# Patient Record
Sex: FEMALE | Race: WHITE | HISPANIC OR LATINO | Employment: OTHER | URBAN - METROPOLITAN AREA
[De-identification: names, ages, dates, MRNs, and addresses within clinical notes are randomized per-mention and may not be internally consistent; named-entity substitution may affect disease eponyms.]

---

## 2017-01-24 ENCOUNTER — OFFICE VISIT (OUTPATIENT)
Dept: BEHAVIORAL HEALTH | Facility: PHYSICIAN GROUP | Age: 52
End: 2017-01-24
Payer: MEDICARE

## 2017-01-24 VITALS
HEIGHT: 62 IN | BODY MASS INDEX: 32.2 KG/M2 | WEIGHT: 175 LBS | DIASTOLIC BLOOD PRESSURE: 87 MMHG | SYSTOLIC BLOOD PRESSURE: 118 MMHG | HEART RATE: 84 BPM

## 2017-01-24 DIAGNOSIS — F43.10 PTSD (POST-TRAUMATIC STRESS DISORDER): ICD-10-CM

## 2017-01-24 DIAGNOSIS — F31.32 BIPOLAR AFFECTIVE DISORDER, CURRENTLY DEPRESSED, MODERATE (HCC): ICD-10-CM

## 2017-01-24 PROBLEM — F31.9 BIPOLAR I DISORDER WITH MOOD-CONGRUENT PSYCHOTIC FEATURES (HCC): Status: ACTIVE | Noted: 2017-01-24

## 2017-01-24 PROBLEM — F31.81 BIPOLAR 2 DISORDER (HCC): Status: RESOLVED | Noted: 2017-01-24 | Resolved: 2017-01-24

## 2017-01-24 PROBLEM — F31.81 BIPOLAR 2 DISORDER (HCC): Status: ACTIVE | Noted: 2017-01-24

## 2017-01-24 PROBLEM — F31.9 BIPOLAR I DISORDER WITH MOOD-CONGRUENT PSYCHOTIC FEATURES (HCC): Status: RESOLVED | Noted: 2017-01-24 | Resolved: 2017-01-24

## 2017-01-24 PROCEDURE — G8484 FLU IMMUNIZE NO ADMIN: HCPCS | Performed by: PSYCHIATRY & NEUROLOGY

## 2017-01-24 PROCEDURE — 1036F TOBACCO NON-USER: CPT | Performed by: PSYCHIATRY & NEUROLOGY

## 2017-01-24 PROCEDURE — 99204 OFFICE O/P NEW MOD 45 MIN: CPT | Performed by: PSYCHIATRY & NEUROLOGY

## 2017-01-24 PROCEDURE — 3017F COLORECTAL CA SCREEN DOC REV: CPT | Mod: 8P | Performed by: PSYCHIATRY & NEUROLOGY

## 2017-01-24 PROCEDURE — 3014F SCREEN MAMMO DOC REV: CPT | Mod: 8P | Performed by: PSYCHIATRY & NEUROLOGY

## 2017-01-24 PROCEDURE — G8432 DEP SCR NOT DOC, RNG: HCPCS | Performed by: PSYCHIATRY & NEUROLOGY

## 2017-01-24 PROCEDURE — G8419 CALC BMI OUT NRM PARAM NOF/U: HCPCS | Performed by: PSYCHIATRY & NEUROLOGY

## 2017-01-24 RX ORDER — DIVALPROEX SODIUM 500 MG/1
500 TABLET, EXTENDED RELEASE ORAL DAILY
COMMUNITY
End: 2017-01-24

## 2017-01-24 RX ORDER — LORAZEPAM 1 MG/1
1 TABLET ORAL 2 TIMES DAILY
COMMUNITY
End: 2017-10-05

## 2017-01-24 RX ORDER — LURASIDONE HYDROCHLORIDE 20 MG/1
20 TABLET, FILM COATED ORAL
Qty: 60 TAB | Refills: 1 | Status: SHIPPED | OUTPATIENT
Start: 2017-01-24 | End: 2017-10-05
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
## 2017-03-22 ENCOUNTER — OFFICE VISIT (OUTPATIENT)
Dept: URGENT CARE | Facility: CLINIC | Age: 52
End: 2017-03-22
Payer: MEDICARE

## 2017-03-22 VITALS
TEMPERATURE: 97 F | HEIGHT: 62 IN | BODY MASS INDEX: 30.36 KG/M2 | RESPIRATION RATE: 14 BRPM | WEIGHT: 165 LBS | HEART RATE: 62 BPM | DIASTOLIC BLOOD PRESSURE: 72 MMHG | OXYGEN SATURATION: 98 % | SYSTOLIC BLOOD PRESSURE: 118 MMHG

## 2017-03-22 DIAGNOSIS — S70.02XA CONTUSION OF LEFT HIP, INITIAL ENCOUNTER: ICD-10-CM

## 2017-03-22 DIAGNOSIS — S80.12XA CONTUSION OF LEFT LOWER LEG, INITIAL ENCOUNTER: ICD-10-CM

## 2017-03-22 PROCEDURE — G8419 CALC BMI OUT NRM PARAM NOF/U: HCPCS | Performed by: NURSE PRACTITIONER

## 2017-03-22 PROCEDURE — 3017F COLORECTAL CA SCREEN DOC REV: CPT | Mod: 8P | Performed by: NURSE PRACTITIONER

## 2017-03-22 PROCEDURE — 1036F TOBACCO NON-USER: CPT | Performed by: NURSE PRACTITIONER

## 2017-03-22 PROCEDURE — G8432 DEP SCR NOT DOC, RNG: HCPCS | Performed by: NURSE PRACTITIONER

## 2017-03-22 PROCEDURE — 3014F SCREEN MAMMO DOC REV: CPT | Mod: 8P | Performed by: NURSE PRACTITIONER

## 2017-03-22 PROCEDURE — G8484 FLU IMMUNIZE NO ADMIN: HCPCS | Performed by: NURSE PRACTITIONER

## 2017-03-22 PROCEDURE — 99213 OFFICE O/P EST LOW 20 MIN: CPT | Performed by: NURSE PRACTITIONER

## 2017-03-22 NOTE — MR AVS SNAPSHOT
"Patti Tello   3/22/2017 8:30 AM   Office Visit   MRN: [id number]    Department:  [facility]   Dept Phone:  [phone]    Description:  Female : 1965   Provider:  VIPIN Crouch           Reason for Visit     Fall pt states she fell in the shower and landed on (L) side aprox an hour ago      Allergies as of 3/22/2017     No Known Allergies      You were diagnosed with     Contusion of left lower leg, initial encounter   [535206]       Contusion of left hip, initial encounter   [743977]         Vital Signs     Blood Pressure Pulse Temperature Respirations Height Weight    118/72 mmHg 62 36.1 °C (97 °F) 14 1.575 m (5' 2\") 74.844 kg (165 lb)    Body Mass Index Oxygen Saturation Smoking Status             30.17 kg/m2 98% Never Smoker          Basic Information     Date Of Birth Sex Race Ethnicity Preferred Language    1965 Female  or  Non- English      Problem List              ICD-10-CM Priority Class Noted - Resolved    Herniated nucleus pulposus, L4-5 M51.26   3/18/2010 - Present    Spondylisthesis M43.10   3/18/2010 - Present    Insomnia G47.00   11/10/2010 - Present    Left lumbar radiculopathy M54.16   11/10/2010 - Present    Arthritis M19.90   Unknown - Present    Cancer (CMS-HCC) C80.1   Unknown - Present    Back injury S39.92XA   Unknown - Present    Chronic neck pain M54.2, G89.29   Unknown - Present    Chronic low back pain M54.5, G89.29   Unknown - Present    Vitamin D insufficiency E55.9   2010 - Present    Panic attacks F41.0   Unknown - Present    PTSD (post-traumatic stress disorder) F43.10   Unknown - Present    Encounter for long-term (current) use of other medications Z79.899   2011 - Present    Cervical radiculopathy, chronic M54.12   2011 - Present    Itching due to drug T50.901A, L29.8   2011 - Present    Cold intolerance R68.89   2012 - Present    Exertional dyspnea R06.09   2012 - Present   " Degenerative arthritis of cervical spine M47.812   2/8/2012 - Present    Radiculopathy, lumbar region M54.16   10/1/2012 - Present    Spondylolisthesis of lumbar region M43.16   10/1/2012 - Present    Low back pain M54.5   10/16/2012 - Present    Lumbar radiculopathy, acute M54.16   10/16/2012 - Present    Cervicalgia M54.2   10/22/2012 - Present    Thoracic or lumbosacral neuritis or radiculitis, unspecified [id number]   10/22/2012 - Present    Lumbar stenosis M48.06 High  10/22/2012 - Present    S/P laminectomy Z98.890   10/22/2012 - Present    Anxiety F41.9   12/6/2012 - Present    Chronic UTI N39.0   9/16/2013 - Present    Chronic pain syndrome G89.4   9/16/2013 - Present    Complicated UTI (urinary tract infection) N39.0   10/11/2013 - Present    Nephrolithiasis N20.0   10/11/2013 - Present    H/O gastric bypass Z98.890   10/11/2013 - Present    HX: breast cancer Z85.3   10/11/2013 - Present    Shortness of breath R06.02   9/6/2016 - Present    Bipolar affective disorder, currently depressed, moderate (CMS-HCC) F31.32   1/24/2017 - Present      Health Maintenance        Date Due Completion Dates    IMM DTaP/Tdap/Td Vaccine (1 - Tdap) 9/25/1983 ---    PAP SMEAR 9/25/1985 ---    MAMMOGRAM 7/26/2013 7/26/2012, 9/9/2011, 5/20/2009, 5/20/2009, 12/12/2005, 10/14/2004    COLONOSCOPY 9/25/2014 ---    IMM INFLUENZA (1) 9/1/2016 10/2/2012            Current Immunizations     Influenza TIV (IM) 10/2/2012  9:34 AM      Below and/or attached are the medications your provider expects you to take. Review all of your home medications and newly ordered medications with your provider and/or pharmacist. Follow medication instructions as directed by your provider and/or pharmacist. Please keep your medication list with you and share with your provider. Update the information when medications are discontinued, doses are changed, or new medications (including over-the-counter products) are added; and carry medication information at all  times in the event of emergency situations     Allergies:  No Known Allergies          Medications  Valid as of: March 22, 2017 -  9:00 AM    Generic Name Brand Name Tablet Size Instructions for use    Albuterol Sulfate (Aero Soln) albuterol 108 (90 BASE) MCG/ACT Inhale 2 Puffs by mouth every 6 hours as needed for Shortness of Breath.        Budesonide-Formoterol Fumarate (Aerosol) SYMBICORT 160-4.5 MCG/ACT Inhale 1 Puff by mouth 2 Times a Day.        Fluticasone Propionate (Suspension) FLONASE 50 MCG/ACT Spray 1 Spray in nose 2 times a day. Each Nostril        LORazepam (Tab) ATIVAN 1 MG Take 1 mg by mouth 2 Times a Day.        Lurasidone HCl (Tab) LATUDA 20 MG Take 1 Tab by mouth with dinner.        Meclizine HCl (Tab) ANTIVERT 12.5 MG Take 1 Tab by mouth 3 times a day as needed.        Methadone HCl (Tab) DOLOPHINE 10 MG Take 10 mg by mouth 3 times a day as needed. Indications: Moderate to Severe Pain        Nitrofurantoin Monohyd Macro (Cap) MACROBID 100 MG Take 100 mg by mouth 2 times a day.        Ondansetron HCl (Tab) ZOFRAN 4 MG Take 1 Tab by mouth every 6 hours as needed for Nausea/Vomiting.        Ospemifene (Tab) Ospemifene 60 MG Take  by mouth.        OxyCODONE HCl (Tab) ROXICODONE 10 MG Take 20 mg by mouth every 6 hours as needed for Moderate Pain.        Sertraline HCl (Tab) ZOLOFT 50 MG Take 1 Tab by mouth every day.        Tiotropium Bromide Monohydrate (Cap) SPIRIVA 18 MCG Inhale 1 Cap by mouth every day.        .                 Medicines prescribed today were sent to:     [facility] [id number] [place] [address], [place] - [address] [facility] [address] [facility]    [address] [facility] [address]    Phone: [phone] Fax: [phone]    Open 24 Hours?: Yes      Medication refill instructions:       If your prescription bottle indicates you have medication refills left, it is not necessary to call your provider’s office. Please contact your pharmacy and they will refill your medication.    If your  prescription bottle indicates you do not have any refills left, you may request refills at any time through one of the following ways: The online Patsnap system (except Urgent Care), by calling your provider’s office, or by asking your pharmacy to contact your provider’s office with a refill request. Medication refills are processed only during regular business hours and may not be available until the next business day. Your provider may request additional information or to have a follow-up visit with you prior to refilling your medication.   *Please Note: Medication refills are assigned a new Rx number when refilled electronically. Your pharmacy may indicate that no refills were authorized even though a new prescription for the same medication is available at the pharmacy. Please request the medicine by name with the pharmacy before contacting your provider for a refill.        Other Notes About Your Plan     Violation of narcotic contract.  Neg uds twice while on norco.  Will not prescribe narcotics.  See tel encounter dated 2/4/11.           Patsnap Access Code: [email or web address]  Expires: 4/21/2017  8:25 AM    Patsnap  A secure, online tool to manage your health information     [employer]’s Patsnap® is a secure, online tool that connects you to your personalized health information from the privacy of your home -- day or night - making it very easy for you to manage your healthcare. Once the activation process is completed, you can even access your medical information using the Patsnap sena, which is available for free in the Apple Sena store or Google Play store.     Patsnap provides the following levels of access (as shown below):   My Chart Features   [facility] Doctor [facility]  Specialists [facility]  Urgent  Care Non-[facility]  Primary Care  Doctor   Email your healthcare team securely and privately 24/7 X X X    Manage appointments: schedule your next appointment; view details of past/upcoming appointments  X      Request prescription refills. X      View recent personal medical records, including lab and immunizations X X X X   View health record, including health history, allergies, medications X X X X   Read reports about your outpatient visits, procedures, consult and ER notes X X X X   See your discharge summary, which is a recap of your hospital and/or ER visit that includes your diagnosis, lab results, and care plan. X X       How to register for Wellsphere:  1. Go to  [email or web address].  2. Click on the Sign Up Now box, which takes you to the New Member Sign Up page. You will need to provide the following information:  a. Enter your Wellsphere Access Code exactly as it appears at the top of this page. (You will not need to use this code after you’ve completed the sign-up process. If you do not sign up before the expiration date, you must request a new code.)   b. Enter your date of birth.   c. Enter your home email address.   d. Click Submit, and follow the next screen’s instructions.  3. Create a Wellsphere ID. This will be your Wellsphere login ID and cannot be changed, so think of one that is secure and easy to remember.  4. Create a Wellsphere password. You can change your password at any time.  5. Enter your Password Reset Question and Answer. This can be used at a later time if you forget your password.   6. Enter your e-mail address. This allows you to receive e-mail notifications when new information is available in Wellsphere.  7. Click Sign Up. You can now view your health information.    For assistance activating your Wellsphere account, call [phone]

## 2017-03-22 NOTE — PROGRESS NOTES
"Subjective:      Patti Tello is a 51 y.o. female who presents with Fall            HPI this is a new problem. 51 year female with fall in bathrub this morning with now she has left hip pain and left lower leg pain. She does not recall hitting anything, just slid down into tub. She rates her pain as 10/10 and states her tolerance for pain is \"high\". No treatment for this at this time.   Allergies, medications and history reviewed by me today      ROS  Please see HPI       Objective:     /72 mmHg  Pulse 62  Temp(Src) 36.1 °C (97 °F)  Resp 14  Ht 1.575 m (5' 2\")  Wt 74.844 kg (165 lb)  BMI 30.17 kg/m2  SpO2 98%     Physical Exam   Constitutional: She is oriented to person, place, and time. She appears well-developed and well-nourished.   Musculoskeletal: Normal range of motion.        Left hip: She exhibits tenderness and bony tenderness.        Left lower leg: She exhibits tenderness, bony tenderness and swelling.        Legs:  Neurological: She is alert and oriented to person, place, and time. Gait abnormal.   Skin: Skin is warm and dry.   Psychiatric: She has a normal mood and affect. Her behavior is normal. Thought content normal.   Nursing note and vitals reviewed.              Assessment/Plan:     1. Contusion of left lower leg, initial encounter     2. Contusion of left hip, initial encounter       Recommend ice/extra strength tylenol.  Follow up as needed.      "

## 2017-06-19 ENCOUNTER — HOSPITAL ENCOUNTER (OUTPATIENT)
Dept: LAB | Facility: MEDICAL CENTER | Age: 52
End: 2017-06-19
Attending: INTERNAL MEDICINE
Payer: MEDICARE

## 2017-06-19 LAB
ALBUMIN SERPL BCP-MCNC: 3.9 G/DL (ref 3.2–4.9)
ALBUMIN/GLOB SERPL: 1.1 G/DL
ALP SERPL-CCNC: 142 U/L (ref 30–99)
ALT SERPL-CCNC: 12 U/L (ref 2–50)
ANION GAP SERPL CALC-SCNC: 4 MMOL/L (ref 0–11.9)
AST SERPL-CCNC: 14 U/L (ref 12–45)
BASOPHILS # BLD AUTO: 0.9 % (ref 0–1.8)
BASOPHILS # BLD: 0.07 K/UL (ref 0–0.12)
BILIRUB SERPL-MCNC: 0.4 MG/DL (ref 0.1–1.5)
BUN SERPL-MCNC: 14 MG/DL (ref 8–22)
CALCIUM SERPL-MCNC: 9.2 MG/DL (ref 8.5–10.5)
CHLORIDE SERPL-SCNC: 110 MMOL/L (ref 96–112)
CHOLEST SERPL-MCNC: 186 MG/DL (ref 100–199)
CO2 SERPL-SCNC: 25 MMOL/L (ref 20–33)
CREAT SERPL-MCNC: 0.74 MG/DL (ref 0.5–1.4)
EOSINOPHIL # BLD AUTO: 0.37 K/UL (ref 0–0.51)
EOSINOPHIL NFR BLD: 4.7 % (ref 0–6.9)
ERYTHROCYTE [DISTWIDTH] IN BLOOD BY AUTOMATED COUNT: 48 FL (ref 35.9–50)
GFR SERPL CREATININE-BSD FRML MDRD: >60 ML/MIN/1.73 M 2
GLOBULIN SER CALC-MCNC: 3.4 G/DL (ref 1.9–3.5)
GLUCOSE SERPL-MCNC: 100 MG/DL (ref 65–99)
HCT VFR BLD AUTO: 38.8 % (ref 37–47)
HDLC SERPL-MCNC: 57 MG/DL
HGB BLD-MCNC: 12.1 G/DL (ref 12–16)
IMM GRANULOCYTES # BLD AUTO: 0.02 K/UL (ref 0–0.11)
IMM GRANULOCYTES NFR BLD AUTO: 0.3 % (ref 0–0.9)
LDLC SERPL CALC-MCNC: 103 MG/DL
LYMPHOCYTES # BLD AUTO: 2.37 K/UL (ref 1–4.8)
LYMPHOCYTES NFR BLD: 30.2 % (ref 22–41)
MCH RBC QN AUTO: 24.9 PG (ref 27–33)
MCHC RBC AUTO-ENTMCNC: 31.2 G/DL (ref 33.6–35)
MCV RBC AUTO: 80 FL (ref 81.4–97.8)
MONOCYTES # BLD AUTO: 0.41 K/UL (ref 0–0.85)
MONOCYTES NFR BLD AUTO: 5.2 % (ref 0–13.4)
NEUTROPHILS # BLD AUTO: 4.6 K/UL (ref 2–7.15)
NEUTROPHILS NFR BLD: 58.7 % (ref 44–72)
NRBC # BLD AUTO: 0 K/UL
NRBC BLD AUTO-RTO: 0 /100 WBC
PLATELET # BLD AUTO: 314 K/UL (ref 164–446)
PMV BLD AUTO: 11.4 FL (ref 9–12.9)
POTASSIUM SERPL-SCNC: 3.5 MMOL/L (ref 3.6–5.5)
PROT SERPL-MCNC: 7.3 G/DL (ref 6–8.2)
RBC # BLD AUTO: 4.85 M/UL (ref 4.2–5.4)
SODIUM SERPL-SCNC: 139 MMOL/L (ref 135–145)
TRIGL SERPL-MCNC: 130 MG/DL (ref 0–149)
WBC # BLD AUTO: 7.8 K/UL (ref 4.8–10.8)

## 2017-06-19 PROCEDURE — 80053 COMPREHEN METABOLIC PANEL: CPT | Mod: GY

## 2017-06-19 PROCEDURE — 85025 COMPLETE CBC W/AUTO DIFF WBC: CPT | Mod: GY

## 2017-06-19 PROCEDURE — 36415 COLL VENOUS BLD VENIPUNCTURE: CPT | Mod: GY

## 2017-06-19 PROCEDURE — 80061 LIPID PANEL: CPT | Mod: GY

## 2017-06-20 ENCOUNTER — HOSPITAL ENCOUNTER (OUTPATIENT)
Dept: LAB | Facility: MEDICAL CENTER | Age: 52
End: 2017-06-20
Attending: INTERNAL MEDICINE
Payer: MEDICARE

## 2017-06-20 VITALS
TEMPERATURE: 98.1 F | OXYGEN SATURATION: 98 % | BODY MASS INDEX: 31.04 KG/M2 | HEART RATE: 113 BPM | RESPIRATION RATE: 19 BRPM | WEIGHT: 168.65 LBS | DIASTOLIC BLOOD PRESSURE: 93 MMHG | SYSTOLIC BLOOD PRESSURE: 144 MMHG | HEIGHT: 62 IN

## 2017-06-20 PROCEDURE — 84080 ASSAY ALKALINE PHOSPHATASES: CPT

## 2017-06-20 PROCEDURE — 84075 ASSAY ALKALINE PHOSPHATASE: CPT

## 2017-06-20 PROCEDURE — 36415 COLL VENOUS BLD VENIPUNCTURE: CPT

## 2017-06-20 PROCEDURE — 302449 STATCHG TRIAGE ONLY (STATISTIC)

## 2017-06-20 ASSESSMENT — PAIN SCALES - GENERAL: PAINLEVEL_OUTOF10: 10

## 2017-06-21 ENCOUNTER — HOSPITAL ENCOUNTER (EMERGENCY)
Facility: MEDICAL CENTER | Age: 52
End: 2017-06-21
Payer: MEDICARE

## 2017-06-21 NOTE — ED NOTES
"Patti Tello  51 y.o.  Chief Complaint   Patient presents with   • Shoulder Pain     RIGHT   • Low Back Pain       Ambulatory to triage with above complaint. Patient has had pain for > 1 month. Has seen her PCP multiple times for this pain. States that he was prescribed pain medication at home \"but they haven't been working and I even forget to take them sometimes.\" Patient crying in triage. States that she has bloodwork done at the [facility] yesterday and today per her PCP.      Triage process explained to patient, apologized for wait time, and returned to [facility].  "

## 2017-06-23 LAB
ALP BONE SERPL-CCNC: 81 U/L (ref 0–55)
ALP ISOS SERPL HS-CCNC: 0 U/L
ALP LIVER SERPL-CCNC: 77 U/L (ref 0–94)
ALP SERPL-CCNC: 158 U/L (ref 40–120)

## 2017-06-28 ENCOUNTER — HOSPITAL ENCOUNTER (OUTPATIENT)
Dept: LAB | Facility: MEDICAL CENTER | Age: 52
End: 2017-06-28
Attending: INTERNAL MEDICINE
Payer: MEDICARE

## 2017-06-28 LAB
EST. AVERAGE GLUCOSE BLD GHB EST-MCNC: 117 MG/DL
HBA1C MFR BLD: 5.7 % (ref 0–5.6)

## 2017-06-28 PROCEDURE — 36415 COLL VENOUS BLD VENIPUNCTURE: CPT | Mod: GA

## 2017-06-28 PROCEDURE — 83036 HEMOGLOBIN GLYCOSYLATED A1C: CPT | Mod: GA

## 2017-06-28 PROCEDURE — 84080 ASSAY ALKALINE PHOSPHATASES: CPT

## 2017-06-28 PROCEDURE — 84075 ASSAY ALKALINE PHOSPHATASE: CPT

## 2017-06-29 ENCOUNTER — HOSPITAL ENCOUNTER (OUTPATIENT)
Dept: HOSPITAL 8 - CFH | Age: 52
Discharge: HOME | End: 2017-06-29
Attending: NEUROLOGICAL SURGERY
Payer: COMMERCIAL

## 2017-06-29 DIAGNOSIS — M51.36: ICD-10-CM

## 2017-06-29 DIAGNOSIS — Z12.31: Primary | ICD-10-CM

## 2017-06-29 DIAGNOSIS — M43.27: ICD-10-CM

## 2017-06-29 DIAGNOSIS — M47.892: ICD-10-CM

## 2017-06-29 DIAGNOSIS — Z98.890: ICD-10-CM

## 2017-06-29 LAB
ALP BONE SERPL-CCNC: 82 U/L (ref 0–55)
ALP ISOS SERPL HS-CCNC: 0 U/L
ALP LIVER SERPL-CCNC: 85 U/L (ref 0–94)
ALP SERPL-CCNC: 167 U/L (ref 40–120)

## 2017-06-29 PROCEDURE — 72110 X-RAY EXAM L-2 SPINE 4/>VWS: CPT

## 2017-06-29 PROCEDURE — 72050 X-RAY EXAM NECK SPINE 4/5VWS: CPT

## 2017-06-29 PROCEDURE — G0202 SCR MAMMO BI INCL CAD: HCPCS

## 2017-06-30 ENCOUNTER — HOSPITAL ENCOUNTER (OUTPATIENT)
Dept: HOSPITAL 8 - CFH | Age: 52
Discharge: HOME | End: 2017-06-30
Attending: NEUROLOGICAL SURGERY
Payer: MEDICARE

## 2017-06-30 DIAGNOSIS — M50.21: ICD-10-CM

## 2017-06-30 DIAGNOSIS — M48.07: ICD-10-CM

## 2017-06-30 DIAGNOSIS — Z85.3: ICD-10-CM

## 2017-06-30 DIAGNOSIS — Z98.890: ICD-10-CM

## 2017-06-30 DIAGNOSIS — M50.323: Primary | ICD-10-CM

## 2017-06-30 DIAGNOSIS — M51.24: ICD-10-CM

## 2017-06-30 DIAGNOSIS — M48.06: ICD-10-CM

## 2017-06-30 PROCEDURE — 72141 MRI NECK SPINE W/O DYE: CPT

## 2017-06-30 PROCEDURE — A9585 GADOBUTROL INJECTION: HCPCS

## 2017-06-30 PROCEDURE — 72158 MRI LUMBAR SPINE W/O & W/DYE: CPT

## 2017-07-06 ENCOUNTER — HOSPITAL ENCOUNTER (EMERGENCY)
Dept: HOSPITAL 8 - ED | Age: 52
Discharge: HOME | End: 2017-07-06
Payer: MEDICARE

## 2017-07-06 VITALS — BODY MASS INDEX: 31.08 KG/M2 | WEIGHT: 168.87 LBS | HEIGHT: 62 IN

## 2017-07-06 VITALS — DIASTOLIC BLOOD PRESSURE: 73 MMHG | SYSTOLIC BLOOD PRESSURE: 121 MMHG

## 2017-07-06 DIAGNOSIS — G89.29: Primary | ICD-10-CM

## 2017-07-06 DIAGNOSIS — M54.12: ICD-10-CM

## 2017-07-06 DIAGNOSIS — S09.90XA: ICD-10-CM

## 2017-07-06 DIAGNOSIS — M54.5: ICD-10-CM

## 2017-07-06 LAB — BUN SERPL-MCNC: 10 MG/DL (ref 7–18)

## 2017-07-06 PROCEDURE — 80048 BASIC METABOLIC PNL TOTAL CA: CPT

## 2017-07-06 PROCEDURE — 36415 COLL VENOUS BLD VENIPUNCTURE: CPT

## 2017-07-06 PROCEDURE — 96375 TX/PRO/DX INJ NEW DRUG ADDON: CPT

## 2017-07-06 PROCEDURE — 99284 EMERGENCY DEPT VISIT MOD MDM: CPT

## 2017-07-06 PROCEDURE — 96374 THER/PROPH/DIAG INJ IV PUSH: CPT

## 2017-07-06 PROCEDURE — 85025 COMPLETE CBC W/AUTO DIFF WBC: CPT

## 2017-07-06 PROCEDURE — 82040 ASSAY OF SERUM ALBUMIN: CPT

## 2017-07-10 ENCOUNTER — HOSPITAL ENCOUNTER (EMERGENCY)
Dept: HOSPITAL 8 - ED | Age: 52
Discharge: HOME | End: 2017-07-10
Payer: MEDICARE

## 2017-07-10 VITALS — WEIGHT: 170.2 LBS | BODY MASS INDEX: 31.32 KG/M2 | HEIGHT: 62 IN

## 2017-07-10 VITALS — DIASTOLIC BLOOD PRESSURE: 67 MMHG | SYSTOLIC BLOOD PRESSURE: 109 MMHG

## 2017-07-10 DIAGNOSIS — Z90.710: ICD-10-CM

## 2017-07-10 DIAGNOSIS — M51.36: ICD-10-CM

## 2017-07-10 DIAGNOSIS — N30.00: Primary | ICD-10-CM

## 2017-07-10 DIAGNOSIS — M47.896: ICD-10-CM

## 2017-07-10 LAB
AST SERPL-CCNC: 23 U/L (ref 15–37)
BUN SERPL-MCNC: 13 MG/DL (ref 7–18)
PATH.CAST-FLAG: (no result)
SPERM-FLAG: (no result)
SRC-FLAG: (no result)
XTAL-FLAG: (no result)
YLC-FLAG: (no result)

## 2017-07-10 PROCEDURE — 36415 COLL VENOUS BLD VENIPUNCTURE: CPT

## 2017-07-10 PROCEDURE — 85025 COMPLETE CBC W/AUTO DIFF WBC: CPT

## 2017-07-10 PROCEDURE — 80053 COMPREHEN METABOLIC PANEL: CPT

## 2017-07-10 PROCEDURE — 99285 EMERGENCY DEPT VISIT HI MDM: CPT

## 2017-07-10 PROCEDURE — 74176 CT ABD & PELVIS W/O CONTRAST: CPT

## 2017-07-10 PROCEDURE — 87086 URINE CULTURE/COLONY COUNT: CPT

## 2017-07-10 PROCEDURE — 96372 THER/PROPH/DIAG INJ SC/IM: CPT

## 2017-07-10 PROCEDURE — 87077 CULTURE AEROBIC IDENTIFY: CPT

## 2017-07-10 PROCEDURE — 81001 URINALYSIS AUTO W/SCOPE: CPT

## 2017-10-05 ENCOUNTER — HOSPITAL ENCOUNTER (OUTPATIENT)
Facility: MEDICAL CENTER | Age: 52
End: 2017-10-06
Attending: EMERGENCY MEDICINE | Admitting: HOSPITALIST
Payer: MEDICARE

## 2017-10-05 ENCOUNTER — OFFICE VISIT (OUTPATIENT)
Dept: URGENT CARE | Facility: CLINIC | Age: 52
End: 2017-10-05
Payer: MEDICARE

## 2017-10-05 ENCOUNTER — APPOINTMENT (OUTPATIENT)
Dept: RADIOLOGY | Facility: MEDICAL CENTER | Age: 52
End: 2017-10-05
Attending: EMERGENCY MEDICINE
Payer: MEDICARE

## 2017-10-05 ENCOUNTER — RESOLUTE PROFESSIONAL BILLING HOSPITAL PROF FEE (OUTPATIENT)
Dept: HOSPITALIST | Facility: MEDICAL CENTER | Age: 52
End: 2017-10-05
Payer: MEDICARE

## 2017-10-05 VITALS
OXYGEN SATURATION: 98 % | HEART RATE: 98 BPM | DIASTOLIC BLOOD PRESSURE: 72 MMHG | SYSTOLIC BLOOD PRESSURE: 112 MMHG | RESPIRATION RATE: 18 BRPM | TEMPERATURE: 98.2 F

## 2017-10-05 DIAGNOSIS — R68.84 JAW PAIN: ICD-10-CM

## 2017-10-05 DIAGNOSIS — R07.9 CHEST PAIN, UNSPECIFIED TYPE: ICD-10-CM

## 2017-10-05 DIAGNOSIS — M79.602 LEFT ARM PAIN: ICD-10-CM

## 2017-10-05 DIAGNOSIS — R06.02 SHORTNESS OF BREATH: ICD-10-CM

## 2017-10-05 PROBLEM — G89.29 CHRONIC BACK PAIN: Status: ACTIVE | Noted: 2017-10-05

## 2017-10-05 PROBLEM — M54.9 CHRONIC BACK PAIN: Status: ACTIVE | Noted: 2017-10-05

## 2017-10-05 PROBLEM — J44.9 COPD (CHRONIC OBSTRUCTIVE PULMONARY DISEASE) (HCC): Status: ACTIVE | Noted: 2017-10-05

## 2017-10-05 LAB
ALBUMIN SERPL BCP-MCNC: 3.7 G/DL (ref 3.2–4.9)
ALBUMIN/GLOB SERPL: 1.2 G/DL
ALP SERPL-CCNC: 150 U/L (ref 30–99)
ALT SERPL-CCNC: 11 U/L (ref 2–50)
ANION GAP SERPL CALC-SCNC: 9 MMOL/L (ref 0–11.9)
APTT PPP: 25.7 SEC (ref 24.7–36)
AST SERPL-CCNC: 13 U/L (ref 12–45)
BASOPHILS # BLD AUTO: 1 % (ref 0–1.8)
BASOPHILS # BLD: 0.07 K/UL (ref 0–0.12)
BILIRUB SERPL-MCNC: 0.3 MG/DL (ref 0.1–1.5)
BLOOD CULTURE HOLD CXBCH: NORMAL
BLOOD CULTURE HOLD CXBCH: NORMAL
BNP SERPL-MCNC: 19 PG/ML (ref 0–100)
BUN SERPL-MCNC: 11 MG/DL (ref 8–22)
CALCIUM SERPL-MCNC: 9.2 MG/DL (ref 8.5–10.5)
CHLORIDE SERPL-SCNC: 111 MMOL/L (ref 96–112)
CO2 SERPL-SCNC: 23 MMOL/L (ref 20–33)
CREAT SERPL-MCNC: 0.66 MG/DL (ref 0.5–1.4)
EKG IMPRESSION: NORMAL
EKG IMPRESSION: NORMAL
EOSINOPHIL # BLD AUTO: 0.15 K/UL (ref 0–0.51)
EOSINOPHIL NFR BLD: 2.1 % (ref 0–6.9)
ERYTHROCYTE [DISTWIDTH] IN BLOOD BY AUTOMATED COUNT: 45.1 FL (ref 35.9–50)
GFR SERPL CREATININE-BSD FRML MDRD: >60 ML/MIN/1.73 M 2
GLOBULIN SER CALC-MCNC: 3.2 G/DL (ref 1.9–3.5)
GLUCOSE SERPL-MCNC: 73 MG/DL (ref 65–99)
HCT VFR BLD AUTO: 35.6 % (ref 37–47)
HGB BLD-MCNC: 11.1 G/DL (ref 12–16)
IMM GRANULOCYTES # BLD AUTO: 0.04 K/UL (ref 0–0.11)
IMM GRANULOCYTES NFR BLD AUTO: 0.6 % (ref 0–0.9)
INR PPP: 1.02 (ref 0.87–1.13)
LIPASE SERPL-CCNC: 28 U/L (ref 11–82)
LYMPHOCYTES # BLD AUTO: 1.79 K/UL (ref 1–4.8)
LYMPHOCYTES NFR BLD: 24.8 % (ref 22–41)
MAGNESIUM SERPL-MCNC: 2.2 MG/DL (ref 1.5–2.5)
MCH RBC QN AUTO: 25 PG (ref 27–33)
MCHC RBC AUTO-ENTMCNC: 31.2 G/DL (ref 33.6–35)
MCV RBC AUTO: 80.2 FL (ref 81.4–97.8)
MONOCYTES # BLD AUTO: 0.47 K/UL (ref 0–0.85)
MONOCYTES NFR BLD AUTO: 6.5 % (ref 0–13.4)
NEUTROPHILS # BLD AUTO: 4.69 K/UL (ref 2–7.15)
NEUTROPHILS NFR BLD: 65 % (ref 44–72)
NRBC # BLD AUTO: 0 K/UL
NRBC BLD AUTO-RTO: 0 /100 WBC
PLATELET # BLD AUTO: 301 K/UL (ref 164–446)
PMV BLD AUTO: 10.8 FL (ref 9–12.9)
POTASSIUM SERPL-SCNC: 3.2 MMOL/L (ref 3.6–5.5)
PROT SERPL-MCNC: 6.9 G/DL (ref 6–8.2)
PROTHROMBIN TIME: 13.7 SEC (ref 12–14.6)
RBC # BLD AUTO: 4.44 M/UL (ref 4.2–5.4)
SODIUM SERPL-SCNC: 143 MMOL/L (ref 135–145)
TROPONIN I SERPL-MCNC: <0.01 NG/ML (ref 0–0.04)
TSH SERPL DL<=0.005 MIU/L-ACNC: 0.99 UIU/ML (ref 0.3–3.7)
WBC # BLD AUTO: 7.2 K/UL (ref 4.8–10.8)

## 2017-10-05 PROCEDURE — 99215 OFFICE O/P EST HI 40 MIN: CPT | Performed by: PHYSICIAN ASSISTANT

## 2017-10-05 PROCEDURE — A9270 NON-COVERED ITEM OR SERVICE: HCPCS | Performed by: FAMILY MEDICINE

## 2017-10-05 PROCEDURE — 700111 HCHG RX REV CODE 636 W/ 250 OVERRIDE (IP): Performed by: HOSPITALIST

## 2017-10-05 PROCEDURE — 93005 ELECTROCARDIOGRAM TRACING: CPT | Performed by: HOSPITALIST

## 2017-10-05 PROCEDURE — 84484 ASSAY OF TROPONIN QUANT: CPT | Mod: 91

## 2017-10-05 PROCEDURE — 700102 HCHG RX REV CODE 250 W/ 637 OVERRIDE(OP): Performed by: FAMILY MEDICINE

## 2017-10-05 PROCEDURE — G0378 HOSPITAL OBSERVATION PER HR: HCPCS

## 2017-10-05 PROCEDURE — 99220 PR INITIAL OBSERVATION CARE,LEVL III: CPT | Performed by: HOSPITALIST

## 2017-10-05 PROCEDURE — 93005 ELECTROCARDIOGRAM TRACING: CPT | Performed by: EMERGENCY MEDICINE

## 2017-10-05 PROCEDURE — 93010 ELECTROCARDIOGRAM REPORT: CPT | Performed by: INTERNAL MEDICINE

## 2017-10-05 PROCEDURE — 85730 THROMBOPLASTIN TIME PARTIAL: CPT

## 2017-10-05 PROCEDURE — 71010 DX-CHEST-LIMITED (1 VIEW): CPT

## 2017-10-05 PROCEDURE — 700102 HCHG RX REV CODE 250 W/ 637 OVERRIDE(OP): Performed by: EMERGENCY MEDICINE

## 2017-10-05 PROCEDURE — 85025 COMPLETE CBC W/AUTO DIFF WBC: CPT

## 2017-10-05 PROCEDURE — 84443 ASSAY THYROID STIM HORMONE: CPT

## 2017-10-05 PROCEDURE — 93005 ELECTROCARDIOGRAM TRACING: CPT

## 2017-10-05 PROCEDURE — 99285 EMERGENCY DEPT VISIT HI MDM: CPT

## 2017-10-05 PROCEDURE — 90471 IMMUNIZATION ADMIN: CPT

## 2017-10-05 PROCEDURE — A9270 NON-COVERED ITEM OR SERVICE: HCPCS | Performed by: EMERGENCY MEDICINE

## 2017-10-05 PROCEDURE — 83880 ASSAY OF NATRIURETIC PEPTIDE: CPT

## 2017-10-05 PROCEDURE — 83690 ASSAY OF LIPASE: CPT

## 2017-10-05 PROCEDURE — A9270 NON-COVERED ITEM OR SERVICE: HCPCS | Performed by: HOSPITALIST

## 2017-10-05 PROCEDURE — 96365 THER/PROPH/DIAG IV INF INIT: CPT

## 2017-10-05 PROCEDURE — 96372 THER/PROPH/DIAG INJ SC/IM: CPT | Mod: XU

## 2017-10-05 PROCEDURE — 85610 PROTHROMBIN TIME: CPT

## 2017-10-05 PROCEDURE — 700102 HCHG RX REV CODE 250 W/ 637 OVERRIDE(OP): Performed by: HOSPITALIST

## 2017-10-05 PROCEDURE — 90686 IIV4 VACC NO PRSV 0.5 ML IM: CPT | Performed by: HOSPITALIST

## 2017-10-05 PROCEDURE — 36415 COLL VENOUS BLD VENIPUNCTURE: CPT

## 2017-10-05 PROCEDURE — 83735 ASSAY OF MAGNESIUM: CPT

## 2017-10-05 PROCEDURE — 80053 COMPREHEN METABOLIC PANEL: CPT

## 2017-10-05 PROCEDURE — 94760 N-INVAS EAR/PLS OXIMETRY 1: CPT

## 2017-10-05 RX ORDER — ARIPIPRAZOLE 10 MG/1
20 TABLET ORAL
Status: DISCONTINUED | OUTPATIENT
Start: 2017-10-05 | End: 2017-10-06 | Stop reason: HOSPADM

## 2017-10-05 RX ORDER — METHADONE HYDROCHLORIDE 10 MG/1
5-10 TABLET ORAL
Status: DISCONTINUED | OUTPATIENT
Start: 2017-10-05 | End: 2017-10-06 | Stop reason: HOSPADM

## 2017-10-05 RX ORDER — ASPIRIN 325 MG
325 TABLET ORAL DAILY
Status: DISCONTINUED | OUTPATIENT
Start: 2017-10-05 | End: 2017-10-06 | Stop reason: HOSPADM

## 2017-10-05 RX ORDER — ASPIRIN 325 MG
325 TABLET ORAL ONCE
Status: COMPLETED | OUTPATIENT
Start: 2017-10-05 | End: 2017-10-05

## 2017-10-05 RX ORDER — HEPARIN SODIUM 5000 [USP'U]/ML
5000 INJECTION, SOLUTION INTRAVENOUS; SUBCUTANEOUS EVERY 8 HOURS
Status: DISCONTINUED | OUTPATIENT
Start: 2017-10-05 | End: 2017-10-06 | Stop reason: HOSPADM

## 2017-10-05 RX ORDER — ASPIRIN 300 MG/1
300 SUPPOSITORY RECTAL DAILY
Status: DISCONTINUED | OUTPATIENT
Start: 2017-10-05 | End: 2017-10-06 | Stop reason: HOSPADM

## 2017-10-05 RX ORDER — METHADONE HYDROCHLORIDE 10 MG/1
5-10 TABLET ORAL
COMMUNITY
End: 2017-12-22

## 2017-10-05 RX ORDER — ALBUTEROL SULFATE 90 UG/1
2 AEROSOL, METERED RESPIRATORY (INHALATION) EVERY 6 HOURS PRN
Status: DISCONTINUED | OUTPATIENT
Start: 2017-10-05 | End: 2017-10-05

## 2017-10-05 RX ORDER — LURASIDONE HYDROCHLORIDE 20 MG/1
20 TABLET, FILM COATED ORAL
Status: DISCONTINUED | OUTPATIENT
Start: 2017-10-05 | End: 2017-10-05

## 2017-10-05 RX ORDER — POLYETHYLENE GLYCOL 3350 17 G/17G
1 POWDER, FOR SOLUTION ORAL
Status: DISCONTINUED | OUTPATIENT
Start: 2017-10-05 | End: 2017-10-06 | Stop reason: HOSPADM

## 2017-10-05 RX ORDER — ARIPIPRAZOLE 20 MG/1
20 TABLET ORAL DAILY
COMMUNITY
End: 2018-01-18

## 2017-10-05 RX ORDER — BUDESONIDE AND FORMOTEROL FUMARATE DIHYDRATE 160; 4.5 UG/1; UG/1
1 AEROSOL RESPIRATORY (INHALATION) 2 TIMES DAILY
Status: DISCONTINUED | OUTPATIENT
Start: 2017-10-05 | End: 2017-10-05

## 2017-10-05 RX ORDER — ACETAMINOPHEN 325 MG/1
650 TABLET ORAL EVERY 6 HOURS PRN
Status: DISCONTINUED | OUTPATIENT
Start: 2017-10-05 | End: 2017-10-06 | Stop reason: HOSPADM

## 2017-10-05 RX ORDER — TIOTROPIUM BROMIDE 18 UG/1
1 CAPSULE ORAL; RESPIRATORY (INHALATION) DAILY
Status: DISCONTINUED | OUTPATIENT
Start: 2017-10-05 | End: 2017-10-05

## 2017-10-05 RX ORDER — AMOXICILLIN 250 MG
2 CAPSULE ORAL 2 TIMES DAILY
Status: DISCONTINUED | OUTPATIENT
Start: 2017-10-05 | End: 2017-10-06 | Stop reason: HOSPADM

## 2017-10-05 RX ORDER — ASPIRIN 81 MG/1
324 TABLET, CHEWABLE ORAL DAILY
Status: DISCONTINUED | OUTPATIENT
Start: 2017-10-05 | End: 2017-10-06 | Stop reason: HOSPADM

## 2017-10-05 RX ORDER — BISACODYL 10 MG
10 SUPPOSITORY, RECTAL RECTAL
Status: DISCONTINUED | OUTPATIENT
Start: 2017-10-05 | End: 2017-10-06 | Stop reason: HOSPADM

## 2017-10-05 RX ORDER — OXYCODONE HYDROCHLORIDE 5 MG/1
20 TABLET ORAL EVERY 6 HOURS PRN
Status: DISCONTINUED | OUTPATIENT
Start: 2017-10-05 | End: 2017-10-06 | Stop reason: HOSPADM

## 2017-10-05 RX ORDER — OXYCODONE HYDROCHLORIDE 20 MG/1
20 TABLET ORAL EVERY 6 HOURS PRN
COMMUNITY
End: 2018-01-05 | Stop reason: SDUPTHER

## 2017-10-05 RX ADMIN — ACETAMINOPHEN 650 MG: 325 TABLET, FILM COATED ORAL at 21:49

## 2017-10-05 RX ADMIN — CEFTRIAXONE 2 G: 2 INJECTION, SOLUTION INTRAVENOUS at 16:26

## 2017-10-05 RX ADMIN — HEPARIN SODIUM 5000 UNITS: 5000 INJECTION, SOLUTION INTRAVENOUS; SUBCUTANEOUS at 21:08

## 2017-10-05 RX ADMIN — Medication 325 MG: at 10:27

## 2017-10-05 RX ADMIN — NITROGLYCERIN 0.5 INCH: 20 OINTMENT TOPICAL at 11:37

## 2017-10-05 RX ADMIN — INFLUENZA A VIRUS A/MICHIGAN/45/2015 X-275 (H1N1) ANTIGEN (FORMALDEHYDE INACTIVATED), INFLUENZA A VIRUS A/HONG KONG/4801/2014 X-263B (H3N2) ANTIGEN (FORMALDEHYDE INACTIVATED), INFLUENZA B VIRUS B/PHUKET/3073/2013 ANTIGEN (FORMALDEHYDE INACTIVATED), AND INFLUENZA B VIRUS B/BRISBANE/60/2008 ANTIGEN (FORMALDEHYDE INACTIVATED) 0.5 ML: 15; 15; 15; 15 INJECTION, SUSPENSION INTRAMUSCULAR at 15:20

## 2017-10-05 RX ADMIN — SERTRALINE 50 MG: 50 TABLET, FILM COATED ORAL at 21:08

## 2017-10-05 RX ADMIN — ARIPIPRAZOLE 20 MG: 10 TABLET ORAL at 21:07

## 2017-10-05 RX ADMIN — OXYCODONE HYDROCHLORIDE 20 MG: 5 TABLET ORAL at 15:19

## 2017-10-05 RX ADMIN — OXYCODONE HYDROCHLORIDE 20 MG: 5 TABLET ORAL at 21:12

## 2017-10-05 ASSESSMENT — ENCOUNTER SYMPTOMS
TINGLING: 0
SENSORY CHANGE: 0
NAUSEA: 0
FEVER: 0
NEAR-SYNCOPE: 0
ABDOMINAL PAIN: 0
PND: 0
BLURRED VISION: 0
TREMORS: 0
COUGH: 0
SPUTUM PRODUCTION: 0
DIZZINESS: 0
DIARRHEA: 0
SHORTNESS OF BREATH: 0
SPUTUM PRODUCTION: 0
MYALGIAS: 0
MYALGIAS: 0
STRIDOR: 0
SORE THROAT: 0
BACK PAIN: 0
PHOTOPHOBIA: 0
BLURRED VISION: 0
CHILLS: 0
DOUBLE VISION: 0
ORTHOPNEA: 0
SHORTNESS OF BREATH: 1
IRREGULAR HEARTBEAT: 0
DEPRESSION: 0
VOMITING: 0
SORE THROAT: 0
WEAKNESS: 0
LOWER EXTREMITY EDEMA: 0
NECK PAIN: 0
SENSORY CHANGE: 0
HEADACHES: 0
LEG PAIN: 0
NAUSEA: 0
FEVER: 0
HEMOPTYSIS: 0
HEARTBURN: 0
DIAPHORESIS: 0
VOMITING: 0
TINGLING: 0
NUMBNESS: 0
BLOOD IN STOOL: 0
NERVOUS/ANXIOUS: 1
CONSTIPATION: 0
WEAKNESS: 0
MEMORY LOSS: 0
CLAUDICATION: 0
WHEEZING: 0
SPEECH CHANGE: 0
CONSTIPATION: 0
BACK PAIN: 0
PALPITATIONS: 0
DIZZINESS: 0
EXERTIONAL CHEST PRESSURE: 0
SYNCOPE: 0
EYE PAIN: 0
COUGH: 0
HEADACHES: 0
PALPITATIONS: 0
NERVOUS/ANXIOUS: 0
DOUBLE VISION: 0
HEMOPTYSIS: 0

## 2017-10-05 ASSESSMENT — PATIENT HEALTH QUESTIONNAIRE - PHQ9
2. FEELING DOWN, DEPRESSED, IRRITABLE, OR HOPELESS: NOT AT ALL
1. LITTLE INTEREST OR PLEASURE IN DOING THINGS: NOT AT ALL
SUM OF ALL RESPONSES TO PHQ9 QUESTIONS 1 AND 2: 0
SUM OF ALL RESPONSES TO PHQ QUESTIONS 1-9: 0

## 2017-10-05 ASSESSMENT — PAIN SCALES - GENERAL
PAINLEVEL_OUTOF10: 0
PAINLEVEL_OUTOF10: 8

## 2017-10-05 ASSESSMENT — LIFESTYLE VARIABLES
EVER_SMOKED: NEVER
ALCOHOL_USE: NO

## 2017-10-05 NOTE — PROGRESS NOTES
Pt arrived to unit via gurney at 1315. Pt oriented to room, unit, and plan of care. Tele-monitor placed,NSR 60; Pt denies CP and SOB, assessment and admit profile complete. Discussed diet restrictions; Updated MD on tx of UTI o/p; orders received; All questions answered at this time. Call light within reach; fall precautions in place.

## 2017-10-05 NOTE — ED NOTES
Pt arrived via ems, per ems pt having substernal cp for 2 wks, today pt was shopping and pain increased and radiated to lt shoulder and to jaw. Ems gave 324mg asa, 1 nitro spray with relief. U/a pt sitting up caox4 speaking in full sentences no distress, no sob, no cp at this time, no abd pain.

## 2017-10-05 NOTE — PROGRESS NOTES
Subjective:      Patti Tello is a 52 y.o. female who presents with Chest Pain (x 2 weeks/ and left arm pain/ shortness of breathe/ jaw pain x 1 day)            Chest Pain    This is a new (2 weeks mild chest pain- worsened over since this am- 2 hours ago) problem. The onset quality is gradual. The problem occurs constantly. The problem has been unchanged. The pain is present in the substernal region. The pain is at a severity of 5/10. The quality of the pain is described as squeezing and tightness. Radiates to: left arm. Associated symptoms include shortness of breath. Pertinent negatives include no abdominal pain, back pain, cough, diaphoresis, dizziness, exertional chest pressure, fever, headaches, hemoptysis, irregular heartbeat, leg pain, lower extremity edema, malaise/fatigue, nausea, near-syncope, numbness, palpitations, sputum production, syncope, vomiting or weakness. Associated symptoms comments: Left jaw pain. The pain is aggravated by nothing. She has tried nothing for the symptoms. Risk factors include stress.     Past Medical History:   Diagnosis Date   • Bronchitis 2010   • Anxiety and depression    • Arthritis     back, neck   • Back injury    • Back pain    • Cancer (CMS-HCC)     breast LT   • Chronic LBP    • Chronic neck pain    • Diabetes    • Hypertension    • Other specified symptom associated with female genital organs    • Panic disorder    • PTSD (post-traumatic stress disorder)    • Trauma     head trauma     Past Surgical History:   Procedure Laterality Date   • URETEROSCOPY  10/10/2013    Performed by Molly Resendiz M.D. at SURGERY [facility]   • LASERTRIPSY  10/10/2013    Performed by Molly Resendiz M.D. at SURGERY [facility]   • LUMBAR FUSION POSTERIOR  10/22/2012    Performed by Alfred Branham M.D. at SURGERY [facility]   • LUMBAR LAMINECTOMY DISKECTOMY  10/22/2012    Performed by Alferd Branham M.D. at [facility]   • OTHER ORTHOPEDIC  SURGERY  10/01/2012    lumbar fusion, Dr Branham   • OTHER ABDOMINAL SURGERY  2006    gastric bypass   • ABDOMINAL HYSTERECTOMY TOTAL      DUE TO FIBROIDS   • BREAST RECONSTRUCTION      Lt breast   • CRANIOTOMY      DUE TO BRAIN INJURY   • MASTECTOMY      Lt breast   • OTHER      left ear drum surgery   • OTHER ABDOMINAL SURGERY      hernia repair   • PRIMARY C SECTION      x 3   • US-CYST ASPIRATION-BREAST INITIAL         Family History   Problem Relation Age of Onset   • Diabetes Father    • Cancer Mother    • Other Mother      SLE       No Known Allergies    Medications, Allergies, and current problem list reviewed today in Epic    Review of Systems   Constitutional: Negative for diaphoresis, fever and malaise/fatigue.   HENT: Negative for congestion, ear discharge, ear pain and sore throat.    Eyes: Negative for blurred vision and double vision.   Respiratory: Positive for shortness of breath. Negative for cough, hemoptysis, sputum production and wheezing.    Cardiovascular: Positive for chest pain. Negative for palpitations, leg swelling, syncope and near-syncope.   Gastrointestinal: Negative for abdominal pain, constipation, diarrhea, nausea and vomiting.   Musculoskeletal: Negative for back pain and myalgias.   Neurological: Negative for dizziness, tingling, sensory change, weakness, numbness and headaches.   Psychiatric/Behavioral: The patient is nervous/anxious.      All other systems reviewed and are negative.          Objective:     /72   Pulse 98   Temp 36.8 °C (98.2 °F)   Resp 18   SpO2 98%      Physical Exam   Constitutional: She is oriented to person, place, and time. She appears well-developed. She appears distressed (moderately anxious).   HENT:   Head: Normocephalic and atraumatic.   Right Ear: External ear normal.   Left Ear: External ear normal.   Nose: Nose normal.   Mouth/Throat: Oropharynx is clear and moist. No oropharyngeal exudate.   Eyes: Conjunctivae and EOM are normal. Pupils are  equal, round, and reactive to light.   Neck: Neck supple.   Cardiovascular: Normal rate, regular rhythm and normal heart sounds.  Exam reveals no gallop and no friction rub.    No murmur heard.  Pulmonary/Chest: Effort normal and breath sounds normal. No respiratory distress. She has no wheezes. She has no rales.   Abdominal: Soft. She exhibits no distension. There is no tenderness.   Musculoskeletal: Normal range of motion. She exhibits no edema.   .lower extremities without edema or TTP   Lymphadenopathy:     She has no cervical adenopathy.   Neurological: She is alert and oriented to person, place, and time. No cranial nerve deficit.   Skin: Skin is warm and dry. No rash noted.   Psychiatric: She has a normal mood and affect. Her behavior is normal. Judgment and thought content normal.          EKG: Normal sinus rhythm. High QRS voltage.  No st elevation or acute changes     Assessment/Plan:     1. Chest pain, unspecified type    2. Shortness of breath    3. Left arm pain    4. Jaw pain    - aspirin (ASA) tablet 325 mg; Take 1 Tab by mouth Once.  - EKG - Clinic performed    At this time, I feel the patient requires a higher level of care including closer monitoring, stat lab work and/or imaging for further evaluation for complaints of chest pain, left arm pain, left jaw pain.This has been discussed with the patient and they state agreement and understanding. The patient was given 325 mg of aspirin. She will be transported to [facility] via [facility]. [facility] who kindly accepted the patient.    Valerie Armstrong P.A.-C.

## 2017-10-05 NOTE — ED NOTES
Med rec complete per patient  Allergies reviewed    Per patient she just completed her Macrobid course 10-4-17 PM and it suppose to pick of Clindamycin at pharmacy stating Macrobid didn't take care of issue     Only takes 1/2-1 tablet of Methadone twice weekly for withdrawals

## 2017-10-05 NOTE — ED PROVIDER NOTES
ED Provider Note    CHIEF COMPLAINT  Chief Complaint   Patient presents with   • Chest Pain       HPI  Patti Tello is a 52 y.o. female who presentsTo the emergency department complaining that for the last 2 weeks she has been having episodes of midsternal chest pain. Sometimes this occurs while walking, [facility] and had a more severe episode with radiation of the pain to the left shoulder and left arm. The patient went to an urgent care and EMS was called the patient was given nitroglycerin which was helpful and she was brought to the emergency department. The patient says she's having slight recurrent discomfort at the time of arrival. She does not recognize any other exacerbating or alleviating factors or precipitating events.    REVIEW OF SYSTEMS no fever no hemoptysis no abdominal pain no acute back pain although she does have a history of chronic low back pain. All other systems negative    PAST MEDICAL HISTORY  Past Medical History:   Diagnosis Date   • Bronchitis 2010   • Anxiety and depression    • Arthritis     back, neck   • Back injury    • Back pain    • Cancer (CMS-HCC)     breast LT   • Chronic LBP    • Chronic neck pain    • Diabetes    • Hypertension    • Other specified symptom associated with female genital organs    • Panic disorder    • PTSD (post-traumatic stress disorder)    • Trauma     head trauma       FAMILY HISTORY  Family History   Problem Relation Age of Onset   • Diabetes Father    • Cancer Mother    • Other Mother      SLE       SOCIAL HISTORY  Social History     Social History   • Marital status:      Spouse name: N/A   • Number of children: N/A   • Years of education: N/A     Social History Main Topics   • Smoking status: Never Smoker   • Smokeless tobacco: Never Used   • Alcohol use No   • Drug use: No   • Sexual activity: Not on file     Other Topics Concern   • Not on file     Social History Narrative   • No narrative on file       SURGICAL  "HISTORY  Past Surgical History:   Procedure Laterality Date   • URETEROSCOPY  10/10/2013    Performed by Molly Resendiz M.D. at SURGERY [facility]   • LASERTRIPSY  10/10/2013    Performed by Molly Resendiz M.D. at SURGERY [facility]   • LUMBAR FUSION POSTERIOR  10/22/2012    Performed by Alfred Branham M.D. at SURGERY [facility]   • LUMBAR LAMINECTOMY DISKECTOMY  10/22/2012    Performed by Alfred Branham M.D. at SURGERY [facility]   • OTHER ORTHOPEDIC SURGERY  10/01/2012    lumbar fusion, Dr Branham   • OTHER ABDOMINAL SURGERY  2006    gastric bypass   • ABDOMINAL HYSTERECTOMY TOTAL      DUE TO FIBROIDS   • BREAST RECONSTRUCTION      Lt breast   • CRANIOTOMY      DUE TO BRAIN INJURY   • MASTECTOMY      Lt breast   • OTHER      left ear drum surgery   • OTHER ABDOMINAL SURGERY      hernia repair   • PRIMARY C SECTION      x 3   • US-CYST ASPIRATION-BREAST INITIAL         CURRENT MEDICATIONS  Home Medications     Reviewed by Amairani Boston (Pharmacy Tech) on 10/05/17 at 1223  Med List Status: Complete   Medication Last Dose Status   aripiprazole (ABILIFY) 20 MG tablet 10/4/2017 Active   fluticasone (FLONASE) 50 MCG/ACT nasal spray 10/4/2017 Active   methadone (DOLOPHINE) 10 MG Tab 10/1/2017 Active   nitrofurantoin monohydr macro (MACROBID) 100 MG CAPS 10/4/2017 Active   ondansetron (ZOFRAN) 4 MG Tab tablet PRN Active   Oxycodone HCl 20 MG Tab 10/4/2017 Active   sertraline (ZOLOFT) 50 MG Tab 10/4/2017 Active                ALLERGIES  No Known Allergies    PHYSICAL EXAM  VITAL SIGNS: /59   Pulse (!) 59   Temp 36.3 °C (97.4 °F)   Resp 18   Ht 1.575 m (5' 2\")   Wt 80.3 kg (177 lb 0.5 oz)   LMP 10/05/1999   SpO2 97%   Breastfeeding? No   BMI 32.38 kg/m²    Oxygen saturation is interpreted asAdequate  Constitutional: Awake and nontoxic appearing  HENT: Mucous membranes are moist and throat clear  Eyes: No erythema or discharge or jaundice  Neck: Trachea midline no " JVD  Cardiovascular: Regular rate and rhythm  Lungs: Clear and equal bilaterally with no apparent difficulty breathing  Abdomen/Back: Soft nontender nondistended no peritoneal findings  Skin: Warm and dry  Musculoskeletal: No acute bony deformity  Neurologic: Awake and verbal and moving all extremities    Laboratory  A CBC shows white blood cell count of 7.2 with hemoglobin 16.1, troponin is normal at less than 0.01 INR is normal 1.02    EKG interpretation  I reviewed the EKG was obtained prior to arrival is a 12-lead EKG showing sinus rhythm 81 bpm there is no clear ST elevation or some artifact in lead V5.    A repeat EKG done here in emergency departments a 12-lead EKG showing sinus rhythm 73 bpm there is some wandering of the baseline there is J-point versus ST elevation in leads 3 and T wave inversion in lead 3 there is also a large voltages in lead 1 and aVL suggesting LVH.    Radiology  DX-CHEST-LIMITED (1 VIEW)   Final Result      Borderline cardiomegaly.      NM-CARDIAC STRESS TEST    (Results Pending)         MEDICAL DECISION MAKING and DISPOSITION  In the emergency department the patient's IV was maintained she is placed on a cardiac monitor and nitroglycerin paste was placed on the skin. She had received aspirin prior to arrival. I reviewed the case with the hospitalist and the patient is admitted to the hospitalist service for further evaluation and further treatment    IMPRESSION  1. Chest pain         Electronically signed by: Audie Chisholm, 10/5/2017 3:48 PM

## 2017-10-06 ENCOUNTER — APPOINTMENT (OUTPATIENT)
Dept: RADIOLOGY | Facility: MEDICAL CENTER | Age: 52
End: 2017-10-06
Attending: HOSPITALIST
Payer: MEDICARE

## 2017-10-06 VITALS
BODY MASS INDEX: 32.58 KG/M2 | SYSTOLIC BLOOD PRESSURE: 104 MMHG | HEIGHT: 62 IN | TEMPERATURE: 98.2 F | DIASTOLIC BLOOD PRESSURE: 64 MMHG | WEIGHT: 177.03 LBS | OXYGEN SATURATION: 97 % | RESPIRATION RATE: 19 BRPM | HEART RATE: 64 BPM

## 2017-10-06 PROBLEM — R07.9 CHEST PAIN: Status: RESOLVED | Noted: 2017-10-05 | Resolved: 2017-10-06

## 2017-10-06 LAB
ALBUMIN SERPL BCP-MCNC: 3.4 G/DL (ref 3.2–4.9)
ALBUMIN/GLOB SERPL: 1.1 G/DL
ALP SERPL-CCNC: 141 U/L (ref 30–99)
ALT SERPL-CCNC: 8 U/L (ref 2–50)
ANION GAP SERPL CALC-SCNC: 5 MMOL/L (ref 0–11.9)
AST SERPL-CCNC: 12 U/L (ref 12–45)
BASOPHILS # BLD AUTO: 0.7 % (ref 0–1.8)
BASOPHILS # BLD: 0.06 K/UL (ref 0–0.12)
BILIRUB SERPL-MCNC: 0.2 MG/DL (ref 0.1–1.5)
BUN SERPL-MCNC: 12 MG/DL (ref 8–22)
CALCIUM SERPL-MCNC: 8.9 MG/DL (ref 8.5–10.5)
CHLORIDE SERPL-SCNC: 110 MMOL/L (ref 96–112)
CHOLEST SERPL-MCNC: 151 MG/DL (ref 100–199)
CO2 SERPL-SCNC: 26 MMOL/L (ref 20–33)
CREAT SERPL-MCNC: 0.68 MG/DL (ref 0.5–1.4)
EKG IMPRESSION: NORMAL
EOSINOPHIL # BLD AUTO: 0.38 K/UL (ref 0–0.51)
EOSINOPHIL NFR BLD: 4.1 % (ref 0–6.9)
ERYTHROCYTE [DISTWIDTH] IN BLOOD BY AUTOMATED COUNT: 46.3 FL (ref 35.9–50)
GFR SERPL CREATININE-BSD FRML MDRD: >60 ML/MIN/1.73 M 2
GLOBULIN SER CALC-MCNC: 3.1 G/DL (ref 1.9–3.5)
GLUCOSE SERPL-MCNC: 106 MG/DL (ref 65–99)
HCT VFR BLD AUTO: 34.7 % (ref 37–47)
HDLC SERPL-MCNC: 45 MG/DL
HGB BLD-MCNC: 10.8 G/DL (ref 12–16)
IMM GRANULOCYTES # BLD AUTO: 0.04 K/UL (ref 0–0.11)
IMM GRANULOCYTES NFR BLD AUTO: 0.4 % (ref 0–0.9)
LDLC SERPL CALC-MCNC: 77 MG/DL
LYMPHOCYTES # BLD AUTO: 3.14 K/UL (ref 1–4.8)
LYMPHOCYTES NFR BLD: 34.1 % (ref 22–41)
MCH RBC QN AUTO: 25.1 PG (ref 27–33)
MCHC RBC AUTO-ENTMCNC: 31.1 G/DL (ref 33.6–35)
MCV RBC AUTO: 80.5 FL (ref 81.4–97.8)
MONOCYTES # BLD AUTO: 0.52 K/UL (ref 0–0.85)
MONOCYTES NFR BLD AUTO: 5.6 % (ref 0–13.4)
NEUTROPHILS # BLD AUTO: 5.07 K/UL (ref 2–7.15)
NEUTROPHILS NFR BLD: 55.1 % (ref 44–72)
NRBC # BLD AUTO: 0 K/UL
NRBC BLD AUTO-RTO: 0 /100 WBC
PLATELET # BLD AUTO: 296 K/UL (ref 164–446)
PMV BLD AUTO: 10.4 FL (ref 9–12.9)
POTASSIUM SERPL-SCNC: 3.3 MMOL/L (ref 3.6–5.5)
PROT SERPL-MCNC: 6.5 G/DL (ref 6–8.2)
RBC # BLD AUTO: 4.31 M/UL (ref 4.2–5.4)
SODIUM SERPL-SCNC: 141 MMOL/L (ref 135–145)
TRIGL SERPL-MCNC: 147 MG/DL (ref 0–149)
TROPONIN I SERPL-MCNC: <0.01 NG/ML (ref 0–0.04)
WBC # BLD AUTO: 9.2 K/UL (ref 4.8–10.8)

## 2017-10-06 PROCEDURE — 84484 ASSAY OF TROPONIN QUANT: CPT

## 2017-10-06 PROCEDURE — 700102 HCHG RX REV CODE 250 W/ 637 OVERRIDE(OP): Performed by: FAMILY MEDICINE

## 2017-10-06 PROCEDURE — G0378 HOSPITAL OBSERVATION PER HR: HCPCS

## 2017-10-06 PROCEDURE — 93005 ELECTROCARDIOGRAM TRACING: CPT | Performed by: HOSPITALIST

## 2017-10-06 PROCEDURE — 85025 COMPLETE CBC W/AUTO DIFF WBC: CPT

## 2017-10-06 PROCEDURE — 99217 PR OBSERVATION CARE DISCHARGE: CPT | Performed by: HOSPITALIST

## 2017-10-06 PROCEDURE — 700111 HCHG RX REV CODE 636 W/ 250 OVERRIDE (IP): Performed by: HOSPITALIST

## 2017-10-06 PROCEDURE — 80053 COMPREHEN METABOLIC PANEL: CPT

## 2017-10-06 PROCEDURE — 700102 HCHG RX REV CODE 250 W/ 637 OVERRIDE(OP): Performed by: HOSPITALIST

## 2017-10-06 PROCEDURE — A9502 TC99M TETROFOSMIN: HCPCS

## 2017-10-06 PROCEDURE — A9270 NON-COVERED ITEM OR SERVICE: HCPCS | Performed by: FAMILY MEDICINE

## 2017-10-06 PROCEDURE — 36415 COLL VENOUS BLD VENIPUNCTURE: CPT

## 2017-10-06 PROCEDURE — 80061 LIPID PANEL: CPT

## 2017-10-06 PROCEDURE — 700111 HCHG RX REV CODE 636 W/ 250 OVERRIDE (IP)

## 2017-10-06 PROCEDURE — 93010 ELECTROCARDIOGRAM REPORT: CPT | Performed by: INTERNAL MEDICINE

## 2017-10-06 PROCEDURE — 96372 THER/PROPH/DIAG INJ SC/IM: CPT | Mod: XU

## 2017-10-06 PROCEDURE — A9270 NON-COVERED ITEM OR SERVICE: HCPCS | Performed by: HOSPITALIST

## 2017-10-06 RX ORDER — REGADENOSON 0.08 MG/ML
INJECTION, SOLUTION INTRAVENOUS
Status: COMPLETED
Start: 2017-10-06 | End: 2017-10-06

## 2017-10-06 RX ADMIN — ACETAMINOPHEN 650 MG: 325 TABLET, FILM COATED ORAL at 03:23

## 2017-10-06 RX ADMIN — ASPIRIN 325 MG: 325 TABLET, COATED ORAL at 09:59

## 2017-10-06 RX ADMIN — REGADENOSON 0.4 MG: 0.08 INJECTION, SOLUTION INTRAVENOUS at 08:16

## 2017-10-06 RX ADMIN — HEPARIN SODIUM 5000 UNITS: 5000 INJECTION, SOLUTION INTRAVENOUS; SUBCUTANEOUS at 06:40

## 2017-10-06 ASSESSMENT — PATIENT HEALTH QUESTIONNAIRE - PHQ9
SUM OF ALL RESPONSES TO PHQ QUESTIONS 1-9: 0
2. FEELING DOWN, DEPRESSED, IRRITABLE, OR HOPELESS: NOT AT ALL
SUM OF ALL RESPONSES TO PHQ9 QUESTIONS 1 AND 2: 0
1. LITTLE INTEREST OR PLEASURE IN DOING THINGS: NOT AT ALL

## 2017-10-06 ASSESSMENT — PAIN SCALES - GENERAL
PAINLEVEL_OUTOF10: 4
PAINLEVEL_OUTOF10: 0
PAINLEVEL_OUTOF10: 1
PAINLEVEL_OUTOF10: 0

## 2017-10-06 NOTE — ASSESSMENT & PLAN NOTE
No acute flares,  Continue RT protocol, duo nebs, Pep therapy if warranted, and incentive spirometry.

## 2017-10-06 NOTE — H&P
Hospital Medicine History and Physical    Date of Service  10/5/2017    Chief Complaint  Chief Complaint   Patient presents with   • Chest Pain       History of Presenting Illness  52 y.o. Female presented 10/5/2017 with kennedy discomfort that started 2 weeks ago and has been intermitant. She describes pain as dull, located midsternally, with radiating to left shoulder and left arm. She [facility] Hence patient noted having to sit which her discomfort did decrease. Patient went to urgent care as a result, and a nitroglycerin was given which she says improved her discomfort. At this current time, Patient denies fevers/chills, chest pain, shortness of breath or nausea/vommiting.   Patient will be admitted for close telemetry monitoring as well as risk stratification with stress test in the am.      Primary Care Physician  Debora Hsu M.D.    Consultants  None    Code Status  Code: Full code    Review of Systems  Review of Systems   Constitutional: Negative for chills, fever and malaise/fatigue.   HENT: Negative for congestion, hearing loss, sore throat and tinnitus.    Eyes: Negative for blurred vision, double vision, photophobia and pain.   Respiratory: Negative for cough, hemoptysis, sputum production, shortness of breath and stridor.    Cardiovascular: Positive for chest pain. Negative for palpitations, orthopnea, claudication and PND.   Gastrointestinal: Negative for blood in stool, constipation, heartburn, melena, nausea and vomiting.   Genitourinary: Negative for dysuria, frequency and urgency.   Musculoskeletal: Negative for back pain, myalgias and neck pain.   Neurological: Negative for dizziness, tingling, tremors, sensory change, speech change, weakness and headaches.   Psychiatric/Behavioral: Negative for depression, memory loss and suicidal ideas. The patient is not nervous/anxious.           Past Medical History  1. Chronic back pain  2. Depression        Surgical History  Past Surgical History:   Procedure Laterality Date   • URETEROSCOPY  10/10/2013    Performed by Molly Resendiz M.D. at SURGERY [facility]   • LASERTRIPSY  10/10/2013    Performed by Molly Resendiz M.D. at SURGERY [facility]   • LUMBAR FUSION POSTERIOR  10/22/2012    Performed by Alfred Branham M.D. at SURGERY [facility]   • LUMBAR LAMINECTOMY DISKECTOMY  10/22/2012    Performed by Alfred Branham M.D. at SURGERY [facility]   • OTHER ORTHOPEDIC SURGERY  10/01/2012    lumbar fusion, Dr Branham   • OTHER ABDOMINAL SURGERY      gastric bypass   • ABDOMINAL HYSTERECTOMY TOTAL      DUE TO FIBROIDS   • BREAST RECONSTRUCTION      Lt breast   • CRANIOTOMY      DUE TO BRAIN INJURY   • MASTECTOMY      Lt breast   • OTHER      left ear drum surgery   • OTHER ABDOMINAL SURGERY      hernia repair   • PRIMARY C SECTION      x 3   • US-CYST ASPIRATION-BREAST INITIAL         Medications  No current facility-administered medications on file prior to encounter.      Current Outpatient Prescriptions on File Prior to Encounter   Medication Sig Dispense Refill   • sertraline (ZOLOFT) 50 MG Tab Take 1 Tab by mouth every day. 30 Tab 1   • ondansetron (ZOFRAN) 4 MG Tab tablet Take 1 Tab by mouth every 6 hours as needed for Nausea/Vomiting. 20 Each 0   • fluticasone (FLONASE) 50 MCG/ACT nasal spray Spray 1 Spray in nose 2 times a day. Each Nostril 1 Bottle 0   • nitrofurantoin monohydr macro (MACROBID) 100 MG CAPS Take 100 mg by mouth 2 times a day.         Family History  Family History   Problem Relation Age of Onset   • Diabetes Father    • Cancer Mother    • Other Mother      SLE       Social History  Social History   Substance Use Topics   • Smoking status: Never Smoker   • Smokeless tobacco: Never Used   • Alcohol use No       Allergies  No Known Allergies     Physical Exam  Laboratory   Hemodynamics  Temp (24hrs), Av.3 °C (97.4 °F), Min:35.9 °C (96.7 °F), Max:36.9 °C  (98.4 °F)   Temperature: 35.9 °C (96.7 °F)  Pulse  Av  Min: 57  Max: 99 Heart Rate (Monitored): 61  Blood Pressure: 106/58, NIBP: 114/60      Respiratory      Respiration: 18, Pulse Oximetry: 93 %             Physical Exam   Constitutional: She is oriented to person, place, and time. She appears well-developed and well-nourished. No distress.   HENT:   Head: Normocephalic and atraumatic.   Mouth/Throat: No oropharyngeal exudate.   Eyes: Conjunctivae are normal. Pupils are equal, round, and reactive to light. Right eye exhibits no discharge. No scleral icterus.   Neck: Neck supple. No JVD present. No thyromegaly present.   Cardiovascular: Normal rate and intact distal pulses.    No murmur heard.  Pulmonary/Chest: Effort normal and breath sounds normal. No stridor. No respiratory distress. She has no wheezes. She has no rales.   Abdominal: Soft. Bowel sounds are normal. She exhibits no distension. There is no tenderness. There is no rebound.   Musculoskeletal: Normal range of motion. She exhibits no edema.   Neurological: She is alert and oriented to person, place, and time. No cranial nerve deficit.   Skin: Skin is warm. She is not diaphoretic. No erythema.   Psychiatric: She has a normal mood and affect. Her behavior is normal. Thought content normal.         Assessment/Plan  * Chest pain- (present on admission)   Assessment & Plan     Initial troponin was normal and EKG showed no ischemic changes.   Ordered,  statin, ASA, for cardiac protective measures  NM Stress test for risk stratification. Pending                 Chronic back pain- (present on admission)   Assessment & Plan    Continue home dose of methadone and oxycodone.         COPD (chronic obstructive pulmonary disease) (CMS-[facility])- (present on admission)   Assessment & Plan    No acute flares,  Continue RT protocol, duo nebs, Pep therapy if warranted, and incentive spirometry.               I anticipate this patient is appropriate for observation  status at this time.    Prophylaxis: sc heparin    Recent Labs      10/05/17   1100   WBC  7.2   RBC  4.44   HEMOGLOBIN  11.1*   HEMATOCRIT  35.6*   MCV  80.2*   MCH  25.0*   MCHC  31.2*   RDW  45.1   PLATELETCT  301   MPV  10.8     Recent Labs      10/05/17   1100   SODIUM  143   POTASSIUM  3.2*   CHLORIDE  111   CO2  23   GLUCOSE  73   BUN  11   CREATININE  0.66   CALCIUM  9.2     Recent Labs      10/05/17   1100   ALTSGPT  11   ASTSGOT  13   ALKPHOSPHAT  150*   TBILIRUBIN  0.3   LIPASE  28   GLUCOSE  73     Recent Labs      10/05/17   1100   APTT  25.7   INR  1.02     Recent Labs      10/05/17   1100   BNPBTYPENAT  19         Lab Results   Component Value Date    TROPONINI <0.01 10/05/2017       Imaging  DX-CHEST-LIMITED (1 VIEW)   Final Result      Borderline cardiomegaly.      NM-CARDIAC STRESS TEST    (Results Pending)

## 2017-10-06 NOTE — ASSESSMENT & PLAN NOTE
Initial troponin was normal and EKG showed no ischemic changes.   Ordered,  statin, ASA, for cardiac protective measures  NM Stress test for risk stratification. Pending

## 2017-10-06 NOTE — PROGRESS NOTES
Nursing care plan includes knowledge deficit, potential for discomfort, potential for compromised cardiac output. POC includes teaching, comfort measures and reassurance, and access to code cart, cardiology stand by and availability of rapid response team. Pt verbalizes good understanding of benefits and risks of pharmacological cardiac stress test. Informed consent obtained. Lexiscan given, pt developed the following symptoms lightheadedness. VS stable, major symptoms resolved. To waiting room, caffeinated fluids and/or snacks given, awaiting second scan. Nursing goals met.

## 2017-10-06 NOTE — RESPIRATORY CARE
COPD EDUCATION by COPD CLINICAL EDUCATOR  10/6/2017 at 7:30 AM by Anisha Cuadra     Patient reviewed by COPD education team. Patient does not qualify for COPD program.

## 2017-10-06 NOTE — DISCHARGE INSTRUCTIONS
Discharge Instructions    Discharged to home by car with relative. Discharged via walking, hospital escort: Yes.  Special equipment needed: Not Applicable    Be sure to schedule a follow-up appointment with your primary care doctor or any specialists as instructed.     Discharge Plan:   Diet Plan: Discussed  Activity Level: Discussed  Confirmed Follow up Appointment: Patient to Call and Schedule Appointment  Confirmed Symptoms Management: Discussed  Medication Reconciliation Updated: Yes  Influenza Vaccine Indication: Indicated: 9 to 64 years of age  Influenza Vaccine Given - only chart on this line when given: Influenza Vaccine Given (See MAR)    I understand that a diet low in cholesterol, fat, and sodium is recommended for good health. Unless I have been given specific instructions below for another diet, I accept this instruction as my diet prescription.   Other diet:     Special Instructions: None    · Is patient discharged on Warfarin / Coumadin?   No     · Is patient Post Blood Transfusion?  No    Depression / Suicide Risk    As you are discharged from this [facility] facility, it is important to learn how to keep safe from harming yourself.    Recognize the warning signs:  · Abrupt changes in personality, positive or negative- including increase in energy   · Giving away possessions  · Change in eating patterns- significant weight changes-  positive or negative  · Change in sleeping patterns- unable to sleep or sleeping all the time   · Unwillingness or inability to communicate  · Depression  · Unusual sadness, discouragement and loneliness  · Talk of wanting to die  · Neglect of personal appearance   · Rebelliousness- reckless behavior  · Withdrawal from people/activities they love  · Confusion- inability to concentrate     If you or a loved one observes any of these behaviors or has concerns about self-harm, here's what you can do:  · Talk about it- your feelings and reasons for harming  yourself  · Remove any means that you might use to hurt yourself (examples: pills, rope, extension cords, firearm)  · Get professional help from the community (Mental Health, Substance Abuse, psychological counseling)  · Do not be alone:Call your Safe Contact- someone whom you trust who will be there for you.  · Call your local CRISIS HOTLINE 445-2206 or 569-259-7557  · Call your local Children's Mobile Crisis Response Team [place] (378) 155-1655 or www.[employer]  · Call the toll free National Suicide Prevention Hotlines   · National Suicide Prevention Lifeline 482-222-NUWH (1543)  · National Hope Line Network 800-SUICIDE (502-6421)

## 2017-10-07 NOTE — DISCHARGE SUMMARY
CHIEF COMPLAINT ON ADMISSION  Chief Complaint   Patient presents with   • Chest Pain       CODE STATUS  Prior    HPI & HOSPITAL COURSE  This is a 52 y.o. female here with chest pain. On admission patient received an EKG and cardiac serial troponin's were followed which was grossly normal. In addition we ordered a nuclear stress test for further risk stratification which was grossly normal. Patient's chest discomfort has resolved. Patient denies fevers/chills, chest pain, shortness of breath or nausea/vommiting.       Therefore, she is discharged in good and stable condition with close outpatient follow-up.    SPECIFIC OUTPATIENT FOLLOW-UP  PCP 1 week    DISCHARGE PROBLEM LIST  Principal Problem (Resolved):    Chest pain POA: Yes  Active Problems:    COPD (chronic obstructive pulmonary disease) ([facility]) POA: Yes    Chronic back pain POA: Yes      FOLLOW UP  Future Appointments  Date Time Provider Department Center   10/10/2017 10:00 AM CARE MANAGER [facility]   10/11/2017 1:20 PM [facility]     Debora Hsu M.D.  [address] [address]  [phone]    In 3 weeks  For a hospital follow up visit      MEDICATIONS ON DISCHARGE   aPtti Tello   Home Medication Instructions KAREEM:[id number]    Printed on:10/06/17 6797   Medication Information                      aripiprazole (ABILIFY) 20 MG tablet  Take 20 mg by mouth every day.             fluticasone (FLONASE) 50 MCG/ACT nasal spray  Spray 1 Spray in nose 2 times a day. Each Nostril             methadone (DOLOPHINE) 10 MG Tab  Take 5-10 mg by mouth 2X A WEEK. Takes for withdrawls             nitrofurantoin monohydr macro (MACROBID) 100 MG CAPS  Take 100 mg by mouth 2 times a day.             ondansetron (ZOFRAN) 4 MG Tab tablet  Take 1 Tab by mouth every 6 hours as needed for Nausea/Vomiting.             Oxycodone HCl 20 MG Tab  Take 20 mg by mouth every 6 hours as needed.             sertraline (ZOLOFT) 50 MG Tab  Take 1  Tab by mouth every day.                 DIET  No orders of the defined types were placed in this encounter.      ACTIVITY  As tolerated.  Weight bearing as tolerated      CONSULTATIONS  None    PROCEDURES  None    LABORATORY  Lab Results   Component Value Date/Time    SODIUM 141 10/06/2017 03:30 AM    POTASSIUM 3.3 (L) 10/06/2017 03:30 AM    CHLORIDE 110 10/06/2017 03:30 AM    CO2 26 10/06/2017 03:30 AM    GLUCOSE 106 (H) 10/06/2017 03:30 AM    BUN 12 10/06/2017 03:30 AM    CREATININE 0.68 10/06/2017 03:30 AM    CREATININE 0.9 10/25/2005 12:10 PM        Lab Results   Component Value Date/Time    WBC 9.2 10/06/2017 03:30 AM    HEMOGLOBIN 10.8 (L) 10/06/2017 03:30 AM    HEMATOCRIT 34.7 (L) 10/06/2017 03:30 AM    PLATELETCT 296 10/06/2017 03:30 AM        Total time of the discharge process exceeds 36 minutes

## 2017-10-10 ENCOUNTER — PATIENT OUTREACH (OUTPATIENT)
Dept: HEALTH INFORMATION MANAGEMENT | Facility: OTHER | Age: 52
End: 2017-10-10

## 2017-10-17 ENCOUNTER — PATIENT OUTREACH (OUTPATIENT)
Dept: HEALTH INFORMATION MANAGEMENT | Facility: OTHER | Age: 52
End: 2017-10-17

## 2017-10-17 NOTE — PROGRESS NOTES
"10/17/17 at 3:00 PM--Received phone call from pt s/p hospital discharge 10/6/17.  Pt states she \"no-showed\" her appt scheduled for 10/11/17 at the discharge clinic.  Pt states she has been feeling very tired and her heart is \"racing.\"  Instructed pt to go to ER for any new or worsening s/s s/p hospital discharge.  Transferred pt to scheduling so that she may schedule f/u appt.  "

## 2017-10-24 PROBLEM — G89.29 CHRONIC BACK PAIN: Status: RESOLVED | Noted: 2017-10-05 | Resolved: 2017-10-24

## 2017-10-24 PROBLEM — M54.9 CHRONIC BACK PAIN: Status: RESOLVED | Noted: 2017-10-05 | Resolved: 2017-10-24

## 2017-11-08 ENCOUNTER — HOSPITAL ENCOUNTER (EMERGENCY)
Facility: MEDICAL CENTER | Age: 52
End: 2017-11-08
Payer: MEDICARE

## 2017-11-08 VITALS
RESPIRATION RATE: 18 BRPM | OXYGEN SATURATION: 98 % | SYSTOLIC BLOOD PRESSURE: 140 MMHG | DIASTOLIC BLOOD PRESSURE: 74 MMHG | BODY MASS INDEX: 33.02 KG/M2 | WEIGHT: 180.56 LBS | HEART RATE: 95 BPM | TEMPERATURE: 96.6 F

## 2017-11-08 LAB
ALBUMIN SERPL BCP-MCNC: 3.8 G/DL (ref 3.2–4.9)
ALBUMIN/GLOB SERPL: 1.1 G/DL
ALP SERPL-CCNC: 164 U/L (ref 30–99)
ALT SERPL-CCNC: 8 U/L (ref 2–50)
ANION GAP SERPL CALC-SCNC: 10 MMOL/L (ref 0–11.9)
APTT PPP: 25.3 SEC (ref 24.7–36)
AST SERPL-CCNC: 14 U/L (ref 12–45)
BASOPHILS # BLD AUTO: 0.6 % (ref 0–1.8)
BASOPHILS # BLD: 0.07 K/UL (ref 0–0.12)
BILIRUB SERPL-MCNC: 0.3 MG/DL (ref 0.1–1.5)
BNP SERPL-MCNC: 17 PG/ML (ref 0–100)
BUN SERPL-MCNC: 8 MG/DL (ref 8–22)
CALCIUM SERPL-MCNC: 8.9 MG/DL (ref 8.5–10.5)
CHLORIDE SERPL-SCNC: 108 MMOL/L (ref 96–112)
CO2 SERPL-SCNC: 20 MMOL/L (ref 20–33)
CREAT SERPL-MCNC: 0.61 MG/DL (ref 0.5–1.4)
EKG IMPRESSION: NORMAL
EOSINOPHIL # BLD AUTO: 0.23 K/UL (ref 0–0.51)
EOSINOPHIL NFR BLD: 2.1 % (ref 0–6.9)
ERYTHROCYTE [DISTWIDTH] IN BLOOD BY AUTOMATED COUNT: 43.9 FL (ref 35.9–50)
GFR SERPL CREATININE-BSD FRML MDRD: >60 ML/MIN/1.73 M 2
GLOBULIN SER CALC-MCNC: 3.4 G/DL (ref 1.9–3.5)
GLUCOSE SERPL-MCNC: 152 MG/DL (ref 65–99)
HCT VFR BLD AUTO: 36 % (ref 37–47)
HGB BLD-MCNC: 11.1 G/DL (ref 12–16)
IMM GRANULOCYTES # BLD AUTO: 0.07 K/UL (ref 0–0.11)
IMM GRANULOCYTES NFR BLD AUTO: 0.6 % (ref 0–0.9)
INR PPP: 1.05 (ref 0.87–1.13)
LIPASE SERPL-CCNC: 35 U/L (ref 11–82)
LYMPHOCYTES # BLD AUTO: 1.99 K/UL (ref 1–4.8)
LYMPHOCYTES NFR BLD: 17.9 % (ref 22–41)
MCH RBC QN AUTO: 23.9 PG (ref 27–33)
MCHC RBC AUTO-ENTMCNC: 30.8 G/DL (ref 33.6–35)
MCV RBC AUTO: 77.6 FL (ref 81.4–97.8)
MONOCYTES # BLD AUTO: 0.55 K/UL (ref 0–0.85)
MONOCYTES NFR BLD AUTO: 4.9 % (ref 0–13.4)
NEUTROPHILS # BLD AUTO: 8.21 K/UL (ref 2–7.15)
NEUTROPHILS NFR BLD: 73.9 % (ref 44–72)
NRBC # BLD AUTO: 0 K/UL
NRBC BLD AUTO-RTO: 0 /100 WBC
PLATELET # BLD AUTO: 333 K/UL (ref 164–446)
PMV BLD AUTO: 10.7 FL (ref 9–12.9)
POTASSIUM SERPL-SCNC: 3 MMOL/L (ref 3.6–5.5)
PROT SERPL-MCNC: 7.2 G/DL (ref 6–8.2)
PROTHROMBIN TIME: 13.4 SEC (ref 12–14.6)
RBC # BLD AUTO: 4.64 M/UL (ref 4.2–5.4)
SODIUM SERPL-SCNC: 138 MMOL/L (ref 135–145)
TROPONIN I SERPL-MCNC: <0.01 NG/ML (ref 0–0.04)
WBC # BLD AUTO: 11.1 K/UL (ref 4.8–10.8)

## 2017-11-08 PROCEDURE — 80053 COMPREHEN METABOLIC PANEL: CPT

## 2017-11-08 PROCEDURE — 83880 ASSAY OF NATRIURETIC PEPTIDE: CPT

## 2017-11-08 PROCEDURE — 93005 ELECTROCARDIOGRAM TRACING: CPT

## 2017-11-08 PROCEDURE — 85610 PROTHROMBIN TIME: CPT

## 2017-11-08 PROCEDURE — 302449 STATCHG TRIAGE ONLY (STATISTIC)

## 2017-11-08 PROCEDURE — 85025 COMPLETE CBC W/AUTO DIFF WBC: CPT

## 2017-11-08 PROCEDURE — 84484 ASSAY OF TROPONIN QUANT: CPT

## 2017-11-08 PROCEDURE — 83690 ASSAY OF LIPASE: CPT

## 2017-11-08 PROCEDURE — 85730 THROMBOPLASTIN TIME PARTIAL: CPT

## 2017-11-08 NOTE — ED NOTES
"Chief Complaint   Patient presents with   • Chest Pain     radiating to left arm and jaw onset this morning   • Shortness of Breath     Pt ambulated to triage with above complaints. She said she had similar episode before and she was diagnosed with \"enlarged heart\".   "

## 2017-12-05 ENCOUNTER — HOSPITAL ENCOUNTER (OUTPATIENT)
Dept: LAB | Facility: MEDICAL CENTER | Age: 52
End: 2017-12-05
Attending: INTERNAL MEDICINE
Payer: MEDICARE

## 2017-12-05 LAB
ALBUMIN SERPL BCP-MCNC: 3.8 G/DL (ref 3.2–4.9)
ALBUMIN/GLOB SERPL: 1.1 G/DL
ALP SERPL-CCNC: 172 U/L (ref 30–99)
ALT SERPL-CCNC: 11 U/L (ref 2–50)
ANION GAP SERPL CALC-SCNC: 7 MMOL/L (ref 0–11.9)
ANISOCYTOSIS BLD QL SMEAR: ABNORMAL
AST SERPL-CCNC: 13 U/L (ref 12–45)
BASOPHILS # BLD AUTO: 0.7 % (ref 0–1.8)
BASOPHILS # BLD: 0.06 K/UL (ref 0–0.12)
BILIRUB SERPL-MCNC: 0.4 MG/DL (ref 0.1–1.5)
BUN SERPL-MCNC: 9 MG/DL (ref 8–22)
CALCIUM SERPL-MCNC: 9.2 MG/DL (ref 8.5–10.5)
CHLORIDE SERPL-SCNC: 108 MMOL/L (ref 96–112)
CHOLEST SERPL-MCNC: 176 MG/DL (ref 100–199)
CO2 SERPL-SCNC: 27 MMOL/L (ref 20–33)
COMMENT 1642: NORMAL
CREAT SERPL-MCNC: 0.71 MG/DL (ref 0.5–1.4)
EOSINOPHIL # BLD AUTO: 0.3 K/UL (ref 0–0.51)
EOSINOPHIL NFR BLD: 3.6 % (ref 0–6.9)
ERYTHROCYTE [DISTWIDTH] IN BLOOD BY AUTOMATED COUNT: 43.8 FL (ref 35.9–50)
GFR SERPL CREATININE-BSD FRML MDRD: >60 ML/MIN/1.73 M 2
GLOBULIN SER CALC-MCNC: 3.4 G/DL (ref 1.9–3.5)
GLUCOSE SERPL-MCNC: 101 MG/DL (ref 65–99)
HCT VFR BLD AUTO: 37.6 % (ref 37–47)
HDLC SERPL-MCNC: 54 MG/DL
HGB BLD-MCNC: 11.2 G/DL (ref 12–16)
IMM GRANULOCYTES # BLD AUTO: 0.04 K/UL (ref 0–0.11)
IMM GRANULOCYTES NFR BLD AUTO: 0.5 % (ref 0–0.9)
LDLC SERPL CALC-MCNC: 102 MG/DL
LYMPHOCYTES # BLD AUTO: 1.96 K/UL (ref 1–4.8)
LYMPHOCYTES NFR BLD: 23.8 % (ref 22–41)
MCH RBC QN AUTO: 23.2 PG (ref 27–33)
MCHC RBC AUTO-ENTMCNC: 29.8 G/DL (ref 33.6–35)
MCV RBC AUTO: 77.8 FL (ref 81.4–97.8)
MONOCYTES # BLD AUTO: 0.46 K/UL (ref 0–0.85)
MONOCYTES NFR BLD AUTO: 5.6 % (ref 0–13.4)
MORPHOLOGY BLD-IMP: NORMAL
NEUTROPHILS # BLD AUTO: 5.41 K/UL (ref 2–7.15)
NEUTROPHILS NFR BLD: 65.8 % (ref 44–72)
NRBC # BLD AUTO: 0 K/UL
NRBC BLD AUTO-RTO: 0 /100 WBC
PLATELET # BLD AUTO: 343 K/UL (ref 164–446)
PLATELET BLD QL SMEAR: NORMAL
PMV BLD AUTO: 11.3 FL (ref 9–12.9)
POTASSIUM SERPL-SCNC: 3.5 MMOL/L (ref 3.6–5.5)
PROT SERPL-MCNC: 7.2 G/DL (ref 6–8.2)
RBC # BLD AUTO: 4.83 M/UL (ref 4.2–5.4)
RBC BLD AUTO: PRESENT
SODIUM SERPL-SCNC: 142 MMOL/L (ref 135–145)
TRIGL SERPL-MCNC: 99 MG/DL (ref 0–149)
WBC # BLD AUTO: 8.2 K/UL (ref 4.8–10.8)

## 2017-12-05 PROCEDURE — 80061 LIPID PANEL: CPT | Mod: GY

## 2017-12-05 PROCEDURE — 36415 COLL VENOUS BLD VENIPUNCTURE: CPT | Mod: GY

## 2017-12-05 PROCEDURE — 80053 COMPREHEN METABOLIC PANEL: CPT | Mod: GY

## 2017-12-05 PROCEDURE — 85025 COMPLETE CBC W/AUTO DIFF WBC: CPT | Mod: GY

## 2017-12-22 ENCOUNTER — OFFICE VISIT (OUTPATIENT)
Dept: MEDICAL GROUP | Facility: MEDICAL CENTER | Age: 52
End: 2017-12-22
Payer: MEDICARE

## 2017-12-22 VITALS
SYSTOLIC BLOOD PRESSURE: 122 MMHG | DIASTOLIC BLOOD PRESSURE: 76 MMHG | OXYGEN SATURATION: 98 % | HEART RATE: 74 BPM | RESPIRATION RATE: 16 BRPM | TEMPERATURE: 98.6 F | WEIGHT: 186 LBS | BODY MASS INDEX: 34.23 KG/M2 | HEIGHT: 62 IN

## 2017-12-22 DIAGNOSIS — F41.0 PANIC ATTACKS: ICD-10-CM

## 2017-12-22 DIAGNOSIS — G89.4 CHRONIC PAIN SYNDROME: ICD-10-CM

## 2017-12-22 DIAGNOSIS — F43.10 PTSD (POST-TRAUMATIC STRESS DISORDER): ICD-10-CM

## 2017-12-22 DIAGNOSIS — M43.16 SPONDYLOLISTHESIS OF LUMBAR REGION: ICD-10-CM

## 2017-12-22 DIAGNOSIS — N76.4 LABIAL ABSCESS: ICD-10-CM

## 2017-12-22 DIAGNOSIS — F41.9 ANXIETY: ICD-10-CM

## 2017-12-22 DIAGNOSIS — R06.09 EXERTIONAL DYSPNEA: ICD-10-CM

## 2017-12-22 DIAGNOSIS — M25.50 POLYARTHRALGIA: ICD-10-CM

## 2017-12-22 DIAGNOSIS — M54.16 RIGHT LUMBAR RADICULOPATHY: ICD-10-CM

## 2017-12-22 DIAGNOSIS — R06.83 SNORING: ICD-10-CM

## 2017-12-22 PROCEDURE — 99214 OFFICE O/P EST MOD 30 MIN: CPT | Performed by: NURSE PRACTITIONER

## 2017-12-22 RX ORDER — ALPRAZOLAM 2 MG/1
2 TABLET ORAL 2 TIMES DAILY PRN
Qty: 60 TAB | Refills: 0 | Status: SHIPPED | OUTPATIENT
Start: 2017-12-22 | End: 2018-01-21

## 2017-12-22 RX ORDER — AMOXICILLIN 500 MG/1
500 CAPSULE ORAL 3 TIMES DAILY
Qty: 30 CAP | Refills: 0 | Status: SHIPPED | OUTPATIENT
Start: 2017-12-22 | End: 2018-01-18

## 2017-12-22 RX ORDER — OXYCODONE HYDROCHLORIDE 15 MG/1
15 TABLET ORAL 4 TIMES DAILY PRN
COMMUNITY
Start: 2017-12-20 | End: 2018-01-18

## 2017-12-22 RX ORDER — ALPRAZOLAM 2 MG/1
2 TABLET ORAL NIGHTLY PRN
COMMUNITY
End: 2017-12-22 | Stop reason: SDUPTHER

## 2017-12-22 ASSESSMENT — PATIENT HEALTH QUESTIONNAIRE - PHQ9: CLINICAL INTERPRETATION OF PHQ2 SCORE: 0

## 2017-12-22 NOTE — LETTER
[facility]  DARIN Angel.P.R.N.  [address]  [place] [address]  Fax: [phone]   Authorization for Release/Disclosure of   Protected Health Information   Name: PATTI TELLO : 1965 SSN: [id number]   [facility] [address] Phone:    [phone] (home)    I authorize the entity listed below to release/disclose the PHI below to:   [facility]/Renay Ortiz A.P.R.N. and DARIN Angel.P.R.N.   Provider or Entity Name:  [facility]   Address   City, State, Zip   Phone:      Fax:     Reason for request: continuity of care   Information to be released:    [  ] LAST COLONOSCOPY,  including any PATH REPORT and follow-up  [  ] LAST FIT/COLOGUARD RESULT [  ] LAST DEXA  [  ] LAST MAMMOGRAM  [  ] LAST PAP  [  ] LAST LABS [  ] RETINA EXAM REPORT  [  ] IMMUNIZATION RECORDS  [x  ] Release all info      [  ] Check here and initial the line next to each item to release ALL health information INCLUDING  _____ Care and treatment for drug and / or alcohol abuse  _____ HIV testing, infection status, or AIDS  _____ Genetic Testing    DATES OF SERVICE OR TIME PERIOD TO BE DISCLOSED: _____________  I understand and acknowledge that:  * This Authorization may be revoked at any time by you in writing, except if your health information has already been used or disclosed.  * Your health information that will be used or disclosed as a result of you signing this authorization could be re-disclosed by the recipient. If this occurs, your re-disclosed health information may no longer be protected by State or Federal laws.  * You may refuse to sign this Authorization. Your refusal will not affect your ability to obtain treatment.  * This Authorization becomes effective upon signing and will  on (date) __________.      If no date is indicated, this Authorization will  one (1) year from the signature date.    Name: Patti Tello    Signature:   Date:          12/22/2017       PLEASE FAX REQUESTED RECORDS BACK TO: [phone]

## 2017-12-22 NOTE — PATIENT INSTRUCTIONS
Arthritis, Nonspecific  Arthritis is inflammation of a joint. This usually means pain, redness, warmth or swelling are present. One or more joints may be involved. There are a number of types of arthritis. Your caregiver may not be able to tell what type of arthritis you have right away.  CAUSES   The most common cause of arthritis is the wear and tear on the joint (osteoarthritis). This causes damage to the cartilage, which can break down over time. The knees, hips, back and neck are most often affected by this type of arthritis.  Other types of arthritis and common causes of joint pain include:  · Sprains and other injuries near the joint. Sometimes minor sprains and injuries cause pain and swelling that develop hours later.  · Rheumatoid arthritis. This affects hands, feet and knees. It usually affects both sides of your body at the same time. It is often associated with chronic ailments, fever, weight loss and general weakness.  · Crystal arthritis. Gout and pseudo gout can cause occasional acute severe pain, redness and swelling in the foot, ankle, or knee.  · Infectious arthritis. Bacteria can get into a joint through a break in overlying skin. This can cause infection of the joint. Bacteria and viruses can also spread through the blood and affect your joints.  · Drug, infectious and allergy reactions. Sometimes joints can become mildly painful and slightly swollen with these types of illnesses.  SYMPTOMS   · Pain is the main symptom.  · Your joint or joints can also be red, swollen and warm or hot to the touch.  · You may have a fever with certain types of arthritis, or even feel overall ill.  · The joint with arthritis will hurt with movement. Stiffness is present with some types of arthritis.  DIAGNOSIS   Your caregiver will suspect arthritis based on your description of your symptoms and on your exam. Testing may be needed to find the type of arthritis:  · Blood and sometimes urine tests.  · X-ray tests  and sometimes CT or MRI scans.  · Removal of fluid from the joint (arthrocentesis) is done to check for bacteria, crystals or other causes. Your caregiver (or a specialist) will numb the area over the joint with a local anesthetic, and use a needle to remove joint fluid for examination. This procedure is only minimally uncomfortable.  · Even with these tests, your caregiver may not be able to tell what kind of arthritis you have. Consultation with a specialist (rheumatologist) may be helpful.  TREATMENT   Your caregiver will discuss with you treatment specific to your type of arthritis. If the specific type cannot be determined, then the following general recommendations may apply.  Treatment of severe joint pain includes:  · Rest.  · Elevation.  · Anti-inflammatory medication (for example, ibuprofen) may be prescribed. Avoiding activities that cause increased pain.  · Only take over-the-counter or prescription medicines for pain and discomfort as recommended by your caregiver.  · Cold packs over an inflamed joint may be used for 10 to 15 minutes every hour. Hot packs sometimes feel better, but do not use overnight. Do not use hot packs if you are diabetic without your caregiver's permission.  · A cortisone shot into arthritic joints may help reduce pain and swelling.  · Any acute arthritis that gets worse over the next 1 to 2 days needs to be looked at to be sure there is no joint infection.  Long-term arthritis treatment involves modifying activities and lifestyle to reduce joint stress jarring. This can include weight loss. Also, exercise is needed to nourish the joint cartilage and remove waste. This helps keep the muscles around the joint strong.  HOME CARE INSTRUCTIONS   · Do not take aspirin to relieve pain if gout is suspected. This elevates uric acid levels.  · Only take over-the-counter or prescription medicines for pain, discomfort or fever as directed by your caregiver.  · Rest the joint as much as  possible.  · If your joint is swollen, keep it elevated.  · Use crutches if the painful joint is in your leg.  · Drinking plenty of fluids may help for certain types of arthritis.  · Follow your caregiver's dietary instructions.  · Try low-impact exercise such as:  ¨ Swimming.  ¨ Water aerobics.  ¨ Biking.  ¨ Walking.  · Morning stiffness is often relieved by a warm shower.  · Put your joints through regular range-of-motion.  SEEK MEDICAL CARE IF:   · You do not feel better in 24 hours or are getting worse.  · You have side effects to medications, or are not getting better with treatment.  SEEK IMMEDIATE MEDICAL CARE IF:   · You have a fever.  · You develop severe joint pain, swelling or redness.  · Many joints are involved and become painful and swollen.  · There is severe back pain and/or leg weakness.  · You have loss of bowel or bladder control.     This information is not intended to replace advice given to you by your health care provider. Make sure you discuss any questions you have with your health care provider.     Document Released: 01/25/2006 Document Revised: 01/08/2016 Document Reviewed: 03/14/2016  [employer] Interactive Patient Education ©2016 [employer]    Gout  Gout is an inflammatory arthritis caused by a buildup of uric acid crystals in the joints. Uric acid is a chemical that is normally present in the blood. When the level of uric acid in the blood is too high it can form crystals that deposit in your joints and tissues. This causes joint redness, soreness, and swelling (inflammation). Repeat attacks are common. Over time, uric acid crystals can form into masses (tophi) near a joint, destroying bone and causing disfigurement. Gout is treatable and often preventable.  CAUSES   The disease begins with elevated levels of uric acid in the blood. Uric acid is produced by your body when it breaks down a naturally found substance called purines. Certain foods you eat, such as meats and fish, contain  high amounts of purines. Causes of an elevated uric acid level include:  · Being passed down from parent to child (heredity).  · Diseases that cause increased uric acid production (such as obesity, psoriasis, and certain cancers).  · Excessive alcohol use.  · Diet, especially diets rich in meat and seafood.  · Medicines, including certain cancer-fighting medicines (chemotherapy), water pills (diuretics), and aspirin.  · Chronic kidney disease. The kidneys are no longer able to remove uric acid well.  · Problems with metabolism.  Conditions strongly associated with gout include:  · Obesity.  · High blood pressure.  · High cholesterol.  · Diabetes.  Not everyone with elevated uric acid levels gets gout. It is not understood why some people get gout and others do not. Surgery, joint injury, and eating too much of certain foods are some of the factors that can lead to gout attacks.  SYMPTOMS   · An attack of gout comes on quickly. It causes intense pain with redness, swelling, and warmth in a joint.  · Fever can occur.  · Often, only one joint is involved. Certain joints are more commonly involved:  ¨ Base of the big toe.  ¨ Knee.  ¨ Ankle.  ¨ Wrist.  ¨ Finger.  Without treatment, an attack usually goes away in a few days to weeks. Between attacks, you usually will not have symptoms, which is different from many other forms of arthritis.  DIAGNOSIS   Your caregiver will suspect gout based on your symptoms and exam. In some cases, tests may be recommended. The tests may include:  · Blood tests.  · Urine tests.  · X-rays.  · Joint fluid exam. This exam requires a needle to remove fluid from the joint (arthrocentesis). Using a microscope, gout is confirmed when uric acid crystals are seen in the joint fluid.  TREATMENT   There are two phases to gout treatment: treating the sudden onset (acute) attack and preventing attacks (prophylaxis).  · Treatment of an Acute Attack.  ¨ Medicines are used. These include  anti-inflammatory medicines or steroid medicines.  ¨ An injection of steroid medicine into the affected joint is sometimes necessary.  ¨ The painful joint is rested. Movement can worsen the arthritis.  ¨ You may use warm or cold treatments on painful joints, depending which works best for you.  · Treatment to Prevent Attacks.  ¨ If you suffer from frequent gout attacks, your caregiver may advise preventive medicine. These medicines are started after the acute attack subsides. These medicines either help your kidneys eliminate uric acid from your body or decrease your uric acid production. You may need to stay on these medicines for a very long time.  ¨ The early phase of treatment with preventive medicine can be associated with an increase in acute gout attacks. For this reason, during the first few months of treatment, your caregiver may also advise you to take medicines usually used for acute gout treatment. Be sure you understand your caregiver's directions. Your caregiver may make several adjustments to your medicine dose before these medicines are effective.  ¨ Discuss dietary treatment with your caregiver or dietitian. Alcohol and drinks high in sugar and fructose and foods such as meat, poultry, and seafood can increase uric acid levels. Your caregiver or dietitian can advise you on drinks and foods that should be limited.  HOME CARE INSTRUCTIONS   · Do not take aspirin to relieve pain. This raises uric acid levels.  · Only take over-the-counter or prescription medicines for pain, discomfort, or fever as directed by your caregiver.  · Rest the joint as much as possible. When in bed, keep sheets and blankets off painful areas.  · Keep the affected joint raised (elevated).  · Apply warm or cold treatments to painful joints. Use of warm or cold treatments depends on which works best for you.  · Use crutches if the painful joint is in your leg.  · Drink enough fluids to keep your urine clear or pale yellow. This  helps your body get rid of uric acid. Limit alcohol, sugary drinks, and fructose drinks.  · Follow your dietary instructions. Pay careful attention to the amount of protein you eat. Your daily diet should emphasize fruits, vegetables, whole grains, and fat-free or low-fat milk products. Discuss the use of coffee, vitamin C, and cherries with your caregiver or dietitian. These may be helpful in lowering uric acid levels.  · Maintain a healthy body weight.  SEEK MEDICAL CARE IF:   · You develop diarrhea, vomiting, or any side effects from medicines.  · You do not feel better in 24 hours, or you are getting worse.  SEEK IMMEDIATE MEDICAL CARE IF:   · Your joint becomes suddenly more tender, and you have chills or a fever.  MAKE SURE YOU:   · Understand these instructions.  · Will watch your condition.  · Will get help right away if you are not doing well or get worse.     This information is not intended to replace advice given to you by your health care provider. Make sure you discuss any questions you have with your health care provider.     Document Released: 12/15/2001 Document Revised: 01/08/2016 Document Reviewed: 07/31/2013  [employer] Interactive Patient Education ©2016 [employer]

## 2017-12-22 NOTE — LETTER
[facility]  DARIN Angel.P.R.N.  [address]  [place] [address]  Fax: [phone]   Authorization for Release/Disclosure of   Protected Health Information   Name: PATTI TELLO : 1965 SSN: [id number]   Address: [place] [address] Phone:    [phone] (home)    I authorize the entity listed below to release/disclose the PHI below to:   [facility]/Renay Ortiz A.P.R.N. and DARIN Angel.P.R.N.   Provider or Entity Name:  [place] [facility]   Address   City, State, Zip   Phone:      Fax:     Reason for request: continuity of care   Information to be released:    [  ] LAST COLONOSCOPY,  including any PATH REPORT and follow-up  [  ] LAST FIT/COLOGUARD RESULT [  ] LAST DEXA  [ x ] LAST MAMMOGRAM  [  ] LAST PAP  [  ] LAST LABS [  ] RETINA EXAM REPORT  [  ] IMMUNIZATION RECORDS  [  ] Release all info      [  ] Check here and initial the line next to each item to release ALL health information INCLUDING  _____ Care and treatment for drug and / or alcohol abuse  _____ HIV testing, infection status, or AIDS  _____ Genetic Testing    DATES OF SERVICE OR TIME PERIOD TO BE DISCLOSED: _____________  I understand and acknowledge that:  * This Authorization may be revoked at any time by you in writing, except if your health information has already been used or disclosed.  * Your health information that will be used or disclosed as a result of you signing this authorization could be re-disclosed by the recipient. If this occurs, your re-disclosed health information may no longer be protected by State or Federal laws.  * You may refuse to sign this Authorization. Your refusal will not affect your ability to obtain treatment.  * This Authorization becomes effective upon signing and will  on (date) __________.      If no date is indicated, this Authorization will  one (1) year from the signature date.    Name: Patti Tello    Signature:   Date:        12/22/2017       PLEASE FAX REQUESTED RECORDS BACK TO: [phone]

## 2017-12-26 NOTE — PROGRESS NOTES
CC: back pain/anxiety      Patti Tello is a 52 y.o. female here to establish care and to discuss the evaluation and management of:    1. Chronic pain syndrome  2. Right lumbar radiculopathy  3. Spondylolisthesis of lumbar region  Patient states that she has back pain ever since she had an accident 2001. States that she takes oxycodone pain medication for her arthritis. States it is her back her legs her knee and her shoulders and ankles all hurt. States that she gets sciatica in her right leg. Reports back surgery in 2012. States she does not exercise, states she does walk sometimes.    4. Anxiety  5. Panic attacks  6. PTSD (post-traumatic stress disorder)  7. Polyarthralgia  Patient states that she has severe anxiety, and PTSD especially since her accident from 2001. States she had a pretty bad head trauma then and has been dealing with this ever since. States that she gets a lot of anxiety when she is around a lot of people. States that Xanax helps with this and she's been taking it twice a day. States that when she had a lot of pain and she also gets a lot of anxiety. States she also takes Abilify however has been trying to wean herself off as she has gained weight. Also was trying to wean off Zoloft as well every other day she's been aching that medication. States that she was seeing psychiatry.    8. Exertional dyspnea  9. Snoring  She states that she was told she had COPD when she lived in [place] and states that her symptoms were not controlled there and are controlled in [place]. Denies smoking, lives with a smoker, states doesn't use an inhaler and does not have a pulmonologist. States that she climbs up the stairs she gets really short of breath with long distance walking. States that she was using Spiriva and Advair at least once per month. States that she does get short of breath. She also states she has some daytime sleepiness and takes a nap. Concerned she has sleep apnea because she snores and she is  very tired throughout the day.    10. Labial abscess  States that on her right labia she has had a sore and it had erupted after she was trying to express it. Denies any fevers, chills, nausea or vomiting. Denies any pain at this time, states healing. States that it is healing at this time however has had this happen before.      ROS:  Denies any Headache, Blurred Vision, Confusion Chest pain,  Abdominal pain, Changes of bowel or bladder, Lower ext edema, Fevers, Nights sweats, Weight Changes, Focal weakness or numbness.  All other systems are negative.      Current Outpatient Prescriptions:   •  alprazolam (XANAX) 2 MG tablet, Take 1 Tab by mouth 2 times a day as needed for Sleep for up to 30 days., Disp: 60 Tab, Rfl: 0  •  amoxicillin (AMOXIL) 500 MG Cap, Take 1 Cap by mouth 3 times a day., Disp: 30 Cap, Rfl: 0  •  Oxycodone HCl 20 MG Tab, Take 20 mg by mouth every 6 hours as needed., Disp: , Rfl:   •  aripiprazole (ABILIFY) 20 MG tablet, Take 20 mg by mouth every day., Disp: , Rfl:   •  sertraline (ZOLOFT) 50 MG Tab, Take 1 Tab by mouth every day., Disp: 30 Tab, Rfl: 1  •  ondansetron (ZOFRAN) 4 MG Tab tablet, Take 1 Tab by mouth every 6 hours as needed for Nausea/Vomiting., Disp: 20 Each, Rfl: 0  •  fluticasone (FLONASE) 50 MCG/ACT nasal spray, Spray 1 Spray in nose 2 times a day. Each Nostril, Disp: 1 Bottle, Rfl: 0  •  oxycodone (OXY-IR) 15 MG immediate release tablet, Take 15 mg by mouth 4 times a day as needed., Disp: , Rfl:   •  PROAIR  (90 Base) MCG/ACT Aero Soln inhalation aerosol, Take 1 Inhalation by mouth every four hours as needed., Disp: , Rfl:     No Known Allergies    Past Medical History:   Diagnosis Date   • Anxiety and depression    • Arthritis     back, neck   • Back injury    • Back pain    • Bronchitis 2010   • Cancer (CMS-HCC)     breast LT   • Chronic LBP    • Chronic neck pain    • Cold intolerance 1/24/2012   • Diabetes    • H/O gastric bypass 10/11/2013   • HX: breast cancer  "10/11/2013   • Hypertension    • Itching due to drug 6/2/2011   • Nephrolithiasis 10/11/2013   • Other specified symptom associated with female genital organs    • Panic disorder    • PTSD (post-traumatic stress disorder)    • S/P laminectomy 10/22/2012   • Trauma     head trauma     Past Surgical History:   Procedure Laterality Date   • URETEROSCOPY  10/10/2013    Performed by Molly Resendiz M.D. at SURGERY [facility]   • LASERTRIPSY  10/10/2013    Performed by Molly Resendiz M.D. at SURGERY [facility]   • LUMBAR FUSION POSTERIOR  10/22/2012    Performed by Alfred Branham M.D. at SURGERY [facility]   • LUMBAR LAMINECTOMY DISKECTOMY  10/22/2012    Performed by Alfred Branham M.D. at SURGERY [facility]   • OTHER ORTHOPEDIC SURGERY  10/01/2012    lumbar fusion, Dr Branham   • OTHER ABDOMINAL SURGERY  2006    gastric bypass   • ABDOMINAL HYSTERECTOMY TOTAL      DUE TO FIBROIDS   • BREAST RECONSTRUCTION      Lt breast   • CRANIOTOMY      DUE TO BRAIN INJURY   • MASTECTOMY      Lt breast   • OTHER      left ear drum surgery   • OTHER ABDOMINAL SURGERY      hernia repair   • PRIMARY C SECTION      x 3   • US-CYST ASPIRATION-BREAST INITIAL       Family History   Problem Relation Age of Onset   • Diabetes Father    • Cancer Mother    • Other Mother      SLE     Social History     Social History   • Marital status:      Spouse name: N/A   • Number of children: N/A   • Years of education: N/A     Occupational History   • Not on file.     Social History Main Topics   • Smoking status: Never Smoker   • Smokeless tobacco: Never Used   • Alcohol use No   • Drug use: No   • Sexual activity: Not Currently     Other Topics Concern   • Not on file     Social History Narrative   • No narrative on file       Objective:     Vitals: /76   Pulse 74   Temp 37 °C (98.6 °F)   Resp 16   Ht 1.575 m (5' 2\")   Wt 84.4 kg (186 lb)   LMP 10/05/1999   SpO2 98%   BMI 34.02 kg/m²      General: " Alert, pleasant, NAD  HEENT:  Normocephalic.  Neck supple.  No thyromegaly or masses palpated. No cervical or supraclavicular lymphadenopathy.  Heart:  Regular rate and rhythm.  S1 and S2 normal.  No murmurs appreciated.  Respiratory:  Normal respiratory effort.  Clear to auscultation bilaterally.    Skin:  Warm, dry, no rashes  Musculoskeletal:  Gait is normal.  Moves all extremities well.  Extremities: . No leg edema.  Neurological: No tremors, sensation grossly intact  Psych:  Affect/mood is anxious, judgement is good, memory is intact, grooming is appropriate.      Assessment and Plan.   52 y.o. female to establish care and discuss the following  1. Chronic pain syndrome  2. Right lumbar radiculopathy  3. Spondylolisthesis of lumbar region  Patient has chronic use of opiates and benzodiazepines. Referral to physical therapy for assessment. Possible referral to pain management specialist if unable to manage pain effectively with physical therapy. Discussed with patient that does not appropriate and benzodiazepines and pain medications on a daily basis.  - REFERRAL TO PHYSICAL THERAPY Reason for Therapy: Eval/Treat/Report    4. Anxiety  5. Panic attacks  6. PTSD (post-traumatic stress disorder)  Chronic. Discussed with patient that she should remain seen psychiatry and discuss future medications aside from using only Xanax for her anxiety. Discussed with her that is not recommended to be taking benzodiazepines and pain medication on a daily basis. We will be discussing the possibility of decreasing her Xanax and starting any medication if she continues to need her chronic pain medications as well.  - alprazolam (XANAX) 2 MG tablet; Take 1 Tab by mouth 2 times a day as needed for Sleep for up to 30 days.  Dispense: 60 Tab; Refill: 0    7. Exertional dyspnea  8. Snoring  Stable at this time, no acute distress, oxygen in clinic at room at 98%. Needs assessment from pulmonology and sleep studies.Possible history of  COPD with inhaler use. Patient with unclear etiology and management of reported COPD.   - REFERRAL TO PULMONOLOGY  - REFERRAL TO SLEEP STUDIES    9. Polyarthralgia  Chronic. Multiple joints affected,uses chronic pain medication her back and her legs. Needs further workup from rheumatology.  - REFERRAL TO RHEUMATOLOGY  - REFERRAL TO PHYSICAL THERAPY Reason for Therapy: Eval/Treat/Report    10. Labial abscess  Improving. Will do short course of antibiotics to ensure complete recovery.    - amoxicillin (AMOXIL) 500 MG Cap; Take 1 Cap by mouth 3 times a day.  Dispense: 30 Cap; Refill: 0      Health Maintenance: Patient states that she about 3 weeks ago into renal diagnostic and had a mammogram. States 1997 she had chemo and a lumpectomy in the left breast.-Needs yearly mammograms.    Return in about 2 weeks (around 1/5/2018) for chronic medical problems..        Renay NGUYEN

## 2017-12-27 ENCOUNTER — HOSPITAL ENCOUNTER (OUTPATIENT)
Dept: LAB | Facility: MEDICAL CENTER | Age: 52
End: 2017-12-27
Attending: PODIATRIST
Payer: MEDICARE

## 2017-12-27 LAB
ANISOCYTOSIS BLD QL SMEAR: ABNORMAL
BASOPHILS # BLD AUTO: 0.7 % (ref 0–1.8)
BASOPHILS # BLD: 0.06 K/UL (ref 0–0.12)
COMMENT 1642: NORMAL
EOSINOPHIL # BLD AUTO: 0.23 K/UL (ref 0–0.51)
EOSINOPHIL NFR BLD: 2.7 % (ref 0–6.9)
ERYTHROCYTE [DISTWIDTH] IN BLOOD BY AUTOMATED COUNT: 45.1 FL (ref 35.9–50)
HCT VFR BLD AUTO: 39.7 % (ref 37–47)
HGB BLD-MCNC: 11.6 G/DL (ref 12–16)
IMM GRANULOCYTES # BLD AUTO: 0.04 K/UL (ref 0–0.11)
IMM GRANULOCYTES NFR BLD AUTO: 0.5 % (ref 0–0.9)
LYMPHOCYTES # BLD AUTO: 1.89 K/UL (ref 1–4.8)
LYMPHOCYTES NFR BLD: 21.9 % (ref 22–41)
MCH RBC QN AUTO: 22.5 PG (ref 27–33)
MCHC RBC AUTO-ENTMCNC: 29.2 G/DL (ref 33.6–35)
MCV RBC AUTO: 76.9 FL (ref 81.4–97.8)
MICROCYTES BLD QL SMEAR: ABNORMAL
MONOCYTES # BLD AUTO: 0.35 K/UL (ref 0–0.85)
MONOCYTES NFR BLD AUTO: 4.1 % (ref 0–13.4)
MORPHOLOGY BLD-IMP: NORMAL
NEUTROPHILS # BLD AUTO: 6.06 K/UL (ref 2–7.15)
NEUTROPHILS NFR BLD: 70.1 % (ref 44–72)
NRBC # BLD AUTO: 0 K/UL
NRBC BLD-RTO: 0 /100 WBC
OVALOCYTES BLD QL SMEAR: NORMAL
PLATELET # BLD AUTO: 347 K/UL (ref 164–446)
PLATELET BLD QL SMEAR: NORMAL
PMV BLD AUTO: 11 FL (ref 9–12.9)
POIKILOCYTOSIS BLD QL SMEAR: NORMAL
RBC # BLD AUTO: 5.16 M/UL (ref 4.2–5.4)
RBC BLD AUTO: PRESENT
URATE SERPL-MCNC: 4.7 MG/DL (ref 1.9–8.2)
WBC # BLD AUTO: 8.6 K/UL (ref 4.8–10.8)

## 2017-12-27 PROCEDURE — 85025 COMPLETE CBC W/AUTO DIFF WBC: CPT

## 2017-12-27 PROCEDURE — 36415 COLL VENOUS BLD VENIPUNCTURE: CPT

## 2017-12-27 PROCEDURE — 84550 ASSAY OF BLOOD/URIC ACID: CPT

## 2018-01-05 ENCOUNTER — OFFICE VISIT (OUTPATIENT)
Dept: MEDICAL GROUP | Facility: MEDICAL CENTER | Age: 53
End: 2018-01-05
Payer: MEDICARE

## 2018-01-05 VITALS
RESPIRATION RATE: 15 BRPM | SYSTOLIC BLOOD PRESSURE: 120 MMHG | TEMPERATURE: 97.9 F | HEIGHT: 62 IN | WEIGHT: 185.4 LBS | DIASTOLIC BLOOD PRESSURE: 76 MMHG | BODY MASS INDEX: 34.12 KG/M2 | HEART RATE: 77 BPM | OXYGEN SATURATION: 99 %

## 2018-01-05 DIAGNOSIS — M48.061 SPINAL STENOSIS OF LUMBAR REGION, UNSPECIFIED WHETHER NEUROGENIC CLAUDICATION PRESENT: ICD-10-CM

## 2018-01-05 DIAGNOSIS — F41.9 ANXIETY: ICD-10-CM

## 2018-01-05 DIAGNOSIS — M19.90 ARTHRITIS: ICD-10-CM

## 2018-01-05 DIAGNOSIS — G89.4 CHRONIC PAIN SYNDROME: ICD-10-CM

## 2018-01-05 PROCEDURE — 99214 OFFICE O/P EST MOD 30 MIN: CPT | Performed by: NURSE PRACTITIONER

## 2018-01-05 RX ORDER — OXYCODONE HYDROCHLORIDE 20 MG/1
20 TABLET ORAL 3 TIMES DAILY PRN
Qty: 90 TAB | Refills: 0 | Status: SHIPPED | OUTPATIENT
Start: 2018-01-15 | End: 2018-02-02 | Stop reason: SDUPTHER

## 2018-01-05 RX ORDER — IBUPROFEN 800 MG/1
800 TABLET ORAL EVERY 8 HOURS PRN
Qty: 90 TAB | Refills: 2 | Status: SHIPPED | OUTPATIENT
Start: 2018-01-05 | End: 2018-07-12

## 2018-01-05 NOTE — PROGRESS NOTES
cc:  pain    Subjective:     HPI:     Patti Tello is a 52 y.o. female here to discuss the evaluation and management of:    Anxiety and depression   Patient states that she has an appointment with psychiatry on the 16th of this month. States that she will be talking to them about changing her medications as she's been weaning herself off the Zoloft and Abilify. Denies any suicidal ideations at this time. States she continues to take her Xanax daily.    Arthritis  Patient states that she has been taking her oxycodone for this. States she only takes it when she is at home and not going to be driving around drop a day. States she also alternates with ibuprofen. Patient states that she did not make an appointment with physical therapy as she thought just walking at home would be enough. Patient states that she is also waiting for her appointment to be scheduled with rheumatology.    Bone spur  Patient states that she did have foot surgery on her right foot due to that bone spur that she has on her 5th toe. States she's having this done on the 19th of this month with .    Headache  Patient states that she's been having a headache for the last few days. States that she used to drink about 6-7 sodas per day. States that she's cut back to 1-2 sodas per day.    ROS:  Denies any Headache, Blurred Vision, Confusion Chest pain,  Shortness of breath,  Abdominal pain, Changes of bowel or bladder, Lower ext edema, Fevers, Nights sweats, Weight Changes, Focal weakness or numbness.  All other systems are negative.        Current Outpatient Prescriptions:   •  [START ON 1/15/2018] Oxycodone HCl 20 MG Tab, Take 1 Tab by mouth 3 times a day as needed for up to 30 days., Disp: 90 Tab, Rfl: 0  •  ibuprofen (MOTRIN) 800 MG Tab, Take 1 Tab by mouth every 8 hours as needed., Disp: 90 Tab, Rfl: 2  •  PROAIR  (90 Base) MCG/ACT Aero Soln inhalation aerosol, Take 1 Inhalation by mouth every four hours as needed., Disp: , Rfl:    •  alprazolam (XANAX) 2 MG tablet, Take 1 Tab by mouth 2 times a day as needed for Sleep for up to 30 days., Disp: 60 Tab, Rfl: 0  •  amoxicillin (AMOXIL) 500 MG Cap, Take 1 Cap by mouth 3 times a day., Disp: 30 Cap, Rfl: 0  •  aripiprazole (ABILIFY) 20 MG tablet, Take 20 mg by mouth every day., Disp: , Rfl:   •  sertraline (ZOLOFT) 50 MG Tab, Take 1 Tab by mouth every day., Disp: 30 Tab, Rfl: 1  •  ondansetron (ZOFRAN) 4 MG Tab tablet, Take 1 Tab by mouth every 6 hours as needed for Nausea/Vomiting., Disp: 20 Each, Rfl: 0  •  fluticasone (FLONASE) 50 MCG/ACT nasal spray, Spray 1 Spray in nose 2 times a day. Each Nostril, Disp: 1 Bottle, Rfl: 0  •  oxycodone (OXY-IR) 15 MG immediate release tablet, Take 15 mg by mouth 4 times a day as needed., Disp: , Rfl:     No Known Allergies    Past Medical History:   Diagnosis Date   • Anxiety and depression    • Arthritis     back, neck   • Back injury    • Back pain    • Bronchitis 2010   • Cancer (CMS-HCC)     breast LT   • Chronic LBP    • Chronic neck pain    • Cold intolerance 1/24/2012   • Diabetes    • H/O gastric bypass 10/11/2013   • HX: breast cancer 10/11/2013   • Hypertension    • Itching due to drug 6/2/2011   • Nephrolithiasis 10/11/2013   • Other specified symptom associated with female genital organs    • Panic disorder    • PTSD (post-traumatic stress disorder)    • S/P laminectomy 10/22/2012   • Trauma     head trauma     Past Surgical History:   Procedure Laterality Date   • URETEROSCOPY  10/10/2013    Performed by Molly Resendiz M.D. at SURGERY [facility]S   • LASERTRIPSY  10/10/2013    Performed by Molly Resendiz M.D. at SURGERY [facility]S   • LUMBAR FUSION POSTERIOR  10/22/2012    Performed by Alfred Branham M.D. at SURGERY [facility]   • LUMBAR LAMINECTOMY DISKECTOMY  10/22/2012    Performed by Alfred Branham M.D. at SURGERY [facility]   • OTHER ORTHOPEDIC SURGERY  10/01/2012    lumbar fusion, Dr Branham   • OTHER  "ABDOMINAL SURGERY  2006    gastric bypass   • ABDOMINAL HYSTERECTOMY TOTAL      DUE TO FIBROIDS   • BREAST RECONSTRUCTION      Lt breast   • CRANIOTOMY      DUE TO BRAIN INJURY   • MASTECTOMY      Lt breast   • OTHER      left ear drum surgery   • OTHER ABDOMINAL SURGERY      hernia repair   • PRIMARY C SECTION      x 3   • US-CYST ASPIRATION-BREAST INITIAL       Family History   Problem Relation Age of Onset   • Diabetes Father    • Cancer Mother    • Other Mother      SLE     Social History     Social History   • Marital status:      Spouse name: N/A   • Number of children: N/A   • Years of education: N/A     Occupational History   • Not on file.     Social History Main Topics   • Smoking status: Never Smoker   • Smokeless tobacco: Never Used   • Alcohol use No   • Drug use: No   • Sexual activity: Not Currently     Other Topics Concern   • Not on file     Social History Narrative   • No narrative on file       Objective:     Vitals: /76   Pulse 77   Temp 36.6 °C (97.9 °F)   Resp 15   Ht 1.575 m (5' 2\")   Wt 84.1 kg (185 lb 6.4 oz)   LMP 10/05/1999   SpO2 99%   BMI 33.91 kg/m²    General: Alert, pleasant, NAD  HEENT: Normocephalic. Heart: Regular rate and rhythm.  S1 and S2 normal.  No murmurs appreciated.  Respiratory: Normal respiratory effort.  Clear to auscultation bilaterally.  Skin: Warm, dry, no rashes.  Musculoskeletal: Gait is normal.  Moves all extremities well.  Extremities: No leg edema.    Neurological: No tremors, sensation grossly intact  Psych:  Affect/mood is normal, judgement is good, memory is intact, grooming is appropriate.    Assessment/Plan:     Patti was seen today for headache and medication refill.    Diagnoses and all orders for this visit:    Chronic pain syndrome  Spinal stenosis of lumbar region, unspecified whether neurogenic claudication present  Arthritis  Chronic. Discussed with patient that at some point weaning down her narcotic use is important. Discussed " the role of physical therapy and how daily exercises and activity can help with her pain. Discussed appropriate use of medication. Will sign patient for pain contract on next visit.  -     Oxycodone HCl 20 MG Tab; Take 1 Tab by mouth 3 times a day as needed for up to 30 days.  -     CONSENT FOR OPIATE PRESCRIPTION     ibuprofen (MOTRIN) 800 MG Tab; Take 1 Tab by mouth every 8 hours as needed.    Anxiety  Stable. Continue taking Xanax as needed. Follow up with psychiatry on the 16th of this month. Discussed with patient that psychiatry should be taking over prescribing her medications for her anxiety and depression.           Health care Maintenance: Patient is following up with pulmonology and sleep studies in March and May of this year. Patient is also going to call us with therapy back to make an appointment.    Return in about 1 month (around 2/5/2018) for Chronic Pain Management.          Renay BOLANOS.

## 2018-01-11 ENCOUNTER — HOSPITAL ENCOUNTER (OUTPATIENT)
Facility: MEDICAL CENTER | Age: 53
End: 2018-01-11
Attending: PODIATRIST | Admitting: PODIATRIST
Payer: MEDICARE

## 2018-01-12 ENCOUNTER — APPOINTMENT (OUTPATIENT)
Dept: PHYSICAL THERAPY | Facility: REHABILITATION | Age: 53
End: 2018-01-12
Attending: NURSE PRACTITIONER
Payer: MEDICARE

## 2018-01-17 ENCOUNTER — APPOINTMENT (OUTPATIENT)
Dept: ADMISSIONS | Facility: MEDICAL CENTER | Age: 53
End: 2018-01-17
Payer: MEDICARE

## 2018-01-18 ENCOUNTER — OFFICE VISIT (OUTPATIENT)
Dept: MEDICAL GROUP | Facility: MEDICAL CENTER | Age: 53
End: 2018-01-18
Payer: MEDICARE

## 2018-01-18 VITALS
HEIGHT: 62 IN | WEIGHT: 186 LBS | SYSTOLIC BLOOD PRESSURE: 124 MMHG | BODY MASS INDEX: 34.23 KG/M2 | HEART RATE: 78 BPM | RESPIRATION RATE: 16 BRPM | DIASTOLIC BLOOD PRESSURE: 78 MMHG | OXYGEN SATURATION: 98 % | TEMPERATURE: 98.6 F

## 2018-01-18 DIAGNOSIS — G43.009 MIGRAINE WITHOUT AURA AND WITHOUT STATUS MIGRAINOSUS, NOT INTRACTABLE: ICD-10-CM

## 2018-01-18 DIAGNOSIS — E66.9 OBESITY (BMI 30-39.9): ICD-10-CM

## 2018-01-18 DIAGNOSIS — R21 RASH: ICD-10-CM

## 2018-01-18 DIAGNOSIS — G44.209 TENSION HEADACHE: ICD-10-CM

## 2018-01-18 PROCEDURE — 99214 OFFICE O/P EST MOD 30 MIN: CPT | Performed by: FAMILY MEDICINE

## 2018-01-18 RX ORDER — MUPIROCIN CALCIUM 20 MG/G
CREAM TOPICAL
Qty: 30 G | Refills: 1 | Status: SHIPPED | OUTPATIENT
Start: 2018-01-18 | End: 2018-03-15

## 2018-01-18 RX ORDER — SUMATRIPTAN 50 MG/1
50 TABLET, FILM COATED ORAL
Qty: 10 TAB | Refills: 3 | Status: SHIPPED | OUTPATIENT
Start: 2018-01-18 | End: 2018-07-12

## 2018-01-18 NOTE — PROGRESS NOTES
cc: Headache    Subjective:     Patti Tello is a 52 y.o. female presenting to discuss headaches. For the past 4 days, has been having a dull, constant, frontal headache that does not radiate. Last all day. Associated with nausea, light sensitivity, noise sensitivity, and dizziness with sudden movements. Denies any fevers, cough, nasal congestion, elevated blood pressure, vomiting, numbness, tingling, weakness, vision changes, auras. Has had headaches like this in the past. Has been taking ibuprofen 200 mg 3 times a day with minimal improvement. She also started BuSpar 2 days ago, however, the headaches started before the new medication    Review of systems:  See above.  She also reports that she is starting to get a red rash in her groin area      Current Outpatient Prescriptions:   •  BusPIRone HCl (BUSPAR PO), Take  by mouth., Disp: , Rfl:   •  sumatriptan (IMITREX) 50 MG Tab, Take 1 Tab by mouth Once PRN for Migraine (ok to repeat 1 hr later if symptoms persist) for up to 1 dose., Disp: 10 Tab, Rfl: 3  •  mupirocin calcium (BACTROBAN) 2 % Cream, Apply thin layer to affected area twice a day as needed, Disp: 30 g, Rfl: 1  •  Oxycodone HCl 20 MG Tab, Take 1 Tab by mouth 3 times a day as needed for up to 30 days., Disp: 90 Tab, Rfl: 0  •  ibuprofen (MOTRIN) 800 MG Tab, Take 1 Tab by mouth every 8 hours as needed., Disp: 90 Tab, Rfl: 2  •  PROAIR  (90 Base) MCG/ACT Aero Soln inhalation aerosol, Take 1 Inhalation by mouth every four hours as needed., Disp: , Rfl:   •  alprazolam (XANAX) 2 MG tablet, Take 1 Tab by mouth 2 times a day as needed for Sleep for up to 30 days., Disp: 60 Tab, Rfl: 0  •  sertraline (ZOLOFT) 50 MG Tab, Take 1 Tab by mouth every day., Disp: 30 Tab, Rfl: 1  •  ondansetron (ZOFRAN) 4 MG Tab tablet, Take 1 Tab by mouth every 6 hours as needed for Nausea/Vomiting., Disp: 20 Each, Rfl: 0  •  fluticasone (FLONASE) 50 MCG/ACT nasal spray, Spray 1 Spray in nose 2 times a day. Each Nostril,  Disp: 1 Bottle, Rfl: 0    No Known Allergies    Past Medical History:   Diagnosis Date   • Anxiety and depression    • Arthritis     back, neck   • Back injury    • Back pain    • Bronchitis 2010   • Cancer (CMS-HCC)     breast LT   • Chronic LBP    • Chronic neck pain    • Cold intolerance 1/24/2012   • Diabetes    • H/O gastric bypass 10/11/2013   • HX: breast cancer 10/11/2013   • Hypertension    • Itching due to drug 6/2/2011   • Nephrolithiasis 10/11/2013   • Other specified symptom associated with female genital organs    • Panic disorder    • PTSD (post-traumatic stress disorder)    • S/P laminectomy 10/22/2012   • Trauma     head trauma     Past Surgical History:   Procedure Laterality Date   • URETEROSCOPY  10/10/2013    Performed by Molly Resendiz M.D. at SURGERY [facility]   • LASERTRIPSY  10/10/2013    Performed by Molly Resendiz M.D. at SURGERY [facility]   • LUMBAR FUSION POSTERIOR  10/22/2012    Performed by Alfred Branham M.D. at SURGERY [facility]   • LUMBAR LAMINECTOMY DISKECTOMY  10/22/2012    Performed by Alfred Branham M.D. at SURGERY [facility]   • OTHER ORTHOPEDIC SURGERY  10/01/2012    lumbar fusion, Dr Branham   • OTHER ABDOMINAL SURGERY  2006    gastric bypass   • ABDOMINAL HYSTERECTOMY TOTAL      DUE TO FIBROIDS   • BREAST RECONSTRUCTION      Lt breast   • CRANIOTOMY      DUE TO BRAIN INJURY   • MASTECTOMY      Lt breast   • OTHER      left ear drum surgery   • OTHER ABDOMINAL SURGERY      hernia repair   • PRIMARY C SECTION      x 3   • US-CYST ASPIRATION-BREAST INITIAL       Family History   Problem Relation Age of Onset   • Diabetes Father    • Cancer Mother    • Other Mother      SLE     Social History     Social History   • Marital status:      Spouse name: N/A   • Number of children: N/A   • Years of education: N/A     Occupational History   • Not on file.     Social History Main Topics   • Smoking status: Never Smoker   • Smokeless tobacco:  "Never Used   • Alcohol use No   • Drug use: No   • Sexual activity: Not Currently     Other Topics Concern   • Not on file     Social History Narrative   • No narrative on file       Objective:     Vitals: /78   Pulse 78   Temp 37 °C (98.6 °F)   Resp 16   Ht 1.575 m (5' 2\")   Wt 84.4 kg (186 lb)   LMP 10/05/1999   SpO2 98%   Breastfeeding? No   BMI 34.02 kg/m²   General: Alert, pleasant, NAD  HEENT: Normocephalic.  PERRL, EOMI, no icterus or pallor.  Conjunctivae and lids normal. External ears normal. Tympanic membranes pearly, opaque.  Nares patent, mucosa pink.  Oropharynx non-erythematous, mucous membranes moist. Upper dentures present, possible hyperplasia or denture paste seen on the soft palate. Neck supple.  No thyromegaly or masses palpated. No cervical or supraclavicular lymphadenopathy.  Heart: Regular rate and rhythm.  S1 and S2 normal.  No murmurs appreciated.  Respiratory: Normal respiratory effort.  Clear to auscultation bilaterally.  Abdomen: Non-distended, soft  Skin: Warm, dry, no rashes.  Musculoskeletal: Gait is normal.  Moves all extremities well.  Extremities: No leg edema.  Neurological: No tremors, sensation grossly intact, patellar and biceps reflexes 2+ symmetric, tone/strength normal, gait is normal, no clonus, rapid movements normal, finger-to-nose intact, CN2-12 intact  Psych:  Affect/mood is flat, judgement is good, memory is intact, grooming is appropriate.    Assessment/Plan:     Patti was seen today for migraine.    Diagnoses and all orders for this visit:    Tension headache  Migraine without aura and without status migrainosus, not intractable  Suspect tension headache versus migraine without aura. Recommended that she try Excedrin over-the-counter. If no improvement, she can try Imitrex. Return if no improvement        -     sumatriptan (IMITREX) 50 MG Tab; Take 1 Tab by mouth Once PRN for Migraine (ok to repeat 1 hr later if symptoms persist) for up to 1 " dose.    Obesity (BMI 30-39.9)  -     Patient identified as having weight management issue.  Appropriate orders and counseling given.    Rash  -     mupirocin calcium (BACTROBAN) 2 % Cream; Apply thin layer to affected area twice a day as needed        Return if symptoms worsen or fail to improve.

## 2018-01-19 DIAGNOSIS — M19.90 ARTHRITIS: ICD-10-CM

## 2018-01-19 DIAGNOSIS — M25.50 POLYARTHRALGIA: ICD-10-CM

## 2018-01-25 ENCOUNTER — HOSPITAL ENCOUNTER (OUTPATIENT)
Facility: MEDICAL CENTER | Age: 53
End: 2018-01-25
Attending: PODIATRIST | Admitting: PODIATRIST
Payer: MEDICARE

## 2018-01-29 ENCOUNTER — HOSPITAL ENCOUNTER (EMERGENCY)
Facility: MEDICAL CENTER | Age: 53
End: 2018-01-30
Attending: EMERGENCY MEDICINE
Payer: MEDICARE

## 2018-01-29 ENCOUNTER — APPOINTMENT (OUTPATIENT)
Dept: RADIOLOGY | Facility: MEDICAL CENTER | Age: 53
End: 2018-01-29
Attending: EMERGENCY MEDICINE
Payer: MEDICARE

## 2018-01-29 DIAGNOSIS — K21.9 GASTROESOPHAGEAL REFLUX DISEASE, ESOPHAGITIS PRESENCE NOT SPECIFIED: ICD-10-CM

## 2018-01-29 DIAGNOSIS — R07.9 CHEST PAIN, UNSPECIFIED TYPE: ICD-10-CM

## 2018-01-29 LAB
ALBUMIN SERPL BCP-MCNC: 3.7 G/DL (ref 3.2–4.9)
ALBUMIN/GLOB SERPL: 1.2 G/DL
ALP SERPL-CCNC: 169 U/L (ref 30–99)
ALT SERPL-CCNC: 7 U/L (ref 2–50)
ANION GAP SERPL CALC-SCNC: 2 MMOL/L (ref 0–11.9)
AST SERPL-CCNC: 13 U/L (ref 12–45)
BASOPHILS # BLD AUTO: 0.6 % (ref 0–1.8)
BASOPHILS # BLD: 0.06 K/UL (ref 0–0.12)
BILIRUB SERPL-MCNC: 0.3 MG/DL (ref 0.1–1.5)
BNP SERPL-MCNC: 7 PG/ML (ref 0–100)
BUN SERPL-MCNC: 10 MG/DL (ref 8–22)
CALCIUM SERPL-MCNC: 9.1 MG/DL (ref 8.5–10.5)
CHLORIDE SERPL-SCNC: 110 MMOL/L (ref 96–112)
CO2 SERPL-SCNC: 30 MMOL/L (ref 20–33)
CREAT SERPL-MCNC: 0.76 MG/DL (ref 0.5–1.4)
EKG IMPRESSION: NORMAL
EOSINOPHIL # BLD AUTO: 0.32 K/UL (ref 0–0.51)
EOSINOPHIL NFR BLD: 3.1 % (ref 0–6.9)
ERYTHROCYTE [DISTWIDTH] IN BLOOD BY AUTOMATED COUNT: 44.1 FL (ref 35.9–50)
GLOBULIN SER CALC-MCNC: 3.2 G/DL (ref 1.9–3.5)
GLUCOSE SERPL-MCNC: 99 MG/DL (ref 65–99)
HCT VFR BLD AUTO: 35.9 % (ref 37–47)
HGB BLD-MCNC: 11 G/DL (ref 12–16)
IMM GRANULOCYTES # BLD AUTO: 0.05 K/UL (ref 0–0.11)
IMM GRANULOCYTES NFR BLD AUTO: 0.5 % (ref 0–0.9)
LIPASE SERPL-CCNC: 32 U/L (ref 11–82)
LYMPHOCYTES # BLD AUTO: 3.15 K/UL (ref 1–4.8)
LYMPHOCYTES NFR BLD: 30.4 % (ref 22–41)
MCH RBC QN AUTO: 22.7 PG (ref 27–33)
MCHC RBC AUTO-ENTMCNC: 30.6 G/DL (ref 33.6–35)
MCV RBC AUTO: 74.2 FL (ref 81.4–97.8)
MONOCYTES # BLD AUTO: 0.55 K/UL (ref 0–0.85)
MONOCYTES NFR BLD AUTO: 5.3 % (ref 0–13.4)
NEUTROPHILS # BLD AUTO: 6.22 K/UL (ref 2–7.15)
NEUTROPHILS NFR BLD: 60.1 % (ref 44–72)
NRBC # BLD AUTO: 0 K/UL
NRBC BLD-RTO: 0 /100 WBC
PLATELET # BLD AUTO: 326 K/UL (ref 164–446)
PMV BLD AUTO: 10.6 FL (ref 9–12.9)
POTASSIUM SERPL-SCNC: 3.1 MMOL/L (ref 3.6–5.5)
PROT SERPL-MCNC: 6.9 G/DL (ref 6–8.2)
RBC # BLD AUTO: 4.84 M/UL (ref 4.2–5.4)
SODIUM SERPL-SCNC: 142 MMOL/L (ref 135–145)
TROPONIN I SERPL-MCNC: <0.01 NG/ML (ref 0–0.04)
TROPONIN I SERPL-MCNC: <0.01 NG/ML (ref 0–0.04)
WBC # BLD AUTO: 10.4 K/UL (ref 4.8–10.8)

## 2018-01-29 PROCEDURE — 93005 ELECTROCARDIOGRAM TRACING: CPT | Performed by: EMERGENCY MEDICINE

## 2018-01-29 PROCEDURE — 83880 ASSAY OF NATRIURETIC PEPTIDE: CPT

## 2018-01-29 PROCEDURE — 700102 HCHG RX REV CODE 250 W/ 637 OVERRIDE(OP): Performed by: EMERGENCY MEDICINE

## 2018-01-29 PROCEDURE — 93005 ELECTROCARDIOGRAM TRACING: CPT

## 2018-01-29 PROCEDURE — 96374 THER/PROPH/DIAG INJ IV PUSH: CPT

## 2018-01-29 PROCEDURE — 99285 EMERGENCY DEPT VISIT HI MDM: CPT

## 2018-01-29 PROCEDURE — 80053 COMPREHEN METABOLIC PANEL: CPT

## 2018-01-29 PROCEDURE — 83690 ASSAY OF LIPASE: CPT

## 2018-01-29 PROCEDURE — 96375 TX/PRO/DX INJ NEW DRUG ADDON: CPT

## 2018-01-29 PROCEDURE — 85025 COMPLETE CBC W/AUTO DIFF WBC: CPT

## 2018-01-29 PROCEDURE — 84484 ASSAY OF TROPONIN QUANT: CPT

## 2018-01-29 PROCEDURE — 94760 N-INVAS EAR/PLS OXIMETRY 1: CPT

## 2018-01-29 PROCEDURE — 700111 HCHG RX REV CODE 636 W/ 250 OVERRIDE (IP): Performed by: EMERGENCY MEDICINE

## 2018-01-29 PROCEDURE — A9270 NON-COVERED ITEM OR SERVICE: HCPCS | Performed by: EMERGENCY MEDICINE

## 2018-01-29 PROCEDURE — 71045 X-RAY EXAM CHEST 1 VIEW: CPT

## 2018-01-29 RX ORDER — ONDANSETRON 2 MG/ML
4 INJECTION INTRAMUSCULAR; INTRAVENOUS ONCE
Status: COMPLETED | OUTPATIENT
Start: 2018-01-29 | End: 2018-01-29

## 2018-01-29 RX ADMIN — FAMOTIDINE 20 MG: 10 INJECTION, SOLUTION INTRAVENOUS at 21:56

## 2018-01-29 RX ADMIN — ONDANSETRON 4 MG: 2 INJECTION INTRAMUSCULAR; INTRAVENOUS at 21:56

## 2018-01-29 RX ADMIN — LIDOCAINE HYDROCHLORIDE 30 ML: 20 SOLUTION OROPHARYNGEAL at 21:56

## 2018-01-30 VITALS
DIASTOLIC BLOOD PRESSURE: 68 MMHG | SYSTOLIC BLOOD PRESSURE: 118 MMHG | TEMPERATURE: 97.9 F | WEIGHT: 180 LBS | RESPIRATION RATE: 6 BRPM | HEIGHT: 62 IN | HEART RATE: 74 BPM | BODY MASS INDEX: 33.13 KG/M2 | OXYGEN SATURATION: 98 %

## 2018-01-30 NOTE — DISCHARGE INSTRUCTIONS
Return if you have new or different chest pain, shortness of breath, productive cough or pass out.   Chest Pain, Nonspecific  It is often hard to give a specific diagnosis for the cause of chest pain. There is always a chance that your pain could be related to something serious, like a heart attack or a blood clot in the lungs. You need to follow up with your caregiver for further evaluation. More lab tests or other studies such as X-rays, electrocardiography, stress testing, or cardiac imaging may be needed to find the cause of your pain.  Most of the time, nonspecific chest pain improves within 2 to 3 days with rest and mild pain medicine. For the next few days, avoid physical exertion or activities that bring on pain. Do not smoke. Avoid drinking alcohol. Call your caregiver for routine follow-up as advised.   SEEK IMMEDIATE MEDICAL CARE IF:  · You develop increased chest pain or pain that radiates to the arm, neck, jaw, back, or abdomen.   · You develop shortness of breath, increased coughing, or you start coughing up blood.   · You have severe back or abdominal pain, nausea, or vomiting.   · You develop severe weakness, fainting, fever, or chills.   Document Released: 12/18/2006 Document Revised: 03/11/2013 Document Reviewed: 06/06/2008  [employer]® Patient Information ©2013 [employer]        Gastroesophageal Reflux Disease, Adult  Gastroesophageal reflux disease (GERD) happens when acid from your stomach goes into your food pipe (esophagus). The acid can cause a burning feeling in your chest. Over time, the acid can make small holes (ulcers) in your food pipe.   HOME CARE  · Ask your doctor for advice about:  ¨ Losing weight.  ¨ Quitting smoking.  ¨ Alcohol use.  · Avoid foods and drinks that make your problems worse. You may want to avoid:  ¨ Caffeine and alcohol.  ¨ Chocolate.  ¨ Mints.  ¨ Garlic and onions.  ¨ Spicy foods.  ¨ Citrus fruits, such as oranges, mathew, or limes.  ¨ Foods that contain tomato,  such as sauce, chili, salsa, and pizza.  ¨ Fried and fatty foods.  · Avoid lying down for 3 hours before you go to bed or before you take a nap.  · Eat small meals often, instead of large meals.  · Wear loose-fitting clothing. Do not wear anything tight around your waist.  · Raise (elevate) the head of your bed 6 to 8 inches with wood blocks. Using extra pillows does not help.  · Only take medicines as told by your doctor.  · Do not take aspirin or ibuprofen.  GET HELP RIGHT AWAY IF:   · You have pain in your arms, neck, jaw, teeth, or back.  · Your pain gets worse or changes.  · You feel sick to your stomach (nauseous), throw up (vomit), or sweat (diaphoresis).  · You feel short of breath, or you pass out (faint).  · Your throw up is green, yellow, black, or looks like coffee grounds or blood.  · Your poop (stool) is red, bloody, or black.  MAKE SURE YOU:   · Understand these instructions.  · Will watch your condition.  · Will get help right away if you are not doing well or get worse.     This information is not intended to replace advice given to you by your health care provider. Make sure you discuss any questions you have with your health care provider.     Document Released: 06/05/2009 Document Revised: 03/11/2013 Document Reviewed: 04/13/2016  [employer] Interactive Patient Education ©2016 [employer]

## 2018-01-30 NOTE — ED PROVIDER NOTES
"ED Provider Note    Scribed for Rey Lindquist M.D. by Didier Feliciano. 1/29/2018, 9:34 PM.    Primary care provider: PATRICK Angel  Means of arrival: Ambulance  History obtained from: Patient  History limited by: None    CHIEF COMPLAINT  Chief Complaint   Patient presents with   • Chest Pain     pt states that she was at work when pain started, states started around 1300, states L sided chest pain radiating to L jaw and arm, upon arrival to room pt A&O x4.     HPI  Patti Tello is a 52 y.o. female who presents to the Emergency Department complaining of constant \"squeezing\" chest pain onset at 1:00 PM today. She said her son stressed her out and she started to develop the pain. She had associated lightheadedness, nausea, and shortness of breath from coughing while at work. Patient describes a sensation similar to heart burn but did not think it was acid reflux. After getting home from work, patient had left jaw pain and left ear pain and radiation down her left arm. She also complains of swelling to her right calf and ankle. The patient was given nitroglycerin in the ER and feels significantly better since then. Denies fever. Denies history of MI, stents, PE, DVT, ulcers. The pain is not similar to her previous episode in October as this episode felt more pressure.    REVIEW OF SYSTEMS  Pertinent positives include chest pain, lightheaded, nausea, shortness of breath, cough, jaw pain, ear pain, left arm pain, right calf swelling. Pertinent negatives include fever. All other systems negative.    C.    PAST MEDICAL HISTORY   has a past medical history of Anxiety and depression; Arthritis; Back injury; Back pain; Bronchitis (2010); Cancer ([facility]); Chronic LBP; Chronic neck pain; Cold intolerance (1/24/2012); Diabetes; H/O gastric bypass (10/11/2013); breast cancer (10/11/2013); Hypertension; Itching due to drug (6/2/2011); Nephrolithiasis (10/11/2013); Other specified symptom associated " with female genital organs; Panic disorder; PTSD (post-traumatic stress disorder); S/P laminectomy (10/22/2012); and Trauma.    SURGICAL HISTORY   has a past surgical history that includes primary c section; mastectomy; breast reconstruction; abdominal hysterectomy total; craniotomy; US-CYST ASPIRATION-BREAST INITIAL; other orthopedic surgery (10/01/2012); other; other abdominal surgery (2006); other abdominal surgery; lumbar fusion posterior (10/22/2012); lumbar laminectomy diskectomy (10/22/2012); ureteroscopy (10/10/2013); and lasertripsy (10/10/2013).    SOCIAL HISTORY  Social History   Substance Use Topics   • Smoking status: Never Smoker   • Smokeless tobacco: Never Used   • Alcohol use No      History   Drug Use No     FAMILY HISTORY  Family History   Problem Relation Age of Onset   • Diabetes Father    • Cancer Mother    • Other Mother      SLE     CURRENT MEDICATIONS  No current facility-administered medications on file prior to encounter.      Current Outpatient Prescriptions on File Prior to Encounter   Medication Sig Dispense Refill   • BusPIRone HCl (BUSPAR PO) Take  by mouth.     • sumatriptan (IMITREX) 50 MG Tab Take 1 Tab by mouth Once PRN for Migraine (ok to repeat 1 hr later if symptoms persist) for up to 1 dose. 10 Tab 3   • mupirocin calcium (BACTROBAN) 2 % Cream Apply thin layer to affected area twice a day as needed 30 g 1   • Oxycodone HCl 20 MG Tab Take 1 Tab by mouth 3 times a day as needed for up to 30 days. 90 Tab 0   • ibuprofen (MOTRIN) 800 MG Tab Take 1 Tab by mouth every 8 hours as needed. 90 Tab 2   • PROAIR  (90 Base) MCG/ACT Aero Soln inhalation aerosol Take 1 Inhalation by mouth every four hours as needed.     • sertraline (ZOLOFT) 50 MG Tab Take 1 Tab by mouth every day. 30 Tab 1   • ondansetron (ZOFRAN) 4 MG Tab tablet Take 1 Tab by mouth every 6 hours as needed for Nausea/Vomiting. 20 Each 0   • fluticasone (FLONASE) 50 MCG/ACT nasal spray Spray 1 Spray in nose 2 times a  "day. Each Nostril 1 Bottle 0     ALLERGIES  No Known Allergies    PHYSICAL EXAM  VITAL SIGNS: /68   Pulse 78   Temp 36.6 °C (97.9 °F)   Resp 15   Ht 1.575 m (5' 2\")   Wt 81.6 kg (180 lb)   LMP 10/05/1999   BMI 32.92 kg/m²     Constitutional: Well developed, Well nourished, Mild distress.   HENT: Normocephalic, Atraumatic, Oropharynx moist.   Eyes: Conjunctiva normal, No discharge.   Cardiovascular: Normal heart rate, Normal rhythm, No murmurs, equal pulses.   Pulmonary: Normal breath sounds, No respiratory distress, No wheezing, No rales, No rhonchi.  Chest: No reproducible chest tenderness.  Abdomen: Obese, soft, No tenderness.  Musculoskeletal: No major deformities noted, No tenderness, No calf tenderness, Cords, Appear symmetric, No edema.  Skin: Warm, Dry, No erythema, No rash.   Neurologic: Alert & oriented x 3, Normal motor function,  No focal deficits noted.   Psychiatric: Affect normal, Judgment normal, Mood normal.     LABS  Results for orders placed or performed during the hospital encounter of 01/29/18   CBC WITH DIFFERENTIAL   Result Value Ref Range    WBC 10.4 4.8 - 10.8 K/uL    RBC 4.84 4.20 - 5.40 M/uL    Hemoglobin 11.0 (L) 12.0 - 16.0 g/dL    Hematocrit 35.9 (L) 37.0 - 47.0 %    MCV 74.2 (L) 81.4 - 97.8 fL    MCH 22.7 (L) 27.0 - 33.0 pg    MCHC 30.6 (L) 33.6 - 35.0 g/dL    RDW 44.1 35.9 - 50.0 fL    Platelet Count 326 164 - 446 K/uL    MPV 10.6 9.0 - 12.9 fL    Neutrophils-Polys 60.10 44.00 - 72.00 %    Lymphocytes 30.40 22.00 - 41.00 %    Monocytes 5.30 0.00 - 13.40 %    Eosinophils 3.10 0.00 - 6.90 %    Basophils 0.60 0.00 - 1.80 %    Immature Granulocytes 0.50 0.00 - 0.90 %    Nucleated RBC 0.00 /100 WBC    Neutrophils (Absolute) 6.22 2.00 - 7.15 K/uL    Lymphs (Absolute) 3.15 1.00 - 4.80 K/uL    Monos (Absolute) 0.55 0.00 - 0.85 K/uL    Eos (Absolute) 0.32 0.00 - 0.51 K/uL    Baso (Absolute) 0.06 0.00 - 0.12 K/uL    Immature Granulocytes (abs) 0.05 0.00 - 0.11 K/uL    NRBC " (Absolute) 0.00 K/uL   COMP METABOLIC PANEL   Result Value Ref Range    Sodium 142 135 - 145 mmol/L    Potassium 3.1 (L) 3.6 - 5.5 mmol/L    Chloride 110 96 - 112 mmol/L    Co2 30 20 - 33 mmol/L    Anion Gap 2.0 0.0 - 11.9    Glucose 99 65 - 99 mg/dL    Bun 10 8 - 22 mg/dL    Creatinine 0.76 0.50 - 1.40 mg/dL    Calcium 9.1 8.5 - 10.5 mg/dL    AST(SGOT) 13 12 - 45 U/L    ALT(SGPT) 7 2 - 50 U/L    Alkaline Phosphatase 169 (H) 30 - 99 U/L    Total Bilirubin 0.3 0.1 - 1.5 mg/dL    Albumin 3.7 3.2 - 4.9 g/dL    Total Protein 6.9 6.0 - 8.2 g/dL    Globulin 3.2 1.9 - 3.5 g/dL    A-G Ratio 1.2 g/dL   TROPONIN   Result Value Ref Range    Troponin I <0.01 0.00 - 0.04 ng/mL   BTYPE NATRIURETIC PEPTIDE   Result Value Ref Range    B Natriuretic Peptide 7 0 - 100 pg/mL   ESTIMATED GFR   Result Value Ref Range    GFR If African American >60 >60 mL/min/1.73 m 2    GFR If Non African American >60 >60 mL/min/1.73 m 2   LIPASE   Result Value Ref Range    Lipase 32 11 - 82 U/L   TROPONIN   Result Value Ref Range    Troponin I <0.01 0.00 - 0.04 ng/mL   EKG (NOW)   Result Value Ref Range    Report       [facility] Emergency Dept.    Test Date:  2018  Pt Name:    SHELL BRICEÑO               Department: ER  MRN:        [id number]                      Room:       [facility]  Gender:     Female                       Technician: [id number]  :        1965                   Requested By:ER TRIAGE PROTOCOL  Order #:    [id number]                    Reading MD:    Measurements  Intervals                                Axis  Rate:       78                           P:          46  NE:         144                          QRS:        36  QRSD:       94                           T:          -6  QT:         412  QTc:        470    Interpretive Statements  SINUS RHYTHM  LVH BY VOLTAGE  INFERIOR INFARCT, AGE INDETERMINATE  Compared to ECG 2017 15:48:45  Myocardial infarct finding now present  T-wave abnormality no longer  present       All labs reviewed by me.    EKG  12 Lead EKG interpreted by me shows a sinus rhythm at a rate of 78. Axis normal. No ST elevations. Single T wave inversion in Lead III and flattening in AVF. Old EKG from 11/8/17 shows no acute changes. Final impression: Left ventricular hypertrophy.    RADIOLOGY  DX-CHEST-PORTABLE (1 VIEW)   Final Result      No acute cardiopulmonary disease.        The radiologist's interpretation of all radiological studies have been reviewed by me.    COURSE & MEDICAL DECISION MAKING  Pertinent Labs & Imaging studies reviewed. (See chart for details)    Reviewed old medical records that showed the patient was admitted in October for chest pain. She had a normal stress test conducted 10/5/17.    9:34 PM - Patient seen and examined at bedside. Patient will be treated with 4mg Zofran, 30mL GI Cocktail, 20mg Pepcid. Ordered DX-Chest, Lipase, CBC, CMP, Troponin, BNP to evaluate her symptoms. The differential diagnoses include but are not limited to: Myocardial Infarction, Anxiety Reaction, Acid Reflux, Esophageal Spasm.    11:40 PM - Patient seen at bedside. I discussed the current lab and radiology results and explained that I will order a delta troponin. If that comes back negative, the patient can be discharged and should follow-up with a cardiologist for further evaluation. Ordered Troponin.    Patient has a heart score of 3 with the negative recent stress test.    11:59 PM - Patient seen at bedside and is sleeping comfortably. I explained that she can be discharged and should follow-up with a cardiologist. She was given return precautions and she understands and verbalizes agreement.    Medical Decision Making: At this point, think the patient can be discharged home to follow-up with cardiology as an outpatient her heart score is only 3. She's had a recent negative nuclear stress test within the last 6 months. Her troponins are negative with no EKG changes with greater and 10 hours  from onset of pain. Therefore do not think is cardiac in nature. I think it may be more GI in nature such as esophageal spasm are acid reflux.     The patient will return for new or worsening symptoms and is stable at the time of discharge.    DISPOSITION:  Patient will be discharged home in stable condition.    FOLLOW UP:  PATRICK Angel  [address]  [address]  [place] [id number] [phone]    Schedule an appointment as soon as possible for a visit in 1 week      Julian Haider M.D.  [address]  [address]  [facility] [id number] [phone]      They should call you later today for an appointment.     FINAL IMPRESSION  1. Chest pain, unspecified type    2. Gastroesophageal reflux disease, esophagitis presence not specified         Didier PATEL (Frances), am scribing for, and in the presence of, Rey Lindquist M.D.    Electronically signed by: Didier Feliciano (Frances), 1/29/2018    IRey M.D. personally performed the services described in this documentation, as scribed by Didier Feliciano in my presence, and it is both accurate and complete.    The note accurately reflects work and decisions made by me.  Rey Lindquist  1/30/2018  4:07 AM

## 2018-01-30 NOTE — ED TRIAGE NOTES
Chief Complaint   Patient presents with   • Chest Pain     pt states that she was at work when pain started, states started around 1300, states L sided chest pain radiating to L jaw and arm, upon arrival to room pt A&O x4.

## 2018-01-31 ENCOUNTER — PATIENT OUTREACH (OUTPATIENT)
Dept: HEALTH INFORMATION MANAGEMENT | Facility: OTHER | Age: 53
End: 2018-01-31

## 2018-02-02 ENCOUNTER — OFFICE VISIT (OUTPATIENT)
Dept: MEDICAL GROUP | Facility: MEDICAL CENTER | Age: 53
End: 2018-02-02
Payer: MEDICARE

## 2018-02-02 VITALS
OXYGEN SATURATION: 97 % | HEART RATE: 68 BPM | HEIGHT: 62 IN | DIASTOLIC BLOOD PRESSURE: 72 MMHG | RESPIRATION RATE: 14 BRPM | TEMPERATURE: 98.5 F | WEIGHT: 181.6 LBS | SYSTOLIC BLOOD PRESSURE: 124 MMHG | BODY MASS INDEX: 33.42 KG/M2

## 2018-02-02 DIAGNOSIS — F31.32 BIPOLAR AFFECTIVE DISORDER, CURRENTLY DEPRESSED, MODERATE (HCC): ICD-10-CM

## 2018-02-02 DIAGNOSIS — Z79.899 CONTROLLED SUBSTANCE AGREEMENT SIGNED: ICD-10-CM

## 2018-02-02 DIAGNOSIS — H66.002 ACUTE SUPPURATIVE OTITIS MEDIA OF LEFT EAR WITHOUT SPONTANEOUS RUPTURE OF TYMPANIC MEMBRANE, RECURRENCE NOT SPECIFIED: ICD-10-CM

## 2018-02-02 DIAGNOSIS — R11.0 NAUSEA: ICD-10-CM

## 2018-02-02 DIAGNOSIS — M48.061 SPINAL STENOSIS OF LUMBAR REGION, UNSPECIFIED WHETHER NEUROGENIC CLAUDICATION PRESENT: ICD-10-CM

## 2018-02-02 DIAGNOSIS — Z79.891 CHRONIC USE OF OPIATE DRUGS THERAPEUTIC PURPOSES: ICD-10-CM

## 2018-02-02 DIAGNOSIS — J44.9 CHRONIC OBSTRUCTIVE PULMONARY DISEASE, UNSPECIFIED COPD TYPE (HCC): ICD-10-CM

## 2018-02-02 DIAGNOSIS — E66.9 OBESITY (BMI 30-39.9): ICD-10-CM

## 2018-02-02 DIAGNOSIS — F41.9 ANXIETY: ICD-10-CM

## 2018-02-02 DIAGNOSIS — M19.90 ARTHRITIS: ICD-10-CM

## 2018-02-02 DIAGNOSIS — Z02.89 PAIN MEDICATION AGREEMENT: ICD-10-CM

## 2018-02-02 PROCEDURE — 99214 OFFICE O/P EST MOD 30 MIN: CPT | Performed by: NURSE PRACTITIONER

## 2018-02-02 RX ORDER — FLUTICASONE PROPIONATE 50 MCG
1 SPRAY, SUSPENSION (ML) NASAL 2 TIMES DAILY
Qty: 1 BOTTLE | Refills: 3 | Status: SHIPPED | OUTPATIENT
Start: 2018-02-02 | End: 2019-12-09

## 2018-02-02 RX ORDER — AMOXICILLIN 500 MG/1
500 CAPSULE ORAL 2 TIMES DAILY
Qty: 14 CAP | Refills: 0 | Status: SHIPPED | OUTPATIENT
Start: 2018-02-02 | End: 2018-02-13

## 2018-02-02 RX ORDER — ALPRAZOLAM 1 MG/1
2 TABLET ORAL 2 TIMES DAILY
COMMUNITY
Start: 2018-02-02 | End: 2018-03-23

## 2018-02-02 RX ORDER — ONDANSETRON 4 MG/1
4 TABLET, FILM COATED ORAL EVERY 6 HOURS PRN
Qty: 20 EACH | Refills: 0 | Status: SHIPPED | OUTPATIENT
Start: 2018-02-02 | End: 2018-02-13 | Stop reason: SDUPTHER

## 2018-02-02 RX ORDER — OXYCODONE HYDROCHLORIDE 20 MG/1
20 TABLET ORAL 2 TIMES DAILY PRN
Qty: 60 TAB | Refills: 0 | Status: SHIPPED | OUTPATIENT
Start: 2018-02-02 | End: 2018-03-04

## 2018-02-02 RX ORDER — TIOTROPIUM BROMIDE 18 UG/1
18 CAPSULE ORAL; RESPIRATORY (INHALATION) DAILY
Qty: 30 CAP | Refills: 3 | Status: SHIPPED | OUTPATIENT
Start: 2018-02-02 | End: 2018-10-20

## 2018-02-02 ASSESSMENT — PATIENT HEALTH QUESTIONNAIRE - PHQ9: CLINICAL INTERPRETATION OF PHQ2 SCORE: 0

## 2018-02-02 NOTE — PROGRESS NOTES
cc:  Back pain/pain medication    Subjective:     HPI:     Patti Tello is a 52 y.o. female here to discuss the evaluation and management of:    Back pain   Patient states that she continues to set her from back pain, and right leg pain. States that she's been taking the oxycodone for pain relief. States that she has not done her physical therapy and does not do any exercise or stretching at home.    Anxiety  Patient states that she continues to see psychiatry and they have weaned her down on her Xanax. States that she is taking it twice a day now. Patient states she is also taking sertraline 50 mg daily. Denies any thoughts of suicide.    Foot pain  Patient states that she was going to have surgery on her right foot a few weeks ago however she developed chest pain in her arm and pain in her jaw. Patient states that she had a workup in the EKG said that at some time she had a history of a heart attack. States that she will have to be cleared by cardiology before having surgery. Denies any chest pain, difficulty breathing, shortness of breath, leg swelling at this time.    COPD  Patient states that she has COPD. States that she was not using her Spiriva as she used to. States that she's been using her albuterol inhaler more. Denies using tobacco products, however lives with a smoker. Patient states that she had a little bit more shortness of breath and thinks she may need to start using that again.    Left ear pain  Patient states that she's been having left ear pain for the last week. Denies any fevers, chills, nausea or vomiting. Denies any presence of drainage from her left ear, or ringing in her ear.    ROS:  Denies any Headache, Blurred Vision, Confusion Chest pain,  Shortness of breath,  Abdominal pain, Changes of bowel or bladder, Lower ext edema, Fevers, Nights sweats, Weight Changes, Focal weakness or numbness.  All other systems are negative.+ Left ear pain, back pain, right leg pain        Current  Outpatient Prescriptions:   •  ALPRAZolam (XANAX) 1 MG Tab, Take 2 Tabs by mouth 2 Times a Day., Disp: , Rfl:   •  PROAIR  (90 Base) MCG/ACT Aero Soln inhalation aerosol, Inhale 1-2 Puffs by mouth every four hours as needed., Disp: 8.5 g, Rfl: 2  •  fluticasone (FLONASE) 50 MCG/ACT nasal spray, Spray 1 Spray in nose 2 times a day. Each Nostril, Disp: 1 Bottle, Rfl: 3  •  ondansetron (ZOFRAN) 4 MG Tab tablet, Take 1 Tab by mouth every 6 hours as needed for Nausea/Vomiting for up to 25 days., Disp: 20 Each, Rfl: 0  •  Oxycodone HCl 20 MG Tab, Take 1 Tab by mouth 2 times a day as needed for up to 30 days., Disp: 60 Tab, Rfl: 0  •  amoxicillin (AMOXIL) 500 MG Cap, Take 1 Cap by mouth 2 times a day., Disp: 14 Cap, Rfl: 0  •  tiotropium (SPIRIVA) 18 MCG Cap, Inhale 1 Cap by mouth every day., Disp: 30 Cap, Rfl: 3  •  sumatriptan (IMITREX) 50 MG Tab, Take 1 Tab by mouth Once PRN for Migraine (ok to repeat 1 hr later if symptoms persist) for up to 1 dose., Disp: 10 Tab, Rfl: 3  •  mupirocin calcium (BACTROBAN) 2 % Cream, Apply thin layer to affected area twice a day as needed, Disp: 30 g, Rfl: 1  •  ibuprofen (MOTRIN) 800 MG Tab, Take 1 Tab by mouth every 8 hours as needed., Disp: 90 Tab, Rfl: 2  •  sertraline (ZOLOFT) 50 MG Tab, Take 1 Tab by mouth every day., Disp: 30 Tab, Rfl: 1    No Known Allergies    Past Medical History:   Diagnosis Date   • Anxiety and depression    • Arthritis     back, neck   • Back injury    • Back pain    • Bronchitis 2010   • Cancer (CMS-HCC)     breast LT   • Chronic LBP    • Chronic neck pain    • Cold intolerance 1/24/2012   • Diabetes    • H/O gastric bypass 10/11/2013   • HX: breast cancer 10/11/2013   • Hypertension    • Itching due to drug 6/2/2011   • Nephrolithiasis 10/11/2013   • Other specified symptom associated with female genital organs    • Panic disorder    • PTSD (post-traumatic stress disorder)    • S/P laminectomy 10/22/2012   • Trauma     head trauma     Past Surgical  "History:   Procedure Laterality Date   • URETEROSCOPY  10/10/2013    Performed by Molly Resendiz M.D. at SURGERY [facility]   • LASERTRIPSY  10/10/2013    Performed by Molly Resendiz M.D. at SURGERY [facility]   • LUMBAR FUSION POSTERIOR  10/22/2012    Performed by Alfred Branham M.D. at SURGERY [facility]   • LUMBAR LAMINECTOMY DISKECTOMY  10/22/2012    Performed by Alfred Branham M.D. at SUR[facility]ERY [facility]   • OTHER ORTHOPEDIC SURGERY  10/01/2012    lumbar fusion, Dr Branham   • OTHER ABDOMINAL SURGERY  2006    gastric bypass   • ABDOMINAL HYSTERECTOMY TOTAL      DUE TO FIBROIDS   • BREAST RECONSTRUCTION      Lt breast   • CRANIOTOMY      DUE TO BRAIN INJURY   • MASTECTOMY      Lt breast   • OTHER      left ear drum surgery   • OTHER ABDOMINAL SURGERY      hernia repair   • PRIMARY C SECTION      x 3   • US-CYST ASPIRATION-BREAST INITIAL       Family History   Problem Relation Age of Onset   • Diabetes Father    • Cancer Mother    • Other Mother      SLE     Social History     Social History   • Marital status:      Spouse name: N/A   • Number of children: N/A   • Years of education: N/A     Occupational History   • Not on file.     Social History Main Topics   • Smoking status: Never Smoker   • Smokeless tobacco: Never Used   • Alcohol use No   • Drug use: No   • Sexual activity: Not Currently     Other Topics Concern   • Not on file     Social History Narrative   • No narrative on file       Objective:     Vitals: /72   Pulse 68   Temp 36.9 °C (98.5 °F)   Resp 14   Ht 1.575 m (5' 2\")   Wt 82.4 kg (181 lb 9.6 oz)   LMP 10/05/1999   SpO2 97%   BMI 33.22 kg/m²    General: Alert, pleasant, NAD  HEENT: Normocephalic.   Neck supple.  Left TM opaque, fluid level seen, mild erythema base of TM. No thyromegaly or masses palpated. No cervical or supraclavicular lymphadenopathy.  Heart: Regular rate and rhythm.  S1 and S2 normal.  No murmurs appreciated.  Respiratory: " Normal respiratory effort.  Clear to auscultation bilaterally.  Skin: Warm, dry, no rashes.  Musculoskeletal: Gait is normal.  Moves all extremities well.  Extremities: No leg edema.    Neurological: No tremors, sensation grossly intact  Psych:  Affect/mood is normal, judgement is good, memory is intact, grooming is appropriate.        Chronic Opiate therapy:  (5 A's)  Analgesia:  not changed  Activity:  not changed  Adverse Events:  none  Aberrant Behaviors:  Long term use  Affect/Mood: good grooming  Last dose medication 01/05/2018        Assessment/Plan:     Patti was seen today for medication refill and ear pain.    Diagnoses and all orders for this visit:    Acute suppurative otitis media of left ear without spontaneous rupture of tympanic membrane, recurrence not specified  Acute. No fever, chills, hearing loss or ringing in ear. Start taking amoxicillin for 7 days, continue flonase. Follow up as needed.     -     amoxicillin (AMOXIL) 500 MG Cap; Take 1 Cap by mouth 2 times a day.    Chronic use of opiate drugs therapeutic purposes  Pain medication agreement  Controlled substance agreement signed-      Spinal stenosis of lumbar region, unspecified whether neurogenic claudication present  Arthritis  Chronic. No bowel or bladder dysfunction, no foot drop, no weakness in lower extremities. Most recent imaging was [id number] fixation screws at L4-5, L5-S1, anterior vertebral spurring present. Patient has not completed physical therapy as ordered. Discussed with patient that she needs to initiate physical therapy especially if she is going to remain on pain meds. The patient that we will start weaning her down off of her pain medications and refer to the pain clinic to see if there are other nonpharmacological treatment options for her. Discussed with patient that the amount of medication she was on was at the limit of the morphine equivalent allowable. Will start weaning patient down over the  next few months.Pain contract signed, urine sent for millenium.     [facility] PAIN MANAGEMENT SCREEN; Future  -     Controlled Substance Treatment Agreement  -     -     Oxycodone HCl 20 MG Tab; Take 1 Tab by mouth 2 times a day as needed for up to 30 days.  -     CONSENT FOR OPIATE PRESCRIPTION  -     REFERRAL TO PAIN CLINIC    Anxiety  Followed by psychiatry. Patient reports her psychiatrist has decreased her down to Xanax twice a day.    Nausea  Occasional nausea. No hematemesis or severe abdominal pain, fever or chills. Refill requested. If persists will work up.  -     ondansetron (ZOFRAN) 4 MG Tab tablet; Take 1 Tab by mouth every 6 hours as needed for Nausea/Vomiting for up to 25 days.    Bipolar affective disorder, currently depressed, moderate (CMS-HCC)  Stable mood. No thoughts of suicide. Continue sertraline. Follow up with psychiatry    Chronic obstructive pulmonary disease, unspecified COPD type (CMS-[facility])  Chronic. Restart Spiriva daily. Discussed with patient that she should only be using her albuterol inhaler as needed and if she is using it more than twice per week she needs to come back in for reevaluation. Patient has pulmonology appointment in March and May.    -     tiotropium (SPIRIVA) 18 MCG Cap; Inhale 1 Cap by mouth every day.     PROAIR  (90 Base) MCG/ACT Aero Soln inhalation aerosol; Inhale 1-2 Puffs by mouth every four hours as needed.    Obesity (BMI 30-39.9)  Chronic. Patient encouraged to reduce excess calorie consumption. Encouraged regular exercise. Discussed long term sequelae of obesity.   -     REFERRAL TO [facility] IMPROVEMENT PROGRAMS (HIP) Services Requested: General-[facility] Staff to Evaluate Best Program; Reason for Visit: Overweight/Obesity, Medical Condition Requiring Nutrition Counseling        Other orders  -     fluticasone (FLONASE) 50 MCG/ACT nasal spray; Spray 1 Spray in nose 2 times a day. Each Nostril         Health care Maintenance: Recent trops  From 1/29/18  were negative, bnp normal.     Return in about 1 month (around 3/2/2018) for Chronic Pain Management.    I have placed the above orders and discussed them with an approved delegating provider.  The MA is performing the below orders under the direction of Dr. Jah NGUYEN

## 2018-02-08 ENCOUNTER — NON-PROVIDER VISIT (OUTPATIENT)
Dept: MEDICAL GROUP | Facility: MEDICAL CENTER | Age: 53
End: 2018-02-08
Payer: MEDICARE

## 2018-02-08 PROCEDURE — 99999 PR NO CHARGE: CPT | Performed by: NURSE PRACTITIONER

## 2018-02-08 NOTE — PROGRESS NOTES
Patti Tello is a 52 y.o. female here for a non-provider visit for ear wax removal    If abnormal was an in office provider notified today (if so, indicate provider)? No  Routed to PCP? No

## 2018-02-13 ENCOUNTER — OFFICE VISIT (OUTPATIENT)
Dept: MEDICAL GROUP | Facility: MEDICAL CENTER | Age: 53
End: 2018-02-13
Payer: MEDICARE

## 2018-02-13 VITALS
HEART RATE: 78 BPM | BODY MASS INDEX: 34.41 KG/M2 | WEIGHT: 187 LBS | OXYGEN SATURATION: 98 % | RESPIRATION RATE: 16 BRPM | TEMPERATURE: 97.6 F | HEIGHT: 62 IN

## 2018-02-13 DIAGNOSIS — R11.0 NAUSEA: ICD-10-CM

## 2018-02-13 DIAGNOSIS — H66.003 ACUTE SUPPURATIVE OTITIS MEDIA OF BOTH EARS WITHOUT SPONTANEOUS RUPTURE OF TYMPANIC MEMBRANES, RECURRENCE NOT SPECIFIED: ICD-10-CM

## 2018-02-13 DIAGNOSIS — R68.89 FLU-LIKE SYMPTOMS: ICD-10-CM

## 2018-02-13 DIAGNOSIS — J44.1 COPD EXACERBATION (HCC): ICD-10-CM

## 2018-02-13 LAB
FLUAV+FLUBV AG SPEC QL IA: NEGATIVE
INT CON NEG: NEGATIVE
INT CON POS: POSITIVE

## 2018-02-13 PROCEDURE — 99214 OFFICE O/P EST MOD 30 MIN: CPT | Performed by: FAMILY MEDICINE

## 2018-02-13 PROCEDURE — 87804 INFLUENZA ASSAY W/OPTIC: CPT | Performed by: FAMILY MEDICINE

## 2018-02-13 RX ORDER — PREDNISONE 20 MG/1
20 TABLET ORAL DAILY
Qty: 5 TAB | Refills: 0 | Status: SHIPPED | OUTPATIENT
Start: 2018-02-13 | End: 2018-02-18

## 2018-02-13 RX ORDER — CEFDINIR 300 MG/1
300 CAPSULE ORAL 2 TIMES DAILY
Qty: 20 CAP | Refills: 0 | Status: SHIPPED | OUTPATIENT
Start: 2018-02-13 | End: 2018-02-23

## 2018-02-13 RX ORDER — ONDANSETRON 4 MG/1
4 TABLET, FILM COATED ORAL EVERY 6 HOURS PRN
Qty: 30 EACH | Refills: 0 | Status: SHIPPED | OUTPATIENT
Start: 2018-02-13 | End: 2018-03-09

## 2018-02-13 RX ORDER — BENZONATATE 100 MG/1
100 CAPSULE ORAL 3 TIMES DAILY PRN
Qty: 60 CAP | Refills: 0 | Status: SHIPPED | OUTPATIENT
Start: 2018-02-13 | End: 2018-03-15

## 2018-02-13 NOTE — PROGRESS NOTES
cc: Cough    Subjective:     Patti Tello is a 52 y.o. female presenting to discuss a worsening cough. Started approximately 10 days ago. Associated with shortness of breath, nausea, vomiting, fevers to 101 Fahrenheit, left ear pain, nasal congestion, some chest discomfort with walking, diarrhea. She denies any sore throat, leg swelling. She has been having some rashes recently, for which she has been taking Benadryl. She has also been using Flonase with minimal relief. Has been using her albuterol more frequently, 3-4 times a day. She has a history of COPD. She was recently diagnosed with bilateral ear infections 10 days ago, when her symptoms all started. She was prescribed amoxicillin, is currently taking this. States that her symptoms never improved with the amoxicillin.  She has had her flu vaccine this season.    Review of systems:  See above.       Current Outpatient Prescriptions:   •  cefdinir (OMNICEF) 300 MG Cap, Take 1 Cap by mouth 2 times a day for 10 days., Disp: 20 Cap, Rfl: 0  •  predniSONE (DELTASONE) 20 MG Tab, Take 1 Tab by mouth every day for 5 days., Disp: 5 Tab, Rfl: 0  •  benzonatate (TESSALON) 100 MG Cap, Take 1 Cap by mouth 3 times a day as needed for Cough., Disp: 60 Cap, Rfl: 0  •  ondansetron (ZOFRAN) 4 MG Tab tablet, Take 1 Tab by mouth every 6 hours as needed for Nausea/Vomiting., Disp: 30 Each, Rfl: 0  •  ALPRAZolam (XANAX) 1 MG Tab, Take 2 Tabs by mouth 2 Times a Day., Disp: , Rfl:   •  PROAIR  (90 Base) MCG/ACT Aero Soln inhalation aerosol, Inhale 1-2 Puffs by mouth every four hours as needed., Disp: 8.5 g, Rfl: 2  •  fluticasone (FLONASE) 50 MCG/ACT nasal spray, Spray 1 Spray in nose 2 times a day. Each Nostril, Disp: 1 Bottle, Rfl: 3  •  Oxycodone HCl 20 MG Tab, Take 1 Tab by mouth 2 times a day as needed for up to 30 days., Disp: 60 Tab, Rfl: 0  •  tiotropium (SPIRIVA) 18 MCG Cap, Inhale 1 Cap by mouth every day., Disp: 30 Cap, Rfl: 3  •  sumatriptan (IMITREX) 50 MG Tab,  Take 1 Tab by mouth Once PRN for Migraine (ok to repeat 1 hr later if symptoms persist) for up to 1 dose., Disp: 10 Tab, Rfl: 3  •  mupirocin calcium (BACTROBAN) 2 % Cream, Apply thin layer to affected area twice a day as needed, Disp: 30 g, Rfl: 1  •  ibuprofen (MOTRIN) 800 MG Tab, Take 1 Tab by mouth every 8 hours as needed., Disp: 90 Tab, Rfl: 2  •  sertraline (ZOLOFT) 50 MG Tab, Take 1 Tab by mouth every day., Disp: 30 Tab, Rfl: 1    No Known Allergies    Past Medical History:   Diagnosis Date   • Anxiety and depression    • Arthritis     back, neck   • Back injury    • Back pain    • Bronchitis 2010   • Cancer (CMS-HCC)     breast LT   • Chronic LBP    • Chronic neck pain    • Cold intolerance 1/24/2012   • Diabetes    • H/O gastric bypass 10/11/2013   • HX: breast cancer 10/11/2013   • Hypertension    • Itching due to drug 6/2/2011   • Nephrolithiasis 10/11/2013   • Other specified symptom associated with female genital organs    • Panic disorder    • PTSD (post-traumatic stress disorder)    • S/P laminectomy 10/22/2012   • Trauma     head trauma     Past Surgical History:   Procedure Laterality Date   • URETEROSCOPY  10/10/2013    Performed by Molly Resendiz M.D. at [facility] [place] [facility] [facility]   • LASERTRIPSY  10/10/2013    Performed by Molly Resendiz M.D. at [facility] [place] [facility]   • LUMBAR FUSION POSTERIOR  10/22/2012    Performed by Alfred Branham M.D. at SUR[facility] [place]   • LUMBAR LAMINECTOMY DISKECTOMY  10/22/2012    Performed by Alfred Branham M.D. at SUR[facility]   • OTHER ORTHOPEDIC SURGERY  10/01/2012    lumbar fusion, Dr Branham   • OTHER ABDOMINAL SURGERY  2006    gastric bypass   • ABDOMINAL HYSTERECTOMY TOTAL      DUE TO FIBROIDS   • BREAST RECONSTRUCTION      Lt breast   • CRANIOTOMY      DUE TO BRAIN INJURY   • MASTECTOMY      Lt breast   • OTHER      left ear drum surgery   • OTHER ABDOMINAL SURGERY      hernia repair   • PRIMARY C SECTION      x 3   • US-CYST  "ASPIRATION-BREAST INITIAL       Family History   Problem Relation Age of Onset   • Diabetes Father    • Cancer Mother    • Other Mother      SLE     Social History     Social History   • Marital status:      Spouse name: N/A   • Number of children: N/A   • Years of education: N/A     Occupational History   • Not on file.     Social History Main Topics   • Smoking status: Never Smoker   • Smokeless tobacco: Never Used   • Alcohol use No   • Drug use: No   • Sexual activity: Not Currently     Other Topics Concern   • Not on file     Social History Narrative   • No narrative on file       Objective:     Vitals: Pulse 78   Temp 36.4 °C (97.6 °F)   Resp 16   Ht 1.575 m (5' 2\")   Wt 84.8 kg (187 lb)   LMP 10/05/1999   SpO2 98%   BMI 34.20 kg/m²   General: Alert, pleasant, NAD  HEENT: Normocephalic.  PERRL, EOMI, no icterus or pallor.  Conjunctivae and lids normal. External ears normal. Tympanic membranes dull, erythematous, bulging bilaterally.  Nares patent, mucosa pink, drainage present.  Oropharynx non-erythematous, mucous membranes moist.  Neck supple.  No thyromegaly or masses palpated. No cervical or supraclavicular lymphadenopathy.  Heart: Regular rate and rhythm.  S1 and S2 normal.  No murmurs appreciated.  Respiratory: Normal respiratory effort.  Clear to auscultation bilaterally.  Abdomen: Non-distended, soft  Skin: Warm, dry  Musculoskeletal: Gait is normal.  Moves all extremities well.  Extremities: No leg edema.    Psych:  Affect/mood is normal, judgement is good, memory is intact, grooming is appropriate.    poc influenza: negative      Assessment/Plan:     Patti was seen today for influenza.    Diagnoses and all orders for this visit:    Acute suppurative otitis media of both ears without spontaneous rupture of tympanic membranes, recurrence not specified  COPD exacerbation (CMS-[facility])  Flu-like symptoms  Nausea  Will switch her antibiotics from amoxicillin to Cefdinir as it appears that her " ear infection has not improved. Also suspect she has a component of a COPD exacerbation, will also send some prednisone. Continue with albuterol inhaler as needed, benzonatate as needed for the cough. Refills of Zofran given for nausea. Discussed reasons to return  -     POCT Influenza A/B  -     ondansetron (ZOFRAN) 4 MG Tab tablet; Take 1 Tab by mouth every 6 hours as needed for Nausea/Vomiting.  -     cefdinir (OMNICEF) 300 MG Cap; Take 1 Cap by mouth 2 times a day for 10 days.  -     predniSONE (DELTASONE) 20 MG Tab; Take 1 Tab by mouth every day for 5 days.  -     benzonatate (TESSALON) 100 MG Cap; Take 1 Cap by mouth 3 times a day as needed for Cough.        Return if symptoms worsen or fail to improve.

## 2018-02-21 DIAGNOSIS — E66.9 OBESITY (BMI 30-39.9): ICD-10-CM

## 2018-03-02 ENCOUNTER — APPOINTMENT (OUTPATIENT)
Dept: MEDICAL GROUP | Facility: MEDICAL CENTER | Age: 53
End: 2018-03-02
Payer: MEDICARE

## 2018-03-06 ENCOUNTER — HOSPITAL ENCOUNTER (OUTPATIENT)
Dept: LAB | Facility: MEDICAL CENTER | Age: 53
End: 2018-03-06
Attending: ANESTHESIOLOGY
Payer: MEDICARE

## 2018-03-06 ENCOUNTER — HOSPITAL ENCOUNTER (OUTPATIENT)
Dept: LAB | Facility: MEDICAL CENTER | Age: 53
End: 2018-03-06
Attending: NURSE PRACTITIONER
Payer: MEDICARE

## 2018-03-06 ENCOUNTER — HOSPITAL ENCOUNTER (OUTPATIENT)
Dept: RADIOLOGY | Facility: MEDICAL CENTER | Age: 53
End: 2018-03-06
Attending: NURSE PRACTITIONER
Payer: MEDICARE

## 2018-03-06 DIAGNOSIS — M19.90 ARTHRITIS: ICD-10-CM

## 2018-03-06 DIAGNOSIS — M25.50 POLYARTHRALGIA: ICD-10-CM

## 2018-03-06 LAB
HCT VFR BLD AUTO: 38.6 % (ref 37–47)
RHEUMATOID FACT SER IA-ACNC: <10 IU/ML (ref 0–14)

## 2018-03-06 PROCEDURE — 85014 HEMATOCRIT: CPT

## 2018-03-06 PROCEDURE — 86431 RHEUMATOID FACTOR QUANT: CPT

## 2018-03-06 PROCEDURE — 86200 CCP ANTIBODY: CPT

## 2018-03-06 PROCEDURE — 36415 COLL VENOUS BLD VENIPUNCTURE: CPT

## 2018-03-06 PROCEDURE — 73620 X-RAY EXAM OF FOOT: CPT | Mod: LT

## 2018-03-08 LAB — CCP IGG SERPL-ACNC: 12 UNITS (ref 0–19)

## 2018-03-09 ENCOUNTER — OFFICE VISIT (OUTPATIENT)
Dept: MEDICAL GROUP | Facility: MEDICAL CENTER | Age: 53
End: 2018-03-09
Payer: MEDICARE

## 2018-03-09 VITALS
RESPIRATION RATE: 16 BRPM | BODY MASS INDEX: 33.82 KG/M2 | WEIGHT: 183.8 LBS | OXYGEN SATURATION: 97 % | HEIGHT: 62 IN | HEART RATE: 114 BPM | DIASTOLIC BLOOD PRESSURE: 70 MMHG | TEMPERATURE: 99.4 F | SYSTOLIC BLOOD PRESSURE: 120 MMHG

## 2018-03-09 DIAGNOSIS — J44.1 ACUTE EXACERBATION OF CHRONIC OBSTRUCTIVE PULMONARY DISEASE (COPD) (HCC): ICD-10-CM

## 2018-03-09 PROCEDURE — 99214 OFFICE O/P EST MOD 30 MIN: CPT | Performed by: PHYSICIAN ASSISTANT

## 2018-03-09 RX ORDER — PREDNISONE 10 MG/1
TABLET ORAL
Qty: 42 TAB | Refills: 0 | Status: SHIPPED | OUTPATIENT
Start: 2018-03-09 | End: 2018-04-23 | Stop reason: SDUPTHER

## 2018-03-09 RX ORDER — LEVOFLOXACIN 500 MG/1
500 TABLET, FILM COATED ORAL DAILY
Qty: 10 TAB | Refills: 0 | Status: SHIPPED | OUTPATIENT
Start: 2018-03-09 | End: 2018-03-19

## 2018-03-09 RX ORDER — PROMETHAZINE HYDROCHLORIDE 6.25 MG/5ML
6.25 SYRUP ORAL 4 TIMES DAILY PRN
Qty: 120 ML | Refills: 0 | Status: SHIPPED | OUTPATIENT
Start: 2018-03-09 | End: 2018-03-14

## 2018-03-09 RX ORDER — OXYCODONE HYDROCHLORIDE 20 MG/1
TABLET ORAL
Refills: 0 | COMMUNITY
Start: 2018-02-26 | End: 2018-04-20

## 2018-03-10 NOTE — PROGRESS NOTES
Subjective:      Patti Tello is a 52 y.o. female who presents with Cold Symptoms (x 1 week, productive cough with blood)      HPIPatient presents with c/o bad cough, SOB, chest tightness. Started over a month ago. tx with 5 day prednisone and omnicef which helped a little but when she finished much worse. Has COPD. Normally just on spiriva daily and albuterol prn. Now albuterol doesn't seem to be working. Was going to have foot surgery this morning but anesthesia cancelled because she had fever and cough. No recent travel. No known sick contacts.    ROSpositive for headache, abdominal pain, chest pain with cough. Negative for rash, vomiting/diarrhea, joint swelling or redness.    Active Ambulatory Problems     Diagnosis Date Noted   • Herniated nucleus pulposus, L4-5 03/18/2010   • Insomnia 11/10/2010   • Right lumbar radiculopathy 11/10/2010   • Arthritis    • Vitamin D insufficiency 11/16/2010   • Panic attacks    • PTSD (post-traumatic stress disorder)    • Encounter for long-term (current) use of other medications 01/04/2011   • Cervical radiculopathy, chronic 05/04/2011   • Exertional dyspnea 02/08/2012   • Degenerative arthritis of cervical spine 02/08/2012   • Spondylolisthesis of lumbar region 10/01/2012   • Thoracic or lumbosacral neuritis or radiculitis, unspecified 10/22/2012   • Lumbar stenosis 10/22/2012   • Anxiety 12/06/2012   • Chronic pain syndrome 09/16/2013   • Bipolar affective disorder, currently depressed, moderate (CMS-HCC) 01/24/2017   • COPD (chronic obstructive pulmonary disease) (CMS-HCC) 10/05/2017   • Obesity (BMI 30-39.9) 01/18/2018     Resolved Ambulatory Problems     Diagnosis Date Noted   • Spondylisthesis 03/18/2010   • Cervical myelopathy (CMS-HCC) 11/10/2010   • Cancer (CMS-HCC)    • Back injury    • Chronic neck pain    • Chronic low back pain    • Depression    • Bipolar disorder (CMS-HCC) 04/11/2011   • Itching due to drug 06/02/2011   • Cold intolerance 01/24/2012   •  Radiculopathy, lumbar region 10/01/2012   • Low back pain 10/16/2012   • Lumbar radiculopathy, acute 10/16/2012   • Cervicalgia 10/22/2012   • S/P laminectomy 10/22/2012   • Chronic UTI 09/16/2013   • Complicated UTI (urinary tract infection) 10/11/2013   • Nephrolithiasis 10/11/2013   • H/O gastric bypass 10/11/2013   • HX: breast cancer 10/11/2013   • Shortness of breath 09/06/2016   • Bipolar 2 disorder (CMS-HCC) 01/24/2017   • Bipolar I disorder with mood-congruent psychotic features (CMS-HCC) 01/24/2017   • Chronic back pain 10/05/2017   • Chest pain 10/05/2017     Past Medical History:   Diagnosis Date   • Anxiety and depression    • Arthritis    • Back injury    • Back pain    • Bronchitis 2010   • Cancer (CMS-HCC)    • Chronic LBP    • Chronic neck pain    • Cold intolerance 1/24/2012   • Diabetes    • H/O gastric bypass 10/11/2013   • HX: breast cancer 10/11/2013   • Hypertension    • Itching due to drug 6/2/2011   • Nephrolithiasis 10/11/2013   • Other specified symptom associated with female genital organs    • Panic disorder    • PTSD (post-traumatic stress disorder)    • S/P laminectomy 10/22/2012   • Trauma      Current Outpatient Prescriptions   Medication Sig Dispense Refill   • Oxycodone HCl 20 MG Tab TAKE 1 TABLET BY MOUTH TWICE A DAY 30 DAYS M51.36  0   • levoFLOXacin (LEVAQUIN) 500 MG tablet Take 1 Tab by mouth every day for 10 days. 10 Tab 0   • predniSONE (DELTASONE) 10 MG Tab 6 tab QD X 2 days then 5 tab QD X 2 days then 4 tab QD X 2 days then continue taper down 42 Tab 0   • promethazine (PHENERGAN) 6.25 MG/5ML Syrup Take 5 mL by mouth 4 times a day as needed (cough) for up to 5 days. 120 mL 0   • benzonatate (TESSALON) 100 MG Cap Take 1 Cap by mouth 3 times a day as needed for Cough. 60 Cap 0   • sertraline (ZOLOFT) 50 MG Tab Take 1 Tab by mouth every day. 30 Tab 1   • ALPRAZolam (XANAX) 1 MG Tab Take 2 Tabs by mouth 2 Times a Day.     • PROAIR  (90 Base) MCG/ACT Aero Soln inhalation  "aerosol Inhale 1-2 Puffs by mouth every four hours as needed. 8.5 g 2   • fluticasone (FLONASE) 50 MCG/ACT nasal spray Spray 1 Spray in nose 2 times a day. Each Nostril 1 Bottle 3   • tiotropium (SPIRIVA) 18 MCG Cap Inhale 1 Cap by mouth every day. 30 Cap 3   • sumatriptan (IMITREX) 50 MG Tab Take 1 Tab by mouth Once PRN for Migraine (ok to repeat 1 hr later if symptoms persist) for up to 1 dose. 10 Tab 3   • mupirocin calcium (BACTROBAN) 2 % Cream Apply thin layer to affected area twice a day as needed 30 g 1   • ibuprofen (MOTRIN) 800 MG Tab Take 1 Tab by mouth every 8 hours as needed. 90 Tab 2     No current facility-administered medications for this visit.    nkda  Non-smoker   Objective:     /70   Pulse (!) 114   Temp 37.4 °C (99.4 °F)   Resp 16   Ht 1.575 m (5' 2\")   Wt 83.4 kg (183 lb 12.8 oz)   LMP 10/05/1999   SpO2 97%   BMI 33.62 kg/m²      Physical Exam   Constitutional: She is oriented to person, place, and time. She appears well-developed and well-nourished. No distress.   Ill appearing   HENT:   Head: Normocephalic and atraumatic.   Right Ear: Hearing, tympanic membrane, external ear and ear canal normal.   Left Ear: Hearing, tympanic membrane, external ear and ear canal normal.   Nose: Mucosal edema present.   Mouth/Throat: Uvula is midline, oropharynx is clear and moist and mucous membranes are normal.   Eyes: Conjunctivae are normal.   Neck: Normal range of motion. Neck supple.   Cardiovascular: Regular rhythm.    tachycardia   Pulmonary/Chest:   Increased effort, no adventitious sounds appreciated   Lymphadenopathy:     She has no cervical adenopathy.   Neurological: She is alert and oriented to person, place, and time.   Skin: Skin is warm and dry. No rash noted. She is not diaphoretic. There is pallor.   Psychiatric: She has a normal mood and affect. Her behavior is normal. Judgment and thought content normal.   Vitals reviewed.              Assessment/Plan:     1. Acute " exacerbation of chronic obstructive pulmonary disease (COPD) (CMS-[facility])  ER precautions given. Has f/u with PCP scheduled for next week.  - levoFLOXacin (LEVAQUIN) 500 MG tablet; Take 1 Tab by mouth every day for 10 days.  Dispense: 10 Tab; Refill: 0  - predniSONE (DELTASONE) 10 MG Tab; 6 tab QD X 2 days then 5 tab QD X 2 days then 4 tab QD X 2 days then continue taper down  Dispense: 42 Tab; Refill: 0  - promethazine (PHENERGAN) 6.25 MG/5ML Syrup; Take 5 mL by mouth 4 times a day as needed (cough) for up to 5 days.  Dispense: 120 mL; Refill: 0

## 2018-03-15 ENCOUNTER — OFFICE VISIT (OUTPATIENT)
Dept: MEDICAL GROUP | Facility: MEDICAL CENTER | Age: 53
End: 2018-03-15
Payer: MEDICARE

## 2018-03-15 VITALS
OXYGEN SATURATION: 96 % | HEART RATE: 69 BPM | BODY MASS INDEX: 34.6 KG/M2 | SYSTOLIC BLOOD PRESSURE: 118 MMHG | HEIGHT: 62 IN | TEMPERATURE: 97.2 F | RESPIRATION RATE: 18 BRPM | DIASTOLIC BLOOD PRESSURE: 72 MMHG | WEIGHT: 188 LBS

## 2018-03-15 DIAGNOSIS — G89.4 CHRONIC PAIN SYNDROME: ICD-10-CM

## 2018-03-15 DIAGNOSIS — M77.31 CALCANEAL SPUR OF BOTH FEET: ICD-10-CM

## 2018-03-15 DIAGNOSIS — M19.90 ARTHRITIS: ICD-10-CM

## 2018-03-15 DIAGNOSIS — D64.9 ANEMIA, UNSPECIFIED TYPE: ICD-10-CM

## 2018-03-15 DIAGNOSIS — E87.6 HYPOKALEMIA: ICD-10-CM

## 2018-03-15 DIAGNOSIS — E55.9 VITAMIN D INSUFFICIENCY: ICD-10-CM

## 2018-03-15 DIAGNOSIS — M53.9 MULTILEVEL DEGENERATIVE DISC DISEASE: ICD-10-CM

## 2018-03-15 DIAGNOSIS — Z12.11 SCREEN FOR COLON CANCER: ICD-10-CM

## 2018-03-15 DIAGNOSIS — R74.8 ELEVATED ALKALINE PHOSPHATASE LEVEL: ICD-10-CM

## 2018-03-15 DIAGNOSIS — Z91.148 PAIN MANAGEMENT CONTRACT BROKEN: ICD-10-CM

## 2018-03-15 DIAGNOSIS — M77.32 CALCANEAL SPUR OF BOTH FEET: ICD-10-CM

## 2018-03-15 DIAGNOSIS — Z12.39 SCREENING FOR MALIGNANT NEOPLASM OF BREAST: ICD-10-CM

## 2018-03-15 DIAGNOSIS — J44.9 CHRONIC OBSTRUCTIVE PULMONARY DISEASE, UNSPECIFIED COPD TYPE (HCC): ICD-10-CM

## 2018-03-15 PROCEDURE — 99213 OFFICE O/P EST LOW 20 MIN: CPT | Performed by: NURSE PRACTITIONER

## 2018-03-15 RX ORDER — ONDANSETRON 8 MG/1
8 TABLET, ORALLY DISINTEGRATING ORAL EVERY 8 HOURS PRN
Qty: 15 TAB | Refills: 1 | Status: SHIPPED | OUTPATIENT
Start: 2018-03-15 | End: 2018-03-23 | Stop reason: SDUPTHER

## 2018-03-15 RX ORDER — ONDANSETRON 8 MG/1
8 TABLET, ORALLY DISINTEGRATING ORAL EVERY 8 HOURS PRN
COMMUNITY
End: 2018-03-15 | Stop reason: SDUPTHER

## 2018-03-15 NOTE — PROGRESS NOTES
cc:  Follow-up pain and recent respiratory infection    Subjective:     HPI:     Patti Tello is a 52 y.o. female here to discuss the evaluation and management of:    Chronic pain  Patient states that she's been taking her oxycodone for pain relief. Patient states that she was unable to go to physical therapy due to being sick over the last month. Patient states that she went to the pain specialist and they had told her that they would not prescribe her any pain meds because she was getting them from her primary doctor as well as getting Xanax from her psychiatrist. Patient states she is weaning off her Xanax in the next month. Patient also states that she was told that they could put some sort of implant in her back however she does not want to do this as she is afraid of getting an infection. Patient states that her back her legs and her knee and shoulders and ankles all hurt continuously. Patient states she should be having foot surgery soon however due to illness had to cancel it. Currently does not exercise however does walk sometimes.    COPD  Patient states that she has not been taking her Spiriva as she states it was not helping with her recent upper respiratory infection. States that she was sick for about a month and has gone through 3 different antibiotics and 2 doses of steroids. Patient states that she is feeling better at this time.    Foot surgery  Patient states she was supposed to have foot surgery on March 9 however because of her upper respiratory illness that was canceled. Surgery is to be determined at this point.      ROS:  Denies any Headache, Blurred Vision, Confusion Chest pain,  Shortness of breath,  Abdominal pain, Changes of bowel or bladder, Lower ext edema, Fevers, Nights sweats, Weight Changes, Focal weakness or numbness.  All other systems are negative. Back pain, knee pain, foot pain, residual cough        Current Outpatient Prescriptions:   •  ondansetron (ZOFRAN ODT) 8 MG TABLET  DISPERSIBLE, Take 1 Tab by mouth every 8 hours as needed for Nausea., Disp: 15 Tab, Rfl: 1  •  Oxycodone HCl 20 MG Tab, TAKE 1 TABLET BY MOUTH TWICE A DAY 30 DAYS M51.36, Disp: , Rfl: 0  •  levoFLOXacin (LEVAQUIN) 500 MG tablet, Take 1 Tab by mouth every day for 10 days., Disp: 10 Tab, Rfl: 0  •  predniSONE (DELTASONE) 10 MG Tab, 6 tab QD X 2 days then 5 tab QD X 2 days then 4 tab QD X 2 days then continue taper down, Disp: 42 Tab, Rfl: 0  •  ALPRAZolam (XANAX) 1 MG Tab, Take 2 Tabs by mouth 2 Times a Day., Disp: , Rfl:   •  PROAIR  (90 Base) MCG/ACT Aero Soln inhalation aerosol, Inhale 1-2 Puffs by mouth every four hours as needed., Disp: 8.5 g, Rfl: 2  •  fluticasone (FLONASE) 50 MCG/ACT nasal spray, Spray 1 Spray in nose 2 times a day. Each Nostril, Disp: 1 Bottle, Rfl: 3  •  tiotropium (SPIRIVA) 18 MCG Cap, Inhale 1 Cap by mouth every day., Disp: 30 Cap, Rfl: 3  •  sumatriptan (IMITREX) 50 MG Tab, Take 1 Tab by mouth Once PRN for Migraine (ok to repeat 1 hr later if symptoms persist) for up to 1 dose., Disp: 10 Tab, Rfl: 3  •  ibuprofen (MOTRIN) 800 MG Tab, Take 1 Tab by mouth every 8 hours as needed., Disp: 90 Tab, Rfl: 2  •  sertraline (ZOLOFT) 50 MG Tab, Take 1 Tab by mouth every day., Disp: 30 Tab, Rfl: 1    No Known Allergies    Past Medical History:   Diagnosis Date   • Anxiety and depression    • Arthritis     back, neck   • Back injury    • Back pain    • Bronchitis 2010   • Cancer (CMS-HCC)     breast LT   • Chronic LBP    • Chronic neck pain    • Cold intolerance 1/24/2012   • Diabetes    • H/O gastric bypass 10/11/2013   • Herniated nucleus pulposus, L4-5 3/18/2010   • HX: breast cancer 10/11/2013   • Hypertension    • Itching due to drug 6/2/2011   • Nephrolithiasis 10/11/2013   • Other specified symptom associated with female genital organs    • Panic disorder    • PTSD (post-traumatic stress disorder)    • S/P laminectomy 10/22/2012   • Thoracic or lumbosacral neuritis or radiculitis,  "unspecified 10/22/2012   • Trauma     head trauma     Past Surgical History:   Procedure Laterality Date   • URETEROSCOPY  10/10/2013    Performed by Molly Resendiz M.D. at [facility]   • LASERTRIPSY  10/10/2013    Performed by Molly Resendiz M.D. at [facility]   • LUMBAR FUSION POSTERIOR  10/22/2012    Performed by Alfred Branham M.D. at [facility]   • LUMBAR LAMINECTOMY DISKECTOMY  10/22/2012    Performed by Alfred Branham M.D. at [facility]   • OTHER ORTHOPEDIC SURGERY  10/01/2012    lumbar fusion, Dr Branham   • OTHER ABDOMINAL SURGERY  2006    gastric bypass   • ABDOMINAL HYSTERECTOMY TOTAL      DUE TO FIBROIDS   • BREAST RECONSTRUCTION      Lt breast   • CRANIOTOMY      DUE TO BRAIN INJURY   • MASTECTOMY      Lt breast   • OTHER      left ear drum surgery   • OTHER ABDOMINAL SURGERY      hernia repair   • PRIMARY C SECTION      x 3   • US-CYST ASPIRATION-BREAST INITIAL       Family History   Problem Relation Age of Onset   • Diabetes Father    • Cancer Mother    • Other Mother      SLE     Social History     Social History   • Marital status:      Spouse name: N/A   • Number of children: N/A   • Years of education: N/A     Occupational History   • Not on file.     Social History Main Topics   • Smoking status: Never Smoker   • Smokeless tobacco: Never Used   • Alcohol use No   • Drug use: No   • Sexual activity: Not Currently     Other Topics Concern   • Not on file     Social History Narrative   • No narrative on file       Objective:     Vitals: /72   Pulse 69   Temp 36.2 °C (97.2 °F)   Resp 18   Ht 1.575 m (5' 2\")   Wt 85.3 kg (188 lb)   LMP 10/05/1999   SpO2 96%   BMI 34.39 kg/m²    General: Alert, pleasant, NAD  HEENT: Normocephalic.  External ears normal. Right TM dull, left TM with scant hyperemia around base, non bulging TM.   Neck supple.  No thyromegaly or masses palpated. No cervical or supraclavicular " lymphadenopathy.  Heart: Regular rate and rhythm.  S1 and S2 normal.  No murmurs appreciated.  Respiratory: Normal respiratory effort.  Clear to auscultation bilaterally.  Musculoskeletal: Gait is normal.  Moves all extremities well.  Extremities: No leg edema.   Neurological: No tremors, sensation grossly intact  Psych:  Affect/mood is normal, judgement is good, memory is intact, grooming is appropriate. Tearful when talking about discontinuing pain agreement    Assessment/Plan:     Patti was seen today for uri.    Diagnoses and all orders for this visit:    Multilevel degenerative disc disease  Chronic pain syndrome  Arthritis  Pain management contract broken  Chronic pain. Patient was last prescribed oxycodone 20 mg on February 2nd #60 pills that should last 30 days. Patient subsequently went to a different provider on February 26th and received 75 tablets of oxycodone 20 mg for a 30 day supply. At the last office visit patient did sign a contractual agreement for pain management. At that time patient did understand that if she was to get a medication from a different provider our contract would be broken. Advised patient that I can no longer prescribe narcotics to her that she has broken our contract. Patient states that she misunderstood and thought that I had told her to go to another doctor to get pain medication. Reviewed last month's note again and it stated that I would refer patient to pain management to see if there is any other nonpharmacological methods of pain relief that she could participate in while I would be still prescribing to her in weaning her down over the next few months. Advised patient to discuss with pain management to see if they would prescribe to her as well as review any other methods of treatment that may not include Narcotis. Encouraged her to continue with physical therapy.    Chronic obstructive pulmonary disease, unspecified COPD type (CMS-HCC)  Chronic. Advised patient that  she needs to be taking her Spiriva every day as this is a preventative medication for her COPD not just for when she has an upper respiratory illness. Patient is following up with pulmonology on March 22 of this month. PFTs needed     Anemia, unspecified type  -     CBC WITHOUT DIFFERENTIAL; Future  -     FERRITIN; Future  -     IRON/TOTAL IRON BIND; Future    Elevated alkaline phosphatase level  -     ALKALINE PHOSPHATASE ISOENZYMES; Future    Hypokalemia  -     COMP METABOLIC PANEL; Future    Vitamin D insufficiency  -     VITAMIN D,25 HYDROXY; Future    Calcaneal spur of both feet  Followed by podiatry. Pending surgery. Advised patient to try and use exercises to help reduce tension in her fascia and wear supportive shoes try to avoid stressing his activity that includes jumping, running back and caused trauma to her heel.    Screening for malignant neoplasm of breast  -     MA-MAMMO SCREENING BILAT W/GIOVANNI W/CAD; Future    Screen for colon cancer  -     REFERRAL TO GI FOR COLONOSCOPY    Other orders  -     ondansetron (ZOFRAN ODT) 8 MG TABLET DISPERSIBLE; Take 1 Tab by mouth every 8 hours as needed for Nausea.           Health care Maintenance:     Return in about 3 months (around 6/15/2018).          Renay NGUYEN

## 2018-03-22 ENCOUNTER — APPOINTMENT (OUTPATIENT)
Dept: SLEEP MEDICINE | Facility: MEDICAL CENTER | Age: 53
End: 2018-03-22
Payer: MEDICARE

## 2018-03-22 ASSESSMENT — ENCOUNTER SYMPTOMS: SLEEP DISTURBANCE: 1

## 2018-03-23 ENCOUNTER — OFFICE VISIT (OUTPATIENT)
Dept: MEDICAL GROUP | Facility: MEDICAL CENTER | Age: 53
End: 2018-03-23
Payer: MEDICARE

## 2018-03-23 ENCOUNTER — HOSPITAL ENCOUNTER (OUTPATIENT)
Dept: RADIOLOGY | Facility: MEDICAL CENTER | Age: 53
End: 2018-03-23

## 2018-03-23 VITALS
TEMPERATURE: 97.3 F | WEIGHT: 180.78 LBS | BODY MASS INDEX: 33.27 KG/M2 | HEIGHT: 62 IN | OXYGEN SATURATION: 97 % | RESPIRATION RATE: 18 BRPM | SYSTOLIC BLOOD PRESSURE: 118 MMHG | DIASTOLIC BLOOD PRESSURE: 80 MMHG | HEART RATE: 104 BPM

## 2018-03-23 DIAGNOSIS — G89.4 CHRONIC PAIN SYNDROME: ICD-10-CM

## 2018-03-23 DIAGNOSIS — F41.0 PANIC ATTACKS: ICD-10-CM

## 2018-03-23 DIAGNOSIS — J44.9 CHRONIC OBSTRUCTIVE PULMONARY DISEASE, UNSPECIFIED COPD TYPE (HCC): ICD-10-CM

## 2018-03-23 DIAGNOSIS — M53.9 MULTILEVEL DEGENERATIVE DISC DISEASE: ICD-10-CM

## 2018-03-23 DIAGNOSIS — M48.061 SPINAL STENOSIS OF LUMBAR REGION, UNSPECIFIED WHETHER NEUROGENIC CLAUDICATION PRESENT: ICD-10-CM

## 2018-03-23 DIAGNOSIS — R11.0 NAUSEA: ICD-10-CM

## 2018-03-23 DIAGNOSIS — M62.838 MUSCLE SPASM OF BOTH LOWER LEGS: ICD-10-CM

## 2018-03-23 DIAGNOSIS — F31.32 BIPOLAR AFFECTIVE DISORDER, CURRENTLY DEPRESSED, MODERATE (HCC): ICD-10-CM

## 2018-03-23 DIAGNOSIS — M62.838 MUSCLE SPASM: ICD-10-CM

## 2018-03-23 PROCEDURE — 99214 OFFICE O/P EST MOD 30 MIN: CPT | Performed by: PHYSICIAN ASSISTANT

## 2018-03-23 RX ORDER — OXYCODONE HYDROCHLORIDE 20 MG/1
1 TABLET ORAL 3 TIMES DAILY
Qty: 90 TAB | Refills: 0 | Status: SHIPPED | OUTPATIENT
Start: 2018-03-23 | End: 2018-04-22

## 2018-03-23 RX ORDER — CYCLOBENZAPRINE HCL 10 MG
10 TABLET ORAL 3 TIMES DAILY PRN
Qty: 30 TAB | Refills: 0 | Status: SHIPPED | OUTPATIENT
Start: 2018-03-23 | End: 2018-05-08

## 2018-03-23 RX ORDER — METHYLPREDNISOLONE 4 MG/1
TABLET ORAL
Qty: 21 TAB | Refills: 0 | Status: SHIPPED | OUTPATIENT
Start: 2018-03-23 | End: 2018-03-29

## 2018-03-23 RX ORDER — ONDANSETRON 8 MG/1
8 TABLET, ORALLY DISINTEGRATING ORAL EVERY 8 HOURS PRN
Qty: 15 TAB | Refills: 1 | Status: SHIPPED | OUTPATIENT
Start: 2018-03-23 | End: 2018-04-20 | Stop reason: SDUPTHER

## 2018-03-24 ENCOUNTER — HOSPITAL ENCOUNTER (OUTPATIENT)
Dept: RADIOLOGY | Facility: MEDICAL CENTER | Age: 53
End: 2018-03-24
Attending: NURSE PRACTITIONER
Payer: MEDICARE

## 2018-03-24 DIAGNOSIS — Z12.39 SCREENING FOR MALIGNANT NEOPLASM OF BREAST: ICD-10-CM

## 2018-03-24 PROCEDURE — 77067 SCR MAMMO BI INCL CAD: CPT

## 2018-03-26 PROBLEM — M62.838 MUSCLE SPASM OF BOTH LOWER LEGS: Status: ACTIVE | Noted: 2018-03-26

## 2018-03-26 NOTE — ASSESSMENT & PLAN NOTE
Working with psychiatrist to taper off xanax. Knows there is a black box warning for combo of narcotics and benzo's

## 2018-03-26 NOTE — ASSESSMENT & PLAN NOTE
Chronic. Seeing [place] [facility] for injections (epidural or facet?). On oxycodone 20mg TID. Current PCP has refused further prescription d/t breach of contract. I have agreed to fill one month only and refer her to pain management if [place] [facility] is not going to do the prescriptions. I advised patient that she is on a high dose of narcotic and should know that likely the provider that agrees to prescribe this med will want to decrease the dose or wean her off completely. On gabapentin and naproxen as well.

## 2018-03-26 NOTE — ASSESSMENT & PLAN NOTE
Patient complains of this mostly at night, willing to try muscle relaxer. States it is worse after her recent epidural.

## 2018-03-26 NOTE — PROGRESS NOTES
Subjective:   Patti Tello is a 52 y.o. female here today for follow up.    Lumbar stenosis  Chronic. Seeing [place] [facility] for injections (epidural or facet?). On oxycodone 20mg TID. Current PCP has refused further prescription d/t breach of contract. I have agreed to fill one month only and refer her to pain management if [place] [facility] is not going to do the prescriptions. I advised patient that she is on a high dose of narcotic and should know that likely the provider that agrees to prescribe this med will want to decrease the dose or wean her off completely. On gabapentin and naproxen as well.    Panic attacks  Working with psychiatrist to taper off xanax. Knows there is a black box warning for combo of narcotics and benzo's    Bipolar affective disorder, currently depressed, moderate (CMS-HCC)  Better now that she is back on abilify with psychiatrist.    COPD (chronic obstructive pulmonary disease) (CMS-HCC)  Improved after course of antibiotic and steroid. Still some cough.    Muscle spasm of both lower legs  Patient complains of this mostly at night, willing to try muscle relaxer. States it is worse after her recent epidural.       Current medicines (including changes today)  Current Outpatient Prescriptions   Medication Sig Dispense Refill   • ondansetron (ZOFRAN ODT) 8 MG TABLET DISPERSIBLE Take 1 Tab by mouth every 8 hours as needed for Nausea. 15 Tab 1   • methylPREDNISolone (MEDROL) 4 MG Tab 6 po day 1 then 5 po day 2 then 4 po day 3 then 3 po day 4 then 2 po day 5 then 1 po day 6 21 Tab 0   • Oxycodone HCl 20 MG Tab Take 1 Tab by mouth 3 times a day for 30 days. 90 Tab 0   • cyclobenzaprine (FLEXERIL) 10 MG Tab Take 1 Tab by mouth 3 times a day as needed for Muscle Spasms. 30 Tab 0   • PROAIR  (90 Base) MCG/ACT Aero Soln inhalation aerosol Inhale 1-2 Puffs by mouth every four hours as needed. 8.5 g 2   • fluticasone (FLONASE) 50 MCG/ACT nasal spray Spray 1 Spray in nose 2 times a day. Each  "Nostril 1 Bottle 3   • tiotropium (SPIRIVA) 18 MCG Cap Inhale 1 Cap by mouth every day. 30 Cap 3   • sumatriptan (IMITREX) 50 MG Tab Take 1 Tab by mouth Once PRN for Migraine (ok to repeat 1 hr later if symptoms persist) for up to 1 dose. 10 Tab 3   • ibuprofen (MOTRIN) 800 MG Tab Take 1 Tab by mouth every 8 hours as needed. 90 Tab 2   • sertraline (ZOLOFT) 50 MG Tab Take 1 Tab by mouth every day. 30 Tab 1   • Oxycodone HCl 20 MG Tab TAKE 1 TABLET BY MOUTH TWICE A DAY 30 DAYS M51.36  0   • predniSONE (DELTASONE) 10 MG Tab 6 tab QD X 2 days then 5 tab QD X 2 days then 4 tab QD X 2 days then continue taper down 42 Tab 0     No current facility-administered medications for this visit.      She  has a past medical history of Anxiety and depression; Arthritis; Back injury; Back pain; Bronchitis (2010); Cancer ([facility]); Chronic LBP; Chronic neck pain; Cold intolerance (1/24/2012); Diabetes; H/O gastric bypass (10/11/2013); Herniated nucleus pulposus, L4-5 (3/18/2010); breast cancer (10/11/2013); Hypertension; Itching due to drug (6/2/2011); Nephrolithiasis (10/11/2013); Other specified symptom associated with female genital organs; Panic disorder; PTSD (post-traumatic stress disorder); S/P laminectomy (10/22/2012); Thoracic or lumbosacral neuritis or radiculitis, unspecified (10/22/2012); and Trauma.    ROS   No fever/chills. No weight change. No headache/dizziness. No focal weakness. No sore throat, nasal congestion, ear pain. No chest pain. No n/v/d/c or abdominal pain. No urinary complaint. No rash or skin lesion.        Objective:     Blood pressure 118/80, pulse (!) 104, temperature 36.3 °C (97.3 °F), resp. rate 18, height 1.575 m (5' 2\"), weight 82 kg (180 lb 12.4 oz), last menstrual period 10/05/1999, SpO2 97 %. Body mass index is 33.06 kg/m².   Physical Exam:  Constitutional: WDWN, NAD  Skin: Warm, dry, good turgor, no rashes in visible areas.  Eye: Equal, round and reactive, conjunctiva clear, lids " normal.  ENMT: Lips without lesions, good dentition, oropharynx clear.  Neck: Trachea midline, no masses, no thyromegaly. No cervical or supraclavicular lymphadenopathy  Respiratory: Unlabored respiratory effort, lungs clear to auscultation, no wheezes, no ronchi.  Cardiovascular: Normal S1, S2, no murmur, no leg edema.  Psych: Alert and oriented x3, normal affect and mood.        Assessment and Plan:   The following treatment plan was discussed    1. Chronic obstructive pulmonary disease, unspecified COPD type (CMS-HCC)  If cough gets worse before she gets in with pulmonology  - methylPREDNISolone (MEDROL) 4 MG Tab; 6 po day 1 then 5 po day 2 then 4 po day 3 then 3 po day 4 then 2 po day 5 then 1 po day 6  Dispense: 21 Tab; Refill: 0    2. Chronic pain syndrome - discussed with patient that I will only fill this one time for 30 days, no exceptions  [facility] Aware web site evaluation: I have obtained and reviewed patient utilization report from [facility] pharmacy database prior to writing prescription for controlled substance II, III or IV. Based on the report and my clinical assessment the prescription is medically necessary.   Patient is cautioned on sedation potential of narcotic medication; no drinking, driving or operating heavy machinery while on this medication.  - REFERRAL TO PAIN CLINIC  - Oxycodone HCl 20 MG Tab; Take 1 Tab by mouth 3 times a day for 30 days.  Dispense: 90 Tab; Refill: 0    3. Multilevel degenerative disc disease    - REFERRAL TO PAIN CLINIC  - Oxycodone HCl 20 MG Tab; Take 1 Tab by mouth 3 times a day for 30 days.  Dispense: 90 Tab; Refill: 0    4. Spinal stenosis of lumbar region, unspecified whether neurogenic claudication present    - REFERRAL TO PAIN CLINIC  - Oxycodone HCl 20 MG Tab; Take 1 Tab by mouth 3 times a day for 30 days.  Dispense: 90 Tab; Refill: 0    5. Nausea    - ondansetron (ZOFRAN ODT) 8 MG TABLET DISPERSIBLE; Take 1 Tab by mouth every 8 hours as needed for  Nausea.  Dispense: 15 Tab; Refill: 1    6. Muscle spasm    - cyclobenzaprine (FLEXERIL) 10 MG Tab; Take 1 Tab by mouth 3 times a day as needed for Muscle Spasms.  Dispense: 30 Tab; Refill: 0    7. Panic attacks  F/u with psychiatry    8. Bipolar affective disorder, currently depressed, moderate (CMS-HCC)  F/u with psychiatry    9. Muscle spasm of both lower legs        Followup: No Follow-up on file.

## 2018-03-30 ENCOUNTER — APPOINTMENT (OUTPATIENT)
Dept: PHYSICAL MEDICINE AND REHAB | Facility: MEDICAL CENTER | Age: 53
End: 2018-03-30
Payer: MEDICARE

## 2018-03-30 VITALS
DIASTOLIC BLOOD PRESSURE: 90 MMHG | SYSTOLIC BLOOD PRESSURE: 118 MMHG | HEART RATE: 84 BPM | TEMPERATURE: 98 F | WEIGHT: 184 LBS | BODY MASS INDEX: 33.86 KG/M2 | HEIGHT: 62 IN | OXYGEN SATURATION: 98 %

## 2018-03-30 DIAGNOSIS — M25.512 CHRONIC PAIN OF BOTH SHOULDERS: ICD-10-CM

## 2018-03-30 DIAGNOSIS — M25.511 CHRONIC PAIN OF BOTH SHOULDERS: ICD-10-CM

## 2018-03-30 DIAGNOSIS — M54.50 LOW BACK PAIN WITH RADIATION: ICD-10-CM

## 2018-03-30 DIAGNOSIS — Z98.1 S/P LUMBAR FUSION: ICD-10-CM

## 2018-03-30 DIAGNOSIS — M53.9 MULTILEVEL DEGENERATIVE DISC DISEASE: ICD-10-CM

## 2018-03-30 DIAGNOSIS — M25.552 PAIN OF BOTH HIP JOINTS: ICD-10-CM

## 2018-03-30 DIAGNOSIS — G89.29 CHRONIC PAIN OF BOTH SHOULDERS: ICD-10-CM

## 2018-03-30 DIAGNOSIS — F11.90 CHRONIC, CONTINUOUS USE OF OPIOIDS: ICD-10-CM

## 2018-03-30 DIAGNOSIS — M25.551 PAIN OF BOTH HIP JOINTS: ICD-10-CM

## 2018-03-30 PROCEDURE — 99205 OFFICE O/P NEW HI 60 MIN: CPT | Performed by: PHYSICAL MEDICINE & REHABILITATION

## 2018-03-30 ASSESSMENT — PAIN SCALES - GENERAL: PAINLEVEL: 8=MODERATE-SEVERE PAIN
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
## 2018-04-02 ENCOUNTER — OFFICE VISIT (OUTPATIENT)
Dept: MEDICAL GROUP | Facility: CLINIC | Age: 53
End: 2018-04-02
Payer: MEDICARE

## 2018-04-02 VITALS
HEIGHT: 62 IN | HEART RATE: 92 BPM | SYSTOLIC BLOOD PRESSURE: 118 MMHG | TEMPERATURE: 98.8 F | OXYGEN SATURATION: 96 % | BODY MASS INDEX: 34.04 KG/M2 | WEIGHT: 185 LBS | RESPIRATION RATE: 16 BRPM | DIASTOLIC BLOOD PRESSURE: 80 MMHG

## 2018-04-02 DIAGNOSIS — Z92.89 HISTORY OF RECENT HOSPITALIZATION: ICD-10-CM

## 2018-04-02 DIAGNOSIS — J44.9 CHRONIC OBSTRUCTIVE PULMONARY DISEASE, UNSPECIFIED COPD TYPE (HCC): ICD-10-CM

## 2018-04-02 DIAGNOSIS — K21.9 GASTROESOPHAGEAL REFLUX DISEASE, ESOPHAGITIS PRESENCE NOT SPECIFIED: ICD-10-CM

## 2018-04-02 PROCEDURE — 99214 OFFICE O/P EST MOD 30 MIN: CPT | Performed by: NURSE PRACTITIONER

## 2018-04-02 RX ORDER — BUDESONIDE AND FORMOTEROL FUMARATE DIHYDRATE 80; 4.5 UG/1; UG/1
2 AEROSOL RESPIRATORY (INHALATION) 2 TIMES DAILY
Qty: 1 INHALER | Refills: 3 | Status: SHIPPED | OUTPATIENT
Start: 2018-04-02 | End: 2019-12-09

## 2018-04-02 RX ORDER — ARIPIPRAZOLE 10 MG/1
10 TABLET ORAL DAILY
COMMUNITY
End: 2018-06-06 | Stop reason: SDUPTHER

## 2018-04-02 RX ORDER — OMEPRAZOLE 20 MG/1
20 CAPSULE, DELAYED RELEASE ORAL DAILY
Qty: 30 CAP | Refills: 3 | Status: SHIPPED | OUTPATIENT
Start: 2018-04-02 | End: 2018-04-20 | Stop reason: SDUPTHER

## 2018-04-02 ASSESSMENT — ENCOUNTER SYMPTOMS
NECK PAIN: 1
SHORTNESS OF BREATH: 1
DEPRESSION: 1
DEPRESSION: 1
NERVOUS/ANXIOUS: 1
DIZZINESS: 0
SPUTUM PRODUCTION: 0
HEADACHES: 0
NAUSEA: 0
SHORTNESS OF BREATH: 1
WEAKNESS: 0
FEVER: 0
FOCAL WEAKNESS: 0
FALLS: 0
ABDOMINAL PAIN: 0
CHILLS: 0
COUGH: 1
WHEEZING: 0
MYALGIAS: 1
CARDIOVASCULAR NEGATIVE: 1
VOMITING: 1
MYALGIAS: 0
HEARTBURN: 1
NAUSEA: 1
CONSTITUTIONAL NEGATIVE: 1
PALPITATIONS: 0
DIZZINESS: 1
BACK PAIN: 1
BLURRED VISION: 1
VOMITING: 0

## 2018-04-02 NOTE — PROGRESS NOTES
Chief Complaint   Patient presents with   • URI     non productive cough/ nausea and vomiting/ sob/       HISTORY OF PRESENT ILLNESS: Patient is a 52 y.o. female established patient who presents today due to chronic coughing since last November. Pt reports ER visit more than 3 times ever since then. She reports increased BW 30 lbs in 6-7 months. Pt reports recent 2 ER visit was in [facility] and she just discharged from [place] [facility] this morning and instructed to use inhaler and follow up with us. No medical record available at this time. She is now taking oral steroid, spiriva, prn albuterol with little help. She reports poor appetite with weight gain but no evidence of fluid retention, no leg swelling noted. She reports severe acid reflux lately.     Most recent relavent lab and image test available in our epic:    1/29 cxr no acute cardiopulmonary disease  1/29/2018 BNP 7.   10/6/2017 stress test no evidence of significant jeopardized viable MI. Normal left ventricular size.   1/29/2018 EKG sinus rhythm     COPD listed as one of her problem but last PFT in 2012 shows FEV1/FVC 85%, FEV1 108 predicted.     Patient Active Problem List    Diagnosis Date Noted   • Lumbar stenosis 10/22/2012     Priority: High   • Muscle spasm of both lower legs 03/26/2018   • Calcaneal spur of both feet 03/15/2018   • Pain management contract broken 03/15/2018   • Obesity (BMI 30-39.9) 01/18/2018   • COPD (chronic obstructive pulmonary disease) (CMS-[facility]) 10/05/2017   • Bipolar affective disorder, currently depressed, moderate (CMS-HCC) 01/24/2017   • Chronic pain syndrome 09/16/2013   • Anxiety 12/06/2012   • Multilevel degenerative disc disease 10/01/2012   • Exertional dyspnea 02/08/2012   • Cervical radiculopathy, chronic 05/04/2011   • Vitamin D insufficiency 11/16/2010   • Arthritis    • Panic attacks    • PTSD (post-traumatic stress disorder)    • Insomnia 11/10/2010       Allergies:Patient has no known allergies.    Current  Outpatient Prescriptions   Medication Sig Dispense Refill   • ARIPiprazole (ABILIFY) 10 MG Tab Take 10 mg by mouth every day.     • omeprazole (PRILOSEC) 20 MG delayed-release capsule Take 1 Cap by mouth every day. 30 Cap 3   • budesonide-formoterol (SYMBICORT) 80-4.5 MCG/ACT Aerosol Inhale 2 Puffs by mouth 2 Times a Day. 1 Inhaler 3   • ondansetron (ZOFRAN ODT) 8 MG TABLET DISPERSIBLE Take 1 Tab by mouth every 8 hours as needed for Nausea. 15 Tab 1   • Oxycodone HCl 20 MG Tab Take 1 Tab by mouth 3 times a day for 30 days. 90 Tab 0   • cyclobenzaprine (FLEXERIL) 10 MG Tab Take 1 Tab by mouth 3 times a day as needed for Muscle Spasms. 30 Tab 0   • predniSONE (DELTASONE) 10 MG Tab 6 tab QD X 2 days then 5 tab QD X 2 days then 4 tab QD X 2 days then continue taper down 42 Tab 0   • PROAIR  (90 Base) MCG/ACT Aero Soln inhalation aerosol Inhale 1-2 Puffs by mouth every four hours as needed. 8.5 g 2   • tiotropium (SPIRIVA) 18 MCG Cap Inhale 1 Cap by mouth every day. 30 Cap 3   • sertraline (ZOLOFT) 50 MG Tab Take 1 Tab by mouth every day. 30 Tab 1   • Oxycodone HCl 20 MG Tab TAKE 1 TABLET BY MOUTH TWICE A DAY 30 DAYS M51.36  0   • fluticasone (FLONASE) 50 MCG/ACT nasal spray Spray 1 Spray in nose 2 times a day. Each Nostril 1 Bottle 3   • sumatriptan (IMITREX) 50 MG Tab Take 1 Tab by mouth Once PRN for Migraine (ok to repeat 1 hr later if symptoms persist) for up to 1 dose. 10 Tab 3   • ibuprofen (MOTRIN) 800 MG Tab Take 1 Tab by mouth every 8 hours as needed. 90 Tab 2     No current facility-administered medications for this visit.        Social History   Substance Use Topics   • Smoking status: Never Smoker   • Smokeless tobacco: Never Used   • Alcohol use No       Family Status   Relation Status   • Father  at age 64   • Mother  at age 75   • Sister Alive   • Brother Alive     Family History   Problem Relation Age of Onset   • Diabetes Father    • Cancer Mother    • Other Mother      SLE  "        ROS:  Review of Systems   Constitutional: Positive for malaise/fatigue. Negative for chills and fever.        Poor appetite but increased BW 30 lbs in the last 6-7 months   Respiratory: Positive for cough and shortness of breath ( on exertion and when severe coughing). Negative for sputum production and wheezing.    Cardiovascular: Negative for chest pain, palpitations and leg swelling.   Gastrointestinal: Positive for heartburn. Negative for abdominal pain, nausea and vomiting.   Genitourinary: Negative for dysuria, frequency and urgency.   Musculoskeletal: Negative for falls and myalgias.   Skin: Negative for rash.   Neurological: Negative for dizziness, focal weakness, weakness and headaches.   Psychiatric/Behavioral: Positive for depression. Negative for suicidal ideas. The patient is nervous/anxious ( pt reports her anxiety has gone better lately.she is following with psychiatrist).         Objective:     Blood pressure 118/80, pulse 92, temperature 37.1 °C (98.8 °F), resp. rate 16, height 1.575 m (5' 2\"), weight 83.9 kg (185 lb), last menstrual period 10/05/1999, SpO2 96 %.     Physical Exam:  Physical Exam   Constitutional: She is oriented to person, place, and time and well-developed, well-nourished, and in no distress.   HENT:   Head: Normocephalic and atraumatic.   Eyes: Conjunctivae are normal.   Neck: Neck supple. No JVD present. No thyromegaly present.   Cardiovascular: Normal rate and regular rhythm.    Pulmonary/Chest: Effort normal and breath sounds normal. No respiratory distress. She has no wheezes.   Abdominal: Soft. Bowel sounds are normal. She exhibits no distension. There is no tenderness.   Musculoskeletal: Normal range of motion. She exhibits no edema.   Neurological: She is alert and oriented to person, place, and time.   Skin: Skin is warm. No erythema.   Nursing note and vitals reviewed.        Assessment/Plan:  1. History of recent hospitalization  - Obtain Results: Other (see " comment) (hospital/dc summary); Obtain Results From: [facility]    2. Chronic obstructive pulmonary disease, unspecified COPD type (CMS-HCC)  Consider BNP and/or echo if not done in the most recent [facility] visit. Will obtain medical record first and contact pt if she needs to do further work up. Will assist her to schedule PFT here. Continue symbicort and spiriva, prn albuterol.   - PULMONARY FUNCTION TESTS Test requested: Complete Pulmonary Function Test --> scheduled on 4/19  - omeprazole (PRILOSEC) 20 MG delayed-release capsule; Take 1 Cap by mouth every day.  Dispense: 30 Cap; Refill: 3    Somewhat anxious, underlying anxiety which could explain her sinus tachycardia. She is instructed to relax technique, taking deep breath. Continue prn albuterol for now but will consider to switch to levabuterol. Also consider to check tsh if that is not done at [facility]. She has her own psychiatrist, next appointment on 4/12, and she has follow up appointment with PCP on 4/4. She is already scheduled to see pulmonologist on 5/1.     No sign of fluid overload, no leg swelling at all. Lung sounds clear.     3. Gastroesophageal reflux disease, esophagitis presence not specified  - omeprazole (PRILOSEC) 20 MG delayed-release capsule; Take 1 Cap by mouth every day.  Dispense: 30 Cap; Refill: 3    Differential diagnoses and indications for immediate follow-up discussed with patient.    Instructed to return to clinic or nearest emergency department for any change in condition, further concerns, or worsening of symptoms.    The patient demonstrated a good understanding and agreed with the treatment plan.

## 2018-04-02 NOTE — LETTER
[employer]  TAN AngelPAmyRAmyNAmy  [address] [facility] [address]  [place] [address]  Fax: [phone]   Authorization for Release/Disclosure of   Protected Health Information   Name: SHELL BRICEÑO : 1965 SSN: [id number]   Address: [facility] [place] [address] Phone:    [phone] (home)    I authorize the entity listed below to release/disclose the PHI below to:   [facility]/TAN AngelP.RJOANNA and PATRICK Baker   Provider or Entity Name: [facility]   [phone]      Fax: [phone]       Reason for request: continuity of care   Information to be released:    [  ] LAST COLONOSCOPY,  including any PATH REPORT and follow-up  [  ] LAST FIT/COLOGUARD RESULT [ x ] LAST Diagnostics testing   [ X ] LAST imaging   [  ] LAST PAP  [x  ] LAST LABS   [X   ] Release all info from ER visits on 18 and 2018      [  ] Check here and initial the line next to each item to release ALL health information INCLUDING  _____ Care and treatment for drug and / or alcohol abuse  _____ HIV testing, infection status, or AIDS  _____ Genetic Testing    DATES OF SERVICE OR TIME PERIOD TO BE DISCLOSED:  2018 thru 2018  I understand and acknowledge that:  * This Authorization may be revoked at any time by you in writing, except if your health information has already been used or disclosed.  * Your health information that will be used or disclosed as a result of you signing this authorization could be re-disclosed by the recipient. If this occurs, your re-disclosed health information may no longer be protected by State or Federal laws.  * You may refuse to sign this Authorization. Your refusal will not affect your ability to obtain treatment.  * This Authorization becomes effective upon signing and will  on (date) __________.      If no date is indicated, this Authorization will  one (1) year from the signature date.       Name: Patti Tello    Signature:   Date:     4/2/2018       PLEASE FAX REQUESTED RECORDS BACK TO: [phone]

## 2018-04-03 ENCOUNTER — HOSPITAL ENCOUNTER (OUTPATIENT)
Dept: RADIOLOGY | Facility: MEDICAL CENTER | Age: 53
End: 2018-04-03

## 2018-04-04 ENCOUNTER — APPOINTMENT (OUTPATIENT)
Dept: MEDICAL GROUP | Facility: MEDICAL CENTER | Age: 53
End: 2018-04-04
Payer: MEDICARE

## 2018-04-10 ENCOUNTER — TELEPHONE (OUTPATIENT)
Dept: MEDICAL GROUP | Facility: MEDICAL CENTER | Age: 53
End: 2018-04-10

## 2018-04-10 NOTE — TELEPHONE ENCOUNTER
Pt called stating that she is having increasing leg pain and leg cramping. Asked what OTC meds she can try - stated ibuprofen or aleve. Pt asked if she should go to the ER, which I did not recommend. Said that if she thought it was that bad she could try going to urgent care but not sure what they would be able to do as she is already prescribed narcotic pain medication. Pt with an appointment to establish with Katie 4/20

## 2018-04-19 ENCOUNTER — HOSPITAL ENCOUNTER (OUTPATIENT)
Dept: OTHER | Facility: MEDICAL CENTER | Age: 53
End: 2018-04-19
Attending: NURSE PRACTITIONER
Payer: MEDICARE

## 2018-04-19 ENCOUNTER — APPOINTMENT (OUTPATIENT)
Dept: PULMONOLOGY | Facility: MEDICAL CENTER | Age: 53
End: 2018-04-19
Payer: MEDICARE

## 2018-04-19 PROCEDURE — 94726 PLETHYSMOGRAPHY LUNG VOLUMES: CPT

## 2018-04-19 PROCEDURE — 94726 PLETHYSMOGRAPHY LUNG VOLUMES: CPT | Mod: 26 | Performed by: INTERNAL MEDICINE

## 2018-04-19 PROCEDURE — 94729 DIFFUSING CAPACITY: CPT

## 2018-04-19 PROCEDURE — 94060 EVALUATION OF WHEEZING: CPT

## 2018-04-19 PROCEDURE — 94060 EVALUATION OF WHEEZING: CPT | Mod: 26 | Performed by: INTERNAL MEDICINE

## 2018-04-19 PROCEDURE — 94729 DIFFUSING CAPACITY: CPT | Mod: 26 | Performed by: INTERNAL MEDICINE

## 2018-04-20 ENCOUNTER — TELEPHONE (OUTPATIENT)
Dept: MEDICAL GROUP | Facility: MEDICAL CENTER | Age: 53
End: 2018-04-20

## 2018-04-20 ENCOUNTER — TELEPHONE (OUTPATIENT)
Dept: PHYSICAL MEDICINE AND REHAB | Facility: MEDICAL CENTER | Age: 53
End: 2018-04-20

## 2018-04-20 ENCOUNTER — OFFICE VISIT (OUTPATIENT)
Dept: MEDICAL GROUP | Facility: MEDICAL CENTER | Age: 53
End: 2018-04-20
Payer: MEDICARE

## 2018-04-20 VITALS
OXYGEN SATURATION: 98 % | BODY MASS INDEX: 34.04 KG/M2 | SYSTOLIC BLOOD PRESSURE: 118 MMHG | WEIGHT: 185 LBS | HEART RATE: 82 BPM | TEMPERATURE: 98.4 F | DIASTOLIC BLOOD PRESSURE: 80 MMHG | RESPIRATION RATE: 16 BRPM | HEIGHT: 62 IN

## 2018-04-20 DIAGNOSIS — J44.9 CHRONIC OBSTRUCTIVE PULMONARY DISEASE, UNSPECIFIED COPD TYPE (HCC): ICD-10-CM

## 2018-04-20 DIAGNOSIS — K21.9 GASTROESOPHAGEAL REFLUX DISEASE, ESOPHAGITIS PRESENCE NOT SPECIFIED: ICD-10-CM

## 2018-04-20 DIAGNOSIS — Z00.00 WELLNESS EXAMINATION: ICD-10-CM

## 2018-04-20 DIAGNOSIS — R11.0 NAUSEA: ICD-10-CM

## 2018-04-20 PROCEDURE — 99214 OFFICE O/P EST MOD 30 MIN: CPT | Performed by: PHYSICIAN ASSISTANT

## 2018-04-20 RX ORDER — OMEPRAZOLE 20 MG/1
20 CAPSULE, DELAYED RELEASE ORAL DAILY
Qty: 30 CAP | Refills: 3 | Status: SHIPPED | OUTPATIENT
Start: 2018-04-20 | End: 2018-05-08 | Stop reason: SDUPTHER

## 2018-04-20 RX ORDER — IPRATROPIUM BROMIDE AND ALBUTEROL SULFATE 2.5; .5 MG/3ML; MG/3ML
3 SOLUTION RESPIRATORY (INHALATION) EVERY 6 HOURS PRN
Qty: 60 BULLET | Refills: 3 | Status: SHIPPED | OUTPATIENT
Start: 2018-04-20 | End: 2018-05-20

## 2018-04-20 RX ORDER — ONDANSETRON 8 MG/1
8 TABLET, ORALLY DISINTEGRATING ORAL EVERY 8 HOURS PRN
Qty: 15 TAB | Refills: 1 | Status: SHIPPED | OUTPATIENT
Start: 2018-04-20 | End: 2018-05-08

## 2018-04-20 NOTE — TELEPHONE ENCOUNTER
Patient had an appt today with GENE Ventura, samson Wilson called to us to le you know  the patient state  Dr Jackson told her to ask Katie to refill her pain  medication for one month, Katie state she check in your notes and can't find anything about that. Patient have an appt with you on May 4.

## 2018-04-20 NOTE — TELEPHONE ENCOUNTER
Pt had appointment with us today, in her appointment patient stated that she was told by Dr Jackson that Katie should be prescribing an additional month of medication. Katie called Dr Jackson's office to confirm and also let them know that she was not comfortable prescribing this medication given the dose and frequency and inconsistent drug screen. Pt has had issues in the past with inconsistent results as well as requesting medications from several providers. Received call back from Dr Jackson's MA that they did not tell the patient that we would be prescribing, MA stated that if Katie was comfortable she could give a two weeks prescription. Also stated that they would be doing a recheck UDS as last results were inconsistent with medications the patient should be taking. Called pt and advised her of information given by Dr Jackson's office and let her know they did not have any sooner appointments as Dr Jackson was going to be out of the office. Let her know Katie was not comfortable prescribing the medication and she would have to wait to see Dr Jackson for refills. Pt was upset with this as she felt she was misled by Dr Jackson's office regarding refills but stated understanding that we would not be prescribing this medication.

## 2018-04-20 NOTE — PROGRESS NOTES
Subjective:      Patti Tello is a 52 y.o. female who presents with Establish Care; Medication Refill (would like rx for nebulizer, ); and Leg Pain (leg cramping at night time and day time, getting worse)            HPIPatient presents today to establish care with me as new primary care provider. She is undergoing pulmonary testing for worsening cough, possible COPD exacerbation. On spiriva, symbicort and albuterol. Getting a little better. Requests nebulizer prescription. Also having problems with heartburn. Needs refill for omeprazole. Still seeing psychiatry for management of bipolar and anxiety. States she is off of the xanax now.    Has seen Dr. Jackson for initial consultation for pain management. Patient tells me today that Dr. Jackson said she would contact me to ask me to prescribe one month of oxycodone until she is able to see patient in follow up. I do not see any message from Dr. Jackson. I reviewed the note from Dr. Jackson that does not say that. I discussed with patient that as I explained in the last office visit I am not going to prescribe the oxycodone prescription. She is on a higher daily dose that is out of my scope and she is on xanax - she states she is not on it but per  she filled #20 of xanax 1mg on 4/10/2018 with provider Macy Pruett. She filled the one prescription of Oxycodone 20mg #120 on 3/23/2018 so she shouldn't be out yet. Interestingly on her UDS with Dr. Jackson on 3/30/2018 she was negative for all meds.     Complaining of worsening leg cramps that are not helped with muscle relaxers.    ROSno fever/chills. No headache/dizziness. No chest pain. No n/v/d/c or abdominal pain. No rash or skin lesion.    Active Ambulatory Problems     Diagnosis Date Noted   • Insomnia 11/10/2010   • Arthritis    • Vitamin D insufficiency 11/16/2010   • Panic attacks    • PTSD (post-traumatic stress disorder)    • Cervical radiculopathy, chronic 05/04/2011   • Exertional dyspnea 02/08/2012   •  Multilevel degenerative disc disease 10/01/2012   • Lumbar stenosis 10/22/2012   • Anxiety 12/06/2012   • Chronic pain syndrome 09/16/2013   • Bipolar affective disorder, currently depressed, moderate (CMS-HCC) 01/24/2017   • COPD (chronic obstructive pulmonary disease) (CMS-HCC) 10/05/2017   • Obesity (BMI 30-39.9) 01/18/2018   • Calcaneal spur of both feet 03/15/2018   • Pain management contract broken 03/15/2018   • Muscle spasm of both lower legs 03/26/2018     Resolved Ambulatory Problems     Diagnosis Date Noted   • Herniated nucleus pulposus, L4-5 03/18/2010   • Spondylisthesis 03/18/2010   • Cervical myelopathy (CMS-HCC) 11/10/2010   • Right lumbar radiculopathy 11/10/2010   • Cancer (CMS-HCC)    • Back injury    • Chronic neck pain    • Chronic low back pain    • Depression    • Encounter for long-term (current) use of other medications 01/04/2011   • Bipolar disorder (CMS-HCC) 04/11/2011   • Itching due to drug 06/02/2011   • Cold intolerance 01/24/2012   • Degenerative arthritis of cervical spine 02/08/2012   • Radiculopathy, lumbar region 10/01/2012   • Low back pain 10/16/2012   • Lumbar radiculopathy, acute 10/16/2012   • Cervicalgia 10/22/2012   • Thoracic or lumbosacral neuritis or radiculitis, unspecified 10/22/2012   • S/P laminectomy 10/22/2012   • Chronic UTI 09/16/2013   • Complicated UTI (urinary tract infection) 10/11/2013   • Nephrolithiasis 10/11/2013   • H/O gastric bypass 10/11/2013   • HX: breast cancer 10/11/2013   • Shortness of breath 09/06/2016   • Bipolar 2 disorder (CMS-HCC) 01/24/2017   • Bipolar I disorder with mood-congruent psychotic features (CMS-HCC) 01/24/2017   • Chronic back pain 10/05/2017   • Chest pain 10/05/2017     Past Medical History:   Diagnosis Date   • Anxiety and depression    • Arthritis    • Back injury    • Back pain    • Bronchitis 2010   • Cancer (CMS-HCC)    • Chronic LBP    • Chronic neck pain    • Cold intolerance 1/24/2012   • Diabetes    • H/O gastric  bypass 10/11/2013   • Herniated nucleus pulposus, L4-5 3/18/2010   • HX: breast cancer 10/11/2013   • Hypertension    • Itching due to drug 6/2/2011   • Nephrolithiasis 10/11/2013   • Other specified symptom associated with female genital organs    • Panic disorder    • PTSD (post-traumatic stress disorder)    • S/P laminectomy 10/22/2012   • Thoracic or lumbosacral neuritis or radiculitis, unspecified 10/22/2012   • Trauma      Current Outpatient Prescriptions   Medication Sig Dispense Refill   • omeprazole (PRILOSEC) 20 MG delayed-release capsule Take 1 Cap by mouth every day. 30 Cap 3   • ondansetron (ZOFRAN ODT) 8 MG TABLET DISPERSIBLE Take 1 Tab by mouth every 8 hours as needed for Nausea. 15 Tab 1   • Nebulizers (COMPRESSOR/NEBULIZER) Misc 1 Each by Does not apply route Once for 1 dose. 1 Each 0   • ipratropium-albuterol (DUONEB) 0.5-2.5 (3) MG/3ML nebulizer solution 3 mL by Nebulization route every 6 hours as needed for Shortness of Breath for up to 30 days. 60 Bullet 3   • ARIPiprazole (ABILIFY) 10 MG Tab Take 10 mg by mouth every day.     • budesonide-formoterol (SYMBICORT) 80-4.5 MCG/ACT Aerosol Inhale 2 Puffs by mouth 2 Times a Day. 1 Inhaler 3   • Oxycodone HCl 20 MG Tab Take 1 Tab by mouth 3 times a day for 30 days. 90 Tab 0   • tiotropium (SPIRIVA) 18 MCG Cap Inhale 1 Cap by mouth every day. 30 Cap 3   • cyclobenzaprine (FLEXERIL) 10 MG Tab Take 1 Tab by mouth 3 times a day as needed for Muscle Spasms. 30 Tab 0   • predniSONE (DELTASONE) 10 MG Tab 6 tab QD X 2 days then 5 tab QD X 2 days then 4 tab QD X 2 days then continue taper down 42 Tab 0   • PROAIR  (90 Base) MCG/ACT Aero Soln inhalation aerosol Inhale 1-2 Puffs by mouth every four hours as needed. 8.5 g 2   • fluticasone (FLONASE) 50 MCG/ACT nasal spray Spray 1 Spray in nose 2 times a day. Each Nostril 1 Bottle 3   • sumatriptan (IMITREX) 50 MG Tab Take 1 Tab by mouth Once PRN for Migraine (ok to repeat 1 hr later if symptoms persist) for  "up to 1 dose. 10 Tab 3   • ibuprofen (MOTRIN) 800 MG Tab Take 1 Tab by mouth every 8 hours as needed. 90 Tab 2     No current facility-administered medications for this visit.    nkda  Non-smoker   Objective:     /80   Pulse 82   Temp 36.9 °C (98.4 °F)   Resp 16   Ht 1.575 m (5' 2\")   Wt 83.9 kg (185 lb)   LMP 10/05/1999   SpO2 98%   BMI 33.84 kg/m²      Physical Exam   Constitutional: She is oriented to person, place, and time. She appears well-developed and well-nourished. No distress.   Neurological: She is alert and oriented to person, place, and time.   Skin: Skin is warm and dry. She is not diaphoretic.   Psychiatric: She has a normal mood and affect. Her behavior is normal. Judgment and thought content normal.   Vitals reviewed.              Assessment/Plan:     1. Chronic obstructive pulmonary disease, unspecified COPD type (CMS-HCC)    - omeprazole (PRILOSEC) 20 MG delayed-release capsule; Take 1 Cap by mouth every day.  Dispense: 30 Cap; Refill: 3  - Nebulizers (COMPRESSOR/NEBULIZER) Misc; 1 Each by Does not apply route Once for 1 dose.  Dispense: 1 Each; Refill: 0    2. Gastroesophageal reflux disease, esophagitis presence not specified    - omeprazole (PRILOSEC) 20 MG delayed-release capsule; Take 1 Cap by mouth every day.  Dispense: 30 Cap; Refill: 3    3. Nausea    - ondansetron (ZOFRAN ODT) 8 MG TABLET DISPERSIBLE; Take 1 Tab by mouth every 8 hours as needed for Nausea.  Dispense: 15 Tab; Refill: 1    4. Wellness examination    - REFERRAL TO GYNECOLOGY    Regarding opiate prescription I see a number of red flags. Inconsistent history of what providers have told her. Breach of contract with previous PCP. Inconsistent UDS results from 3/30/2018. Review of chart shows inconsistent UDS 2/2011 that resulted in breach of contract and provider refusal to prescribe opitates. I reiterated to patient that I will not prescribe opiate or benzodiazepines but am otherwise happy to be her primary care " provider.     F/u prn and after pulmonology evaluation.

## 2018-04-21 NOTE — PROCEDURES
DATE OF STUDY:  04/19/2018    REFERRING PROVIDER:  RAY Felipe    GIVEN DIAGNOSIS:  Chronic obstructive pulmonary disease.    TECHNICAL COMMENTS:  Indicate good effort on the part of the patient.    Spirometry and the flow volume loop suggest normal dynamic airflow with an   FEV1/FVC ratio of 89% and normal mid expiratory flow.  There is no significant   change in dynamic airflow after administration of an inhaled bronchodilator.    The FEV1 is 2.38 liters, which is 95% of the predicted value.  Maximal   voluntary ventilation is reduced a bit in proportion to the FEV1.  Shape of   the flow volume loop is unremarkable.    Lung volumes include a low normal total lung capacity, residual volume and   slow vital capacity.  Diffusing capacity is normal.  Airway resistance   measurements are unremarkable.    ASSESSMENT:  This is a normal pulmonary function study.  The lung volumes are   in the low normal range, which may not be clinically significant.  The mild   reduction in maximal voluntary ventilation might be related to fatigue,   neuromuscular weakness or incomplete adherence to the testing protocol.    Clinical correlation is required.       ____________________________________     MD JACQUIE BOTELLO / TRACEY    DD:  04/20/2018 15:14:58  DT:  04/20/2018 15:37:37    D#:  [id number]  Job#:  [id number]

## 2018-04-21 NOTE — TELEPHONE ENCOUNTER
I spoke with patient and let to her to know , we don't will be able to refill her medication today and she understood the situation.

## 2018-04-23 DIAGNOSIS — J44.1 ACUTE EXACERBATION OF CHRONIC OBSTRUCTIVE PULMONARY DISEASE (COPD) (HCC): ICD-10-CM

## 2018-04-24 RX ORDER — PREDNISONE 10 MG/1
TABLET ORAL
Qty: 42 TAB | Refills: 0 | Status: SHIPPED | OUTPATIENT
Start: 2018-04-24 | End: 2018-05-08

## 2018-05-08 ENCOUNTER — OFFICE VISIT (OUTPATIENT)
Dept: MEDICAL GROUP | Facility: MEDICAL CENTER | Age: 53
End: 2018-05-08
Payer: MEDICARE

## 2018-05-08 VITALS
RESPIRATION RATE: 16 BRPM | TEMPERATURE: 98.5 F | HEIGHT: 62 IN | BODY MASS INDEX: 33.58 KG/M2 | DIASTOLIC BLOOD PRESSURE: 72 MMHG | HEART RATE: 96 BPM | SYSTOLIC BLOOD PRESSURE: 102 MMHG | WEIGHT: 182.5 LBS | OXYGEN SATURATION: 97 %

## 2018-05-08 DIAGNOSIS — R12 HEARTBURN: ICD-10-CM

## 2018-05-08 DIAGNOSIS — Z98.84 HISTORY OF GASTRIC BYPASS: ICD-10-CM

## 2018-05-08 DIAGNOSIS — K21.9 GASTROESOPHAGEAL REFLUX DISEASE, ESOPHAGITIS PRESENCE NOT SPECIFIED: ICD-10-CM

## 2018-05-08 PROCEDURE — 99213 OFFICE O/P EST LOW 20 MIN: CPT | Performed by: PHYSICIAN ASSISTANT

## 2018-05-08 RX ORDER — OMEPRAZOLE 20 MG/1
20 CAPSULE, DELAYED RELEASE ORAL 2 TIMES DAILY
Qty: 60 CAP | Refills: 3 | Status: SHIPPED | OUTPATIENT
Start: 2018-05-08 | End: 2018-06-07

## 2018-05-08 NOTE — LETTER
May 8, 2018        Re: Patti Tello        Patient has diagnosis of bipolar disorder, anxiety and PTSD. It is recommended from her psychiatrist and myself that she be allowed to have service animals (2 english bulldogs) in her apartment.          Thank you for your time,            Katie Kasper PA-C

## 2018-05-08 NOTE — PROGRESS NOTES
Subjective:   Patti Tello is a 52 y.o. female here today for worsening heartburn    Heartburn  Has gastric bypass with Dr. Ganser    Has had heartburn in the past. Has been on omeprazole for 1-2  Months at once a day.    Now feeling like food is getting stuck.     Review of records shows visit with [facility] 2013 for RUQ pain, no follow up note in media    Current medicines (including changes today)  Current Outpatient Prescriptions   Medication Sig Dispense Refill   • omeprazole (PRILOSEC) 20 MG delayed-release capsule Take 1 Cap by mouth 2 times a day for 30 days. 60 Cap 3   • ARIPiprazole (ABILIFY) 10 MG Tab Take 10 mg by mouth every day.     • budesonide-formoterol (SYMBICORT) 80-4.5 MCG/ACT Aerosol Inhale 2 Puffs by mouth 2 Times a Day. 1 Inhaler 3   • ipratropium-albuterol (DUONEB) 0.5-2.5 (3) MG/3ML nebulizer solution 3 mL by Nebulization route every 6 hours as needed for Shortness of Breath for up to 30 days. 60 Bullet 3   • fluticasone (FLONASE) 50 MCG/ACT nasal spray Spray 1 Spray in nose 2 times a day. Each Nostril 1 Bottle 3   • tiotropium (SPIRIVA) 18 MCG Cap Inhale 1 Cap by mouth every day. 30 Cap 3   • sumatriptan (IMITREX) 50 MG Tab Take 1 Tab by mouth Once PRN for Migraine (ok to repeat 1 hr later if symptoms persist) for up to 1 dose. 10 Tab 3   • ibuprofen (MOTRIN) 800 MG Tab Take 1 Tab by mouth every 8 hours as needed. 90 Tab 2     No current facility-administered medications for this visit.      She  has a past medical history of Anxiety and depression; Arthritis; Back injury; Back pain; Bronchitis (2010); Cancer (HCC); Chronic LBP; Chronic neck pain; Cold intolerance (1/24/2012); Diabetes; H/O gastric bypass (10/11/2013); Herniated nucleus pulposus, L4-5 (3/18/2010); breast cancer (10/11/2013); Hypertension; Itching due to drug (6/2/2011); Nephrolithiasis (10/11/2013); Other specified symptom associated with female genital organs; Panic disorder; PTSD (post-traumatic stress disorder); S/P  "laminectomy (10/22/2012); Thoracic or lumbosacral neuritis or radiculitis, unspecified (10/22/2012); and Trauma.    ROS   No fever/chills. No weight change. No headache/dizziness. No focal weakness. No sore throat, nasal congestion, ear pain. No chest pain, no shortness of breath, difficulty breathing. No n/v/d/c. No urinary complaint. No rash or skin lesion.        Objective:     Blood pressure 102/72, pulse 96, temperature 36.9 °C (98.5 °F), resp. rate 16, height 1.575 m (5' 2\"), weight 82.8 kg (182 lb 8 oz), last menstrual period 10/05/1999, SpO2 97 %. Body mass index is 33.38 kg/m².   Physical Exam:  Constitutional: WDWN, NAD  Skin: Warm, dry, good turgor, no rashes in visible areas.  Psych: Alert and oriented x3, normal affect and mood.        Assessment and Plan:   The following treatment plan was discussed    1. Heartburn    - omeprazole (PRILOSEC) 20 MG delayed-release capsule; Take 1 Cap by mouth 2 times a day for 30 days.  Dispense: 60 Cap; Refill: 3  - REFERRAL TO GASTROENTEROLOGY    2. Gastroesophageal reflux disease, esophagitis presence not specified    - omeprazole (PRILOSEC) 20 MG delayed-release capsule; Take 1 Cap by mouth 2 times a day for 30 days.  Dispense: 60 Cap; Refill: 3  - REFERRAL TO GASTROENTEROLOGY    3. History of gastric bypass  - omeprazole (PRILOSEC) 20 MG delayed-release capsule; Take 1 Cap by mouth 2 times a day for 30 days.  Dispense: 60 Cap; Refill: 3  - REFERRAL TO GASTROENTEROLOGY      Followup: No Follow-up on file.         "

## 2018-05-08 NOTE — ASSESSMENT & PLAN NOTE
Has gastric bypass with Dr. Ganser    Has had heartburn in the past. Has been on omeprazole for 1-2  Months at once a day.    Now feeling like food is getting stuck.

## 2018-05-29 ENCOUNTER — HOSPITAL ENCOUNTER (EMERGENCY)
Dept: HOSPITAL 8 - ED | Age: 53
Discharge: HOME | End: 2018-05-29
Payer: MEDICARE

## 2018-05-29 VITALS — WEIGHT: 181.22 LBS | BODY MASS INDEX: 33.35 KG/M2 | HEIGHT: 62 IN

## 2018-05-29 VITALS — SYSTOLIC BLOOD PRESSURE: 139 MMHG | DIASTOLIC BLOOD PRESSURE: 80 MMHG
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
## 2018-05-29 DIAGNOSIS — S39.012A: Primary | ICD-10-CM

## 2018-05-29 DIAGNOSIS — J44.9: ICD-10-CM
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
## 2018-05-29 DIAGNOSIS — Z90.710: ICD-10-CM

## 2018-05-29 DIAGNOSIS — M19.90: ICD-10-CM

## 2018-05-29 DIAGNOSIS — G89.29: ICD-10-CM
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
## 2018-05-29 DIAGNOSIS — Z98.84: ICD-10-CM

## 2018-05-29 DIAGNOSIS — F11.20: ICD-10-CM
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
## 2018-05-29 PROCEDURE — 99284 EMERGENCY DEPT VISIT MOD MDM: CPT

## 2018-05-29 PROCEDURE — 73502 X-RAY EXAM HIP UNI 2-3 VIEWS: CPT

## 2018-05-29 PROCEDURE — 96372 THER/PROPH/DIAG INJ SC/IM: CPT

## 2018-06-06 ENCOUNTER — OFFICE VISIT (OUTPATIENT)
Dept: MEDICAL GROUP | Facility: PHYSICIAN GROUP | Age: 53
End: 2018-06-06
Payer: MEDICARE

## 2018-06-06 VITALS
HEIGHT: 62 IN | DIASTOLIC BLOOD PRESSURE: 82 MMHG | HEART RATE: 96 BPM | OXYGEN SATURATION: 98 % | SYSTOLIC BLOOD PRESSURE: 140 MMHG | TEMPERATURE: 98.4 F | RESPIRATION RATE: 16 BRPM

## 2018-06-06 DIAGNOSIS — M54.5 LOW BACK PAIN, UNSPECIFIED BACK PAIN LATERALITY, UNSPECIFIED CHRONICITY, WITH SCIATICA PRESENCE UNSPECIFIED: ICD-10-CM

## 2018-06-06 DIAGNOSIS — F31.32 BIPOLAR AFFECTIVE DISORDER, CURRENTLY DEPRESSED, MODERATE (HCC): ICD-10-CM

## 2018-06-06 DIAGNOSIS — R30.0 DYSURIA: ICD-10-CM

## 2018-06-06 LAB
APPEARANCE UR: NORMAL
BILIRUB UR STRIP-MCNC: NORMAL MG/DL
COLOR UR AUTO: YELLOW
GLUCOSE UR STRIP.AUTO-MCNC: NEGATIVE MG/DL
KETONES UR STRIP.AUTO-MCNC: NORMAL MG/DL
LEUKOCYTE ESTERASE UR QL STRIP.AUTO: NEGATIVE
NITRITE UR QL STRIP.AUTO: NEGATIVE
PH UR STRIP.AUTO: 5 [PH] (ref 5–8)
PROT UR QL STRIP: NEGATIVE MG/DL
RBC UR QL AUTO: NORMAL
SP GR UR STRIP.AUTO: 1.01
UROBILINOGEN UR STRIP-MCNC: NEGATIVE MG/DL

## 2018-06-06 PROCEDURE — 99214 OFFICE O/P EST MOD 30 MIN: CPT | Performed by: NURSE PRACTITIONER

## 2018-06-06 PROCEDURE — 81002 URINALYSIS NONAUTO W/O SCOPE: CPT | Performed by: NURSE PRACTITIONER

## 2018-06-06 RX ORDER — CIPROFLOXACIN 500 MG/1
500 TABLET, FILM COATED ORAL 2 TIMES DAILY
Qty: 14 TAB | Refills: 0 | Status: SHIPPED | OUTPATIENT
Start: 2018-06-06 | End: 2018-07-12

## 2018-06-06 RX ORDER — ARIPIPRAZOLE 10 MG/1
10 TABLET ORAL DAILY
Qty: 30 TAB | Refills: 2 | Status: SHIPPED | OUTPATIENT
Start: 2018-06-06 | End: 2019-12-09

## 2018-06-06 NOTE — PROGRESS NOTES
Chief Complaint   Patient presents with   • Back Pain     history of kidney stone        HISTORY OF PRESENT ILLNESS: Patient is a 52 y.o. female established patient of Gardenia Kasper who presents today to discuss the following issues:    Bipolar affective disorder, currently depressed, moderate (CMS-HCC)  Would like a refill of Abilify.  Hasn't been taking it because she wasn't able to afford it.    Dysuria  Has been having dysuria, frequency, and urgency for the past couple of days.  She states that it feels like she has a UTI.  She also has a history of kidney stones, so she was encouraged to increase fluid intake and monitor her symptoms.        Patient Active Problem List    Diagnosis Date Noted   • Lumbar stenosis 10/22/2012     Priority: High   • Dysuria 06/07/2018   • Heartburn 05/08/2018   • Muscle spasm of both lower legs 03/26/2018   • Calcaneal spur of both feet 03/15/2018   • Pain management contract broken 03/15/2018   • Obesity (BMI 30-39.9) 01/18/2018   • COPD (chronic obstructive pulmonary disease) ([facility]) 10/05/2017   • Bipolar affective disorder, currently depressed, moderate ([facility]) 01/24/2017   • History of gastric bypass 10/11/2013   • Chronic pain syndrome 09/16/2013   • Anxiety 12/06/2012   • Multilevel degenerative disc disease 10/01/2012   • Exertional dyspnea 02/08/2012   • Cervical radiculopathy, chronic 05/04/2011   • Vitamin D insufficiency 11/16/2010   • Arthritis    • Panic attacks    • PTSD (post-traumatic stress disorder)    • Insomnia 11/10/2010       Allergies:Patient has no known allergies.    Current Outpatient Prescriptions   Medication Sig Dispense Refill   • ARIPiprazole (ABILIFY) 10 MG Tab Take 1 Tab by mouth every day. 30 Tab 2   • ciprofloxacin (CIPRO) 500 MG Tab Take 1 Tab by mouth 2 times a day. 14 Tab 0   • omeprazole (PRILOSEC) 20 MG delayed-release capsule Take 1 Cap by mouth 2 times a day for 30 days. 60 Cap 3   • budesonide-formoterol (SYMBICORT) 80-4.5 MCG/ACT Aerosol  Inhale 2 Puffs by mouth 2 Times a Day. 1 Inhaler 3   • fluticasone (FLONASE) 50 MCG/ACT nasal spray Spray 1 Spray in nose 2 times a day. Each Nostril 1 Bottle 3   • tiotropium (SPIRIVA) 18 MCG Cap Inhale 1 Cap by mouth every day. 30 Cap 3   • sumatriptan (IMITREX) 50 MG Tab Take 1 Tab by mouth Once PRN for Migraine (ok to repeat 1 hr later if symptoms persist) for up to 1 dose. 10 Tab 3   • ibuprofen (MOTRIN) 800 MG Tab Take 1 Tab by mouth every 8 hours as needed. 90 Tab 2     No current facility-administered medications for this visit.        Social History   Substance Use Topics   • Smoking status: Never Smoker   • Smokeless tobacco: Never Used   • Alcohol use No       Family Status   Relation Status   • Father  at age 64   • Mother  at age 75   • Sister Alive   • Brother Alive     Family History   Problem Relation Age of Onset   • Diabetes Father    • Cancer Mother    • Other Mother      SLE       Review of Systems:   Constitutional: Negative for fever, chills, weight loss and malaise/fatigue.   HENT: Negative for ear pain, nosebleeds, congestion, sore throat and neck pain.    Eyes: Negative for blurred vision.   Respiratory: Negative for cough, sputum production, shortness of breath and wheezing.    Cardiovascular: Negative for chest pain, palpitations, orthopnea and leg swelling.   Gastrointestinal: Negative for heartburn, nausea, vomiting and abdominal pain.   Genitourinary: Positive for dysuria, urgency and frequency.   Musculoskeletal: Negative for myalgias, joint pain, and back pain.  Skin: Negative for rash and itching.   Neurological: Negative for dizziness, tingling, tremors, sensory change, focal weakness and headaches.   Endo/Heme/Allergies: Does not bruise/bleed easily.   Psychiatric/Behavioral: Positive for stable depression.  Denies SI/HI.  All other systems reviewed and are negative except as in HPI.    Exam:  Blood pressure 140/82, pulse 96, temperature 36.9 °C (98.4 °F), resp.  "rate 16, height 1.575 m (5' 2\"), last menstrual period 10/05/1999, SpO2 98 %.  General:  Well nourished, well developed female in NAD  Head: Grossly normal.  Neck: Supple without JVD or bruit. Thyroid is not enlarged.  Pulmonary: Clear to ausculation. Normal effort. No rales, ronchi, or wheezing.  Cardiovascular: Regular rate and rhythm without murmur.   Abdomen:  Soft, nontender, nondistended. No CVAT.  Extremities: No clubbing, cyanosis, or edema.  Skin: Intact with no obvious rashes or lesions.  Neuro: Grossly intact.  Psych: Alert and oriented x 3.  Mood and affect appropriate.    Medical decision-making and discussion: Patti is here today to discuss a few issues.  Prescriptions for Cipro and Abilify were sent to her pharmacy, and she will follow-up here as needed.          Assessment/Plan:  1. Low back pain, unspecified back pain laterality, unspecified chronicity, with sciatica presence unspecified  POCT Urinalysis   2. Bipolar affective disorder, currently depressed, moderate (HCC)  ARIPiprazole (ABILIFY) 10 MG Tab   3. Dysuria  ciprofloxacin (CIPRO) 500 MG Tab       Return if symptoms worsen or fail to improve.    Please note that this dictation was created using voice recognition software. I have made every reasonable attempt to correct obvious errors, but I expect that there are errors of grammar and possibly content that I did not discover before finalizing the note.  "

## 2018-06-06 NOTE — LETTER
2018        To whom it may concern,      Patti Tello ( 1965) was seen in my office and is under my care.  Please excuse her from work today and tomorrow due to illness.        Thank you,          Alek Flores, RAY-BC

## 2018-06-07 PROBLEM — R30.0 DYSURIA: Status: ACTIVE | Noted: 2018-06-07

## 2018-06-07 NOTE — ASSESSMENT & PLAN NOTE
Has been having dysuria, frequency, and urgency for the past couple of days.  She states that it feels like she has a UTI.  She also has a history of kidney stones, so she was encouraged to increase fluid intake and monitor her symptoms.

## 2018-06-11 ENCOUNTER — PATIENT OUTREACH (OUTPATIENT)
Dept: HEALTH INFORMATION MANAGEMENT | Facility: OTHER | Age: 53
End: 2018-06-11

## 2018-06-11 ENCOUNTER — TELEPHONE (OUTPATIENT)
Dept: MEDICAL GROUP | Facility: MEDICAL CENTER | Age: 53
End: 2018-06-11

## 2018-06-11 NOTE — TELEPHONE ENCOUNTER
Future Appointments       Provider Department Center    6/12/2018 3:00 PM Robbin Avila M.D. [facility]    6/20/2018 8:40 AM Katie Kasper P.A.-C.; [facility]  [facility]    6/26/2018 8:40 AM Bob Richard M.D. [facility]          ANNUAL WELLNESS VISIT PRE-VISIT PLANNING WITH OUTREACH    1.  Immunizations were updated in Epic using WebIZ?:Yes       •  WebIZ Recommendations: TDAP, SHINGRIX (Shingles) and MMR       •  Is patient due for Tdap? YES. Patient was not notified of copay/out of pocket cost.       •  Is patient due for Shingles?YES. Patient was not notified of copay/out of pocket cost.    2.  MDX printed for Provider? NO

## 2018-06-11 NOTE — PROGRESS NOTES
1. Attempt #: 1    2. HealthConnect Verified: yes    3. Verify PCP: yes    4. Care Team Updated:       •   DME Company (gait device, O2, CPAP, etc.): YES       •   Other Specialists (eye doctor, derm, GYN, cardiology, endo, etc): YES    5.  Reviewed/Updated the following with patient:       •   Communication Preference Obtained? YES       •   Preferred Pharmacy? YES       •   Preferred Lab? YES       •   Family History (document living status of immediate family members and if + hx of cancer, diabetes, hypertension, hyperlipidemia, heart attack, stroke) YES. Was Abstract Encounter opened and chart updated? YES    6. [employer] Activation: already active    7. Kit[employer] Sena: no    8. Annual Wellness Visit Scheduling  Scheduling Status:Scheduled      9. Care Gap Scheduling (Attempt to Schedule EACH Overdue Care Gap!)     Health Maintenance Due   Topic Date Due   • Annual Wellness Visit  1965   • IMM DTaP/Tdap/Td Vaccine (1 - Tdap) 09/25/1983   • IMM PNEUMOCOCCAL 19-64 (ADULT) MEDIUM RISK SERIES (1 of 1 - PPSV23) 09/25/1983   • PAP SMEAR  09/25/1985   • COLONOSCOPY  09/25/2014        Scheduled patient for Annual Wellness Visit    10. Patient was advised: “This is a free wellness visit. The provider will screen for medical conditions to help you stay healthy. If you have other concerns to address you may be asked to discuss these at a separate visit or there may be an additional fee.”     11. Patient was informed to arrive 15 min prior to their scheduled appointment and bring in their medication bottles.

## 2018-06-12 ENCOUNTER — HOSPITAL ENCOUNTER (OUTPATIENT)
Facility: MEDICAL CENTER | Age: 53
End: 2018-06-12
Attending: FAMILY MEDICINE
Payer: MEDICARE

## 2018-06-12 ENCOUNTER — OFFICE VISIT (OUTPATIENT)
Dept: MEDICAL GROUP | Facility: MEDICAL CENTER | Age: 53
End: 2018-06-12
Payer: MEDICARE

## 2018-06-12 VITALS
SYSTOLIC BLOOD PRESSURE: 110 MMHG | BODY MASS INDEX: 33.19 KG/M2 | OXYGEN SATURATION: 100 % | HEIGHT: 62 IN | TEMPERATURE: 98.6 F | WEIGHT: 180.34 LBS | HEART RATE: 90 BPM | DIASTOLIC BLOOD PRESSURE: 70 MMHG | RESPIRATION RATE: 18 BRPM

## 2018-06-12 DIAGNOSIS — R10.9 FLANK PAIN: ICD-10-CM

## 2018-06-12 LAB
APPEARANCE UR: CLEAR
APPEARANCE UR: CLEAR
BILIRUB UR QL STRIP.AUTO: NEGATIVE
BILIRUB UR STRIP-MCNC: NEGATIVE MG/DL
COLOR UR AUTO: YELLOW
COLOR UR: YELLOW
GLUCOSE UR STRIP.AUTO-MCNC: NEGATIVE MG/DL
GLUCOSE UR STRIP.AUTO-MCNC: NEGATIVE MG/DL
KETONES UR STRIP.AUTO-MCNC: NEGATIVE MG/DL
KETONES UR STRIP.AUTO-MCNC: NEGATIVE MG/DL
LEUKOCYTE ESTERASE UR QL STRIP.AUTO: NEGATIVE
LEUKOCYTE ESTERASE UR QL STRIP.AUTO: NEGATIVE
MICRO URNS: NORMAL
NITRITE UR QL STRIP.AUTO: NEGATIVE
NITRITE UR QL STRIP.AUTO: NEGATIVE
PH UR STRIP.AUTO: 6.5 [PH]
PH UR STRIP.AUTO: 7 [PH] (ref 5–8)
PROT UR QL STRIP: NEGATIVE MG/DL
PROT UR QL STRIP: NORMAL MG/DL
RBC UR QL AUTO: NEGATIVE
RBC UR QL AUTO: NEGATIVE
SP GR UR STRIP.AUTO: 1
SP GR UR STRIP.AUTO: 1.01
UROBILINOGEN UR STRIP-MCNC: 0.2 MG/DL
UROBILINOGEN UR STRIP.AUTO-MCNC: 0.2 MG/DL

## 2018-06-12 PROCEDURE — 99214 OFFICE O/P EST MOD 30 MIN: CPT | Performed by: FAMILY MEDICINE

## 2018-06-12 PROCEDURE — 81003 URINALYSIS AUTO W/O SCOPE: CPT

## 2018-06-12 PROCEDURE — 81002 URINALYSIS NONAUTO W/O SCOPE: CPT | Performed by: FAMILY MEDICINE

## 2018-06-12 RX ORDER — PEAK FLOW METER
EACH MISCELLANEOUS
Refills: 0 | COMMUNITY
Start: 2018-04-22 | End: 2019-04-11

## 2018-06-12 NOTE — ASSESSMENT & PLAN NOTE
The patient states that 3 weeks ago she started working again. She has not done work in some time now. 2 weeks ago she developed dysuria with urinary frequency and urgency. She was seen in the urgent care. Point-of-care urinalysis demonstrated hematuria. No leukocyte esterase or nitrites present. She was treated with ciprofloxacin. The patient states since starting the Cipro, her pain has not gotten better but has not gotten worse, either. She does endorse some loss of appetite, as well as urinary urgency and frequency which is fairly standard for her. She denies dysuria today. She denies abnormal vaginal discharge. She also endorses some suprapubic pain. She denies fevers. The patient denies perineal anesthesia. She denies groin pain. Of note, the patient has a history of chronic lower back pain for which she is seeing pain management.

## 2018-06-12 NOTE — LETTER
June 12, 2018    To Whom It May Concern:         This is confirmation that Patti Tello attended her scheduled appointment with Robbin Avila M.D. on 6/12/18.         Please excuse the patient from 6/8/18 to 6/18/18.     Sincerely,          Robbin Avila M.D.  [phone]

## 2018-06-12 NOTE — PROGRESS NOTES
[facility]  Progress Note  Established Patient    Subjective:   Patti Tello is a 52 y.o. female here today with a chief complaint of flank pain. The patient is alone.     Flank pain  The patient states that 3 weeks ago she started working again. She has not done work in some time now. 2 weeks ago she developed dysuria with urinary frequency and urgency. She was seen in the urgent care. Point-of-care urinalysis demonstrated hematuria. No leukocyte esterase or nitrites present. She was treated with ciprofloxacin. The patient states since starting the Cipro, her pain has not gotten better but has not gotten worse, either. She does endorse some loss of appetite, as well as urinary urgency and frequency which is fairly standard for her. She denies dysuria today. She denies abnormal vaginal discharge. She also endorses some suprapubic pain. She denies fevers. The patient denies perineal anesthesia. She denies groin pain. Of note, the patient has a history of chronic lower back pain for which she is seeing pain management.      Current Outpatient Prescriptions on File Prior to Visit   Medication Sig Dispense Refill   • ARIPiprazole (ABILIFY) 10 MG Tab Take 1 Tab by mouth every day. 30 Tab 2   • ciprofloxacin (CIPRO) 500 MG Tab Take 1 Tab by mouth 2 times a day. 14 Tab 0   • tiotropium (SPIRIVA) 18 MCG Cap Inhale 1 Cap by mouth every day. 30 Cap 3   • sumatriptan (IMITREX) 50 MG Tab Take 1 Tab by mouth Once PRN for Migraine (ok to repeat 1 hr later if symptoms persist) for up to 1 dose. 10 Tab 3   • budesonide-formoterol (SYMBICORT) 80-4.5 MCG/ACT Aerosol Inhale 2 Puffs by mouth 2 Times a Day. 1 Inhaler 3   • fluticasone (FLONASE) 50 MCG/ACT nasal spray Spray 1 Spray in nose 2 times a day. Each Nostril 1 Bottle 3   • ibuprofen (MOTRIN) 800 MG Tab Take 1 Tab by mouth every 8 hours as needed. 90 Tab 2     No current facility-administered medications on file prior to visit.        Past Medical History:   Diagnosis  "Date   • Anxiety and depression    • Arthritis     back, neck   • Back injury    • Back pain    • Bronchitis 2010   • Cancer (HCC)     breast LT   • Chronic LBP    • Chronic neck pain    • Cold intolerance 1/24/2012   • Diabetes    • H/O gastric bypass 10/11/2013   • Herniated nucleus pulposus, L4-5 3/18/2010   • HX: breast cancer 10/11/2013   • Hypertension    • Itching due to drug 6/2/2011   • Nephrolithiasis 10/11/2013   • Other specified symptom associated with female genital organs    • Panic disorder    • PTSD (post-traumatic stress disorder)    • S/P laminectomy 10/22/2012   • Thoracic or lumbosacral neuritis or radiculitis, unspecified 10/22/2012   • Trauma     head trauma       Allergies: Patient has no known allergies.    Surgical History:  has a past surgical history that includes primary c section; mastectomy; breast reconstruction; abdominal hysterectomy total; craniotomy; US-CYST ASPIRATION-BREAST INITIAL; other orthopedic surgery (10/01/2012); other; other abdominal surgery (2006); other abdominal surgery; lumbar fusion posterior (10/22/2012); lumbar laminectomy diskectomy (10/22/2012); ureteroscopy (10/10/2013); and lasertripsy (10/10/2013).    Family History: family history includes Cancer in her father, maternal aunt, and mother; Diabetes in her father, maternal aunt, maternal grandfather, maternal grandmother, maternal uncle, and mother; Hyperlipidemia in her father and mother; Hypertension in her father and mother; Other in her mother; Stroke in her mother.    Social History:  reports that she has never smoked. She has never used smokeless tobacco. She reports that she does not drink alcohol or use drugs.    ROS: no fever, no nausea.        Objective:     Vitals:    06/12/18 1456   BP: 110/70   Pulse: 90   Resp: 18   Temp: 37 °C (98.6 °F)   SpO2: 100%   Weight: 81.8 kg (180 lb 5.4 oz)   Height: 1.575 m (5' 2\")       Physical Exam:  General: alert in no apparent distress.   Back: no TTP over " spine, no CVAT.   Neuro: sensation intact in BLE.   Abd: soft, NTND, normal BS.   Gait: normal.     POC UA: trace protein, otherwise normal.       Followup: Return if symptoms worsen or fail to improve.    Assessment and Plan:     1. Flank pain  The patient's flank pain seems to be mechanical in nature. It is not associated with vaginal discharge or dysuria to suggest an infectious etiology. It is not classic for nephrolithiasis. I'm also reassured by her normal vital signs, normal exam and essentially normal urinalysis (only pos for trace protein). See no indication for imaging. She is already on ciprofloxacin. I recommended that she complete her course of ciprofloxacin, drinks half a gallon of water per day and discuss her back pain with her pain physician, whom she states she will be seeing next week. Patient was advised to return with any new or worsening symptoms. She was advised to return with any persistent symptoms. As precaution, will send UA to lab for formal evaluation.     Note: patient is also requesting a letter excusing her from work for a set duration of time. This was provided.     Return: PRN

## 2018-06-20 ENCOUNTER — OFFICE VISIT (OUTPATIENT)
Dept: MEDICAL GROUP | Facility: MEDICAL CENTER | Age: 53
End: 2018-06-20
Payer: MEDICARE

## 2018-06-20 VITALS
HEIGHT: 62 IN | TEMPERATURE: 97.4 F | WEIGHT: 180 LBS | RESPIRATION RATE: 18 BRPM | HEART RATE: 69 BPM | SYSTOLIC BLOOD PRESSURE: 112 MMHG | DIASTOLIC BLOOD PRESSURE: 60 MMHG | OXYGEN SATURATION: 94 % | BODY MASS INDEX: 33.13 KG/M2

## 2018-06-20 DIAGNOSIS — E66.9 OBESITY (BMI 30-39.9): ICD-10-CM

## 2018-06-20 DIAGNOSIS — Z00.00 MEDICARE ANNUAL WELLNESS VISIT, SUBSEQUENT: ICD-10-CM

## 2018-06-20 DIAGNOSIS — F41.9 ANXIETY: ICD-10-CM

## 2018-06-20 DIAGNOSIS — G89.4 CHRONIC PAIN SYNDROME: ICD-10-CM

## 2018-06-20 DIAGNOSIS — F31.32 BIPOLAR AFFECTIVE DISORDER, CURRENTLY DEPRESSED, MODERATE (HCC): ICD-10-CM

## 2018-06-20 DIAGNOSIS — R12 HEARTBURN: ICD-10-CM

## 2018-06-20 DIAGNOSIS — R53.83 OTHER FATIGUE: ICD-10-CM

## 2018-06-20 DIAGNOSIS — D50.9 IRON DEFICIENCY ANEMIA, UNSPECIFIED IRON DEFICIENCY ANEMIA TYPE: ICD-10-CM

## 2018-06-20 DIAGNOSIS — J44.9 CHRONIC OBSTRUCTIVE PULMONARY DISEASE, UNSPECIFIED COPD TYPE (HCC): ICD-10-CM

## 2018-06-20 PROBLEM — R30.0 DYSURIA: Status: RESOLVED | Noted: 2018-06-07 | Resolved: 2018-06-20

## 2018-06-20 PROBLEM — R10.9 FLANK PAIN: Status: RESOLVED | Noted: 2018-06-12 | Resolved: 2018-06-20

## 2018-06-20 PROCEDURE — G0439 PPPS, SUBSEQ VISIT: HCPCS | Performed by: PHYSICIAN ASSISTANT

## 2018-06-20 RX ORDER — OXYCODONE HYDROCHLORIDE 20 MG/1
TABLET ORAL
COMMUNITY
End: 2018-06-20 | Stop reason: CLARIF

## 2018-06-20 ASSESSMENT — PATIENT HEALTH QUESTIONNAIRE - PHQ9
SUM OF ALL RESPONSES TO PHQ QUESTIONS 1-9: 0
CLINICAL INTERPRETATION OF PHQ2 SCORE: 0

## 2018-06-20 ASSESSMENT — ENCOUNTER SYMPTOMS: GENERAL WELL-BEING: GOOD

## 2018-06-20 ASSESSMENT — ACTIVITIES OF DAILY LIVING (ADL): BATHING_REQUIRES_ASSISTANCE: 0

## 2018-06-20 NOTE — ASSESSMENT & PLAN NOTE
I referred her to [facility] pain management but per notes she never followed up. Per the [facility] she is filling prescriptions with another provider outside of [facility].

## 2018-06-20 NOTE — ASSESSMENT & PLAN NOTE
Chronic, seems to be a little worse, does have hx of anemia, will recheck. Has sleep study scheduled, may be due to this. No additional SOB. No chest pain, headache, dizziness.

## 2018-06-20 NOTE — LETTER
June 20, 2018        Re: Patti Tello      Due to patient's multiple chronic medical disorders I request that she work no more than 4, 6 hour shifts per week.        Thank you for your time,          Katie Kasper PA-C

## 2018-06-20 NOTE — PROGRESS NOTES
Chief Complaint   Patient presents with   • Annual Wellness Visit   • Tired     x 1 mo         HPI:  Patti is a 52 y.o. here for Medicare Annual Wellness Visit    Problem List Items Addressed This Visit     Anxiety     Still seeing psychiatrist. Per  still taking xanax.         Chronic pain syndrome     I referred her to [facility] pain management but per notes she never followed up. Per the  she is filling prescriptions with another provider outside of [facility].         Bipolar affective disorder, currently depressed, moderate ([facility])     Still followed by psychiatrist. Currently stable per patient         COPD (chronic obstructive pulmonary disease) ([facility])     Chronic, stable on current, has f/u with pulmonology scheduled         Obesity (BMI 30-39.9)     Chronic, stable, not really able to exercise because of pain, tries to watch her diet         Heartburn     She hasn't yet contacted GI. Referral info printed and given to her again.         Other fatigue     Chronic, seems to be a little worse, does have hx of anemia, will recheck. Has sleep study scheduled, may be due to this. No additional SOB. No chest pain, headache, dizziness.         Relevant Orders    CBC WITH DIFFERENTIAL    COMP METABOLIC PANEL    TSH WITH REFLEX TO FT4    IRON/TOTAL IRON BIND    FERRITIN      Other Visit Diagnoses     Medicare annual wellness visit, subsequent        Need for vaccination        Relevant Medications    Oxycodone HCl 20 MG Tab    Iron deficiency anemia, unspecified iron deficiency anemia type        Relevant Orders    CBC WITH DIFFERENTIAL    IRON/TOTAL IRON BIND    FERRITIN          Patient Active Problem List    Diagnosis Date Noted   • Lumbar stenosis 10/22/2012     Priority: High   • Other fatigue 06/20/2018   • Heartburn 05/08/2018   • Muscle spasm of both lower legs 03/26/2018   • Calcaneal spur of both feet 03/15/2018   • Pain management contract broken 03/15/2018   • Obesity (BMI 30-39.9) 01/18/2018   • COPD  (chronic obstructive pulmonary disease) ([facility]) 10/05/2017   • Bipolar affective disorder, currently depressed, moderate ([facility]) 01/24/2017   • History of gastric bypass 10/11/2013   • Chronic pain syndrome 09/16/2013   • Anxiety 12/06/2012   • Multilevel degenerative disc disease 10/01/2012   • Cervical radiculopathy, chronic 05/04/2011   • Vitamin D insufficiency 11/16/2010   • Arthritis    • Panic attacks    • PTSD (post-traumatic stress disorder)        Current Outpatient Prescriptions   Medication Sig Dispense Refill   • Oxycodone HCl 20 MG Tab Take  by mouth.     • ARIPiprazole (ABILIFY) 10 MG Tab Take 1 Tab by mouth every day. 30 Tab 2   • ciprofloxacin (CIPRO) 500 MG Tab Take 1 Tab by mouth 2 times a day. 14 Tab 0   • Nebulizers (WISE s.r.l AEROSOL DELIVERY SYSTEM) Misc U UTD  0   • budesonide-formoterol (SYMBICORT) 80-4.5 MCG/ACT Aerosol Inhale 2 Puffs by mouth 2 Times a Day. 1 Inhaler 3   • fluticasone (FLONASE) 50 MCG/ACT nasal spray Spray 1 Spray in nose 2 times a day. Each Nostril 1 Bottle 3   • tiotropium (SPIRIVA) 18 MCG Cap Inhale 1 Cap by mouth every day. 30 Cap 3   • sumatriptan (IMITREX) 50 MG Tab Take 1 Tab by mouth Once PRN for Migraine (ok to repeat 1 hr later if symptoms persist) for up to 1 dose. 10 Tab 3   • ibuprofen (MOTRIN) 800 MG Tab Take 1 Tab by mouth every 8 hours as needed. 90 Tab 2     No current facility-administered medications for this visit.         Patient is taking medications as noted in medication list.  Current supplements as per medication list.     Allergies: Patient has no known allergies.    Current social contact/activities: Pt. Enjoys spending time with her family     Is patient current with immunizations? No, due for PNEUMOVAX (PPSV23). Patient is interested in receiving PNEUMOVAX (PPSV23) today.    She  reports that she has never smoked. She has never used smokeless tobacco. She reports that she does not drink alcohol or use drugs.  Counseling given: Not  Answered        DPA/Advanced directive: Patient does not have an Advanced Directive.  A packet and workshop information was given on Advanced Directives.    ROS:    Gait: Uses no assistive device   Ostomy: no   Other tubes: no   Amputations: no   Chronic oxygen use no   Last eye exam 1 week ago   Wears hearing aids: no   : Reports urinary leakage during the last 6 months that has interfered a lot with their daily activities or sleep.  Pt. Has an sheeba with the Urologist coming up.     Screening:  COPD    1.  Is patient under the care of a pulmonologist? No.  2.  Has patient ever completed a PFT or spirometry? Yes, on 3/2018.  3.  Is patient on oxygen or CPAP? No.  4.  Has patient ever had instruction on inhaler technique by health care professional? Yes  5.  Is patient interested in a referral to respiratory therapy for more information on COPD, inhaler technique, and/or information on establishing an action plan?  No, patient is NOT interested.      Depression Screening    Little interest or pleasure in doing things?  0 - not at all  Feeling down, depressed, or hopeless? 0 - not at all  Patient Health Questionnaire Score: 0    If depressive symptoms identified deferred to follow up visit unless specifically addressed in assessment and plan.    Interpretation of PHQ-9 Total Score   Score Severity   1-4 No Depression   5-9 Mild Depression   10-14 Moderate Depression   15-19 Moderately Severe Depression   20-27 Severe Depression    Screening for Cognitive Impairment    Three Minute Recall (leader, season, table)  3/3    Selvin clock face with all 12 numbers and set the hands to show 10 past 11.  Yes 5/5  If cognitive concerns identified, deferred for follow up unless specifically addressed in assessment and plan.    Fall Risk Assessment    Has the patient had two or more falls in the last year or any fall with injury in the last year?  Yes  If fall risk identified, deferred for follow up unless specifically addressed in  assessment and plan.    Safety Assessment    Throw rugs on floor.  Yes  Handrails on all stairs.  Yes  Good lighting in all hallways.  Yes  Difficulty hearing.  No  Patient counseled about all safety risks that were identified.    Functional Assessment ADLs    Are there any barriers preventing you from cooking for yourself or meeting nutritional needs?  No.    Are there any barriers preventing you from driving safely or obtaining transportation?  No.    Are there any barriers preventing you from using a telephone or calling for help?  No.    Are there any barriers preventing you from shopping?  No.    Are there any barriers preventing you from taking care of your own finances?  No.    Are there any barriers preventing you from managing your medications?  No.    Are there any barriers preventing you from showering, bathing or dressing yourself?  No.    Are you currently engaging in any exercise or physical activity?  Yes.  Pt takes care of her home, walks when goes out shopping.   What is your perception of your health?  Good.    Health Maintenance Summary                Annual Wellness Visit Overdue 1965     IMM DTaP/Tdap/Td Vaccine Overdue 9/25/1983     IMM PNEUMOCOCCAL 19-64 (ADULT) MEDIUM RISK SERIES Overdue 9/25/1983     COLONOSCOPY Overdue 9/25/2014     MAMMOGRAM Next Due 3/24/2019      Done 3/24/2018 MA-MAMMO SCREENING BILAT W/TOMOSYNTHESIS W/CAD     Patient has more history with this topic...    PFT SCREENING-FEV1 AND FEV/FVC RATIO / SPIROMETRY SHOULD BE PERFORMED ANNUALLY Next Due 4/19/2019      Done 4/19/2018 PULMONARY FUNCTION TESTS     Patient has more history with this topic...          Patient Care Team:  Katie Kasper P.A.-C. as PCP - General (Family Medicine)  Debora Hsu M.D. as Consulting Physician (Internal Medicine)    Social History   Substance Use Topics   • Smoking status: Never Smoker   • Smokeless tobacco: Never Used   • Alcohol use No     Family History   Problem Relation Age  of Onset   • Diabetes Father    • Cancer Father    • Hypertension Father    • Hyperlipidemia Father    • Cancer Mother    • Other Mother      SLE   • Diabetes Mother    • Hypertension Mother    • Hyperlipidemia Mother    • Stroke Mother    • Cancer Maternal Aunt    • Diabetes Maternal Aunt    • Diabetes Maternal Uncle    • Diabetes Maternal Grandmother    • Diabetes Maternal Grandfather      She  has a past medical history of Anxiety and depression; Arthritis; Back injury; Back pain; Bronchitis (2010); Cancer (HCC); Chronic LBP; Chronic neck pain; Cold intolerance (1/24/2012); Diabetes; H/O gastric bypass (10/11/2013); Herniated nucleus pulposus, L4-5 (3/18/2010); breast cancer (10/11/2013); Hypertension; Itching due to drug (6/2/2011); Nephrolithiasis (10/11/2013); Other specified symptom associated with female genital organs; Panic disorder; PTSD (post-traumatic stress disorder); S/P laminectomy (10/22/2012); Thoracic or lumbosacral neuritis or radiculitis, unspecified (10/22/2012); and Trauma.   Past Surgical History:   Procedure Laterality Date   • URETEROSCOPY  10/10/2013    Performed by Molly Resendiz M.D. at [facility]   • LASERTRIPSY  10/10/2013    Performed by Molly Resendiz M.D. at [facility]   • LUMBAR FUSION POSTERIOR  10/22/2012    Performed by Alfred Branham M.D. at [facility]   • LUMBAR LAMINECTOMY DISKECTOMY  10/22/2012    Performed by Alfred [facility]ham M.D. at [facility]   • OTHER ORTHOPEDIC SURGERY  10/01/2012    lumbar fusion, Dr Branham   • OTHER ABDOMINAL SURGERY  2006    gastric bypass   • ABDOMINAL HYSTERECTOMY TOTAL      DUE TO FIBROIDS   • BREAST RECONSTRUCTION      Lt breast   • CRANIOTOMY      DUE TO BRAIN INJURY   • MASTECTOMY      Lt breast   • OTHER      left ear drum surgery   • OTHER ABDOMINAL SURGERY      hernia repair   • PRIMARY C SECTION      x 3   • US-CYST ASPIRATION-BREAST INITIAL             Exam:     Blood pressure  "112/60, pulse 69, temperature 36.3 °C (97.4 °F), resp. rate 18, height 1.575 m (5' 2\"), weight 81.6 kg (180 lb), last menstrual period 10/05/1999, SpO2 94 %. Body mass index is 32.92 kg/m².    Hearing excellent.    Dentition good  Alert, oriented in no acute distress.  Eye contact is good, speech goal directed, affect calm      Assessment and Plan. The following treatment and monitoring plan is recommended:      1. Medicare annual wellness visit, subsequent      2. Other fatigue    - CBC WITH DIFFERENTIAL; Future  - COMP METABOLIC PANEL; Future  - TSH WITH REFLEX TO FT4; Future  - IRON/TOTAL IRON BIND; Future  - FERRITIN; Future    3. Iron deficiency anemia, unspecified iron deficiency anemia type    - CBC WITH DIFFERENTIAL; Future  - IRON/TOTAL IRON BIND; Future  - FERRITIN; Future    4. Anxiety  Cont f/u with psych    5. Bipolar affective disorder, currently depressed, moderate (HCC)  Cont f/u with psych    6. Chronic pain syndrome  Cont management with current MD    7. Chronic obstructive pulmonary disease, unspecified COPD type (HCC)  f/u with pulm    8. Heartburn  Number given to call and make appt with GI    9. Obesity (BMI 30-39.9)    Services suggested: No services needed at this time  Health Care Screening recommendations as per orders if indicated.  Referrals offered: PT/OT/Nutrition counseling/Behavioral Health/Smoking cessation as per orders if indicated.    Discussion today about general wellness and lifestyle habits:    · Prevent falls and reduce trip hazards; Cautioned about securing or removing rugs.  · Have a working fire alarm and carbon monoxide detector;   · Engage in regular physical activity and social activities       Follow-up: Return for after labs.    "

## 2018-06-26 ENCOUNTER — SLEEP CENTER VISIT (OUTPATIENT)
Dept: SLEEP MEDICINE | Facility: MEDICAL CENTER | Age: 53
End: 2018-06-26
Payer: MEDICARE

## 2018-06-26 VITALS
OXYGEN SATURATION: 95 % | RESPIRATION RATE: 15 BRPM | HEART RATE: 74 BPM | DIASTOLIC BLOOD PRESSURE: 84 MMHG | HEIGHT: 62 IN | BODY MASS INDEX: 32.94 KG/M2 | WEIGHT: 179 LBS | SYSTOLIC BLOOD PRESSURE: 124 MMHG

## 2018-06-26 DIAGNOSIS — J44.9 CHRONIC OBSTRUCTIVE PULMONARY DISEASE, UNSPECIFIED COPD TYPE (HCC): ICD-10-CM

## 2018-06-26 DIAGNOSIS — E66.9 OBESITY (BMI 30-39.9): ICD-10-CM

## 2018-06-26 DIAGNOSIS — G47.01 INSOMNIA DUE TO MEDICAL CONDITION: ICD-10-CM

## 2018-06-26 DIAGNOSIS — F41.9 ANXIETY: ICD-10-CM

## 2018-06-26 DIAGNOSIS — G47.30 SLEEP DISORDER BREATHING: ICD-10-CM

## 2018-06-26 PROCEDURE — 99204 OFFICE O/P NEW MOD 45 MIN: CPT | Performed by: FAMILY MEDICINE

## 2018-06-26 RX ORDER — OXYCODONE HYDROCHLORIDE 20 MG/1
20 TABLET ORAL 4 TIMES DAILY
COMMUNITY
End: 2019-12-09

## 2018-06-26 RX ORDER — ZOLPIDEM TARTRATE 5 MG/1
5 TABLET ORAL NIGHTLY PRN
Qty: 2 TAB | Refills: 0 | Status: SHIPPED | OUTPATIENT
Start: 2018-06-26 | End: 2018-06-27

## 2018-06-26 NOTE — PATIENT INSTRUCTIONS
Stimulus control (Ref: UpToDate)    Patients with insomnia may associate their bed and bedroom with the fear of not sleeping or other arousing events, rather than the more pleasurable anticipation of sleep. The longer one stays in bed trying to sleep, the stronger the association becomes. This perpetuates the difficulty falling asleep.Stimulus control therapy is a strategy whose purpose is to disrupt this association by enhancing the likelihood of sleep . Patients should not go to bed until they are sleepy and should use the bed primarily for sleep (and not for reading, watching television, eating, or worrying). They should not spend more than 20 minutes in bed awake. If they are awake after 20 minutes, they should leave the bedroom and engage in a relaxing activity, such as reading or listening to soothing music. Patients should not engage in activities that stimulate them or reward them for being awake in the middle of the night, such as eating or watching television. In addition, they should not return to bed until they are tired and feel ready to sleep. If they return to bed and still cannot sleep within 20 minutes, the process should be repeated. An alarm should be set to wake the patient at the same time every morning, including weekends. Daytime naps are not allowed.    Sleep hygiene (ref: UpToDate)  ?Sleep as long as necessary to feel rested (usually seven to eight hours for adults) and then get out of bed  ?Maintain a regular sleep schedule, particularly a regular wake-up time in the morning  ?Try not to force sleep  ?Avoid caffeinated beverages after lunch  ?Avoid alcohol near bedtime (eg, late afternoon and evening)  ?Avoid smoking or other nicotine intake, particularly during the evening  ?Adjust the bedroom environment as needed to decrease stimuli (eg, reduce ambient light, turn off the television or radio)  ?Avoid use of light-emitting screens  (laptops, tablets, smartphones, [employer]s) 2 hours before  bedtime   ?Resolve concerns or worries before bedtime  ?Exercise regularly for at least 20 minutes, preferably more than four to five hours prior to bedtime.  ?Avoid daytime naps, especially if they are longer than 20 to 30 minutes or occur late in the day      Sleep Apnea  Sleep apnea is a condition in which breathing pauses or becomes shallow during sleep. Episodes of sleep apnea usually last 10 seconds or longer, and they may occur as many as 20 times an hour. Sleep apnea disrupts your sleep and keeps your body from getting the rest that it needs. This condition can increase your risk of certain health problems, including:  · Heart attack.  · Stroke.  · Obesity.  · Diabetes.  · Heart failure.  · Irregular heartbeat.  There are three kinds of sleep apnea:  · Obstructive sleep apnea. This kind is caused by a blocked or collapsed airway.  · Central sleep apnea. This kind happens when the part of the brain that controls breathing does not send the correct signals to the muscles that control breathing.  · Mixed sleep apnea. This is a combination of obstructive and central sleep apnea.  What are the causes?  The most common cause of this condition is a collapsed or blocked airway. An airway can collapse or become blocked if:  · Your throat muscles are abnormally relaxed.  · Your tongue and tonsils are larger than normal.  · You are overweight.  · Your airway is smaller than normal.  What increases the risk?  This condition is more likely to develop in people who:  · Are overweight.  · Smoke.  · Have a smaller than normal airway.  · Are elderly.  · Are male.  · Drink alcohol.  · Take sedatives or tranquilizers.  · Have a family history of sleep apnea.  What are the signs or symptoms?  Symptoms of this condition include:  · Trouble staying asleep.  · Daytime sleepiness and tiredness.  · Irritability.  · Loud snoring.  · Morning headaches.  · Trouble concentrating.  · Forgetfulness.  · Decreased interest in  sex.  · Unexplained sleepiness.  · Mood swings.  · Personality changes.  · Feelings of depression.  · Waking up often during the night to urinate.  · Dry mouth.  · Sore throat.  How is this diagnosed?  This condition may be diagnosed with:  · A medical history.  · A physical exam.  · A series of tests that are done while you are sleeping (sleep study). These tests are usually done in a sleep lab, but they may also be done at home.  How is this treated?  Treatment for this condition aims to restore normal breathing and to ease symptoms during sleep. It may involve managing health issues that can affect breathing, such as high blood pressure or obesity. Treatment may include:  · Sleeping on your side.  · Using a decongestant if you have nasal congestion.  · Avoiding the use of depressants, including alcohol, sedatives, and narcotics.  · Losing weight if you are overweight.  · Making changes to your diet.  · Quitting smoking.  · Using a device to open your airway while you sleep, such as:  ¨ An oral appliance. This is a custom-made mouthpiece that shifts your lower jaw forward.  ¨ A continuous positive airway pressure (CPAP) device. This device delivers oxygen to your airway through a mask.  ¨ A nasal expiratory positive airway pressure (EPAP) device. This device has valves that you put into each nostril.  ¨ A bi-level positive airway pressure (BPAP) device. This device delivers oxygen to your airway through a mask.  · Surgery if other treatments do not work. During surgery, excess tissue is removed to create a wider airway.  It is important to get treatment for sleep apnea. Without treatment, this condition can lead to:  · High blood pressure.  · Coronary artery disease.  · (Men) An inability to achieve or maintain an erection (impotence).  · Reduced thinking abilities.  Follow these instructions at home:  · Make any lifestyle changes that your health care provider recommends.  · Eat a healthy, well-balanced  diet.  · Take over-the-counter and prescription medicines only as told by your health care provider.  · Avoid using depressants, including alcohol, sedatives, and narcotics.  · Take steps to lose weight if you are overweight.  · If you were given a device to open your airway while you sleep, use it only as told by your health care provider.  · Do not use any tobacco products, such as cigarettes, chewing tobacco, and e-cigarettes. If you need help quitting, ask your health care provider.  · Keep all follow-up visits as told by your health care provider. This is important.  Contact a health care provider if:  · The device that you received to open your airway during sleep is uncomfortable or does not seem to be working.  · Your symptoms do not improve.  · Your symptoms get worse.  Get help right away if:  · You develop chest pain.  · You develop shortness of breath.  · You develop discomfort in your back, arms, or stomach.  · You have trouble speaking.  · You have weakness on one side of your body.  · You have drooping in your face.  These symptoms may represent a serious problem that is an emergency. Do not wait to see if the symptoms will go away. Get medical help right away. Call your local emergency services (911 in the [place] Do not drive yourself to the hospital.   This information is not intended to replace advice given to you by your health care provider. Make sure you discuss any questions you have with your health care provider.  Document Released: 12/08/2003 Document Revised: 08/13/2017 Document Reviewed: 09/26/2016  [employer] Patient Education © 2017 [employer]

## 2018-06-26 NOTE — PROGRESS NOTES
"     [facility]  Consult Note     Date: 6/26/2018 / Time: 8:59 AM    Patient ID:   Name:             Patti Tello     YOB: 1965  Age:                 52 y.o.  female   MRN:               [id number]      Thank you for requesting a sleep medicine consultation on Patti Tello at the sleep center. She presents today with the chief complaints of snoring, witnessed apnea, excessive daytime sleepiness and trouble falling asleep. She is referred by Renay Ortiz for evaluation and treatment of sleep disorder breathing .     HISTORY OF PRESENT ILLNESS:       At night,  Patti Tello goes to bed around 9 pm on weekdays and  on weekends. She gets out of bed at 4-5 am on weekdays and on weekends. She averages 6 hrs of sleep on a good night and 3-4 hrs on a bad night. Pt has bad nights 3 nights per week. She falls asleep within 30-60 minutes w/o sleep aide. Sometimes she takes xanax as a sleep aide. She takes oxycodone 20 mg TID including before bed time .She awakens few times a night due to bathroom use and trouble breathing. It takes her 60 min to fall back asleep. During that time She gets out of bed.  She  doesuse the bed only for sleeping. Also, during that time  She  thinks about worries about day-to-day problems and does house chores.     She is aware of snoring/witnessed apneas/gasping or choking in sleep.  She  denies any symptoms of restless legs syndrome such as an \"urge to move\"  She  legs in the evening or bedtime. She  denies any symptoms of narcolepsy such as sleep paralysis or cataplexy, or any symptoms to suggest parasomnias such as sleep walking or acting out of dreams. She  has not used any medications for her sleep problem.    Overall,  She doesnot finds her sleep refreshing. When She  wakes up in the morning, She does feel tired. In terms of  excessive daytime sleepiness,  She complains of sleepiness while  at work, while reading or watching TV and occasionally " while driving. She  Does take regular naps.   REVIEW OF SYSTEMS:       Constitutional: Denies fevers, Denies weight changes  Eyes: Denies changes in vision, no eye pain  Ears/Nose/Throat/Mouth: Denies nasal congestion or sore throat   Cardiovascular: Denies chest pain or palpitations   Respiratory: Denies shortness of breath , Denies cough  Gastrointestinal/Hepatic: Denies abdominal pain, nausea, vomiting, diarrhea, constipation or GI bleeding   Genitourinary: Denies bladder dysfunction, dysuria or frequency  Musculoskeletal/Rheum: chronic back pain s/p surgery   Skin/Breast: Denies rash, denies breast lumps or discharge  Neurological: Denies headache, confusion, memory loss or focal weakness  Psychiatric: + anxiety/ depression     Comprehensive review of systems form is reviewed with the patient and is attached in the EMR.     PMH:  has a past medical history of Anxiety and depression; Arthritis; Asthma; Back injury; Back pain; Bronchitis (2010); Cancer ([facility]); Chickenpox; Chronic LBP; Chronic neck pain; Cold intolerance (1/24/2012); COPD (chronic obstructive pulmonary disease) ([facility]); Cystic fibrosis ([facility]); Depression; Diabetes; GERD (gastroesophageal reflux disease); H/O gastric bypass (10/11/2013); Herniated nucleus pulposus, L4-5 (3/18/2010); breast cancer (10/11/2013); Hypertension; Itching due to drug (6/2/2011); Kidney stone; Nephrolithiasis (10/11/2013); Obesity; Osteoporosis; Other specified symptom associated with female genital organs; Panic disorder; Pneumonia; PTSD (post-traumatic stress disorder); Restless leg syndrome; Rheumatoid arthritis ([facility]); S/P laminectomy (10/22/2012); Thoracic or lumbosacral neuritis or radiculitis, unspecified (10/22/2012); and Trauma.  MEDS:   Current Outpatient Prescriptions:   •  Oxycodone HCl 20 MG Tab, Take  by mouth., Disp: , Rfl:   •  ARIPiprazole (ABILIFY) 10 MG Tab, Take 1 Tab by mouth every day., Disp: 30 Tab, Rfl: 2  •  Nebulizers (VIOS AEROSOL DELIVERY SYSTEM)  Misc, U UTD, Disp: , Rfl: 0  •  ciprofloxacin (CIPRO) 500 MG Tab, Take 1 Tab by mouth 2 times a day., Disp: 14 Tab, Rfl: 0  •  budesonide-formoterol (SYMBICORT) 80-4.5 MCG/ACT Aerosol, Inhale 2 Puffs by mouth 2 Times a Day., Disp: 1 Inhaler, Rfl: 3  •  fluticasone (FLONASE) 50 MCG/ACT nasal spray, Spray 1 Spray in nose 2 times a day. Each Nostril, Disp: 1 Bottle, Rfl: 3  •  tiotropium (SPIRIVA) 18 MCG Cap, Inhale 1 Cap by mouth every day., Disp: 30 Cap, Rfl: 3  •  sumatriptan (IMITREX) 50 MG Tab, Take 1 Tab by mouth Once PRN for Migraine (ok to repeat 1 hr later if symptoms persist) for up to 1 dose., Disp: 10 Tab, Rfl: 3  •  ibuprofen (MOTRIN) 800 MG Tab, Take 1 Tab by mouth every 8 hours as needed., Disp: 90 Tab, Rfl: 2  ALLERGIES: No Known Allergies  SURGHX:   Past Surgical History:   Procedure Laterality Date   • URETEROSCOPY  10/10/2013    Performed by Molly Resendiz M.D. at SURGERY [facility]   • LASERTRIPSY  10/10/2013    Performed by Molly Resendiz M.D. at SURGERY [facility]   • LUMBAR FUSION POSTERIOR  10/22/2012    Performed by Alfred Branham M.D. at SURGERY [facility]   • LUMBAR LAMINECTOMY DISKECTOMY  10/22/2012    Performed by Alfred Branham M.D. at SURGERY [facility]   • OTHER ORTHOPEDIC SURGERY  10/01/2012    lumbar fusion, Dr Branham   • OTHER ABDOMINAL SURGERY  2006    gastric bypass   • ABDOMINAL HYSTERECTOMY TOTAL      DUE TO FIBROIDS   • BREAST RECONSTRUCTION      Lt breast   • CRANIOTOMY      DUE TO BRAIN INJURY   • HYSTERECTOMY LAPAROSCOPY     • LAMINOTOMY     • MASTECTOMY      Lt breast   • OTHER      left ear drum surgery   • OTHER ABDOMINAL SURGERY      hernia repair   • PRIMARY C SECTION      x 3   • SLEEVE,MAXIMILIAN VASO THIGH     • TONSILLECTOMY     • US-CYST ASPIRATION-BREAST INITIAL     • VENTILATOR CONTINUOUS       SOCHX:  reports that she has never smoked. She has never used smokeless tobacco. She reports that she does not drink alcohol or use drugs..   FH:  "  Family History   Problem Relation Age of Onset   • Diabetes Father    • Cancer Father    • Hypertension Father    • Hyperlipidemia Father    • Cancer Mother    • Other Mother      SLE   • Diabetes Mother    • Hypertension Mother    • Hyperlipidemia Mother    • Stroke Mother    • Sleep Apnea Brother    • Cancer Maternal Aunt    • Diabetes Maternal Aunt    • Diabetes Maternal Uncle    • Diabetes Maternal Grandmother    • Diabetes Maternal Grandfather            Physical Exam:  Vitals/ General Appearance:   Weight/BMI: Body mass index is 32.74 kg/m².  Blood pressure 124/84, pulse 74, resp. rate 15, height 1.575 m (5' 2\"), weight 81.2 kg (179 lb), last menstrual period 10/05/1999, SpO2 95 %.  Vitals:    06/26/18 0835   BP: 124/84   Pulse: 74   Resp: 15   SpO2: 95%   Weight: 81.2 kg (179 lb)   Height: 1.575 m (5' 2\")       Pt. is alert and oriented to time, place and person. Cooperative and in no apparent distress.       1. Head: Atraumatic, normocephalic.   2. Ears: Normal tympanic membrane and no discharge  3. Nose: No inferior turbinate hypertophy, no septal deviation, no polyp.   4. Throat: Oropharynx appears crowded in that the palate is overhanging (Malam Amy scale 4. Tonsils are not enlarged, uvula is  large, pharynx not inflamed. Tongue is large. She wear complete upper and partial lower dentures.  5. Neck: Supple. No thyromegaly  6. Chest: Trachea central  7. Lungs auscultation: B/L good air entry, vesicular breath sounds, no  Wheezing   8. Heart auscultation: 1st and 2nd heart sounds normal, regular rhythm. No appreciable murmur.  9. Abdomen: Soft, non tender, no organomegaly. Bowel sounds present  10. Extremities: no pedal edema.   Peripheral pulses felt.  11. Skin: No rash  12. NEUROLOGICAL EXAMINATION: On neurological exam, the patient was alert and oriented x3. speech was clear and fluent without dysarthria.       ASSESSMENT AND PLAN     1. She  has symptoms of Obstructive Sleep Apnea (TOMY). She  has " excessive daytime sleepiness that interferes with activites of daily living. She  risk factors for TOMY include obesity, thick neck, and crowded oropharynx.     The pathophysiology of TOMY and the increased risk of cardiovascular morbidity from untreated TOMY is discussed in detail with the patient.    We have discussed diagnostic options including in-laboratory, attended polysomnography and home sleep testing. We have also discussed treatment options including airway pressurization, reconstructive otolaryngologic surgery, dental appliances and weight management.       Subsequently,treatment options will be discussed based on the diagnostic study. Meanwhile, She is urged to avoid supine sleep, weight gain and alcoholic beverages since all of these can worsen TOMY. She is cautioned against drowsy driving. If She feels sleepy while driving, She must pull over for a break/nap, rather than persist on the road, in the interest of She own safety and that of others on the road.    Plan  -  She  will be scheduled for an overnight PSG to assess sleep related  breathing disorder  - she wears with the denture   - Ambien is prescribed only for the night of the SS. I have obtained and reviewed patient utilization report from [email or web address] prior to writing  prescription for controlled substance II, III or IV. Based on the report and my clinical assessment the prescription is medically necessary.  Pt was advised to use Ambien appropriately. Do not drive or use fast moving machinery after taking the medication. Side affects were  discussed in detail.        2.The evolution of  insomnia is discussed in detail. Multifactorial due to chronic pain, anxiety/depression and TOMY The importance of cognitive restructuring and behavior modification therapy is emphasized for long-term improvement rather than the use of hypnotic agents, which may offer short term relief but may lead to the development of tolerance and side effects with prolonged use.  Evening exercise, wind-down before bedtime, and stimulus control (leaving the bed when unable to fall back asleep at night) are explained.     Plan   - Handouts on stimulus control and sleep hygiene are given and a couple of titles of books on insomnia are recommended, written by  behavioral psychologists.    - reassess after treatment of TOMY    3. Patient's body mass index is 32.74 kg/m². Exercise and nutrition counseling were performed at this visit.    - recommended healthy diet with portion control , aerobic exercise 5x week  - obesity counseling done today: Drink more water. Reduce carb intake in drinks. Try to eat 3 meals a day with last meal 4-6 hours prior to bedtime. Limit caloric intake to 1600 - 1800 kcal per day.Restrict carbohydrate intake to 50 g per day to 100 g per day    4. COPD. Has COPD and currently symbicort on medication. Currently stable on medication. Denies recent exacerbation and use of systemic steroids.

## 2018-06-28 ENCOUNTER — OFFICE VISIT (OUTPATIENT)
Dept: MEDICAL GROUP | Facility: CLINIC | Age: 53
End: 2018-06-28
Payer: MEDICARE

## 2018-06-28 ENCOUNTER — HOSPITAL ENCOUNTER (OUTPATIENT)
Dept: LAB | Facility: MEDICAL CENTER | Age: 53
End: 2018-06-28
Attending: PHYSICIAN ASSISTANT
Payer: MEDICARE

## 2018-06-28 VITALS
TEMPERATURE: 97.7 F | DIASTOLIC BLOOD PRESSURE: 66 MMHG | WEIGHT: 181 LBS | BODY MASS INDEX: 33.31 KG/M2 | SYSTOLIC BLOOD PRESSURE: 110 MMHG | HEIGHT: 62 IN | OXYGEN SATURATION: 95 % | HEART RATE: 70 BPM | RESPIRATION RATE: 16 BRPM

## 2018-06-28 DIAGNOSIS — F31.32 BIPOLAR AFFECTIVE DISORDER, CURRENTLY DEPRESSED, MODERATE (HCC): ICD-10-CM

## 2018-06-28 DIAGNOSIS — L81.9 SKIN DEPIGMENTATION: ICD-10-CM

## 2018-06-28 DIAGNOSIS — F19.930: ICD-10-CM

## 2018-06-28 DIAGNOSIS — F15.93 CAFFEINE WITHDRAWAL (HCC): ICD-10-CM

## 2018-06-28 DIAGNOSIS — D50.9 IRON DEFICIENCY ANEMIA, UNSPECIFIED IRON DEFICIENCY ANEMIA TYPE: ICD-10-CM

## 2018-06-28 DIAGNOSIS — R53.83 OTHER FATIGUE: ICD-10-CM

## 2018-06-28 LAB
ALBUMIN SERPL BCP-MCNC: 3.8 G/DL (ref 3.2–4.9)
ALBUMIN/GLOB SERPL: 1.2 G/DL
ALP SERPL-CCNC: 144 U/L (ref 30–99)
ALT SERPL-CCNC: 14 U/L (ref 2–50)
ANION GAP SERPL CALC-SCNC: 8 MMOL/L (ref 0–11.9)
ANISOCYTOSIS BLD QL SMEAR: ABNORMAL
AST SERPL-CCNC: 18 U/L (ref 12–45)
BASOPHILS # BLD AUTO: 2.6 % (ref 0–1.8)
BASOPHILS # BLD: 0.21 K/UL (ref 0–0.12)
BILIRUB SERPL-MCNC: 0.4 MG/DL (ref 0.1–1.5)
BUN SERPL-MCNC: 10 MG/DL (ref 8–22)
CALCIUM SERPL-MCNC: 9.3 MG/DL (ref 8.5–10.5)
CHLORIDE SERPL-SCNC: 109 MMOL/L (ref 96–112)
CO2 SERPL-SCNC: 24 MMOL/L (ref 20–33)
CREAT SERPL-MCNC: 0.67 MG/DL (ref 0.5–1.4)
EOSINOPHIL # BLD AUTO: 0.28 K/UL (ref 0–0.51)
EOSINOPHIL NFR BLD: 3.5 % (ref 0–6.9)
ERYTHROCYTE [DISTWIDTH] IN BLOOD BY AUTOMATED COUNT: 47.1 FL (ref 35.9–50)
FERRITIN SERPL-MCNC: 8 NG/ML (ref 10–291)
GLOBULIN SER CALC-MCNC: 3.3 G/DL (ref 1.9–3.5)
GLUCOSE SERPL-MCNC: 95 MG/DL (ref 65–99)
HCT VFR BLD AUTO: 39.2 % (ref 37–47)
HGB BLD-MCNC: 11.6 G/DL (ref 12–16)
IRON SATN MFR SERPL: 8 % (ref 15–55)
IRON SERPL-MCNC: 40 UG/DL (ref 40–170)
LYMPHOCYTES # BLD AUTO: 1.55 K/UL (ref 1–4.8)
LYMPHOCYTES NFR BLD: 19.1 % (ref 22–41)
MANUAL DIFF BLD: NORMAL
MCH RBC QN AUTO: 22.5 PG (ref 27–33)
MCHC RBC AUTO-ENTMCNC: 29.6 G/DL (ref 33.6–35)
MCV RBC AUTO: 76.1 FL (ref 81.4–97.8)
MICROCYTES BLD QL SMEAR: ABNORMAL
MONOCYTES # BLD AUTO: 0.36 K/UL (ref 0–0.85)
MONOCYTES NFR BLD AUTO: 4.4 % (ref 0–13.4)
MORPHOLOGY BLD-IMP: NORMAL
NEUTROPHILS # BLD AUTO: 5.7 K/UL (ref 2–7.15)
NEUTROPHILS NFR BLD: 70.4 % (ref 44–72)
NRBC # BLD AUTO: 0 K/UL
NRBC BLD-RTO: 0 /100 WBC
OVALOCYTES BLD QL SMEAR: NORMAL
PLATELET # BLD AUTO: 302 K/UL (ref 164–446)
PLATELET BLD QL SMEAR: NORMAL
PMV BLD AUTO: 10.9 FL (ref 9–12.9)
POIKILOCYTOSIS BLD QL SMEAR: NORMAL
POTASSIUM SERPL-SCNC: 3.4 MMOL/L (ref 3.6–5.5)
PROT SERPL-MCNC: 7.1 G/DL (ref 6–8.2)
RBC # BLD AUTO: 5.15 M/UL (ref 4.2–5.4)
RBC BLD AUTO: PRESENT
SODIUM SERPL-SCNC: 141 MMOL/L (ref 135–145)
TIBC SERPL-MCNC: 490 UG/DL (ref 250–450)
TSH SERPL DL<=0.005 MIU/L-ACNC: 1.36 UIU/ML (ref 0.38–5.33)
WBC # BLD AUTO: 8.1 K/UL (ref 4.8–10.8)

## 2018-06-28 PROCEDURE — 83540 ASSAY OF IRON: CPT

## 2018-06-28 PROCEDURE — 80053 COMPREHEN METABOLIC PANEL: CPT

## 2018-06-28 PROCEDURE — 82728 ASSAY OF FERRITIN: CPT

## 2018-06-28 PROCEDURE — 99214 OFFICE O/P EST MOD 30 MIN: CPT | Performed by: FAMILY MEDICINE

## 2018-06-28 PROCEDURE — 36415 COLL VENOUS BLD VENIPUNCTURE: CPT

## 2018-06-28 PROCEDURE — 85007 BL SMEAR W/DIFF WBC COUNT: CPT

## 2018-06-28 PROCEDURE — 85027 COMPLETE CBC AUTOMATED: CPT

## 2018-06-28 PROCEDURE — 83550 IRON BINDING TEST: CPT

## 2018-06-28 PROCEDURE — 84443 ASSAY THYROID STIM HORMONE: CPT

## 2018-06-28 NOTE — PROGRESS NOTES
CC: Morning headaches, skin problem    HPI:   Patti is a 52 year old female who presents today with the following.    She wakes up with headaches every morning for the last few days.  She says that this was about the time that she decreased her caffeine soda intake from a 6 pack a day to 2 sodas a day.  She says that when she drinks a soda her headache goes away.  Her headache is also exacerbated by the son.  Denies visual changes, nausea or vomiting.  Denies fever or chills.  Denies abdominal pain, change in bowel or bladder habits.  Denies cough, chest pain or chest congestion.  Denies dizziness.  She does note some allergies with sinus congestion and the headache is mainly over her forehead.    She looked in the mirror and noticed some white spots on her abdomen and her back.  She is wondering what these are.  She wonders if it is shingles.  She says in general she sometimes gets skin itching but the spots themselves are not painful or itching.    She requests that I fill out some paperwork for her so that she could have her service dog's in an apartment.  She is apartment hunting.  Her PCP wrote her a letter but she is currently on vacation and the patient needs an actual form filled out.  She is treated for bipolar disorder and PTSD.      Patient Active Problem List    Diagnosis Date Noted   • Lumbar stenosis 10/22/2012     Priority: High   • Other fatigue 06/20/2018   • Heartburn 05/08/2018   • Muscle spasm of both lower legs 03/26/2018   • Calcaneal spur of both feet 03/15/2018   • Pain management contract broken 03/15/2018   • Obesity (BMI 30-39.9) 01/18/2018   • COPD (chronic obstructive pulmonary disease) ([facility]) 10/05/2017   • Bipolar affective disorder, currently depressed, moderate ([facility]) 01/24/2017   • History of gastric bypass 10/11/2013   • Chronic pain syndrome 09/16/2013   • Anxiety 12/06/2012   • Multilevel degenerative disc disease 10/01/2012   • Cervical radiculopathy, chronic 05/04/2011   •  "Vitamin D insufficiency 11/16/2010   • Arthritis    • Panic attacks    • PTSD (post-traumatic stress disorder)        Current Outpatient Prescriptions   Medication Sig Dispense Refill   • ARIPiprazole (ABILIFY) 10 MG Tab Take 1 Tab by mouth every day. 30 Tab 2   • Oxycodone HCl 20 MG Tab Take  by mouth.     • Nebulizers (VIOS AEROSOL DELIVERY SYSTEM) Misc U UTD  0   • ciprofloxacin (CIPRO) 500 MG Tab Take 1 Tab by mouth 2 times a day. (Patient not taking: Reported on 6/28/2018) 14 Tab 0   • budesonide-formoterol (SYMBICORT) 80-4.5 MCG/ACT Aerosol Inhale 2 Puffs by mouth 2 Times a Day. 1 Inhaler 3   • fluticasone (FLONASE) 50 MCG/ACT nasal spray Spray 1 Spray in nose 2 times a day. Each Nostril 1 Bottle 3   • tiotropium (SPIRIVA) 18 MCG Cap Inhale 1 Cap by mouth every day. 30 Cap 3   • sumatriptan (IMITREX) 50 MG Tab Take 1 Tab by mouth Once PRN for Migraine (ok to repeat 1 hr later if symptoms persist) for up to 1 dose. (Patient not taking: Reported on 6/28/2018) 10 Tab 3   • ibuprofen (MOTRIN) 800 MG Tab Take 1 Tab by mouth every 8 hours as needed. (Patient not taking: Reported on 6/28/2018) 90 Tab 2     No current facility-administered medications for this visit.          Allergies as of 06/28/2018   • (No Known Allergies)        ROS: As per HPI.    /66   Pulse 70   Temp 36.5 °C (97.7 °F)   Resp 16   Ht 1.575 m (5' 2\")   Wt 82.1 kg (181 lb)   LMP 10/05/1999   SpO2 95%   BMI 33.11 kg/m²     Physical Exam:  Gen:         Alert and oriented, No apparent distress.  HEENT:    PERRLA, EOMI, oropharynx clear without exudates, TMs clear bilaterally.  Neck:        No Lymphadenopathy   Lungs:     Clear to auscultation bilaterally  CV:          Regular rate and rhythm. No murmurs, rubs or gallops.  Skin:        There are a few depigmented areas on her lower abdomen and a couple of barely visible patches on her lower back.                   Ext:          No clubbing, cyanosis, edema.      Assessment and Plan. "   52 y.o. female with the following issues.    1. Caffeine withdrawal (HCC) headaches  I reassured her that these headaches should resolve over several days.  I encouraged her to get off of sodas entirely because of the high sugar content.  She says she might switch to T.    3. Skin depigmentation  I reassured her that this is not shingles nor does it have a typical appearance of tinea versicolor.  No treatment necessary.    4. Bipolar affective disorder, currently depressed, moderate (HCC)  Paperwork is filled out for her.      Total of 25 minutes face-to-face time was spent with patient, with greater than 50% of the time spent in counseling and coordination of care.

## 2018-06-29 ENCOUNTER — TELEPHONE (OUTPATIENT)
Dept: MEDICAL GROUP | Facility: MEDICAL CENTER | Age: 53
End: 2018-06-29

## 2018-06-29 NOTE — TELEPHONE ENCOUNTER
----- Message from Katie Kasper P.A.-C. sent at 6/29/2018  1:42 PM PDT -----  Please verify patient receives results. Thanks! Katie Kasper PA-C

## 2018-06-29 NOTE — TELEPHONE ENCOUNTER
I called and spoke with patient.  Pt informed of results and message from Katie.    Per pt, went to [facility] due to having white spots all over her body and was told it was due to severe anemia.    Patient states she will call GI this afternoon (6/29/18) and make an appointment.

## 2018-07-12 ENCOUNTER — OFFICE VISIT (OUTPATIENT)
Dept: MEDICAL GROUP | Facility: MEDICAL CENTER | Age: 53
End: 2018-07-12
Payer: MEDICARE

## 2018-07-12 ENCOUNTER — HOSPITAL ENCOUNTER (OUTPATIENT)
Dept: LAB | Facility: MEDICAL CENTER | Age: 53
End: 2018-07-12
Attending: PHYSICIAN ASSISTANT
Payer: MEDICARE

## 2018-07-12 VITALS
SYSTOLIC BLOOD PRESSURE: 98 MMHG | OXYGEN SATURATION: 93 % | RESPIRATION RATE: 16 BRPM | DIASTOLIC BLOOD PRESSURE: 68 MMHG | WEIGHT: 175.4 LBS | HEART RATE: 79 BPM | HEIGHT: 62 IN | BODY MASS INDEX: 32.28 KG/M2

## 2018-07-12 DIAGNOSIS — R21 RASH OF FACE: ICD-10-CM

## 2018-07-12 DIAGNOSIS — G89.4 CHRONIC PAIN SYNDROME: ICD-10-CM

## 2018-07-12 DIAGNOSIS — B36.0 TINEA VERSICOLOR: ICD-10-CM

## 2018-07-12 LAB — ERYTHROCYTE [SEDIMENTATION RATE] IN BLOOD BY WESTERGREN METHOD: 23 MM/HOUR (ref 0–30)

## 2018-07-12 PROCEDURE — 86038 ANTINUCLEAR ANTIBODIES: CPT

## 2018-07-12 PROCEDURE — 85652 RBC SED RATE AUTOMATED: CPT

## 2018-07-12 PROCEDURE — 99214 OFFICE O/P EST MOD 30 MIN: CPT | Performed by: PHYSICIAN ASSISTANT

## 2018-07-12 PROCEDURE — 36415 COLL VENOUS BLD VENIPUNCTURE: CPT

## 2018-07-12 PROCEDURE — 86255 FLUORESCENT ANTIBODY SCREEN: CPT

## 2018-07-12 RX ORDER — KETOCONAZOLE 20 MG/ML
SHAMPOO TOPICAL
Qty: 1 BOTTLE | Refills: 3 | Status: SHIPPED | OUTPATIENT
Start: 2018-07-12 | End: 2019-05-14

## 2018-07-12 NOTE — PROGRESS NOTES
Subjective:      Patti Tello is a 52 y.o. female who presents with Other (white spots on stomach and upper back) and Anemia (had colonoscopy and EGD, no bleeding ulcers, concerned that they did not find a cause for her anemia)            HPIPatient presents for same day access with a few concerns:  1. White spots on stomach and back for a few weeks. No known cause. Not itching or painful. No treatment tried. Seems to be getting more. Hasn't had this problem before.  2. Had endoscopy and colonoscopy. Report reviewed in media. One polyp removed from colon, pending pathology. Stricture dilated without complication. No sign of inflammation or ulcer. No bleeding. f/u scheduled in 6 weeks. Discussed with patient that the iron deficiency is likely from malabsorption from gastric bypass surgery. She is currently taking iron BID but after f/u with GI can likely go down to once daily depending on lab results.  3. Light sensitivity and headaches. Thought to be from caffeine withdrawal but not really getting better  4. Wants to be tested for lupus because of family hx, chronic pain, did have red rash on face a few weeks ago. Previous labs reviewed - had normal negative ccp and RF but didn't have esr, NAKUL, ANCA on our records.    ROSno fever/chills. No dizziness or focal weakness. No vision change. No vomiting or diarrhea. No abdominal pain. No new joint redness or swelling. No urinary symptoms.    Active Ambulatory Problems     Diagnosis Date Noted   • Arthritis    • Vitamin D insufficiency 11/16/2010   • Panic attacks    • PTSD (post-traumatic stress disorder)    • Cervical radiculopathy, chronic 05/04/2011   • Multilevel degenerative disc disease 10/01/2012   • Lumbar stenosis 10/22/2012   • Anxiety 12/06/2012   • Chronic pain syndrome 09/16/2013   • History of gastric bypass 10/11/2013   • Bipolar affective disorder, currently depressed, moderate ([facility]) 01/24/2017   • COPD (chronic obstructive pulmonary disease) ([facility])  10/05/2017   • Obesity (BMI 30-39.9) 01/18/2018   • Calcaneal spur of both feet 03/15/2018   • Pain management contract broken 03/15/2018   • Muscle spasm of both lower legs 03/26/2018   • Heartburn 05/08/2018   • Other fatigue 06/20/2018     Resolved Ambulatory Problems     Diagnosis Date Noted   • Herniated nucleus pulposus, L4-5 03/18/2010   • Spondylisthesis 03/18/2010   • Cervical myelopathy ([facility]) 11/10/2010   • Insomnia 11/10/2010   • Right lumbar radiculopathy 11/10/2010   • Cancer (CMS-HCC)    • Back injury    • Chronic neck pain    • Chronic low back pain    • Depression    • Encounter for long-term (current) use of other medications 01/04/2011   • Bipolar disorder (CMS-HCC) 04/11/2011   • Itching due to drug 06/02/2011   • Cold intolerance 01/24/2012   • Exertional dyspnea 02/08/2012   • Degenerative arthritis of cervical spine 02/08/2012   • Radiculopathy, lumbar region 10/01/2012   • Low back pain 10/16/2012   • Lumbar radiculopathy, acute 10/16/2012   • Cervicalgia 10/22/2012   • Thoracic or lumbosacral neuritis or radiculitis, unspecified 10/22/2012   • S/P laminectomy 10/22/2012   • Chronic UTI 09/16/2013   • Complicated UTI (urinary tract infection) 10/11/2013   • Nephrolithiasis 10/11/2013   • HX: breast cancer 10/11/2013   • Shortness of breath 09/06/2016   • Bipolar 2 disorder ([facility]) 01/24/2017   • Bipolar I disorder with mood-congruent psychotic features ([facility]) 01/24/2017   • Chronic back pain 10/05/2017   • Chest pain 10/05/2017   • Dysuria 06/07/2018   • Flank pain 06/12/2018     Past Medical History:   Diagnosis Date   • Anxiety and depression    • Arthritis    • Asthma    • Back injury    • Back pain    • Bronchitis 2010   • Cancer ([facility])    • Chickenpox    • Chronic LBP    • Chronic neck pain    • Cold intolerance 1/24/2012   • COPD (chronic obstructive pulmonary disease) ([facility])    • Cystic fibrosis ([facility])    • Depression    • Diabetes    • GERD (gastroesophageal reflux disease)    • H/O gastric  "bypass 10/11/2013   • Herniated nucleus pulposus, L4-5 3/18/2010   • HX: breast cancer 10/11/2013   • Hypertension    • Itching due to drug 6/2/2011   • Kidney stone    • Nephrolithiasis 10/11/2013   • Obesity    • Osteoporosis    • Other specified symptom associated with female genital organs    • Panic disorder    • Pneumonia    • PTSD (post-traumatic stress disorder)    • Restless leg syndrome    • Rheumatoid arthritis (HCC)    • S/P laminectomy 10/22/2012   • Thoracic or lumbosacral neuritis or radiculitis, unspecified 10/22/2012   • Trauma      Current Outpatient Prescriptions on File Prior to Visit   Medication Sig Dispense Refill   • Oxycodone HCl 20 MG Tab Take  by mouth.     • Nebulizers ([place] AEROSOL DELIVERY SYSTEM) Misc U UTD  0   • ARIPiprazole (ABILIFY) 10 MG Tab Take 1 Tab by mouth every day. 30 Tab 2   • budesonide-formoterol (SYMBICORT) 80-4.5 MCG/ACT Aerosol Inhale 2 Puffs by mouth 2 Times a Day. 1 Inhaler 3   • fluticasone (FLONASE) 50 MCG/ACT nasal spray Spray 1 Spray in nose 2 times a day. Each Nostril 1 Bottle 3   • tiotropium (SPIRIVA) 18 MCG Cap Inhale 1 Cap by mouth every day. 30 Cap 3     No current facility-administered medications on file prior to visit.    nkda  Non-smoker   Objective:     BP (!) 98/68   Pulse 79   Resp 16   Ht 1.575 m (5' 2\")   Wt 79.6 kg (175 lb 6.4 oz)   LMP 10/05/1999   SpO2 93%   BMI 32.08 kg/m²      Physical Exam   Constitutional: She is oriented to person, place, and time. She appears well-developed and well-nourished. No distress.   Neurological: She is alert and oriented to person, place, and time.   Skin: Skin is warm and dry. She is not diaphoretic. No pallor.   Light/white colored ovoid patches abdomen, few on back, varying sizes   Psychiatric: She has a normal mood and affect. Her behavior is normal. Judgment and thought content normal.   Vitals reviewed.              Assessment/Plan:     1. Chronic pain syndrome    - ANTI-NUCLEAR ANTIBODY SERUM; " Future  - ANTI-NEUTROPHIL CYTOPLASMIC AB W/RFLX; Future  - WESTERGREN SED RATE; Future    2. Rash of face    - ANTI-NUCLEAR ANTIBODY SERUM; Future  - ANTI-NEUTROPHIL CYTOPLASMIC AB W/RFLX; Future  - WESTERGREN SED RATE; Future    3. Tinea versicolor    - ketoconazole (NIZORAL) 2 % shampoo; Apply to skin one time daily in the shower and leave on for 5 minutes then rinse off. 2-4 weeks  Dispense: 1 Bottle; Refill: 3    rec f/uw [facility] pain management regarding headaches. Will contact patient with lab results

## 2018-07-14 LAB — ANCA IGG TITR SER IF: NORMAL {TITER}

## 2018-07-16 LAB
NUCLEAR IGG SER QL IA: DETECTED
NUCLEAR IGG TITR SER IF: ABNORMAL {TITER}

## 2018-07-17 ENCOUNTER — TELEPHONE (OUTPATIENT)
Dept: MEDICAL GROUP | Facility: MEDICAL CENTER | Age: 53
End: 2018-07-17

## 2018-07-17 DIAGNOSIS — R53.83 OTHER FATIGUE: ICD-10-CM

## 2018-07-17 DIAGNOSIS — M53.9 MULTILEVEL DEGENERATIVE DISC DISEASE: ICD-10-CM

## 2018-07-17 DIAGNOSIS — M19.90 ARTHRITIS: ICD-10-CM

## 2018-07-17 DIAGNOSIS — R76.8 POSITIVE ANA (ANTINUCLEAR ANTIBODY): ICD-10-CM

## 2018-07-17 DIAGNOSIS — G89.4 CHRONIC PAIN SYNDROME: ICD-10-CM

## 2018-07-17 NOTE — TELEPHONE ENCOUNTER
----- Message from Katie Kasper P.A.-C. sent at 7/17/2018  1:27 PM PDT -----  Please verify patient receives results. Thanks! Katie Kasper PA-C

## 2018-07-17 NOTE — TELEPHONE ENCOUNTER
I called and spoke with pt, pt informed of results.    Per pt, she never did go see the Rheumatologist last year and is requesting a new referral.    Pt informed that once the referral has been placed, some one will contact her with 5-7 business days.    Please advise, thank you.

## 2018-07-18 ENCOUNTER — SLEEP STUDY (OUTPATIENT)
Dept: SLEEP MEDICINE | Facility: MEDICAL CENTER | Age: 53
End: 2018-07-18
Attending: FAMILY MEDICINE
Payer: MEDICARE

## 2018-07-18 DIAGNOSIS — G47.30 SLEEP DISORDER BREATHING: ICD-10-CM

## 2018-07-18 PROCEDURE — 95810 POLYSOM 6/> YRS 4/> PARAM: CPT | Performed by: FAMILY MEDICINE

## 2018-07-19 NOTE — PROCEDURES
Clinical Comments:  The patient underwent a comprehensive polysomnogram using the standard montage for measurement of parameters of sleep, respiratory events, movement abnormalities, heart rate and rhythm. A microphone was used to monitor snoring.      INTERPRETATION:  The study was started at 9:54pm.  The total recording time was 386.4 minutes with a sleep period of 386.4 minutes and the total sleep time was 368.9 minutes with a sleep efficiency of 95.5%.  The sleep latency was 0.0 minutes, and REM latency was 80.3 minutes.  The patient experienced 104 arousals in total, for an arousal index of 16.9    RESPIRATORY: The patient had 12 apneas in total.  Of these, 12 were obstructive apneas, and 0 were central apneas.  This resulted in an apnea index (AI) of 2.0.  The patient had 148 hypopneas, for a hypopnea index of 24.1.  The overall AHI was 26.0, while the AHI during Stage R sleep was 34.7.  AHI while supine was 64.6.    OXIMETRY: Oxygen saturation monitoring showed a mean SpO2 of 92.4%, with a minimum oxygen saturation of 77.0%.  Oxygen saturations were less than or = 89% for 17.1 minutes of sleep time.    CARDIAC: The highest heart rate during the recording was 101.0 beats per minute.  The average heart rate during sleep was 71.4 bpm.    LIMB MOVEMENTS: There were a total of 458 PLMs during sleep, of which 30 were PLMs arousals.  This resulted in a PLMS index of 74.5.    Technical summary:The patient underwent a diagnostic polysomnogram. This was a 16 channel montage study to include a 6 channel EEG, a 2 channel EOG, and chin EMG, left and right leg EMG, a snore channel, a nasal pressure transducer, and a nasal oral airflow semester.   Respiratory effort was assessed with the use of a thoracic and abdominal monitor and overnight oximetry was obtained. Audio and video recordings were reviewed. This was a fully attended study and sleep stage scoring was performed. The test was technically adequate.       Scoring  Criteria: A modification of the the AASM Manual for the Scoring of Sleep and Associated Events, 2012, was used.   Obstructive apnea was scored by cessation of airflow for at least 10 seconds with continuing respiratory effort.  Central apnea was scored by cessation of airflow for at least 10 seconds with no effort.  Hypopnea was scored by a 30% or more reduction in airflow for at least 10 seconds accompanied by an arterial oxygen desaturation of 3% or more.  (For Medicare patients, hypopneas were scored by a 30% or more reduction in airflow for at least 10 seconds accompanied by an arterial oxygen saturation of 4% or more, as required by their insurance, CMS.    General sleep summary:  General sleep summary:  During the overnight study, the sleep latency was 0 min which is decreased. The REM latency was 80 which is normal. The total sleep time was 368 min and sleep efficiency was 95 % which is normal.  Sleep stage proportions showed decreased REM otherwise normal sleep architecture.  In regards to sleep quality there was a moderate degree of sleep fragmentation as shown by the arousal index of 16 an hourThe arousals were due to spontaneous arousal and respiratory events.    Respiratory summary: During the overnight study, the patient demonstrated moderate obstructive sleep apnea as shown by the apnea hypopnea index of 26/hr. There was a total of 12 apneas and 148 hypopneas. In the supine position the respiratory disturbance index was 64 an hour and in the non-supine position the respiratory disturbance index was 12 per hour. The respiratory events were associated with O2 terrie of 77% and average O2 saturation of 92%. She spent 9 % of sleep time below 89% O2 saturation. There was snoring. The patient demonstrated a NSR with an average heartbeat of 71 bpm.     Periodic limb movement summary: The patient demonstrated an normal degree of periodic limb movements as shown by the PLM index of 1.1 per  hour.      Impression:  1.  Moderate TOMY    Recommendations:  Recommend the patient return for a CPAP titration. In some cases alternative treatment options may prove effective in resolving sleep apnea and these options include upper airway surgery, the use of a dental orthotic or weight loss and positional therapy. Clinical correlation is required. In general patients with sleep apnea are advised to avoid alcohol and sedatives and to not operate a motor vehicle while drowsy and are at a greater risk for cardiovascular disease.

## 2018-07-24 ENCOUNTER — OFFICE VISIT (OUTPATIENT)
Dept: MEDICAL GROUP | Facility: MEDICAL CENTER | Age: 53
End: 2018-07-24
Payer: MEDICARE

## 2018-07-24 VITALS
RESPIRATION RATE: 16 BRPM | DIASTOLIC BLOOD PRESSURE: 72 MMHG | BODY MASS INDEX: 32.25 KG/M2 | SYSTOLIC BLOOD PRESSURE: 100 MMHG | OXYGEN SATURATION: 96 % | HEART RATE: 78 BPM | WEIGHT: 175.27 LBS | HEIGHT: 62 IN

## 2018-07-24 DIAGNOSIS — M12.9 ARTHRITIS, MULTIPLE JOINT INVOLVEMENT: ICD-10-CM

## 2018-07-24 DIAGNOSIS — R76.8 POSITIVE ANA (ANTINUCLEAR ANTIBODY): ICD-10-CM

## 2018-07-24 DIAGNOSIS — L98.9 SKIN LESIONS: ICD-10-CM

## 2018-07-24 DIAGNOSIS — Z82.69 FAMILY HISTORY OF SYSTEMIC LUPUS ERYTHEMATOSUS (SLE) IN MOTHER: ICD-10-CM

## 2018-07-24 DIAGNOSIS — R12 HEARTBURN: ICD-10-CM

## 2018-07-24 DIAGNOSIS — D50.9 IRON DEFICIENCY ANEMIA, UNSPECIFIED IRON DEFICIENCY ANEMIA TYPE: ICD-10-CM

## 2018-07-24 PROCEDURE — 99214 OFFICE O/P EST MOD 30 MIN: CPT | Performed by: PHYSICIAN ASSISTANT

## 2018-07-24 NOTE — ASSESSMENT & PLAN NOTE
Recent [facility] evaluation with normal upper endoscopy and normal colonoscopy. Likely iron deficiency anemia is an absorption issue stemming from her gastric bypass surgery. She is now on 325mg iron daily. Will repeat labs in 3 months.

## 2018-07-24 NOTE — ASSESSMENT & PLAN NOTE
Symptoms improved after upper endoscopy with stricture dilation and she has also stopped drinking sodas.

## 2018-07-24 NOTE — PROGRESS NOTES
"Subjective:   Patti Tello is a 52 y.o. female here today for lab results, f/u on anemia, f/u on GIC consult    Positive NAKUL (antinuclear antibody)  Patient concerned about lupus as her mom had lupus and  from complications related to the disease. She does have chronic multi joint pain and arthritis. She does have history of what she describes as \"butterfly rash\" on her face. Referral has been placed for rheumatology in [facility]. She would also like a referral to [place] as the [facility] rheum will likely take a long time.    Heartburn  Symptoms improved after upper endoscopy with stricture dilation and she has also stopped drinking sodas.     Iron deficiency anemia  Recent GIC evaluation with normal upper endoscopy and normal colonoscopy. Likely iron deficiency anemia is an absorption issue stemming from her gastric bypass surgery. She is now on 325mg iron daily. Will repeat labs in 3 months.    Skin lesions  States she tried the ketoconazole shampoo but didn't notice improvement. Would like to see derm       Current medicines (including changes today)  Current Outpatient Prescriptions   Medication Sig Dispense Refill   • ketoconazole (NIZORAL) 2 % shampoo Apply to skin one time daily in the shower and leave on for 5 minutes then rinse off. 2-4 weeks 1 Bottle 3   • Oxycodone HCl 20 MG Tab Take  by mouth.     • Nebulizers ([facility] AEROSOL DELIVERY SYSTEM) Misc U UTD  0   • ARIPiprazole (ABILIFY) 10 MG Tab Take 1 Tab by mouth every day. 30 Tab 2   • budesonide-formoterol (SYMBICORT) 80-4.5 MCG/ACT Aerosol Inhale 2 Puffs by mouth 2 Times a Day. 1 Inhaler 3   • fluticasone (FLONASE) 50 MCG/ACT nasal spray Spray 1 Spray in nose 2 times a day. Each Nostril 1 Bottle 3   • tiotropium (SPIRIVA) 18 MCG Cap Inhale 1 Cap by mouth every day. 30 Cap 3     No current facility-administered medications for this visit.      She  has a past medical history of Anxiety and depression; Arthritis; Asthma; Back injury; Back pain; " "Bronchitis (2010); Cancer ([facility]); Chickenpox; Chronic LBP; Chronic neck pain; Cold intolerance (1/24/2012); COPD (chronic obstructive pulmonary disease) ([facility]); Cystic fibrosis ([facility]); Depression; Diabetes; GERD (gastroesophageal reflux disease); H/O gastric bypass (10/11/2013); Herniated nucleus pulposus, L4-5 (3/18/2010); breast cancer (10/11/2013); Hypertension; Itching due to drug (6/2/2011); Kidney stone; Nephrolithiasis (10/11/2013); Obesity; Osteoporosis; Other specified symptom associated with female genital organs; Panic disorder; Pneumonia; PTSD (post-traumatic stress disorder); Restless leg syndrome; Rheumatoid arthritis ([facility]); S/P laminectomy (10/22/2012); Thoracic or lumbosacral neuritis or radiculitis, unspecified (10/22/2012); and Trauma.    ROS   No fever/chills. No weight change. No headache/dizziness. No focal weakness. No sore throat, nasal congestion, ear pain. No chest pain, no shortness of breath, difficulty breathing. No n/v/d/c or abdominal pain. No urinary complaint.      Objective:     Blood pressure 100/72, pulse 78, resp. rate 16, height 1.575 m (5' 2\"), weight 79.5 kg (175 lb 4.3 oz), last menstrual period 10/05/1999, SpO2 96 %. Body mass index is 32.06 kg/m².   Physical Exam:  Constitutional: WDWN, NAD  Skin: Warm, dry, good turgor, light flat patches of varying sizes again noted on abdomen, few on back  Eye: Equal, round and reactive, conjunctiva clear, lids normal.  ENMT: Lips without lesions, good dentition, oropharynx clear.  Neck: Trachea midline, no masses, no thyromegaly. No cervical or supraclavicular lymphadenopathy  Respiratory: Unlabored respiratory effort, lungs clear to auscultation, no wheezes, no ronchi.  Cardiovascular: Normal S1, S2, no murmur, no leg edema.  Psych: Alert and oriented x3, normal affect and mood.        Assessment and Plan:   The following treatment plan was discussed    1. Iron deficiency anemia, unspecified iron deficiency anemia type  Cont daily iron " 325mg daily  - CBC WITH DIFFERENTIAL; Future  - IRON/TOTAL IRON BIND; Future  - FERRITIN; Future    2. Positive NAKUL (antinuclear antibody)    - REFERRAL TO RHEUMATOLOGY    3. Arthritis, multiple joint involvement    - REFERRAL TO RHEUMATOLOGY    4. Family history of systemic lupus erythematosus (SLE) in mother    - REFERRAL TO RHEUMATOLOGY    5. Heartburn  Cont avoiding soda, otherwise no f/u needed at this point    6. Skin lesions    - REFERRAL TO DERMATOLOGY      Followup: Return in about 3 months (around 10/24/2018).

## 2018-07-24 NOTE — ASSESSMENT & PLAN NOTE
"Patient concerned about lupus as her mom had lupus and  from complications related to the disease. She does have chronic multi joint pain and arthritis. She does have history of what she describes as \"butterfly rash\" on her face. Referral has been placed for rheumatology in [facility]. She would also like a referral to [place] as the [facility] rheum will likely take a long time.  "

## 2018-07-31 ENCOUNTER — TELEPHONE (OUTPATIENT)
Dept: MEDICAL GROUP | Facility: MEDICAL CENTER | Age: 53
End: 2018-07-31

## 2018-08-13 ENCOUNTER — PATIENT MESSAGE (OUTPATIENT)
Dept: MEDICAL GROUP | Facility: MEDICAL CENTER | Age: 53
End: 2018-08-13

## 2018-08-13 DIAGNOSIS — M19.90 ARTHRITIS: ICD-10-CM

## 2018-08-13 DIAGNOSIS — R76.8 POSITIVE ANA (ANTINUCLEAR ANTIBODY): ICD-10-CM

## 2018-08-13 DIAGNOSIS — M53.9 MULTILEVEL DEGENERATIVE DISC DISEASE: ICD-10-CM

## 2018-08-13 DIAGNOSIS — G89.4 CHRONIC PAIN SYNDROME: ICD-10-CM

## 2018-08-13 DIAGNOSIS — M12.9 ARTHRITIS, MULTIPLE JOINT INVOLVEMENT: ICD-10-CM

## 2018-08-15 ENCOUNTER — APPOINTMENT (OUTPATIENT)
Dept: PULMONOLOGY | Facility: HOSPICE | Age: 53
End: 2018-08-15
Payer: MEDICARE

## 2018-08-17 RX ORDER — IBUPROFEN 800 MG/1
TABLET ORAL
Qty: 60 TAB | Refills: 0 | Status: SHIPPED | OUTPATIENT
Start: 2018-08-17 | End: 2018-10-16

## 2018-08-19 ENCOUNTER — OFFICE VISIT (OUTPATIENT)
Dept: URGENT CARE | Facility: PHYSICIAN GROUP | Age: 53
End: 2018-08-19
Payer: MEDICARE

## 2018-08-19 VITALS
SYSTOLIC BLOOD PRESSURE: 128 MMHG | DIASTOLIC BLOOD PRESSURE: 70 MMHG | BODY MASS INDEX: 32.42 KG/M2 | HEART RATE: 95 BPM | RESPIRATION RATE: 16 BRPM | TEMPERATURE: 98.4 F | WEIGHT: 176.2 LBS | HEIGHT: 62 IN | OXYGEN SATURATION: 98 %
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
## 2018-08-19 PROCEDURE — 90715 TDAP VACCINE 7 YRS/> IM: CPT | Performed by: PHYSICIAN ASSISTANT

## 2018-08-19 PROCEDURE — 90471 IMMUNIZATION ADMIN: CPT | Performed by: PHYSICIAN ASSISTANT

## 2018-08-19 PROCEDURE — 99214 OFFICE O/P EST MOD 30 MIN: CPT | Mod: 25 | Performed by: PHYSICIAN ASSISTANT

## 2018-08-19 RX ORDER — CEPHALEXIN 500 MG/1
500 CAPSULE ORAL 4 TIMES DAILY
Qty: 20 CAP | Refills: 0 | Status: SHIPPED | OUTPATIENT
Start: 2018-08-19 | End: 2018-08-24

## 2018-08-19 ASSESSMENT — PAIN SCALES - GENERAL: PAINLEVEL: 3=SLIGHT PAIN

## 2018-08-19 NOTE — PROGRESS NOTES
Chief Complaint   Patient presents with   • Other     scratch on the back of her leg from her dogs       HISTORY OF PRESENT ILLNESS: Patient is a 52 y.o. female who presents today for the following:    Patient comes in for evaluation of the scratch on the back of her right lower leg from her dog.  She is concerned because she has a history of diabetes and has some sort of autoimmune disorder going on that she is in the process of having worked up.  It happened yesterday.  She complained of pain, redness, bruising in the area.  She denies distal paresthesias.  Her last tetanus shot was years ago.    Patient Active Problem List    Diagnosis Date Noted   • Lumbar stenosis 10/22/2012     Priority: High   • Positive NAKUL (antinuclear antibody) 07/24/2018   • Arthritis, multiple joint involvement 07/24/2018   • Family history of systemic lupus erythematosus (SLE) in mother 07/24/2018   • Iron deficiency anemia 07/24/2018   • Skin lesions 07/24/2018   • Other fatigue 06/20/2018   • Heartburn 05/08/2018   • Muscle spasm of both lower legs 03/26/2018   • Calcaneal spur of both feet 03/15/2018   • Pain management contract broken 03/15/2018   • Obesity (BMI 30-39.9) 01/18/2018   • COPD (chronic obstructive pulmonary disease) ([facility]) 10/05/2017   • Bipolar affective disorder, currently depressed, moderate ([facility]) 01/24/2017   • History of gastric bypass 10/11/2013   • Chronic pain syndrome 09/16/2013   • Anxiety 12/06/2012   • Multilevel degenerative disc disease 10/01/2012   • Cervical radiculopathy, chronic 05/04/2011   • Vitamin D insufficiency 11/16/2010   • Arthritis    • Panic attacks    • PTSD (post-traumatic stress disorder)        Allergies:Patient has no known allergies.    Current Outpatient Prescriptions Ordered in [facility]   Medication Sig Dispense Refill   • cephALEXin (KEFLEX) 500 MG Cap Take 1 Cap by mouth 4 times a day for 5 days. 20 Cap 0   • ARIPiprazole (ABILIFY) 10 MG Tab Take 1 Tab by mouth every day. 30 Tab 2   •  fluticasone (FLONASE) 50 MCG/ACT nasal spray Spray 1 Spray in nose 2 times a day. Each Nostril 1 Bottle 3   • ibuprofen (MOTRIN) 800 MG Tab TAKE 1 TABLET BY MOUTH EVERY 8 HOURS AS NEEDED 60 Tab 0   • ketoconazole (NIZORAL) 2 % shampoo Apply to skin one time daily in the shower and leave on for 5 minutes then rinse off. 2-4 weeks 1 Bottle 3   • Oxycodone HCl 20 MG Tab Take  by mouth.     • Nebulizers ([place] AEROSOL DELIVERY SYSTEM) Misc U UTD  0   • budesonide-formoterol (SYMBICORT) 80-4.5 MCG/ACT Aerosol Inhale 2 Puffs by mouth 2 Times a Day. 1 Inhaler 3   • tiotropium (SPIRIVA) 18 MCG Cap Inhale 1 Cap by mouth every day. 30 Cap 3     No current Epic-ordered facility-administered medications on file.        Past Medical History:   Diagnosis Date   • Anxiety and depression    • Arthritis     back, neck   • Asthma    • Back injury    • Back pain    • Bronchitis    • Cancer (HCC)     breast LT   • Chickenpox    • Chronic LBP    • Chronic neck pain    • Cold intolerance 2012   • COPD (chronic obstructive pulmonary disease) ([facility])    • Cystic fibrosis ([facility])    • Depression    • Diabetes    • GERD (gastroesophageal reflux disease)    • H/O gastric bypass 10/11/2013   • Herniated nucleus pulposus, L4-5 3/18/2010   • HX: breast cancer 10/11/2013   • Hypertension    • Itching due to drug 2011   • Kidney stone    • Nephrolithiasis 10/11/2013   • Obesity    • Osteoporosis    • Other specified symptom associated with female genital organs    • Panic disorder    • Pneumonia    • PTSD (post-traumatic stress disorder)    • Restless leg syndrome    • Rheumatoid arthritis ([facility])    • S/P laminectomy 10/22/2012   • Thoracic or lumbosacral neuritis or radiculitis, unspecified 10/22/2012   • Trauma     head trauma       Social History   Substance Use Topics   • Smoking status: Never Smoker   • Smokeless tobacco: Never Used   • Alcohol use No       Family Status   Relation Status   • Fa  at age 64   • Mo  at  "age 75   • Sis Alive   • Bro Alive   • MAunt (Not Specified)   • MUnc (Not Specified)   • MGMo (Not Specified)   • MGFa (Not Specified)     Family History   Problem Relation Age of Onset   • Diabetes Father    • Cancer Father    • Hypertension Father    • Hyperlipidemia Father    • Cancer Mother    • Other Mother         SLE   • Diabetes Mother    • Hypertension Mother    • Hyperlipidemia Mother    • Stroke Mother    • Sleep Apnea Brother    • Cancer Maternal Aunt    • Diabetes Maternal Aunt    • Diabetes Maternal Uncle    • Diabetes Maternal Grandmother    • Diabetes Maternal Grandfather        Review of Systems:    Constitutional ROS: No unexpected change in weight, No weakness, No fatigue  Eye ROS: No recent significant change in vision, No eye pain, redness, discharge  Ear ROS: No drainage, No tinnitus or vertigo, No recent change in hearing  Mouth/Throat ROS: No teeth or gum problems, No bleeding gums, No tongue complaints  Neck ROS: No swollen glands, No significant pain in neck  Pulmonary ROS: No chronic cough, sputum, or hemoptysis, No dyspnea on exertion, No wheezing  Cardiovascular ROS: No diaphoresis, No edema, No palpitations  Gastrointestinal ROS: No change in bowel habits, No significant change in appetite, No nausea, vomiting, diarrhea, or constipation    Exam:  Blood pressure 128/70, pulse 95, temperature 36.9 °C (98.4 °F), resp. rate 16, height 1.575 m (5' 2\"), weight 79.9 kg (176 lb 3.2 oz), last menstrual period 10/05/1999, SpO2 98 %.  General: Well developed, well nourished. No distress.  Pulmonary: Unlabored respiratory effort.  Neurologic: Grossly nonfocal. No facial asymmetry noted.  Skin: Warm, dry, good turgor.  Large scratch noted on the posterior aspect of the right lower leg extending from the posterior aspect of the knee covering two thirds of the right lower leg.  There is associated erythema and ecchymosis.  There is no induration or drainage.  The area is diffusely mildly " tender.  Psych: Normal mood. Alert and oriented x3. Judgment and insight is normal.    tdap IM    Assessment/Plan:  No significant infection going on at this time.  There may be a mild infection starting and given her potential immune compromised state, will cover with antibiotics.  Take all medication as directed.  Follow-up for worsening or persistent symptoms.  1. Dog scratch  cephALEXin (KEFLEX) 500 MG Cap    Tdap =>5yo IM

## 2018-09-04 ENCOUNTER — OFFICE VISIT (OUTPATIENT)
Dept: MEDICAL GROUP | Facility: PHYSICIAN GROUP | Age: 53
End: 2018-09-04
Payer: MEDICARE

## 2018-09-04 VITALS
SYSTOLIC BLOOD PRESSURE: 128 MMHG | HEIGHT: 62 IN | OXYGEN SATURATION: 99 % | BODY MASS INDEX: 32.76 KG/M2 | WEIGHT: 178 LBS | RESPIRATION RATE: 16 BRPM | TEMPERATURE: 97.4 F | DIASTOLIC BLOOD PRESSURE: 80 MMHG | HEART RATE: 80 BPM

## 2018-09-04 DIAGNOSIS — Z13.6 SCREENING FOR CARDIOVASCULAR CONDITION: ICD-10-CM

## 2018-09-04 DIAGNOSIS — R76.8 POSITIVE ANA (ANTINUCLEAR ANTIBODY): ICD-10-CM

## 2018-09-04 DIAGNOSIS — D50.9 IRON DEFICIENCY ANEMIA, UNSPECIFIED IRON DEFICIENCY ANEMIA TYPE: ICD-10-CM

## 2018-09-04 DIAGNOSIS — L98.9 SKIN LESIONS: ICD-10-CM

## 2018-09-04 DIAGNOSIS — Z86.39 HISTORY OF DIABETES MELLITUS, TYPE II: ICD-10-CM

## 2018-09-04 DIAGNOSIS — M12.9 ARTHRITIS, MULTIPLE JOINT INVOLVEMENT: ICD-10-CM

## 2018-09-04 DIAGNOSIS — Z23 NEED FOR INFLUENZA VACCINATION: ICD-10-CM

## 2018-09-04 DIAGNOSIS — F31.32 BIPOLAR AFFECTIVE DISORDER, CURRENTLY DEPRESSED, MODERATE (HCC): ICD-10-CM

## 2018-09-04 DIAGNOSIS — M48.061 SPINAL STENOSIS OF LUMBAR REGION, UNSPECIFIED WHETHER NEUROGENIC CLAUDICATION PRESENT: ICD-10-CM

## 2018-09-04 DIAGNOSIS — J44.9 CHRONIC OBSTRUCTIVE PULMONARY DISEASE, UNSPECIFIED COPD TYPE (HCC): ICD-10-CM

## 2018-09-04 DIAGNOSIS — G89.4 CHRONIC PAIN SYNDROME: ICD-10-CM

## 2018-09-04 DIAGNOSIS — M53.9 MULTILEVEL DEGENERATIVE DISC DISEASE: ICD-10-CM

## 2018-09-04 DIAGNOSIS — M54.12 CERVICAL RADICULOPATHY, CHRONIC: ICD-10-CM

## 2018-09-04 PROCEDURE — 99214 OFFICE O/P EST MOD 30 MIN: CPT | Performed by: NURSE PRACTITIONER

## 2018-09-04 RX ORDER — METHADONE HYDROCHLORIDE 10 MG/1
10 TABLET ORAL 3 TIMES DAILY
Refills: 0 | COMMUNITY
Start: 2018-08-10 | End: 2018-09-24

## 2018-09-04 NOTE — ASSESSMENT & PLAN NOTE
Patient was encouraged to make an appointment with dermatology for multiple skin lesions to abdomen and upper back.  They do not itch, she has previously tried on ketoconazole shampoo but this did not make any kind of improvement.

## 2018-09-04 NOTE — PROGRESS NOTES
Chief Complaint   Patient presents with   • Back Pain     joint pain         This is a 52 y.o.female patient that presents today with the following: Chronic pain, other acute and chronic conditions, positive NAKUL    Chronic pain syndrome  Pt has chronic pain in low back, neck and in multiple joints. She has positive NAKUL and has been referred to rheumatology by previous PCP. She is currently taking Oxycodone 20 mg 3 times a day, but also told MA she was on methadone. She tells me that she is not taking it because she does not like it. She is wanted to start pain contract with me, but I discussed with her that with her current dose of oxycodone, MME is 90--too high for primary care to prescribe. She is agreeable to referral to pain management.    Bipolar affective disorder, currently depressed, moderate (CMS-HCC)  Pt is normally followed by psychiatry for this, but is currently in process of transferring care to provider here in [place], as she recently moved to the area.     Positive NAKUL (antinuclear antibody)  Pt has family hx of lupus and she was recently referred to rheumatology for positive NAKUL.  This is tested because she has multiple joint pain, arthritis and states that she sometimes has butterfly rash on her face.  She was initially referred to rheumatology at [facility], but would not be able to be scheduled for several months, thus she was referred to [place].  Unfortunately she reports that [place] cannot get her in until January 2019, she can no longer wait and would like referral to another rheumatologist outside of [facility] in [place], referral was placed for her.    Iron deficiency anemia  Patient has been taking iron supplements, recently diagnosed with iron deficiency anemia.  She will be due for labs again in September as ordered by her previous PCP.    Skin lesions  Patient was encouraged to make an appointment with dermatology for multiple skin lesions to abdomen and upper back.  They do not itch, she  has previously tried on ketoconazole shampoo but this did not make any kind of improvement.    COPD (chronic obstructive pulmonary disease) (CMS-HCC) (HCC)  This is a chronic condition, stable at this time, she is on Symbicort and as needed albuterol.  She states that her COPD worsens in the winter months.  We will make sure she gets her flu shot, this is given to her today.    History of diabetes mellitus, type II  Patient reports past history of type 2 diabetes for which she was taking Metformin.  She has not taken this for several years secondary to losing weight with the help of bariatric surgery.  However she does report chronic headache and excessive thirst and would like to be rechecked, labs were ordered.      No visits with results within 1 Month(s) from this visit.   Latest known visit with results is:   Hospital Outpatient Visit on 07/12/2018   Component Date Value   • Antinuclear Antibody 07/12/2018 Detected*   • NAKUL Titer 07/12/2018 1:80*   • ANCA IgG 07/12/2018 <1:20    • Sed Rate Westergren 07/12/2018 23          clinical course has been stable    Past Medical History:   Diagnosis Date   • Anxiety and depression    • Arthritis     back, neck   • Asthma    • Back injury    • Back pain    • Bronchitis 2010   • Cancer (HCC)     breast LT   • Chickenpox    • Chronic LBP    • Chronic neck pain    • Cold intolerance 1/24/2012   • COPD (chronic obstructive pulmonary disease) ([facility])    • Cystic fibrosis ([facility])    • Depression    • Diabetes    • GERD (gastroesophageal reflux disease)    • H/O gastric bypass 10/11/2013   • Herniated nucleus pulposus, L4-5 3/18/2010   • HX: breast cancer 10/11/2013   • Hypertension    • Itching due to drug 6/2/2011   • Kidney stone    • Nephrolithiasis 10/11/2013   • Obesity    • Osteoporosis    • Other specified symptom associated with female genital organs    • Panic disorder    • Pneumonia    • PTSD (post-traumatic stress disorder)    • Restless leg syndrome    • Rheumatoid  arthritis (HCC)    • S/P laminectomy 10/22/2012   • Thoracic or lumbosacral neuritis or radiculitis, unspecified 10/22/2012   • Trauma     head trauma       Past Surgical History:   Procedure Laterality Date   • URETEROSCOPY  10/10/2013    Performed by Molly Resendiz M.D. at SURGERY [facility]   • LASERTRIPSY  10/10/2013    Performed by Molly Resendiz M.D. at SURGERY [facility] ORS   • LUMBAR FUSION POSTERIOR  10/22/2012    Performed by Alfred Branham M.D. at SURGERY [facility]   • LUMBAR LAMINECTOMY DISKECTOMY  10/22/2012    Performed by Alfred Branham M.D. at SURGERY [facility]   • OTHER ORTHOPEDIC SURGERY  10/01/2012    lumbar fusion, Dr Branham   • OTHER ABDOMINAL SURGERY  2006    gastric bypass   • ABDOMINAL HYSTERECTOMY TOTAL      DUE TO FIBROIDS   • BREAST RECONSTRUCTION      Lt breast   • CRANIOTOMY      DUE TO BRAIN INJURY   • HYSTERECTOMY LAPAROSCOPY     • LAMINOTOMY     • MASTECTOMY      Lt breast   • OTHER      left ear drum surgery   • OTHER ABDOMINAL SURGERY      hernia repair   • PRIMARY C SECTION      x 3   • SLEEVE,MAXIMILIAN VASO THIGH     • TONSILLECTOMY     • US-CYST ASPIRATION-BREAST INITIAL     • VENTILATOR CONTINUOUS         Family History   Problem Relation Age of Onset   • Diabetes Father    • Cancer Father    • Hypertension Father    • Hyperlipidemia Father    • Cancer Mother    • Other Mother         SLE   • Diabetes Mother    • Hypertension Mother    • Hyperlipidemia Mother    • Stroke Mother    • Sleep Apnea Brother    • Cancer Maternal Aunt    • Diabetes Maternal Aunt    • Diabetes Maternal Uncle    • Diabetes Maternal Grandmother    • Diabetes Maternal Grandfather        Patient has no known allergies.    Current Outpatient Prescriptions Ordered in [facility]   Medication Sig Dispense Refill   • methadone (DOLOPHINE) 10 MG Tab Take 10 mg by mouth 3 times a day.  0   • ibuprofen (MOTRIN) 800 MG Tab TAKE 1 TABLET BY MOUTH EVERY 8 HOURS AS NEEDED 60 Tab 0   • ketoconazole  "(NIZORAL) 2 % shampoo Apply to skin one time daily in the shower and leave on for 5 minutes then rinse off. 2-4 weeks 1 Bottle 3   • Oxycodone HCl 20 MG Tab Take 20 mg by mouth 4 times a day.     • Nebulizers (VIOS AEROSOL DELIVERY SYSTEM) Misc U UTD  0   • ARIPiprazole (ABILIFY) 10 MG Tab Take 1 Tab by mouth every day. 30 Tab 2   • budesonide-formoterol (SYMBICORT) 80-4.5 MCG/ACT Aerosol Inhale 2 Puffs by mouth 2 Times a Day. 1 Inhaler 3   • fluticasone (FLONASE) 50 MCG/ACT nasal spray Spray 1 Spray in nose 2 times a day. Each Nostril 1 Bottle 3   • tiotropium (SPIRIVA) 18 MCG Cap Inhale 1 Cap by mouth every day. 30 Cap 3     No current Epic-ordered facility-administered medications on file.        Constitutional ROS: No unexpected change in weight, No weakness, No unexplained fevers, sweats, or chills  Pulmonary ROS: Positive for COPD  Cardiovascular ROS: No chest pain, No edema, No palpitations  Musculoskeletal/Extremities ROS: Positive per HPI  Neurologic ROS: Normal development, No seizures, No weakness  Psychiatric ROS: Positive per HPI  Skin ROS: Positive per HPI  Endocrine ROS: Positive per HPI    Physical exam:  /80   Pulse 80   Temp 36.3 °C (97.4 °F)   Resp 16   Ht 1.575 m (5' 2\")   Wt 80.7 kg (178 lb)   LMP 10/05/1999   SpO2 99%   BMI 32.56 kg/m²   General Appearance: alert, obese, well-groomed  Skin: Positive for white colored irregularly-shaped lesions on abdomen and back  Lungs: negative findings: normal respiratory rate and rhythm, lungs clear to auscultation  Heart: negative. RRR without murmur, gallop, or rubs.  No ectopy.  Abdomen: Abdomen soft, non-tender. BS normal. No masses,  No organomegaly  Musculoskeletal: negative findings: no evidence of joint instability, no evidence of muscle atrophy, no deformities present.  Positive for limited range of motion to cervical and lumbar spine due to pain  Neurologic: intact, CN II through XII grossly intact    Medical decision " making/discussion: Urgent referrals have been placed rheumatology and pain management.  She is to have her labs done before she follows up with me in 6-8 weeks.  She was advised to make an appointment with psychiatry as referred by her previous PCP.  She was also advised to make an appointment with dermatology, contact information was provided for her.  She will get flu shot today.    Patti was seen today for back pain.    Diagnoses and all orders for this visit:    Cervical radiculopathy, chronic    Arthritis, multiple joint involvement  -     REFERRAL TO RHEUMATOLOGY  -     REFERRAL TO PAIN CLINIC    Positive NAKUL (antinuclear antibody)  -     REFERRAL TO RHEUMATOLOGY    Spinal stenosis of lumbar region, unspecified whether neurogenic claudication present  -     REFERRAL TO PAIN CLINIC    Multilevel degenerative disc disease  -     REFERRAL TO PAIN CLINIC    Bipolar affective disorder, currently depressed, moderate (HCC)    Chronic pain syndrome    Iron deficiency anemia, unspecified iron deficiency anemia type    Skin lesions    Chronic obstructive pulmonary disease, unspecified COPD type (HCC)    History of diabetes mellitus, type II  -     HEMOGLOBIN A1C; Future  -     MICROALBUMIN CREAT RATIO URINE; Future    Screening for cardiovascular condition  -     LIPID PROFILE; Future    Need for influenza vaccination  -     INFLUENZA VACCINE QUAD INJ >3Y(PF)          Please note that this dictation was created using voice recognition software. I have made every reasonable attempt to correct obvious errors, but I expect that there are errors of grammar and possibly content that I did not discover before finalizing the note.

## 2018-09-04 NOTE — ASSESSMENT & PLAN NOTE
Patient has been taking iron supplements, recently diagnosed with iron deficiency anemia.  She will be due for labs again in September as ordered by her previous PCP.

## 2018-09-04 NOTE — PATIENT INSTRUCTIONS
Urgent referrals placed to rheumatology and pain management    Have your labs done, I added some to the current order--they are fasting    Make appt with psychiatry here in [place]    Call the following to set up appt with derm:    [facility]  [facility] [address] [place] [address]  Phone: [phone]  Fax: [phone]    Follow up with me in 6-8 weeks, sooner if needed

## 2018-09-04 NOTE — ASSESSMENT & PLAN NOTE
Pt has chronic pain in low back, neck and in multiple joints. She has positive NAKUL and has been referred to rheumatology by previous PCP. She is currently taking Oxycodone 20 mg 3 times a day, but also told MA she was on methadone. She tells me that she is not taking it because she does not like it. She is wanted to start pain contract with me, but I discussed with her that with her current dose of oxycodone, MME is 90--too high for primary care to prescribe. She is agreeable to referral to pain management.

## 2018-09-04 NOTE — ASSESSMENT & PLAN NOTE
This is a chronic condition, stable at this time, she is on Symbicort and as needed albuterol.  She states that her COPD worsens in the winter months.  We will make sure she gets her flu shot, this is given to her today.

## 2018-09-04 NOTE — ASSESSMENT & PLAN NOTE
Patient reports past history of type 2 diabetes for which she was taking Metformin.  She has not taken this for several years secondary to losing weight with the help of bariatric surgery.  However she does report chronic headache and excessive thirst and would like to be rechecked, labs were ordered.

## 2018-09-04 NOTE — ASSESSMENT & PLAN NOTE
Pt is normally followed by psychiatry for this, but is currently in process of transferring care to provider here in [place], as she recently moved to the area.

## 2018-09-04 NOTE — ASSESSMENT & PLAN NOTE
Pt has family hx of lupus and she was recently referred to rheumatology for positive NAKUL.  This is tested because she has multiple joint pain, arthritis and states that she sometimes has butterfly rash on her face.  She was initially referred to rheumatology at [facility], but would not be able to be scheduled for several months, thus she was referred to [place].  Unfortunately she reports that [place] cannot get her in until January 2019, she can no longer wait and would like referral to another rheumatologist outside of [facility] in [place], referral was placed for her.

## 2018-09-17 ENCOUNTER — SLEEP CENTER VISIT (OUTPATIENT)
Dept: SLEEP MEDICINE | Facility: MEDICAL CENTER | Age: 53
End: 2018-09-17
Payer: MEDICARE

## 2018-09-17 VITALS
HEART RATE: 86 BPM | HEIGHT: 62 IN | WEIGHT: 176 LBS | BODY MASS INDEX: 32.39 KG/M2 | RESPIRATION RATE: 15 BRPM | OXYGEN SATURATION: 97 % | SYSTOLIC BLOOD PRESSURE: 124 MMHG | DIASTOLIC BLOOD PRESSURE: 82 MMHG

## 2018-09-17 DIAGNOSIS — G47.33 OSA (OBSTRUCTIVE SLEEP APNEA): ICD-10-CM

## 2018-09-17 DIAGNOSIS — J44.9 CHRONIC OBSTRUCTIVE PULMONARY DISEASE, UNSPECIFIED COPD TYPE (HCC): ICD-10-CM

## 2018-09-17 PROCEDURE — 99213 OFFICE O/P EST LOW 20 MIN: CPT | Performed by: FAMILY MEDICINE

## 2018-09-17 NOTE — PROGRESS NOTES
[facility] Follow Up Note     Date: 9/17/2018 / Time: 10:47 AM    Patient ID:   Name:             Patti Tello     YOB: 1965  Age:                 52 y.o.  female   MRN:               [id number]      Thank you for requesting a sleep medicine consultation on Patti Tello at the sleep center. She presents today with the chief complaints of TOMY and SS follow up.     HISTORY OF PRESENT ILLNESS:       Pt is currently not on PAP therapy. No change in sleep schedule and symptoms.  She goes to sleep around 9 pm and wakes up around 5 am. She is getting about 6 hrs of sleep on a good night and about 4 hr of sleep on a bad night. The bad nights are about 3-4 per week.  The symptoms of excessive daytime, snoring, witnessed apnea and trouble initiating sleep continues.     Since her last visit she had PSG which showed moderate obstructive sleep apnea as shown by the apnea hypopnea index of 26/hr. There was a total of 12 apneas and 148 hypopneas. In the supine position the respiratory disturbance index was 64 an hour and in the non-supine position the respiratory disturbance index was 12 per hour. The respiratory events were associated with O2 terrie of 77% and average O2 saturation of 92%. She spent 9 % of sleep time below 89% O2 saturation.          REVIEW OF SYSTEMS:       Constitutional: Denies fevers, Denies weight changes  Eyes: Denies changes in vision, no eye pain  Ears/Nose/Throat/Mouth: Denies nasal congestion or sore throat   Cardiovascular: Denies chest pain or palpitations   Respiratory: Denies shortness of breath , Denies cough  Gastrointestinal/Hepatic: Denies abdominal pain, nausea, vomiting, diarrhea, constipation or GI bleeding   Genitourinary: Denies bladder dysfunction, dysuria or frequency  Musculoskeletal/Rheum: Denies  joint pain and swelling   Skin/Breast: Denies rash,   Neurological: Denies headache, confusion, memory loss or focal weakness/parasthesias  Psychiatric:  denies mood disorder     Comprehensive review of systems form is reviewed with the patient and is attached in the EMR.     PMH:  has a past medical history of Anxiety and depression; Arthritis; Asthma; Back injury; Back pain; Bronchitis (2010); Cancer ([facility]); Chickenpox; Chronic LBP; Chronic neck pain; Cold intolerance (1/24/2012); COPD (chronic obstructive pulmonary disease) ([facility]); Cystic fibrosis ([facility]); Depression; Diabetes; GERD (gastroesophageal reflux disease); H/O gastric bypass (10/11/2013); Herniated nucleus pulposus, L4-5 (3/18/2010); breast cancer (10/11/2013); Hypertension; Itching due to drug (6/2/2011); Kidney stone; Nephrolithiasis (10/11/2013); Obesity; Osteoporosis; Other specified symptom associated with female genital organs; Panic disorder; Pneumonia; PTSD (post-traumatic stress disorder); Restless leg syndrome; Rheumatoid arthritis ([facility]); S/P laminectomy (10/22/2012); Thoracic or lumbosacral neuritis or radiculitis, unspecified (10/22/2012); and Trauma.  MEDS:   Current Outpatient Prescriptions:   •  methadone (DOLOPHINE) 10 MG Tab, Take 10 mg by mouth 3 times a day., Disp: , Rfl: 0  •  ibuprofen (MOTRIN) 800 MG Tab, TAKE 1 TABLET BY MOUTH EVERY 8 HOURS AS NEEDED, Disp: 60 Tab, Rfl: 0  •  ketoconazole (NIZORAL) 2 % shampoo, Apply to skin one time daily in the shower and leave on for 5 minutes then rinse off. 2-4 weeks, Disp: 1 Bottle, Rfl: 3  •  Oxycodone HCl 20 MG Tab, Take 20 mg by mouth 4 times a day., Disp: , Rfl:   •  Nebulizers (VIOS AEROSOL DELIVERY SYSTEM) Misc, U UTD, Disp: , Rfl: 0  •  ARIPiprazole (ABILIFY) 10 MG Tab, Take 1 Tab by mouth every day., Disp: 30 Tab, Rfl: 2  •  budesonide-formoterol (SYMBICORT) 80-4.5 MCG/ACT Aerosol, Inhale 2 Puffs by mouth 2 Times a Day., Disp: 1 Inhaler, Rfl: 3  •  fluticasone (FLONASE) 50 MCG/ACT nasal spray, Spray 1 Spray in nose 2 times a day. Each Nostril, Disp: 1 Bottle, Rfl: 3  •  tiotropium (SPIRIVA) 18 MCG Cap, Inhale 1 Cap by mouth every day.,  "Disp: 30 Cap, Rfl: 3  ALLERGIES: No Known Allergies  SURGHX:   Past Surgical History:   Procedure Laterality Date   • URETEROSCOPY  10/10/2013    Performed by Molly Resendiz M.D. at SURGERY [facility]   • LASERTRIPSY  10/10/2013    Performed by Molly Resendiz M.D. at SURGERY [facility] ORS   • LUMBAR FUSION POSTERIOR  10/22/2012    Performed by Alfred Branham M.D. at SURGERY [facility]   • LUMBAR LAMINECTOMY DISKECTOMY  10/22/2012    Performed by Alfred Branham M.D. at SURGERY [facility]   • OTHER ORTHOPEDIC SURGERY  10/01/2012    lumbar fusion, Dr Branham   • OTHER ABDOMINAL SURGERY  2006    gastric bypass   • ABDOMINAL HYSTERECTOMY TOTAL      DUE TO FIBROIDS   • BREAST RECONSTRUCTION      Lt breast   • CRANIOTOMY      DUE TO BRAIN INJURY   • HYSTERECTOMY LAPAROSCOPY     • LAMINOTOMY     • MASTECTOMY      Lt breast   • OTHER      left ear drum surgery   • OTHER ABDOMINAL SURGERY      hernia repair   • PRIMARY C SECTION      x 3   • SLEEVE,MAXIMILIAN VASO THIGH     • TONSILLECTOMY     • US-CYST ASPIRATION-BREAST INITIAL     • VENTILATOR CONTINUOUS       SOCHX:  reports that she has never smoked. She has never used smokeless tobacco. She reports that she does not drink alcohol or use drugs..  FH:   Family History   Problem Relation Age of Onset   • Diabetes Father    • Cancer Father    • Hypertension Father    • Hyperlipidemia Father    • Cancer Mother    • Other Mother         SLE   • Diabetes Mother    • Hypertension Mother    • Hyperlipidemia Mother    • Stroke Mother    • Sleep Apnea Brother    • Cancer Maternal Aunt    • Diabetes Maternal Aunt    • Diabetes Maternal Uncle    • Diabetes Maternal Grandmother    • Diabetes Maternal Grandfather          Physical Exam:  Vitals/ General Appearance:   Weight/BMI: Body mass index is 32.19 kg/m².  Blood pressure 124/82, pulse 86, resp. rate 15, height 1.575 m (5' 2\"), weight 79.8 kg (176 lb), last menstrual period 10/05/1999, SpO2 97 %.  Vitals:    " "09/17/18 1110   BP: 124/82   Pulse: 86   Resp: 15   SpO2: 97%   Weight: 79.8 kg (176 lb)   Height: 1.575 m (5' 2\")       Pt. is alert and oriented to time, place and person. Cooperative and in no apparent distress.       1. Head: Atraumatic, normocephalic.   2. Ears: Normal tympanic membrane and no discharge  3. Nose: No inferior turbinate hypertophy, no septal deviation, no polyp.   4. Throat: Oropharynx appears crowded in that the palate is  overhanging (Malam Amy scale 4). 5. Neck: Supple. No thyromegaly  6. Chest: Trachea central, no spine deformity   7. Lungs auscultation: B/L good air entry, vesicular breath sounds, no adventitious sounds  8. Heart auscultation: 1st and 2nd heart sounds normal, regular rhythm. No appreciable murmur.  9. Abdomen: Soft, non tender, no organomegaly. Bowel sounds present  10. Extremities: no clubbing, no pedal edema.  11. Skin: No rash  12. NEUROLOGICAL EXAMINATION: On neurological exam, the patient was alert and oriented x3. speech was clear and fluent without dysarthria.      INVESTIGATIONS:           ASSESSMENT AND PLAN     1. Sleep Apnea (TOMY).The pathophysiology of sleep anea and the increased risk of cardiovascular morbidity from untreated sleep apnea is discussed in detail with the patient.    She is urged to avoid supine sleep, weight gain and alcoholic beverages since all of these can worsen sleep apnea. She is cautioned against drowsy driving. If She feels sleepy while driving, She must pull over for a break/nap, rather than persist on the road, in the interest of She own safety and that of others on the road.   Plan   -  After informed discussion ACPAP 8-15 cm is ordered today with F[facility]    - she would like ENT referral for possible UPPP or jaw advancement surgery for TOMY    - F/u in 6 weeks to assess the efficiacy of recommended pressure    - SS was reviewed and discussed with the pt   - compliance was reinforced     2. COPD: Currently stable on symbicort and spiriva. " Denies recent exacerbation or systemic steroid use.      3. Regarding treatment of other past medical problems and general health maintenance,  She is urged to follow up with PCP.

## 2018-09-19 ENCOUNTER — HOSPITAL ENCOUNTER (OUTPATIENT)
Dept: LAB | Facility: MEDICAL CENTER | Age: 53
End: 2018-09-19
Attending: PHYSICIAN ASSISTANT
Payer: MEDICARE

## 2018-09-19 ENCOUNTER — HOSPITAL ENCOUNTER (OUTPATIENT)
Dept: LAB | Facility: MEDICAL CENTER | Age: 53
End: 2018-09-19
Attending: NURSE PRACTITIONER
Payer: MEDICARE

## 2018-09-19 DIAGNOSIS — D50.9 IRON DEFICIENCY ANEMIA, UNSPECIFIED IRON DEFICIENCY ANEMIA TYPE: ICD-10-CM

## 2018-09-19 DIAGNOSIS — R74.8 ELEVATED ALKALINE PHOSPHATASE LEVEL: ICD-10-CM

## 2018-09-19 DIAGNOSIS — E55.9 VITAMIN D INSUFFICIENCY: ICD-10-CM

## 2018-09-19 DIAGNOSIS — Z86.39 HISTORY OF DIABETES MELLITUS, TYPE II: ICD-10-CM

## 2018-09-19 DIAGNOSIS — Z13.6 SCREENING FOR CARDIOVASCULAR CONDITION: ICD-10-CM

## 2018-09-19 LAB
25(OH)D3 SERPL-MCNC: 11 NG/ML (ref 30–100)
ANISOCYTOSIS BLD QL SMEAR: ABNORMAL
BASOPHILS # BLD AUTO: 0.9 % (ref 0–1.8)
BASOPHILS # BLD: 0.08 K/UL (ref 0–0.12)
CHOLEST SERPL-MCNC: 158 MG/DL (ref 100–199)
COMMENT 1642: NORMAL
EOSINOPHIL # BLD AUTO: 0.35 K/UL (ref 0–0.51)
EOSINOPHIL NFR BLD: 3.9 % (ref 0–6.9)
ERYTHROCYTE [DISTWIDTH] IN BLOOD BY AUTOMATED COUNT: 50.9 FL (ref 35.9–50)
EST. AVERAGE GLUCOSE BLD GHB EST-MCNC: 137 MG/DL
FASTING STATUS PATIENT QL REPORTED: NORMAL
FERRITIN SERPL-MCNC: 8.2 NG/ML (ref 10–291)
HBA1C MFR BLD: 6.4 % (ref 0–5.6)
HCT VFR BLD AUTO: 39.8 % (ref 37–47)
HDLC SERPL-MCNC: 53 MG/DL
HGB BLD-MCNC: 11.7 G/DL (ref 12–16)
IMM GRANULOCYTES # BLD AUTO: 0.02 K/UL (ref 0–0.11)
IMM GRANULOCYTES NFR BLD AUTO: 0.2 % (ref 0–0.9)
IRON SATN MFR SERPL: 15 % (ref 15–55)
IRON SERPL-MCNC: 75 UG/DL (ref 40–170)
LDLC SERPL CALC-MCNC: 81 MG/DL
LYMPHOCYTES # BLD AUTO: 1.94 K/UL (ref 1–4.8)
LYMPHOCYTES NFR BLD: 21.3 % (ref 22–41)
MCH RBC QN AUTO: 22.5 PG (ref 27–33)
MCHC RBC AUTO-ENTMCNC: 29.4 G/DL (ref 33.6–35)
MCV RBC AUTO: 76.4 FL (ref 81.4–97.8)
MICROCYTES BLD QL SMEAR: ABNORMAL
MONOCYTES # BLD AUTO: 0.49 K/UL (ref 0–0.85)
MONOCYTES NFR BLD AUTO: 5.4 % (ref 0–13.4)
MORPHOLOGY BLD-IMP: NORMAL
NEUTROPHILS # BLD AUTO: 6.21 K/UL (ref 2–7.15)
NEUTROPHILS NFR BLD: 68.3 % (ref 44–72)
NRBC # BLD AUTO: 0 K/UL
NRBC BLD-RTO: 0 /100 WBC
PLATELET # BLD AUTO: 340 K/UL (ref 164–446)
PLATELET BLD QL SMEAR: NORMAL
PMV BLD AUTO: 11.3 FL (ref 9–12.9)
RBC # BLD AUTO: 5.21 M/UL (ref 4.2–5.4)
RBC BLD AUTO: PRESENT
TIBC SERPL-MCNC: 515 UG/DL (ref 250–450)
TRIGL SERPL-MCNC: 119 MG/DL (ref 0–149)
WBC # BLD AUTO: 9.1 K/UL (ref 4.8–10.8)

## 2018-09-19 PROCEDURE — 82728 ASSAY OF FERRITIN: CPT

## 2018-09-19 PROCEDURE — 83036 HEMOGLOBIN GLYCOSYLATED A1C: CPT | Mod: GA

## 2018-09-19 PROCEDURE — 82306 VITAMIN D 25 HYDROXY: CPT

## 2018-09-19 PROCEDURE — 80061 LIPID PANEL: CPT

## 2018-09-19 PROCEDURE — 83550 IRON BINDING TEST: CPT

## 2018-09-19 PROCEDURE — 82570 ASSAY OF URINE CREATININE: CPT

## 2018-09-19 PROCEDURE — 36415 COLL VENOUS BLD VENIPUNCTURE: CPT | Mod: GA

## 2018-09-19 PROCEDURE — 85025 COMPLETE CBC W/AUTO DIFF WBC: CPT

## 2018-09-19 PROCEDURE — 84080 ASSAY ALKALINE PHOSPHATASES: CPT

## 2018-09-19 PROCEDURE — 82043 UR ALBUMIN QUANTITATIVE: CPT

## 2018-09-19 PROCEDURE — 84075 ASSAY ALKALINE PHOSPHATASE: CPT

## 2018-09-19 PROCEDURE — 83540 ASSAY OF IRON: CPT

## 2018-09-20 LAB
CREAT UR-MCNC: 393 MG/DL
MICROALBUMIN UR-MCNC: 2.4 MG/DL
MICROALBUMIN/CREAT UR: 6 MG/G (ref 0–30)

## 2018-09-21 ENCOUNTER — OFFICE VISIT (OUTPATIENT)
Dept: URGENT CARE | Facility: PHYSICIAN GROUP | Age: 53
End: 2018-09-21
Payer: MEDICARE

## 2018-09-21 VITALS — RESPIRATION RATE: 20 BRPM | WEIGHT: 177 LBS | BODY MASS INDEX: 32.57 KG/M2 | HEIGHT: 62 IN

## 2018-09-21 DIAGNOSIS — T78.40XA ALLERGIC REACTION, INITIAL ENCOUNTER: ICD-10-CM

## 2018-09-21 LAB
ALP BONE SERPL-CCNC: 99 U/L (ref 0–55)
ALP ISOS SERPL HS-CCNC: 0 U/L
ALP LIVER SERPL-CCNC: 84 U/L (ref 0–94)
ALP SERPL-CCNC: 183 U/L (ref 40–120)

## 2018-09-21 PROCEDURE — 99214 OFFICE O/P EST MOD 30 MIN: CPT | Mod: 25 | Performed by: PHYSICIAN ASSISTANT

## 2018-09-21 RX ORDER — METHYLPREDNISOLONE 4 MG/1
TABLET ORAL
Qty: 21 TAB | Refills: 0 | Status: SHIPPED | OUTPATIENT
Start: 2018-09-21 | End: 2018-10-20

## 2018-09-21 RX ORDER — BUPRENORPHINE HYDROCHLORIDE, NALOXONE HYDROCHLORIDE 8; 2 MG/1; MG/1
8 FILM, SOLUBLE BUCCAL; SUBLINGUAL 2 TIMES DAILY
Refills: 0 | COMMUNITY
Start: 2018-09-18 | End: 2018-09-24

## 2018-09-21 RX ORDER — METHYLPREDNISOLONE SODIUM SUCCINATE 125 MG/2ML
125 INJECTION, POWDER, LYOPHILIZED, FOR SOLUTION INTRAMUSCULAR; INTRAVENOUS ONCE
Status: COMPLETED | OUTPATIENT
Start: 2018-09-21 | End: 2018-09-21

## 2018-09-21 RX ADMIN — METHYLPREDNISOLONE SODIUM SUCCINATE 125 MG: 125 INJECTION, POWDER, LYOPHILIZED, FOR SOLUTION INTRAMUSCULAR; INTRAVENOUS at 18:27

## 2018-09-22 NOTE — PROGRESS NOTES
Chief Complaint   Patient presents with   • Allergic Reaction     poss allergic reaction to medication       HISTORY OF PRESENT ILLNESS: Patient is a 52 y.o. female who presents today for the following:      Taken off methadone and oxycodone 9/18  Was put on suboxone - made tongue swell 9/19 the first time she took it, about 30-45 minutes after taking it; also had hives  Yesterday's dose and today's dose, swelling has been worse each time  Has been benadryl every 4 hours   Feels chest is tight, hard to breathe  Also had the flu 9/18 - has had the flu shot for years    Patient Active Problem List    Diagnosis Date Noted   • Lumbar stenosis 10/22/2012     Priority: High   • TOMY (obstructive sleep apnea) 09/17/2018   • History of diabetes mellitus, type II 09/04/2018   • Positive NAKUL (antinuclear antibody) 07/24/2018   • Arthritis, multiple joint involvement 07/24/2018   • Family history of systemic lupus erythematosus (SLE) in mother 07/24/2018   • Iron deficiency anemia 07/24/2018   • Skin lesions 07/24/2018   • Other fatigue 06/20/2018   • Heartburn 05/08/2018   • Muscle spasm of both lower legs 03/26/2018   • Calcaneal spur of both feet 03/15/2018   • Pain management contract broken 03/15/2018   • Obesity (BMI 30-39.9) 01/18/2018   • COPD (chronic obstructive pulmonary disease) ([facility]) 10/05/2017   • Bipolar affective disorder, currently depressed, moderate ([facility]) 01/24/2017   • History of gastric bypass 10/11/2013   • Chronic pain syndrome 09/16/2013   • Anxiety 12/06/2012   • Multilevel degenerative disc disease 10/01/2012   • Cervical radiculopathy, chronic 05/04/2011   • Vitamin D insufficiency 11/16/2010   • Arthritis    • Panic attacks    • PTSD (post-traumatic stress disorder)        Allergies:Patient has no known allergies.    Current Outpatient Prescriptions [facility]   Medication Sig Dispense Refill   • SUBOXONE 8-2 MG FILM Take 8 tablet by mouth 2 Times a Day.  0   • MethylPREDNISolone (MEDROL  DOSEPAK) 4 MG Tablet Therapy Pack Use as package directs 21 Tab 0   • methadone (DOLOPHINE) 10 MG Tab Take 10 mg by mouth 3 times a day.  0   • ibuprofen (MOTRIN) 800 MG Tab TAKE 1 TABLET BY MOUTH EVERY 8 HOURS AS NEEDED 60 Tab 0   • ARIPiprazole (ABILIFY) 10 MG Tab Take 1 Tab by mouth every day. 30 Tab 2   • budesonide-formoterol (SYMBICORT) 80-4.5 MCG/ACT Aerosol Inhale 2 Puffs by mouth 2 Times a Day. 1 Inhaler 3   • ketoconazole (NIZORAL) 2 % shampoo Apply to skin one time daily in the shower and leave on for 5 minutes then rinse off. 2-4 weeks 1 Bottle 3   • Oxycodone HCl 20 MG Tab Take 20 mg by mouth 4 times a day.     • Nebulizers ([employer] AEROSOL DELIVERY SYSTEM) Misc U UTD  0   • fluticasone (FLONASE) 50 MCG/ACT nasal spray Spray 1 Spray in nose 2 times a day. Each Nostril 1 Bottle 3   • tiotropium (SPIRIVA) 18 MCG Cap Inhale 1 Cap by mouth every day. 30 Cap 3     Current Facility-Administered Medications Ordered in Epic   Medication Dose Route Frequency Provider Last Rate Last Dose   • methylPREDNISolone sod succ (SOLU-MEDROL) 125 MG injection 125 mg  125 mg Intramuscular Once MARY JANE AppleA.LEISA.           Past Medical History:   Diagnosis Date   • Anxiety and depression    • Arthritis     back, neck   • Asthma    • Back injury    • Back pain    • Bronchitis 2010   • Cancer (HCC)     breast LT   • Chickenpox    • Chronic LBP    • Chronic neck pain    • Cold intolerance 1/24/2012   • COPD (chronic obstructive pulmonary disease) ([facility])    • Cystic fibrosis ([facility])    • Depression    • Diabetes    • GERD (gastroesophageal reflux disease)    • H/O gastric bypass 10/11/2013   • Herniated nucleus pulposus, L4-5 3/18/2010   • HX: breast cancer 10/11/2013   • Hypertension    • Itching due to drug 6/2/2011   • Kidney stone    • Nephrolithiasis 10/11/2013   • Obesity    • Osteoporosis    • Other specified symptom associated with female genital organs    • Panic disorder    • Pneumonia    • PTSD (post-traumatic stress  "disorder)    • Restless leg syndrome    • Rheumatoid arthritis (HCC)    • S/P laminectomy 10/22/2012   • Thoracic or lumbosacral neuritis or radiculitis, unspecified 10/22/2012   • Trauma     head trauma       Social History   Substance Use Topics   • Smoking status: Never Smoker   • Smokeless tobacco: Never Used   • Alcohol use No       Family Status   Relation Status   • Fa  at age 64   • Mo  at age 75   • Sis Alive   • Bro Alive   • MAunt (Not Specified)   • MUnc (Not Specified)   • MGMo (Not Specified)   • MGFa (Not Specified)     Family History   Problem Relation Age of Onset   • Diabetes Father    • Cancer Father    • Hypertension Father    • Hyperlipidemia Father    • Cancer Mother    • Other Mother         SLE   • Diabetes Mother    • Hypertension Mother    • Hyperlipidemia Mother    • Stroke Mother    • Sleep Apnea Brother    • Cancer Maternal Aunt    • Diabetes Maternal Aunt    • Diabetes Maternal Uncle    • Diabetes Maternal Grandmother    • Diabetes Maternal Grandfather        Review of Systems:  Constitutional ROS: No unexpected change in weight, No weakness, No fatigue  Eye ROS: No recent significant change in vision, No eye pain, redness, discharge  Ear ROS: No drainage, No tinnitus or vertigo, No recent change in hearing  Neck ROS: No swollen glands, No significant pain in neck  Cardiovascular ROS: No diaphoresis, No edema, No palpitations  Gastrointestinal ROS: No change in bowel habits, No significant change in appetite, No nausea, vomiting, diarrhea, or constipation  Musculoskeletal/Extremities ROS: No peripheral edema, No pain, redness or swelling on the joints  Hematologic/Lymphatic ROS: No chills, No night sweats, No weight loss    Exam:  Resp. rate 20, height 1.575 m (5' 2\"), weight 80.3 kg (177 lb), last menstrual period 10/05/1999, not currently breastfeeding.  General: Well developed, well nourished. Slightly anxious.  Eye: PERRL/EOMI; conjunctivae clear, lids normal.  ENMT: " Lips without lesions, MMM. Oropharynx is clear. No oral swelling noted.  Neck: Trachea midline, no masses. No thyromegaly.  Pulmonary: Unlabored respiratory effort. Lungs clear to auscultation, no wheezes, no rhonchi. Speaking in full sentences.  Cardiovascular: Regular rate and rhythm without murmur.    Neurologic: Grossly nonfocal. No facial asymmetry noted.  Skin: Warm, dry, good turgor. No rashes in visible areas.   Psych: Normal mood. Alert and oriented x3. Judgment and insight is normal.    Solumedrol 125mg IM    Assessment/Plan:  Patient is feeling better 15 minutes after solumedrol injection.    Patient appears to be having an allergic reaction to suboxone. Advised stopping the medication immediately. Start steroid prescription. Follow up with prescribing doctor for further evaluation and management. Discussed signs and symptoms of opiate withdrawals. Patient reports she has zofran at home. Discussed strict ER precautions for any withdrawal symptoms or return of facial swelling/shortness of breath.  1. Allergic reaction, initial encounter  methylPREDNISolone sod succ (SOLU-MEDROL) 125 MG injection 125 mg    MethylPREDNISolone (MEDROL DOSEPAK) 4 MG Tablet Therapy Pack

## 2018-09-24 ENCOUNTER — OFFICE VISIT (OUTPATIENT)
Dept: MEDICAL GROUP | Facility: PHYSICIAN GROUP | Age: 53
End: 2018-09-24
Payer: MEDICARE

## 2018-09-24 VITALS
HEART RATE: 78 BPM | BODY MASS INDEX: 32.76 KG/M2 | TEMPERATURE: 97.8 F | WEIGHT: 178 LBS | SYSTOLIC BLOOD PRESSURE: 122 MMHG | RESPIRATION RATE: 16 BRPM | OXYGEN SATURATION: 98 % | HEIGHT: 62 IN | DIASTOLIC BLOOD PRESSURE: 76 MMHG

## 2018-09-24 DIAGNOSIS — D50.9 IRON DEFICIENCY ANEMIA, UNSPECIFIED IRON DEFICIENCY ANEMIA TYPE: ICD-10-CM

## 2018-09-24 DIAGNOSIS — Z86.39 HISTORY OF DIABETES MELLITUS, TYPE II: ICD-10-CM

## 2018-09-24 DIAGNOSIS — J44.9 CHRONIC OBSTRUCTIVE PULMONARY DISEASE, UNSPECIFIED COPD TYPE (HCC): ICD-10-CM

## 2018-09-24 DIAGNOSIS — R76.8 POSITIVE ANA (ANTINUCLEAR ANTIBODY): ICD-10-CM

## 2018-09-24 DIAGNOSIS — R89.9 ABNORMAL LABORATORY TEST: ICD-10-CM

## 2018-09-24 DIAGNOSIS — E55.9 VITAMIN D INSUFFICIENCY: ICD-10-CM

## 2018-09-24 DIAGNOSIS — G89.4 CHRONIC PAIN SYNDROME: ICD-10-CM

## 2018-09-24 PROCEDURE — 99214 OFFICE O/P EST MOD 30 MIN: CPT | Performed by: NURSE PRACTITIONER

## 2018-09-24 RX ORDER — FERROUS SULFATE 324(65)MG
324 TABLET, DELAYED RELEASE (ENTERIC COATED) ORAL
COMMUNITY
End: 2019-12-09

## 2018-09-24 RX ORDER — ERGOCALCIFEROL 1.25 MG/1
50000 CAPSULE ORAL
Qty: 12 CAP | Refills: 1 | Status: SHIPPED | OUTPATIENT
Start: 2018-09-24 | End: 2018-09-27

## 2018-09-25 ENCOUNTER — TELEPHONE (OUTPATIENT)
Dept: HEMATOLOGY ONCOLOGY | Facility: MEDICAL CENTER | Age: 53
End: 2018-09-25

## 2018-09-25 PROBLEM — R89.9 ABNORMAL LABORATORY TEST: Status: ACTIVE | Noted: 2018-09-25

## 2018-09-25 NOTE — TELEPHONE ENCOUNTER
1st attempt to contact the patient.  LM on voicemail for patient requesting a call back to schedule a new patient hematology appointment.  NP/ Medicare/ Iron deficiency/ Bethany Saab

## 2018-09-25 NOTE — ASSESSMENT & PLAN NOTE
Pt has significant vitamin D deficiency. Her most recent level was 11. She is not on supplement at this time. Will have her start weekly high dose supplement, but is to take a daily supplement as well, 2000 units daily. Will plan on rechecking labs in 3 months.

## 2018-09-25 NOTE — PROGRESS NOTES
Chief Complaint   Patient presents with   • Anemia     fv labs         This is a 52 y.o.female patient that presents today with the following: follow up, review labs.    Vitamin D insufficiency  Pt has significant vitamin D deficiency. Her most recent level was 11. She is not on supplement at this time. Will have her start weekly high dose supplement, but is to take a daily supplement as well, 2000 units daily. Will plan on rechecking labs in 3 months.     COPD (chronic obstructive pulmonary disease) (CMS-HCC) (HCC)  This is chronic, stable and usually well controlled with current medications, however she was not aware that she needed to take Symbicort daily.  She was instructed to take this as prescribed, 2 puffs twice daily along with her as needed albuterol.  She is followed by pulmonology for this.    Positive NAKUL (antinuclear antibody)  Pt still waiting to see rheumatology for positive NAKUL and chronic all over joint pain.     Iron deficiency anemia  This is chronic, uncontrolled despite daily iron supplements. Ferritin recently was 8.2, TIBC elevated at 515. She has been seen by GI with normal colonoscopy despite 1 tiny polyp removed. Discussed with her that this could be anemia of chronic disease. Will refer to hematology for further evaluation and treatment.    History of diabetes mellitus, type II  Pt has history of type 2 diabetes for which she has taken metformin in the past.  She has not taken this for several years secondary to losing weight with the help of bariatric surgery.  Unfortunately she has gained weight over the past several months.  Her most recent  hemoglobin A1c was at 6.4%, discussed with her that this is consistent with type 2 diabetes.  She does want to try lifestyle modifications for 3 months before resuming medications, this is reasonable.  She was advised to eat healthy, exercise regularly as tolerated and continue efforts towards weight loss.    Chronic pain syndrome  Patient has  chronic low back pain, as well as pain and other joints.  She does have positive NAKUL and has been referred to rheumatology.  She is currently on oxycodone 20 mg 4 times daily and is followed by pain management.  She was previously on methadone but is no longer on that medication.  She tolerates her medications well with no significant or bothersome side effects.    Abnormal laboratory test  Pt has history of abnormal liver function test, specifically alk phos--this has been chronic and stable. Also, she had further testing with alk phos isoenzymes and this showed elevated bone fractions at 99. She does have significant vitamin D deficiency as well as arthritis, positive NAKUL and family history of SLE--she has been referred to rheumatology. Will repeat this test in 3 months, if not improved with replenishing vitamin D, she may need referral to endocrinologist.       Hospital Outpatient Visit on 09/19/2018   Component Date Value   • Alkaline Phosphatase 09/19/2018 183*   • Liver Fractions 09/19/2018 84    • Bone Fractions 09/19/2018 99*   • Alk Phos Other Calc 09/19/2018 0    • 25-Hydroxy   Vitamin D 25 09/19/2018 11*   Hospital Outpatient Visit on 09/19/2018   Component Date Value   • WBC 09/19/2018 9.1    • RBC 09/19/2018 5.21    • Hemoglobin 09/19/2018 11.7*   • Hematocrit 09/19/2018 39.8    • MCV 09/19/2018 76.4*   • MCH 09/19/2018 22.5*   • MCHC 09/19/2018 29.4*   • RDW 09/19/2018 50.9*   • Platelet Count 09/19/2018 340    • MPV 09/19/2018 11.3    • Nucleated RBC 09/19/2018 0.00    • NRBC (Absolute) 09/19/2018 0.00    • Neutrophils-Polys 09/19/2018 68.30    • Lymphocytes 09/19/2018 21.30*   • Monocytes 09/19/2018 5.40    • Eosinophils 09/19/2018 3.90    • Basophils 09/19/2018 0.90    • Immature Granulocytes 09/19/2018 0.20    • Lymphs (Absolute) 09/19/2018 1.94    • Monos (Absolute) 09/19/2018 0.49    • Eos (Absolute) 09/19/2018 0.35    • Baso (Absolute) 09/19/2018 0.08    • Immature Granulocytes (a* 09/19/2018  0.02    • Neutrophils (Absolute) 09/19/2018 6.21    • Anisocytosis 09/19/2018 2+    • Microcytosis 09/19/2018 2+    • Iron 09/19/2018 75    • Total Iron Binding 09/19/2018 515*   • % Saturation 09/19/2018 15    • Ferritin 09/19/2018 8.2*   • Peripheral Smear Review 09/19/2018 see below    • Plt Estimation 09/19/2018 Normal    • RBC Morphology 09/19/2018 Present    • Comments-Diff 09/19/2018 see below    Hospital Outpatient Visit on 09/19/2018   Component Date Value   • Glycohemoglobin 09/19/2018 6.4*   • Est Avg Glucose 09/19/2018 137    • Creatinine, Urine 09/19/2018 393.00    • Microalbumin, Urine Rand* 09/19/2018 2.4    • Micro Alb Creat Ratio 09/19/2018 6    • Cholesterol,Tot 09/19/2018 158    • Triglycerides 09/19/2018 119    • HDL 09/19/2018 53    • LDL 09/19/2018 81    • Fasting Status 09/19/2018 Fasting          clinical course has been stable    Past Medical History:   Diagnosis Date   • Anxiety and depression    • Arthritis     back, neck   • Asthma    • Back injury    • Back pain    • Bronchitis 2010   • Cancer (HCC)     breast LT   • Chickenpox    • Chronic LBP    • Chronic neck pain    • Cold intolerance 1/24/2012   • COPD (chronic obstructive pulmonary disease) (HCC)    • Cystic fibrosis (HCC)    • Depression    • Diabetes    • GERD (gastroesophageal reflux disease)    • H/O gastric bypass 10/11/2013   • Herniated nucleus pulposus, L4-5 3/18/2010   • HX: breast cancer 10/11/2013   • Hypertension    • Itching due to drug 6/2/2011   • Kidney stone    • Nephrolithiasis 10/11/2013   • Obesity    • Osteoporosis    • Other specified symptom associated with female genital organs    • Panic disorder    • Pneumonia    • PTSD (post-traumatic stress disorder)    • Restless leg syndrome    • Rheumatoid arthritis (HCC)    • S/P laminectomy 10/22/2012   • Thoracic or lumbosacral neuritis or radiculitis, unspecified 10/22/2012   • Trauma     head trauma       Past Surgical History:   Procedure Laterality Date   •  URETEROSCOPY  10/10/2013    Performed by Molly Resendiz M.D. at [facility]   • LASERTRIPSY  10/10/2013    Performed by Molly Resendiz M.D. at SUR[facility]ERY [facility]   • LUMBAR FUSION POSTERIOR  10/22/2012    Performed by Alfred Branham M.D. at SUR[facility]ERY [facility]   • LUMBAR LAMINECTOMY DISKECTOMY  10/22/2012    Performed by Alfred Branham M.D. at SUR[facility]ERY [facility]   • OTHER ORTHOPEDIC SURGERY  10/01/2012    lumbar fusion, Dr Branham   • OTHER ABDOMINAL SURGERY  2006    gastric bypass   • ABDOMINAL HYSTERECTOMY TOTAL      DUE TO FIBROIDS   • BREAST RECONSTRUCTION      Lt breast   • CRANIOTOMY      DUE TO BRAIN INJURY   • HYSTERECTOMY LAPAROSCOPY     • LAMINOTOMY     • MASTECTOMY      Lt breast   • OTHER      left ear drum surgery   • OTHER ABDOMINAL SURGERY      hernia repair   • PRIMARY C SECTION      x 3   • SLEEVE,MAXIMILIAN VASO THIGH     • TONSILLECTOMY     • US-CYST ASPIRATION-BREAST INITIAL     • VENTILATOR CONTINUOUS         Family History   Problem Relation Age of Onset   • Diabetes Father    • Cancer Father    • Hypertension Father    • Hyperlipidemia Father    • Cancer Mother    • Other Mother         SLE   • Diabetes Mother    • Hypertension Mother    • Hyperlipidemia Mother    • Stroke Mother    • Sleep Apnea Brother    • Cancer Maternal Aunt    • Diabetes Maternal Aunt    • Diabetes Maternal Uncle    • Diabetes Maternal Grandmother    • Diabetes Maternal Grandfather        Suboxone    Current Outpatient Prescriptions Ordered in [facility]   Medication Sig Dispense Refill   • ferrous sulfate 324 (65 Fe) MG Tablet Delayed Response EC tablet Take 324 mg by mouth with dinner.     • ergocalciferol (DRISDOL) 08377 UNIT capsule Take 1 Cap by mouth every 7 days. 12 Cap 1   • MethylPREDNISolone (MEDROL DOSEPAK) 4 MG Tablet Therapy Pack Use as package directs 21 Tab 0   • Oxycodone HCl 20 MG Tab Take 20 mg by mouth 4 times a day.     • ibuprofen (MOTRIN) 800 MG Tab TAKE 1 TABLET BY MOUTH  "EVERY 8 HOURS AS NEEDED 60 Tab 0   • ketoconazole (NIZORAL) 2 % shampoo Apply to skin one time daily in the shower and leave on for 5 minutes then rinse off. 2-4 weeks 1 Bottle 3   • Nebulizers ([employer] AEROSOL DELIVERY SYSTEM) Misc U UTD  0   • ARIPiprazole (ABILIFY) 10 MG Tab Take 1 Tab by mouth every day. 30 Tab 2   • budesonide-formoterol (SYMBICORT) 80-4.5 MCG/ACT Aerosol Inhale 2 Puffs by mouth 2 Times a Day. 1 Inhaler 3   • fluticasone (FLONASE) 50 MCG/ACT nasal spray Spray 1 Spray in nose 2 times a day. Each Nostril 1 Bottle 3   • tiotropium (SPIRIVA) 18 MCG Cap Inhale 1 Cap by mouth every day. 30 Cap 3     No current Epic-ordered facility-administered medications on file.        Constitutional ROS: No unexpected change in weight, No weakness, No unexplained fevers, sweats, or chills  Pulmonary ROS: positive per HPI  Cardiovascular ROS: No chest pain, No edema, No palpitations  Gastrointestinal ROS: No abdominal pain, No nausea, vomiting, diarrhea, or constipation  Musculoskeletal/Extremities ROS: positive per HPI  Hematology ROS: positive per HPI  Neurologic ROS: Normal development, No seizures, No weakness  Endocrine ROS: positive per HPI    Physical exam:  /76 (BP Location: Right arm, Patient Position: Sitting, BP Cuff Size: Adult)   Pulse 78   Temp 36.6 °C (97.8 °F) (Temporal)   Resp 16   Ht 1.575 m (5' 2\")   Wt 80.7 kg (178 lb)   LMP 10/05/1999   SpO2 98%   BMI 32.56 kg/m²   General Appearance: alert, no distress, obese, well groomed  Skin: Skin color, texture, turgor normal. No rashes or lesions.  Lungs: negative findings: normal respiratory rate and rhythm, normal effort  Musculoskeletal: positive findings: limited ROM to lumbar spine due to chronic pain  Neurologic: intact    Medical decision making/discussion: pt to start high dose vitamin D and recheck labs in 3 months, after labs would like her to follow up with me. She has been referred to hematology for chronic anemia.     Patti was " seen today for anemia.    Diagnoses and all orders for this visit:    Iron deficiency anemia, unspecified iron deficiency anemia type  -     REFERRAL TO HEMATOLOGY ONCOLOGY Referral to? [facility]    Vitamin D insufficiency  -     ergocalciferol (DRISDOL) 21772 UNIT capsule; Take 1 Cap by mouth every 7 days.  -     VITAMIN D,25 HYDROXY; Future  -     ALKALINE PHOSPHATASE ISOENZYMES; Future    Chronic obstructive pulmonary disease, unspecified COPD type (HCC)    History of diabetes mellitus, type II    Positive NAKUL (antinuclear antibody)    Chronic pain syndrome    Abnormal laboratory test  -     PTH INTACT (PTH ONLY); Future  -     CALCIUM (CA); Future          Please note that this dictation was created using voice recognition software. I have made every reasonable attempt to correct obvious errors, but I expect that there are errors of grammar and possibly content that I did not discover before finalizing the note.

## 2018-09-25 NOTE — ASSESSMENT & PLAN NOTE
Pt has history of type 2 diabetes for which she has taken metformin in the past.  She has not taken this for several years secondary to losing weight with the help of bariatric surgery.  Unfortunately she has gained weight over the past several months.  Her most recent  hemoglobin A1c was at 6.4%, discussed with her that this is consistent with type 2 diabetes.  She does want to try lifestyle modifications for 3 months before resuming medications, this is reasonable.  She was advised to eat healthy, exercise regularly as tolerated and continue efforts towards weight loss.

## 2018-09-25 NOTE — ASSESSMENT & PLAN NOTE
Pt has history of abnormal liver function test, specifically alk phos--this has been chronic and stable. Also, she had further testing with alk phos isoenzymes and this showed elevated bone fractions at 99. She does have significant vitamin D deficiency as well as arthritis, positive NAKUL and family history of SLE--she has been referred to rheumatology. Will repeat this test in 3 months, if not improved with replenishing vitamin D, she may need referral to endocrinologist.

## 2018-09-25 NOTE — ASSESSMENT & PLAN NOTE
Patient has chronic low back pain, as well as pain and other joints.  She does have positive NAKUL and has been referred to rheumatology.  She is currently on oxycodone 20 mg 4 times daily and is followed by pain management.  She was previously on methadone but is no longer on that medication.  She tolerates her medications well with no significant or bothersome side effects.

## 2018-09-25 NOTE — PATIENT INSTRUCTIONS
Take Symbicort every day, use albuterol as needed    Referral to hematology    Take high dose Vitamin D weekly, this has been called in, but can take daily OTC supplement at 2000 units    See me again in 3 months with labs before visit

## 2018-09-25 NOTE — ASSESSMENT & PLAN NOTE
This is chronic, stable and usually well controlled with current medications, however she was not aware that she needed to take Symbicort daily.  She was instructed to take this as prescribed, 2 puffs twice daily along with her as needed albuterol.  She is followed by pulmonology for this.

## 2018-09-25 NOTE — ASSESSMENT & PLAN NOTE
This is chronic, uncontrolled despite daily iron supplements. Ferritin recently was 8.2, TIBC elevated at 515. She has been seen by GI with normal colonoscopy despite 1 tiny polyp removed. Discussed with her that this could be anemia of chronic disease. Will refer to hematology for further evaluation and treatment.

## 2018-09-26 NOTE — TELEPHONE ENCOUNTER
Patient returned phone call and scheduled a new hematology appointment for 9/27/2018 at 8:00. Will give us a call if there is any questions or concerns.

## 2018-09-26 NOTE — TELEPHONE ENCOUNTER
2nd attempt to contact the patient.  LM on voicemail for patient requesting a call back to schedule a new patient hematology appointment.  NP/ Medicare/ Iron deficiency/ Bethany Saab

## 2018-09-27 ENCOUNTER — HOSPITAL ENCOUNTER (OUTPATIENT)
Dept: LAB | Facility: MEDICAL CENTER | Age: 53
End: 2018-09-27
Attending: NURSE PRACTITIONER
Payer: MEDICARE

## 2018-09-27 ENCOUNTER — OFFICE VISIT (OUTPATIENT)
Dept: HEMATOLOGY ONCOLOGY | Facility: MEDICAL CENTER | Age: 53
End: 2018-09-27
Payer: MEDICARE

## 2018-09-27 ENCOUNTER — HOSPITAL ENCOUNTER (OUTPATIENT)
Dept: LAB | Facility: MEDICAL CENTER | Age: 53
End: 2018-09-27
Attending: INTERNAL MEDICINE
Payer: MEDICARE

## 2018-09-27 VITALS
WEIGHT: 175.82 LBS | RESPIRATION RATE: 16 BRPM | SYSTOLIC BLOOD PRESSURE: 106 MMHG | DIASTOLIC BLOOD PRESSURE: 72 MMHG | TEMPERATURE: 97.5 F | OXYGEN SATURATION: 96 % | HEART RATE: 81 BPM | BODY MASS INDEX: 32.35 KG/M2 | HEIGHT: 62 IN

## 2018-09-27 DIAGNOSIS — E53.8 VITAMIN B12 DEFICIENCY: ICD-10-CM

## 2018-09-27 DIAGNOSIS — R89.9 ABNORMAL LABORATORY TEST: ICD-10-CM

## 2018-09-27 DIAGNOSIS — J44.9 CHRONIC OBSTRUCTIVE PULMONARY DISEASE, UNSPECIFIED COPD TYPE (HCC): ICD-10-CM

## 2018-09-27 DIAGNOSIS — M12.9 ARTHRITIS, MULTIPLE JOINT INVOLVEMENT: ICD-10-CM

## 2018-09-27 DIAGNOSIS — E87.6 HYPOKALEMIA: ICD-10-CM

## 2018-09-27 DIAGNOSIS — B36.0 TINEA VERSICOLOR: ICD-10-CM

## 2018-09-27 DIAGNOSIS — E55.9 VITAMIN D INSUFFICIENCY: ICD-10-CM

## 2018-09-27 DIAGNOSIS — Z98.84 HISTORY OF GASTRIC BYPASS: ICD-10-CM

## 2018-09-27 DIAGNOSIS — D64.9 ANEMIA, UNSPECIFIED TYPE: ICD-10-CM

## 2018-09-27 DIAGNOSIS — E66.9 OBESITY (BMI 30-39.9): ICD-10-CM

## 2018-09-27 DIAGNOSIS — R53.83 OTHER FATIGUE: ICD-10-CM

## 2018-09-27 DIAGNOSIS — D50.9 IRON DEFICIENCY ANEMIA, UNSPECIFIED IRON DEFICIENCY ANEMIA TYPE: Primary | ICD-10-CM

## 2018-09-27 DIAGNOSIS — G47.33 OSA (OBSTRUCTIVE SLEEP APNEA): ICD-10-CM

## 2018-09-27 DIAGNOSIS — R76.8 POSITIVE ANA (ANTINUCLEAR ANTIBODY): ICD-10-CM

## 2018-09-27 DIAGNOSIS — F41.0 PANIC ATTACKS: ICD-10-CM

## 2018-09-27 DIAGNOSIS — F31.32 BIPOLAR AFFECTIVE DISORDER, CURRENTLY DEPRESSED, MODERATE (HCC): ICD-10-CM

## 2018-09-27 LAB
ALBUMIN SERPL BCP-MCNC: 4.3 G/DL (ref 3.2–4.9)
ALBUMIN/GLOB SERPL: 1.1 G/DL
ALP SERPL-CCNC: 170 U/L (ref 30–99)
ALT SERPL-CCNC: 19 U/L (ref 2–50)
ANION GAP SERPL CALC-SCNC: 7 MMOL/L (ref 0–11.9)
AST SERPL-CCNC: 16 U/L (ref 12–45)
BILIRUB SERPL-MCNC: 0.4 MG/DL (ref 0.1–1.5)
BUN SERPL-MCNC: 15 MG/DL (ref 8–22)
CALCIUM SERPL-MCNC: 9.4 MG/DL (ref 8.5–10.5)
CALCIUM SERPL-MCNC: 9.4 MG/DL (ref 8.5–10.5)
CHLORIDE SERPL-SCNC: 105 MMOL/L (ref 96–112)
CO2 SERPL-SCNC: 26 MMOL/L (ref 20–33)
CREAT SERPL-MCNC: 0.77 MG/DL (ref 0.5–1.4)
FERRITIN SERPL-MCNC: 7.7 NG/ML (ref 10–291)
GLOBULIN SER CALC-MCNC: 3.8 G/DL (ref 1.9–3.5)
GLUCOSE SERPL-MCNC: 77 MG/DL (ref 65–99)
IRON SATN MFR SERPL: 8 % (ref 15–55)
IRON SERPL-MCNC: 39 UG/DL (ref 40–170)
POTASSIUM SERPL-SCNC: 3.4 MMOL/L (ref 3.6–5.5)
PROT SERPL-MCNC: 8.1 G/DL (ref 6–8.2)
SODIUM SERPL-SCNC: 138 MMOL/L (ref 135–145)
TIBC SERPL-MCNC: 517 UG/DL (ref 250–450)
VIT B12 SERPL-MCNC: 149 PG/ML (ref 211–911)

## 2018-09-27 PROCEDURE — 82306 VITAMIN D 25 HYDROXY: CPT

## 2018-09-27 PROCEDURE — 82728 ASSAY OF FERRITIN: CPT

## 2018-09-27 PROCEDURE — 82310 ASSAY OF CALCIUM: CPT

## 2018-09-27 PROCEDURE — 84080 ASSAY ALKALINE PHOSPHATASES: CPT

## 2018-09-27 PROCEDURE — 83970 ASSAY OF PARATHORMONE: CPT

## 2018-09-27 PROCEDURE — 82607 VITAMIN B-12: CPT

## 2018-09-27 PROCEDURE — 84075 ASSAY ALKALINE PHOSPHATASE: CPT

## 2018-09-27 PROCEDURE — 80053 COMPREHEN METABOLIC PANEL: CPT

## 2018-09-27 PROCEDURE — 83540 ASSAY OF IRON: CPT

## 2018-09-27 PROCEDURE — 36415 COLL VENOUS BLD VENIPUNCTURE: CPT

## 2018-09-27 PROCEDURE — 99204 OFFICE O/P NEW MOD 45 MIN: CPT | Mod: Q6 | Performed by: INTERNAL MEDICINE

## 2018-09-27 PROCEDURE — 83550 IRON BINDING TEST: CPT

## 2018-09-27 RX ORDER — PNEUMOCOCCAL 13-VALENT CONJUGATE VACCINE 2.2; 2.2; 2.2; 2.2; 2.2; 4.4; 2.2; 2.2; 2.2; 2.2; 2.2; 2.2; 2.2 UG/.5ML; UG/.5ML; UG/.5ML; UG/.5ML; UG/.5ML; UG/.5ML; UG/.5ML; UG/.5ML; UG/.5ML; UG/.5ML; UG/.5ML; UG/.5ML; UG/.5ML
INJECTION, SUSPENSION INTRAMUSCULAR
Refills: 0 | COMMUNITY
Start: 2018-09-18 | End: 2018-10-20

## 2018-09-27 RX ORDER — PRENATAL VIT 91/IRON/FOLIC/DHA 28-975-200
1 COMBINATION PACKAGE (EA) ORAL 2 TIMES DAILY
Qty: 15 G | Refills: 2 | Status: SHIPPED | OUTPATIENT
Start: 2018-09-27 | End: 2018-10-04

## 2018-09-27 RX ORDER — AMOXICILLIN AND CLAVULANATE POTASSIUM 875; 125 MG/1; MG/1
TABLET, FILM COATED ORAL
Refills: 0 | COMMUNITY
Start: 2018-08-07 | End: 2018-10-16

## 2018-09-27 RX ORDER — OMEPRAZOLE 20 MG/1
CAPSULE, DELAYED RELEASE ORAL
Refills: 5 | COMMUNITY
Start: 2018-07-07 | End: 2018-10-16

## 2018-09-27 RX ORDER — FLUCONAZOLE 100 MG/1
100 TABLET ORAL DAILY
Qty: 5 TAB | Refills: 0 | Status: SHIPPED | OUTPATIENT
Start: 2018-09-27 | End: 2018-10-02

## 2018-09-27 RX ORDER — ALBUTEROL SULFATE 90 UG/1
AEROSOL, METERED RESPIRATORY (INHALATION)
Refills: 0 | COMMUNITY
Start: 2018-09-18 | End: 2020-11-02

## 2018-09-27 RX ORDER — ZOLPIDEM TARTRATE 5 MG/1
TABLET ORAL
Refills: 0 | COMMUNITY
Start: 2018-07-18 | End: 2018-10-16

## 2018-09-27 RX ORDER — CARVEDILOL 6.25 MG/1
TABLET ORAL
Refills: 0 | COMMUNITY
Start: 2018-09-18 | End: 2018-10-16

## 2018-09-27 RX ORDER — ERGOCALCIFEROL 1.25 MG/1
50000 CAPSULE ORAL
Qty: 4 CAP | Refills: 0 | Status: SHIPPED | OUTPATIENT
Start: 2018-09-27 | End: 2018-11-13 | Stop reason: SDUPTHER

## 2018-09-27 RX ORDER — ONDANSETRON HYDROCHLORIDE 8 MG/1
TABLET, FILM COATED ORAL
Refills: 1 | COMMUNITY
Start: 2018-09-18 | End: 2019-04-11 | Stop reason: SDUPTHER

## 2018-09-27 RX ORDER — INFLUENZA A VIRUS A/BRISBANE/02/2018 IVR-190 (H1N1) ANTIGEN (FORMALDEHYDE INACTIVATED), INFLUENZA A VIRUS A/KANSAS/14/2017 X-327 (H3N2) ANTIGEN (FORMALDEHYDE INACTIVATED), INFLUENZA B VIRUS B/PHUKET/3073/2013 ANTIGEN (FORMALDEHYDE INACTIVATED), AND INFLUENZA B VIRUS B/MARYLAND/15/2016 BX-69A ANTIGEN (FORMALDEHYDE INACTIVATED) 15; 15; 15; 15 UG/.5ML; UG/.5ML; UG/.5ML; UG/.5ML
INJECTION, SUSPENSION INTRAMUSCULAR
Refills: 0 | COMMUNITY
Start: 2018-09-18 | End: 2018-10-16

## 2018-09-27 RX ORDER — CLOTRIMAZOLE 10 MG/1
10 LOZENGE ORAL; TOPICAL
Qty: 100 TROCHE | Refills: 0 | Status: SHIPPED | OUTPATIENT
Start: 2018-09-27 | End: 2018-10-24

## 2018-09-27 ASSESSMENT — ENCOUNTER SYMPTOMS
HEADACHES: 1
BACK PAIN: 1
DIZZINESS: 0
SHORTNESS OF BREATH: 1
FEVER: 0
WEIGHT LOSS: 0
NERVOUS/ANXIOUS: 1
NAUSEA: 1
DIARRHEA: 1
INSOMNIA: 1
CHILLS: 1
COUGH: 1
SORE THROAT: 1
DEPRESSION: 1
ABDOMINAL PAIN: 1
WEAKNESS: 1
BLURRED VISION: 0
DOUBLE VISION: 0
VOMITING: 1

## 2018-09-27 NOTE — PROGRESS NOTES
Consult Note: Hematology    Date of consultation: 9/27/2018 8:24 AM    Referring provider: Bethany Saab A.P.*    Reason for consultation: anemia iron deficiency  , in the setting of gastric bypass surgery, completed in 2006.    History of presenting illness:     53-year-old female presents today for evaluation in the setting of her iron deficiency anemia now with increased fatigue and weakness for evaluation.  Patient underwent gastric bypass surgery for obesity in 2006.  She is aware of her iron deficiency anemia and she is made aware that in the setting of bypass surgery oral intake of the supplements will be not well absorbed.    Labs completed on September 19, 2018: By PCP disclosed: Hemoglobin 11.7.  White count 9.1.  Platelets 240,000.  Vitamin D 11 with a very low level.  No supplements started yet secondary miscommunication.  Ferritin noted on September 19, 2018 is low at 8.2.  Was 8.0 on June 28, 2018.      Thank you very much for allowing me to see  Patti eTllo today .    Past Medical History:    Past Medical History:   Diagnosis Date   • Anxiety and depression    • Arthritis     back, neck   • Asthma    • Back injury    • Back pain    • Bronchitis 2010   • Cancer (HCC)     breast LT   • Chickenpox    • Chronic LBP    • Chronic neck pain    • Cold intolerance 1/24/2012   • COPD (chronic obstructive pulmonary disease) (HCC)    • Cystic fibrosis (HCC)    • Depression    • Diabetes    • GERD (gastroesophageal reflux disease)    • H/O gastric bypass 10/11/2013   • Herniated nucleus pulposus, L4-5 3/18/2010   • HX: breast cancer 10/11/2013   • Hypertension    • Itching due to drug 6/2/2011   • Kidney stone    • Nephrolithiasis 10/11/2013   • Obesity    • Osteoporosis    • Other specified symptom associated with female genital organs    • Panic disorder    • Pneumonia    • PTSD (post-traumatic stress disorder)    • Restless leg syndrome    • Rheumatoid arthritis (HCC)    • S/P laminectomy 10/22/2012   •  Thoracic or lumbosacral neuritis or radiculitis, unspecified 10/22/2012   • Trauma     head trauma       Past surgical history:    Past Surgical History:   Procedure Laterality Date   • URETEROSCOPY  10/10/2013    Performed by Molly Resendiz M.D. at SURGERY [facility]   • LASERTRIPSY  10/10/2013    Performed by Molly Resendiz M.D. at SURGERY [facility]   • LUMBAR FUSION POSTERIOR  10/22/2012    Performed by Alfred Branham M.D. at SURGERY [facility]   • LUMBAR LAMINECTOMY DISKECTOMY  10/22/2012    Performed by Alfred Branham M.D. at SURGERY [facility]   • OTHER ORTHOPEDIC SURGERY  10/01/2012    lumbar fusion, Dr Branham   • OTHER ABDOMINAL SURGERY  2006    gastric bypass   • ABDOMINAL HYSTERECTOMY TOTAL      DUE TO FIBROIDS   • BREAST RECONSTRUCTION      Lt breast   • CRANIOTOMY      DUE TO BRAIN INJURY   • HYSTERECTOMY LAPAROSCOPY     • LAMINOTOMY     • MASTECTOMY      Lt breast   • OTHER      left ear drum surgery   • OTHER ABDOMINAL SURGERY      hernia repair   • PRIMARY C SECTION      x 3   • SLEEVE,MAXIMILIAN VASO THIGH     • TONSILLECTOMY     • US-CYST ASPIRATION-BREAST INITIAL     • VENTILATOR CONTINUOUS         Allergies:    Allergies   Allergen Reactions   • Suboxone Hives, Shortness of Breath and Swelling       Medications:    Current Outpatient Prescriptions   Medication Sig Dispense Refill   • carvedilol (COREG) 6.25 MG Tab TK 1 T PO  BID  0   • ondansetron (ZOFRAN) 8 MG Tab TK 1 T PO QID PRN  1   • ferrous sulfate 324 (65 Fe) MG Tablet Delayed Response EC tablet Take 324 mg by mouth with dinner.     • MethylPREDNISolone (MEDROL DOSEPAK) 4 MG Tablet Therapy Pack Use as package directs 21 Tab 0   • VENTOLIN  (90 Base) MCG/ACT Aero Soln inhalation aerosol INHALE 1 PUFF PO QID PRN  0   • FLUZONE QUADRIVALENT 0.5 ML Suspension Prefilled Syringe injection TO BE ADMINISTERED BY PHARMACIST FOR IMMUNIZATION  0   • amoxicillin-clavulanate (AUGMENTIN) 875-125 MG Tab TK 1 T PO  BID   0   • omeprazole (PRILOSEC) 20 MG delayed-release capsule TK 1 C PO QD 30 MIN B BK MEAL  5   • PREVNAR 13 syringe TO BE ADMINISTERED BY PHARMACIST FOR IMMUNIZATION  0   • zolpidem (AMBIEN) 5 MG Tab TK 1 T PO QD HS FOR SLEEP  0   • ergocalciferol (DRISDOL) 31590 UNIT capsule Take 1 Cap by mouth every 7 days. 12 Cap 1   • ibuprofen (MOTRIN) 800 MG Tab TAKE 1 TABLET BY MOUTH EVERY 8 HOURS AS NEEDED 60 Tab 0   • ketoconazole (NIZORAL) 2 % shampoo Apply to skin one time daily in the shower and leave on for 5 minutes then rinse off. 2-4 weeks 1 Bottle 3   • Oxycodone HCl 20 MG Tab Take 20 mg by mouth 4 times a day.     • Nebulizers ([employer] AEROSOL DELIVERY SYSTEM) Misc U UTD  0   • ARIPiprazole (ABILIFY) 10 MG Tab Take 1 Tab by mouth every day. 30 Tab 2   • budesonide-formoterol (SYMBICORT) 80-4.5 MCG/ACT Aerosol Inhale 2 Puffs by mouth 2 Times a Day. 1 Inhaler 3   • fluticasone (FLONASE) 50 MCG/ACT nasal spray Spray 1 Spray in nose 2 times a day. Each Nostril 1 Bottle 3   • tiotropium (SPIRIVA) 18 MCG Cap Inhale 1 Cap by mouth every day. 30 Cap 3     No current facility-administered medications for this visit.        Social History:     Social History     Social History   • Marital status:      Spouse name: N/A   • Number of children: N/A   • Years of education: N/A     Occupational History   • Not on file.     Social History Main Topics   • Smoking status: Never Smoker   • Smokeless tobacco: Never Used   • Alcohol use No   • Drug use: No   • Sexual activity: Not Currently     Other Topics Concern   •  Service No   • Blood Transfusions Yes     In 2006   • Caffeine Concern No   • Occupational Exposure No   • Hobby Hazards No   • Sleep Concern No   • Stress Concern No   • Weight Concern Yes   • Special Diet No   • Back Care Yes   • Exercise No   • Bike Helmet No     Doesn't Ride Bike   • Seat Belt Yes   • Self-Exams No     Social History Narrative   • No narrative on file       Family History:     Family  History   Problem Relation Age of Onset   • Diabetes Father    • Cancer Father    • Hypertension Father    • Hyperlipidemia Father    • Cancer Mother    • Other Mother         SLE   • Diabetes Mother    • Hypertension Mother    • Hyperlipidemia Mother    • Stroke Mother    • Sleep Apnea Brother    • Cancer Maternal Aunt    • Diabetes Maternal Aunt    • Diabetes Maternal Uncle    • Diabetes Maternal Grandmother    • Diabetes Maternal Grandfather        Review of Systems   Constitutional: Positive for chills and malaise/fatigue. Negative for fever and weight loss.   HENT: Positive for sore throat. Negative for nosebleeds.    Eyes: Negative for blurred vision and double vision.   Respiratory: Positive for cough and shortness of breath.    Cardiovascular: Positive for chest pain.   Gastrointestinal: Positive for abdominal pain, diarrhea, nausea and vomiting.   Genitourinary: Negative.    Musculoskeletal: Positive for back pain and joint pain.   Skin: Positive for rash (Heat rash but noted for the past 3 months tinea versicolor ).   Neurological: Positive for weakness and headaches (mostly in the sun ). Negative for dizziness.   Psychiatric/Behavioral: Positive for depression. The patient is nervous/anxious and has insomnia.        Physical Exam   Constitutional: She is oriented to person, place, and time and well-developed, well-nourished, and in no distress.   HENT:   Head: Normocephalic and atraumatic.   Mouth/Throat: Oropharyngeal exudate (Oral thrush) present.   Eyes: Pupils are equal, round, and reactive to light. No scleral icterus.   Neck: Normal range of motion. Neck supple. No JVD present. No thyromegaly present.   Cardiovascular: Normal rate and normal heart sounds.    No murmur heard.  Pulmonary/Chest: Effort normal and breath sounds normal. She has no wheezes. She has no rales.   Abdominal: Soft. Bowel sounds are normal. She exhibits no distension. There is no tenderness. There is no rebound.  "  Musculoskeletal: Normal range of motion. She exhibits no edema.   Lymphadenopathy:     She has no cervical adenopathy.   Neurological: She is alert and oriented to person, place, and time.   Skin: Skin is warm and dry. Rash (Tinea versicolor noted on the anterior aspect of her abdomen and right shoulder.) noted. She is not diaphoretic.   Psychiatric: Mood and affect normal.       Vitals:   /72 (BP Location: Right arm, Patient Position: Sitting, BP Cuff Size: Adult)   Pulse 81   Temp 36.4 °C (97.5 °F) (Temporal)   Resp 16   Ht 1.575 m (5' 2\")   Wt 79.8 kg (175 lb 13.1 oz)   LMP 10/05/1999   SpO2 96%   BMI 32.16 kg/m²     Labs:   No results for input(s): RBC, HEMOGLOBIN, HEMATOCRIT, PLATELETCT, PROTHROMBTM, APTT, INR, IRON, FERRITIN, TOTIRONBC in the last 72 hours.  Lab Results   Component Value Date/Time    SODIUM 141 06/28/2018 07:34 AM    POTASSIUM 3.4 (L) 06/28/2018 07:34 AM    CHLORIDE 109 06/28/2018 07:34 AM    CO2 24 06/28/2018 07:34 AM    GLUCOSE 95 06/28/2018 07:34 AM    BUN 10 06/28/2018 07:34 AM    CREATININE 0.67 06/28/2018 07:34 AM    CREATININE 0.9 10/25/2005 12:10 PM        Assessment and Plan:    1.  Iron deficiency anemia in the setting of gastric bypass surgery.  We are going to start iron sucrose infusion on October 16 for 5 doses to be ended on October 24.  Iron studies will follow post infusion on October 31, 2018.  2.  Vitamin D deficiency supplement given.  Levels will be checked on October 31, 2018 as ordered.  3.  Vitamin B12 deficiency, will check a level today and supplement will be initiated today with 2000 mcg/day.  Levels will be rechecked on October 31.  Expected deficiencies in the setting of gastric bypass surgery.  4.  Weakness and fatigue with associated shortness of breath with very well could be explained by muscle weakness of the chest cavity in the setting of severe iron deficiency and vitamin D deficiency.  5.  Oral thrush Diflucan 100 mg p.o. daily for 5 days.  " Clotrimazole troches 10 mg p.o. 5 times a day for 30 days.  6.  Tinea versicolor--terbinafine gel prescribed.  Fluconazole will help initially with the treatment as well.  7.  Follow-up November 2, 2018 for plans and reevaluation.      She agreed and verbalized her agreement and understanding with the current plan.  I answered all questions and concerns she has at this time.              Thank you for allowing me to participate in her care.      All labs mentioned in the note above were reviewed with and explained to the patient as a pertain to medical decision making.      Patient was seen for 50 minutes face to face of which > 70 % of appointment time was spent on counseling and coordination of care discussing gastric bypass surgery long-term type of side effects with severe iron deficiency anemia and and treatment which will need close follow-up of the therapeutic levels of the iron.  Vitamin D and B12 deficiencies and there symptoms associated with increased weakness and fatigue.  Discussed about the skin rash and treatment plans as well as oral thrush as noted.    SIGNATURES:  Julieth Larsen    CC:  BOGDAN ChrisRAmyN.  Bethany Saab A.P.*

## 2018-09-27 NOTE — PATIENT INSTRUCTIONS
1. Need to start iron infusion --10/ 16/2018   , for 5 doses --- with last infusion 10/24/18       Blood work after all 5 infusions of iron -- 10/31/18.    2. Blood work to check vit B12 level --- start taking Vit B12 2000 mcg /day   3. Start Vitamin D 1 pill once a week x 4 weeks then will check the level   F/U -- 11/2/18 with results and plans   5. Start Diflucan 100 mg/day x 5 days and clotrimazole troches as prescribed   6. Use ointment  2x/day on white spots for 1 week

## 2018-09-28 ENCOUNTER — TELEPHONE (OUTPATIENT)
Dept: HEMATOLOGY ONCOLOGY | Facility: MEDICAL CENTER | Age: 53
End: 2018-09-28

## 2018-09-28 LAB
25(OH)D3 SERPL-MCNC: 14 NG/ML (ref 30–100)
PTH-INTACT SERPL-MCNC: 236.4 PG/ML (ref 14–72)

## 2018-09-28 NOTE — TELEPHONE ENCOUNTER
Per Dr. Larsen, patient to take 5,000 mcg of vitamin B12 daily X 2 weeks and then 2,000 mcg daily continuously thereafter. Pt read back instructions correctly and agrees. She denies any questions or concerns.

## 2018-10-02 LAB
ALP BONE SERPL-CCNC: 105 U/L (ref 0–55)
ALP ISOS SERPL HS-CCNC: 0 U/L
ALP LIVER SERPL-CCNC: 80 U/L (ref 0–94)
ALP SERPL-CCNC: 185 U/L (ref 40–120)

## 2018-10-04 ENCOUNTER — TELEPHONE (OUTPATIENT)
Dept: SLEEP MEDICINE | Facility: MEDICAL CENTER | Age: 53
End: 2018-10-04

## 2018-10-04 DIAGNOSIS — R79.89 ELEVATED PTHRP LEVEL: ICD-10-CM

## 2018-10-04 NOTE — TELEPHONE ENCOUNTER
Patient left voice message stating cpap order has not received a call.     I returned the patient's call states she has received a call from [facility] [place]. States she has been trying to call them back and has not received any calls back from [facility]. I encouraged the patient to contact them once more local number was provided to the patient

## 2018-10-08 ENCOUNTER — OFFICE VISIT (OUTPATIENT)
Dept: MEDICAL GROUP | Facility: PHYSICIAN GROUP | Age: 53
End: 2018-10-08
Payer: MEDICARE

## 2018-10-08 VITALS
HEART RATE: 108 BPM | HEIGHT: 62 IN | TEMPERATURE: 97.3 F | BODY MASS INDEX: 32.39 KG/M2 | SYSTOLIC BLOOD PRESSURE: 118 MMHG | WEIGHT: 176 LBS | DIASTOLIC BLOOD PRESSURE: 76 MMHG | OXYGEN SATURATION: 98 % | RESPIRATION RATE: 16 BRPM

## 2018-10-08 DIAGNOSIS — D50.9 IRON DEFICIENCY ANEMIA, UNSPECIFIED IRON DEFICIENCY ANEMIA TYPE: ICD-10-CM

## 2018-10-08 DIAGNOSIS — J44.9 CHRONIC OBSTRUCTIVE PULMONARY DISEASE, UNSPECIFIED COPD TYPE (HCC): ICD-10-CM

## 2018-10-08 DIAGNOSIS — R79.89 ELEVATED PTHRP LEVEL: ICD-10-CM

## 2018-10-08 PROCEDURE — 99214 OFFICE O/P EST MOD 30 MIN: CPT | Performed by: NURSE PRACTITIONER

## 2018-10-08 RX ORDER — UBIDECARENONE 75 MG
100 CAPSULE ORAL DAILY
COMMUNITY
End: 2019-12-09

## 2018-10-08 RX ORDER — PREDNISONE 20 MG/1
TABLET ORAL
Qty: 5 TAB | Refills: 0 | Status: SHIPPED | OUTPATIENT
Start: 2018-10-08 | End: 2018-10-16

## 2018-10-08 RX ORDER — TIOTROPIUM BROMIDE 18 UG/1
18 CAPSULE ORAL; RESPIRATORY (INHALATION) DAILY
Qty: 30 CAP | Refills: 3 | Status: SHIPPED | OUTPATIENT
Start: 2018-10-08 | End: 2019-05-14

## 2018-10-08 NOTE — PATIENT INSTRUCTIONS
Allegra, Zyrtec or Xyzal--in addition to Flonase daily. Can also use nasal, be generous with    Added Spiriva to regimen, will likely need to see lung specialist    Chest x-ray today

## 2018-10-08 NOTE — PROGRESS NOTES
Chief Complaint   Patient presents with   • Nasal Congestion     cough, swollen lymph node L armpit         This is a 53 y.o.female patient that presents today with the following: Follow-up visit    COPD (chronic obstructive pulmonary disease) (CMS-HCC) (HCC)  This is a chronic condition, stable and usually well controlled with current medications.  At her last visit she indicated that she had not been taking Symbicort daily, she is not taking this daily and using her albuterol inhaler as needed.  She is followed by pulmonology and I discussed with her that she may need to follow-up for further evaluation and treatment adjustment.  She states that she has deep breathing difficulties usually every fall and winter when the weather turns cold.  She does not report a cough but she does report chest tightness.  She was previously given a steroid back in early to mid September and states that her symptoms improved with this and is requesting a refill.  I discussed with her that I would be willing to give her a 5-day steroid burst and that if she is not better with the added Spiriva to her Symbicort and albuterol she is to follow-up with her pulmonologist.  We will also get chest x-ray, but she will have it done tomorrow.    Iron deficiency anemia  Patient has significant iron deficiency anemia likely related to her past gastric bypass and is now followed by hematology.  She is scheduled for iron infusions next week.  She reports to feeling very tired.    Elevated PTHrP level  Due to patient's continued elevation of alkaline phosphatase isoenzymes, additional labs were ordered and she was found to have a significantly elevated PTH level.  She has been referred to endocrinology for further evaluation.      Hospital Outpatient Visit on 09/27/2018   Component Date Value   • Vitamin B12 -True Cobala* 09/27/2018 149*   Hospital Outpatient Visit on 09/27/2018   Component Date Value   • 25-Hydroxy   Vitamin D 25 09/27/2018 14*    • Alkaline Phosphatase 09/27/2018 185*   • Liver Fractions 09/27/2018 80    • Bone Fractions 09/27/2018 105*   • Alk Phos Other Calc 09/27/2018 0    • Pth, Intact 09/27/2018 236.4*   • Calcium 09/27/2018 9.4    Hospital Outpatient Visit on 09/27/2018   Component Date Value   • Sodium 09/27/2018 138    • Potassium 09/27/2018 3.4*   • Chloride 09/27/2018 105    • Co2 09/27/2018 26    • Anion Gap 09/27/2018 7.0    • Glucose 09/27/2018 77    • Bun 09/27/2018 15    • Creatinine 09/27/2018 0.77    • Calcium 09/27/2018 9.4    • AST(SGOT) 09/27/2018 16    • ALT(SGPT) 09/27/2018 19    • Alkaline Phosphatase 09/27/2018 170*   • Total Bilirubin 09/27/2018 0.4    • Albumin 09/27/2018 4.3    • Total Protein 09/27/2018 8.1    • Globulin 09/27/2018 3.8*   • A-G Ratio 09/27/2018 1.1    • Ferritin 09/27/2018 7.7*   • Iron 09/27/2018 39*   • Total Iron Binding 09/27/2018 517*   • % Saturation 09/27/2018 8*   • GFR If  09/27/2018 >60    • GFR If Non  Ameri* 09/27/2018 >60    Hospital Outpatient Visit on 09/19/2018   Component Date Value   • Alkaline Phosphatase 09/19/2018 183*   • Liver Fractions 09/19/2018 84    • Bone Fractions 09/19/2018 99*   • Alk Phos Other Calc 09/19/2018 0    • 25-Hydroxy   Vitamin D 25 09/19/2018 11*   Hospital Outpatient Visit on 09/19/2018   Component Date Value   • WBC 09/19/2018 9.1    • RBC 09/19/2018 5.21    • Hemoglobin 09/19/2018 11.7*   • Hematocrit 09/19/2018 39.8    • MCV 09/19/2018 76.4*   • MCH 09/19/2018 22.5*   • MCHC 09/19/2018 29.4*   • RDW 09/19/2018 50.9*   • Platelet Count 09/19/2018 340    • MPV 09/19/2018 11.3    • Nucleated RBC 09/19/2018 0.00    • NRBC (Absolute) 09/19/2018 0.00    • Neutrophils-Polys 09/19/2018 68.30    • Lymphocytes 09/19/2018 21.30*   • Monocytes 09/19/2018 5.40    • Eosinophils 09/19/2018 3.90    • Basophils 09/19/2018 0.90    • Immature Granulocytes 09/19/2018 0.20    • Lymphs (Absolute) 09/19/2018 1.94    • Monos (Absolute) 09/19/2018  0.49    • Eos (Absolute) 09/19/2018 0.35    • Baso (Absolute) 09/19/2018 0.08    • Immature Granulocytes (a* 09/19/2018 0.02    • Neutrophils (Absolute) 09/19/2018 6.21    • Anisocytosis 09/19/2018 2+    • Microcytosis 09/19/2018 2+    • Iron 09/19/2018 75    • Total Iron Binding 09/19/2018 515*   • % Saturation 09/19/2018 15    • Ferritin 09/19/2018 8.2*   • Peripheral Smear Review 09/19/2018 see below    • Plt Estimation 09/19/2018 Normal    • RBC Morphology 09/19/2018 Present    • Comments-Diff 09/19/2018 see below    Hospital Outpatient Visit on 09/19/2018   Component Date Value   • Glycohemoglobin 09/19/2018 6.4*   • Est Avg Glucose 09/19/2018 137    • Creatinine, Urine 09/19/2018 393.00    • Microalbumin, Urine Rand* 09/19/2018 2.4    • Micro Alb Creat Ratio 09/19/2018 6    • Cholesterol,Tot 09/19/2018 158    • Triglycerides 09/19/2018 119    • HDL 09/19/2018 53    • LDL 09/19/2018 81    • Fasting Status 09/19/2018 Fasting          clinical course has been stable    Past Medical History:   Diagnosis Date   • Anxiety and depression    • Arthritis     back, neck   • Asthma    • Back injury    • Back pain    • Bronchitis 2010   • Cancer (HCC)     breast LT   • Chickenpox    • Chronic LBP    • Chronic neck pain    • Cold intolerance 1/24/2012   • COPD (chronic obstructive pulmonary disease) (HCC)    • Cystic fibrosis (HCC)    • Depression    • Diabetes    • GERD (gastroesophageal reflux disease)    • H/O gastric bypass 10/11/2013   • Herniated nucleus pulposus, L4-5 3/18/2010   • HX: breast cancer 10/11/2013   • Hypertension    • Itching due to drug 6/2/2011   • Kidney stone    • Nephrolithiasis 10/11/2013   • Obesity    • Osteoporosis    • Other specified symptom associated with female genital organs    • Panic disorder    • Pneumonia    • PTSD (post-traumatic stress disorder)    • Restless leg syndrome    • Rheumatoid arthritis (HCC)    • S/P laminectomy 10/22/2012   • Thoracic or lumbosacral neuritis or  radiculitis, unspecified 10/22/2012   • Trauma     head trauma       Past Surgical History:   Procedure Laterality Date   • URETEROSCOPY  10/10/2013    Performed by Molly Resendiz M.D. at SURGERY [facility]   • LASERTRIPSY  10/10/2013    Performed by Molly Resendiz M.D. at SURGERY [facility]   • LUMBAR FUSION POSTERIOR  10/22/2012    Performed by Alfred Branham M.D. at SURGERY [facility]   • LUMBAR LAMINECTOMY DISKECTOMY  10/22/2012    Performed by Alfred Branham M.D. at SURGERY [facility]   • OTHER ORTHOPEDIC SURGERY  10/01/2012    lumbar fusion, Dr Branham   • OTHER ABDOMINAL SURGERY  2006    gastric bypass   • ABDOMINAL HYSTERECTOMY TOTAL      DUE TO FIBROIDS   • BREAST RECONSTRUCTION      Lt breast   • CRANIOTOMY      DUE TO BRAIN INJURY   • HYSTERECTOMY LAPAROSCOPY     • LAMINOTOMY     • MASTECTOMY      Lt breast   • OTHER      left ear drum surgery   • OTHER ABDOMINAL SURGERY      hernia repair   • PRIMARY C SECTION      x 3   • SLEEVE,MAXIMILIAN VASO THIGH     • TONSILLECTOMY     • US-CYST ASPIRATION-BREAST INITIAL     • VENTILATOR CONTINUOUS         Family History   Problem Relation Age of Onset   • Diabetes Father    • Cancer Father    • Hypertension Father    • Hyperlipidemia Father    • Cancer Mother    • Other Mother         SLE   • Diabetes Mother    • Hypertension Mother    • Hyperlipidemia Mother    • Stroke Mother    • Sleep Apnea Brother    • Cancer Maternal Aunt    • Diabetes Maternal Aunt    • Diabetes Maternal Uncle    • Diabetes Maternal Grandmother    • Diabetes Maternal Grandfather        Suboxone    Current Outpatient Prescriptions Ordered in [facility]   Medication Sig Dispense Refill   • cyanocobalamin (VITAMIN B-12) 100 MCG Tab Take 100 mcg by mouth every day.     • predniSONE (DELTASONE) 20 MG Tab Take once a day for 5 days 5 Tab 0   • tiotropium (SPIRIVA) 18 MCG Cap Inhale 1 Cap by mouth every day. 30 Cap 3   • vitamin D, Ergocalciferol, (DRISDOL) 00758 units Cap  capsule Take 1 Cap by mouth every 7 days. 4 Cap 0   • clotrimazole (MYCELEX) 10 MG Jacque Take 1 Jacque by mouth 5 Times a Day. 100 Jacque 0   • VENTOLIN  (90 Base) MCG/ACT Aero Soln inhalation aerosol INHALE 1 PUFF PO QID PRN  0   • carvedilol (COREG) 6.25 MG Tab TK 1 T PO  BID  0   • FLUZONE QUADRIVALENT 0.5 ML Suspension Prefilled Syringe injection TO BE ADMINISTERED BY PHARMACIST FOR IMMUNIZATION  0   • amoxicillin-clavulanate (AUGMENTIN) 875-125 MG Tab TK 1 T PO  BID  0   • omeprazole (PRILOSEC) 20 MG delayed-release capsule TK 1 C PO QD 30 MIN B BK MEAL  5   • ondansetron (ZOFRAN) 8 MG Tab TK 1 T PO QID PRN  1   • PREVNAR 13 syringe TO BE ADMINISTERED BY PHARMACIST FOR IMMUNIZATION  0   • zolpidem (AMBIEN) 5 MG Tab TK 1 T PO QD HS FOR SLEEP  0   • ferrous sulfate 324 (65 Fe) MG Tablet Delayed Response EC tablet Take 324 mg by mouth with dinner.     • MethylPREDNISolone (MEDROL DOSEPAK) 4 MG Tablet Therapy Pack Use as package directs 21 Tab 0   • ibuprofen (MOTRIN) 800 MG Tab TAKE 1 TABLET BY MOUTH EVERY 8 HOURS AS NEEDED 60 Tab 0   • ketoconazole (NIZORAL) 2 % shampoo Apply to skin one time daily in the shower and leave on for 5 minutes then rinse off. 2-4 weeks 1 Bottle 3   • Oxycodone HCl 20 MG Tab Take 20 mg by mouth 4 times a day.     • Nebulizers (VIOS AEROSOL DELIVERY SYSTEM) Misc U UTD  0   • ARIPiprazole (ABILIFY) 10 MG Tab Take 1 Tab by mouth every day. 30 Tab 2   • budesonide-formoterol (SYMBICORT) 80-4.5 MCG/ACT Aerosol Inhale 2 Puffs by mouth 2 Times a Day. 1 Inhaler 3   • fluticasone (FLONASE) 50 MCG/ACT nasal spray Spray 1 Spray in nose 2 times a day. Each Nostril 1 Bottle 3   • tiotropium (SPIRIVA) 18 MCG Cap Inhale 1 Cap by mouth every day. 30 Cap 3     No current Epic-ordered facility-administered medications on file.        Constitutional ROS: No unexpected change in weight, No weakness, No unexplained fevers, sweats, or chills  Pulmonary ROS: Positive per HPI  Cardiovascular ROS: No  "chest pain, No edema, No palpitations  Musculoskeletal/Extremities ROS: Chronic joint pain  Neurologic ROS: Normal development, No seizures, No weakness  Endocrine ROS: Positive per HPI    Physical exam:  /76 (BP Location: Right arm, Patient Position: Sitting, BP Cuff Size: Adult long)   Pulse (!) 108   Temp 36.3 °C (97.3 °F) (Temporal)   Resp 16   Ht 1.575 m (5' 2\")   Wt 79.8 kg (176 lb)   LMP 10/05/1999   SpO2 98%   BMI 32.19 kg/m²   General Appearance: Middle-aged female, alert, no distress, obese, well-groomed  Skin: Skin color, texture, turgor normal. No rashes or lesions.  Lungs: negative findings: normal respiratory rate and rhythm, lungs clear to auscultation  Heart: negative. RRR without murmur, gallop, or rubs.  No ectopy.  Abdomen: Abdomen soft, non-tender. BS normal. No masses,  No organomegaly  Musculoskeletal: negative findings: no evidence of joint instability, no deformities present  Neurologic: intact    Medical decision making/discussion: We will get chest x-ray, she will have done tomorrow.  We will add Spiriva to her COPD medication regimen.  She was also advised to use Allegra, Zyrtec or Xyzal in addition to Flonase daily.  She is to use nasal saline generously as needed.  Also notify patient that she was referred to endocrinology for further evaluation of elevated PTH level.    Patti was seen today for nasal congestion.    Diagnoses and all orders for this visit:    Chronic obstructive pulmonary disease, unspecified COPD type ([facility])  -     DX-CHEST-2 VIEWS; Future  -     predniSONE (DELTASONE) 20 MG Tab; Take once a day for 5 days  -     tiotropium (SPIRIVA) 18 MCG Cap; Inhale 1 Cap by mouth every day.    Elevated PTHrP level    Iron deficiency anemia, unspecified iron deficiency anemia type          Please note that this dictation was created using voice recognition software. I have made every reasonable attempt to correct obvious errors, but I expect that there are errors of " grammar and possibly content that I did not discover before finalizing the note.

## 2018-10-09 PROBLEM — R79.89 ELEVATED PTHRP LEVEL: Status: ACTIVE | Noted: 2018-10-09

## 2018-10-10 ENCOUNTER — HOSPITAL ENCOUNTER (OUTPATIENT)
Dept: LAB | Facility: MEDICAL CENTER | Age: 53
End: 2018-10-10
Attending: NURSE PRACTITIONER
Payer: MEDICARE

## 2018-10-10 ENCOUNTER — HOSPITAL ENCOUNTER (OUTPATIENT)
Dept: LAB | Facility: MEDICAL CENTER | Age: 53
End: 2018-10-10
Attending: INTERNAL MEDICINE
Payer: MEDICARE

## 2018-10-10 LAB
C3 SERPL-MCNC: 165 MG/DL (ref 87–200)
C4 SERPL-MCNC: 44 MG/DL (ref 19–52)
CRP SERPL HS-MCNC: 0.46 MG/DL (ref 0–0.75)

## 2018-10-10 PROCEDURE — 86695 HERPES SIMPLEX TYPE 1 TEST: CPT

## 2018-10-10 PROCEDURE — 86235 NUCLEAR ANTIGEN ANTIBODY: CPT | Mod: 91

## 2018-10-10 PROCEDURE — 83516 IMMUNOASSAY NONANTIBODY: CPT | Mod: 91

## 2018-10-10 PROCEDURE — 86140 C-REACTIVE PROTEIN: CPT

## 2018-10-10 PROCEDURE — G0475 HIV COMBINATION ASSAY: HCPCS

## 2018-10-10 PROCEDURE — 36415 COLL VENOUS BLD VENIPUNCTURE: CPT

## 2018-10-10 PROCEDURE — 86215 DEOXYRIBONUCLEASE ANTIBODY: CPT

## 2018-10-10 PROCEDURE — 86696 HERPES SIMPLEX TYPE 2 TEST: CPT

## 2018-10-10 PROCEDURE — 86694 HERPES SIMPLEX NES ANTBDY: CPT

## 2018-10-10 PROCEDURE — 87340 HEPATITIS B SURFACE AG IA: CPT

## 2018-10-10 PROCEDURE — 86160 COMPLEMENT ANTIGEN: CPT

## 2018-10-10 PROCEDURE — 86780 TREPONEMA PALLIDUM: CPT

## 2018-10-10 PROCEDURE — 86803 HEPATITIS C AB TEST: CPT

## 2018-10-12 LAB
CENTROMERE IGG TITR SER IF: 0 AU/ML (ref 0–40)
CHROMATIN IGG SERPL-ACNC: 37 UNITS (ref 0–19)
ENA JO1 AB TITR SER: 0 AU/ML (ref 0–40)
ENA SCL70 IGG SER QL: 1 AU/ML (ref 0–40)
ENA SM IGG SER-ACNC: 1 AU/ML (ref 0–40)
ENA SS-B IGG SER IA-ACNC: 16 AU/ML (ref 0–40)
HSV1 GG IGG SER-ACNC: 27.4 IV
HSV1+2 IGG SER IA-ACNC: >22.4 IV
HSV2 GG IGG SER-ACNC: 2.21 IV
SSA52 R0ENA AB IGG Q0420: 11 AU/ML (ref 0–40)
SSA60 R0ENA AB IGG Q0419: 8 AU/ML (ref 0–40)
STREP DNASE B TITR SER: <86 U/ML (ref 0–260)
TEST NAME 95000: NORMAL
U1 SNRNP IGG SER QL: 0 AU/ML (ref 0–40)
UNCODED TEST RESULT 95040: NORMAL

## 2018-10-13 LAB — HISTONE IGG SER IA-ACNC: 3.3 UNITS (ref 0–0.9)

## 2018-10-14 LAB — TEST NAME 95000: NORMAL

## 2018-10-15 LAB — TEST NAME 95000: NORMAL

## 2018-10-16 ENCOUNTER — OUTPATIENT INFUSION SERVICES (OUTPATIENT)
Dept: ONCOLOGY | Facility: MEDICAL CENTER | Age: 53
End: 2018-10-16
Attending: INTERNAL MEDICINE
Payer: MEDICARE

## 2018-10-16 VITALS
HEART RATE: 82 BPM | OXYGEN SATURATION: 94 % | DIASTOLIC BLOOD PRESSURE: 65 MMHG | SYSTOLIC BLOOD PRESSURE: 120 MMHG | HEIGHT: 61 IN | BODY MASS INDEX: 33.88 KG/M2 | TEMPERATURE: 98.2 F | WEIGHT: 179.45 LBS | RESPIRATION RATE: 18 BRPM

## 2018-10-16 PROCEDURE — 306780 HCHG STAT FOR TRANSFUSION PER CASE

## 2018-10-16 PROCEDURE — 96374 THER/PROPH/DIAG INJ IV PUSH: CPT

## 2018-10-16 PROCEDURE — 700111 HCHG RX REV CODE 636 W/ 250 OVERRIDE (IP): Performed by: INTERNAL MEDICINE

## 2018-10-16 RX ADMIN — IRON SUCROSE 200 MG: 20 INJECTION, SOLUTION INTRAVENOUS at 16:04

## 2018-10-16 ASSESSMENT — PAIN SCALES - GENERAL
PAINLEVEL: 7=MODERATE-SEVERE PAIN
PAINLEVEL_OUTOF10: 7

## 2018-10-17 LAB — UNCODED TEST RESULT 95040: NORMAL

## 2018-10-17 NOTE — PROGRESS NOTES
Patient here for for 1/5 Venofer. PIV established. Venofer given as IVP over 5 minutes. No adverse reaction noted during 30 minute post-Venofer monitoring. PIV removed; gauze/coban applied over site. Next appointment scheduled. Discharged to self care; no apparent distress noted.

## 2018-10-18 ENCOUNTER — TELEPHONE (OUTPATIENT)
Dept: MEDICAL GROUP | Facility: PHYSICIAN GROUP | Age: 53
End: 2018-10-18

## 2018-10-18 ENCOUNTER — OUTPATIENT INFUSION SERVICES (OUTPATIENT)
Dept: ONCOLOGY | Facility: MEDICAL CENTER | Age: 53
End: 2018-10-18
Attending: INTERNAL MEDICINE
Payer: MEDICARE

## 2018-10-18 VITALS
HEART RATE: 102 BPM | OXYGEN SATURATION: 97 % | DIASTOLIC BLOOD PRESSURE: 62 MMHG | TEMPERATURE: 97 F | BODY MASS INDEX: 34.01 KG/M2 | SYSTOLIC BLOOD PRESSURE: 119 MMHG | WEIGHT: 180.12 LBS | HEIGHT: 61 IN | RESPIRATION RATE: 18 BRPM

## 2018-10-18 PROCEDURE — 96374 THER/PROPH/DIAG INJ IV PUSH: CPT

## 2018-10-18 PROCEDURE — 306780 HCHG STAT FOR TRANSFUSION PER CASE

## 2018-10-18 PROCEDURE — 700111 HCHG RX REV CODE 636 W/ 250 OVERRIDE (IP): Performed by: INTERNAL MEDICINE

## 2018-10-18 RX ADMIN — IRON SUCROSE 200 MG: 20 INJECTION, SOLUTION INTRAVENOUS at 15:45

## 2018-10-18 ASSESSMENT — PAIN SCALES - GENERAL: PAINLEVEL_OUTOF10: 3

## 2018-10-18 NOTE — TELEPHONE ENCOUNTER
VOICEMAIL  1. Caller Name: Patti Tello                      Call Back Number: [phone] (home)       2. Message: Patient called and stated that she received her first iron infusion. Since then she has felt very nauseous. She wants to know if she can get an Rx for Zofran sent to [facility].     3. Patient approves office to leave a detailed voicemail/Steviehart message: N\A

## 2018-10-20 ENCOUNTER — OUTPATIENT INFUSION SERVICES (OUTPATIENT)
Dept: ONCOLOGY | Facility: MEDICAL CENTER | Age: 53
End: 2018-10-20
Attending: INTERNAL MEDICINE
Payer: MEDICARE

## 2018-10-20 VITALS
TEMPERATURE: 97.8 F | RESPIRATION RATE: 18 BRPM | HEART RATE: 74 BPM | OXYGEN SATURATION: 97 % | DIASTOLIC BLOOD PRESSURE: 70 MMHG | BODY MASS INDEX: 34.13 KG/M2 | WEIGHT: 180.78 LBS | SYSTOLIC BLOOD PRESSURE: 119 MMHG | HEIGHT: 61 IN

## 2018-10-20 PROCEDURE — 96374 THER/PROPH/DIAG INJ IV PUSH: CPT

## 2018-10-20 PROCEDURE — 306780 HCHG STAT FOR TRANSFUSION PER CASE

## 2018-10-20 PROCEDURE — 700111 HCHG RX REV CODE 636 W/ 250 OVERRIDE (IP): Performed by: INTERNAL MEDICINE

## 2018-10-20 RX ORDER — PREDNISONE 10 MG/1
10 TABLET ORAL DAILY
COMMUNITY
End: 2019-04-11

## 2018-10-20 RX ADMIN — IRON SUCROSE 200 MG: 20 INJECTION, SOLUTION INTRAVENOUS at 16:10

## 2018-10-20 ASSESSMENT — PAIN SCALES - GENERAL: PAINLEVEL_OUTOF10: 0

## 2018-10-21 NOTE — PROGRESS NOTES
Pt here for dose 3/5 Venofer for iron deficiency. IV access established. Venofer given IVP over >5 min. Pt c/o some discomfort at IV site, heat pack applied and NS infused KVO. Pt remained for 30 min of observation with no apparent issues. IV flushed and removed. Pressure dressing applied. Pt knows to return on Monday. Discharged home under self care in no apparent distress.

## 2018-10-22 ENCOUNTER — OUTPATIENT INFUSION SERVICES (OUTPATIENT)
Dept: ONCOLOGY | Facility: MEDICAL CENTER | Age: 53
End: 2018-10-22
Attending: INTERNAL MEDICINE
Payer: MEDICARE

## 2018-10-22 VITALS
OXYGEN SATURATION: 98 % | HEIGHT: 61 IN | BODY MASS INDEX: 33.96 KG/M2 | DIASTOLIC BLOOD PRESSURE: 65 MMHG | RESPIRATION RATE: 18 BRPM | HEART RATE: 94 BPM | SYSTOLIC BLOOD PRESSURE: 112 MMHG | TEMPERATURE: 97.7 F | WEIGHT: 179.9 LBS

## 2018-10-22 PROCEDURE — 306780 HCHG STAT FOR TRANSFUSION PER CASE

## 2018-10-22 PROCEDURE — 96374 THER/PROPH/DIAG INJ IV PUSH: CPT

## 2018-10-22 PROCEDURE — 700111 HCHG RX REV CODE 636 W/ 250 OVERRIDE (IP): Performed by: INTERNAL MEDICINE

## 2018-10-22 RX ADMIN — IRON SUCROSE 200 MG: 20 INJECTION, SOLUTION INTRAVENOUS at 15:26

## 2018-10-22 ASSESSMENT — PAIN SCALES - GENERAL: PAINLEVEL_OUTOF10: 5

## 2018-10-22 NOTE — PROGRESS NOTES
Pt arrives ambulatory to [facility] accompanied by self.  She is here for 4/5 Venofer.  Venofer given IVP over 6 minutes with NS running, per pt request.  She tells me she has had discomfort at PIV site when venofer given without saline running.  No complaints voiced with this method.  30 minute post observation completed.  No evidence of adverse effects noted or expressed.  Pt dc'd to self care in no apparent distress.  Returns on Wednesday for #5/5 Venofer.    ADD:  Pt states frustration over her oxycodone medication and being dose reduced.  She tells me she is having trouble sleeping d/t pain, and that she cannot work d/t pain.  She tells me she could go for six hours between pain pills when on larger dose.  I did encourage her to talk to her pain doctor about this.  She tells me she will do so.

## 2018-10-23 ENCOUNTER — NON-PROVIDER VISIT (OUTPATIENT)
Dept: URGENT CARE | Facility: PHYSICIAN GROUP | Age: 53
End: 2018-10-23

## 2018-10-23 DIAGNOSIS — Z02.1 PRE-EMPLOYMENT DRUG SCREENING: ICD-10-CM

## 2018-10-23 LAB
AMP AMPHETAMINE: NORMAL
COC COCAINE: NORMAL
INT CON NEG: NORMAL
INT CON POS: NORMAL
MET METHAMPHETAMINES: NORMAL
OPI OPIATES: NORMAL
PCP PHENCYCLIDINE: NORMAL
POC DRUG COMMENT 753798-OCCUPATIONAL HEALTH: NORMAL
THC: NORMAL

## 2018-10-23 PROCEDURE — 80305 DRUG TEST PRSMV DIR OPT OBS: CPT | Performed by: PHYSICIAN ASSISTANT

## 2018-10-24 ENCOUNTER — OUTPATIENT INFUSION SERVICES (OUTPATIENT)
Dept: ONCOLOGY | Facility: MEDICAL CENTER | Age: 53
End: 2018-10-24
Attending: INTERNAL MEDICINE
Payer: MEDICARE

## 2018-10-24 VITALS
BODY MASS INDEX: 33.63 KG/M2 | HEART RATE: 102 BPM | SYSTOLIC BLOOD PRESSURE: 133 MMHG | DIASTOLIC BLOOD PRESSURE: 67 MMHG | RESPIRATION RATE: 18 BRPM | HEIGHT: 61 IN | OXYGEN SATURATION: 95 % | WEIGHT: 178.13 LBS | TEMPERATURE: 97.8 F

## 2018-10-24 PROCEDURE — 96374 THER/PROPH/DIAG INJ IV PUSH: CPT

## 2018-10-24 PROCEDURE — 700111 HCHG RX REV CODE 636 W/ 250 OVERRIDE (IP): Performed by: INTERNAL MEDICINE

## 2018-10-24 PROCEDURE — 306780 HCHG STAT FOR TRANSFUSION PER CASE

## 2018-10-24 RX ADMIN — IRON SUCROSE 200 MG: 20 INJECTION, SOLUTION INTRAVENOUS at 14:45

## 2018-10-24 ASSESSMENT — PAIN SCALES - GENERAL: PAINLEVEL_OUTOF10: 0

## 2018-10-24 NOTE — PROGRESS NOTES
Here for Venofer IV infusion dose # 5 of 5. In good spirits. Reports to be tired / fatigue. Teaching and education reinforcement provided, also emotional support. Treatment well tolerated without complications. Patient observed by RN for 30 min. after treatment completed. No sign or symptoms of adverse reaction observed or expressed. Discharge home with family to self care. No further appointments needed at this time.

## 2018-10-25 ENCOUNTER — TELEPHONE (OUTPATIENT)
Dept: HEMATOLOGY ONCOLOGY | Facility: MEDICAL CENTER | Age: 53
End: 2018-10-25

## 2018-10-25 NOTE — TELEPHONE ENCOUNTER
Called and left voicemail for patient to call our office back regarding lab orders, Mckenzie Cohen RN stated there is standing orders patient can have them done prior to appointment with Dr. More on 11/02/2018. When patient calls back please give her information above.

## 2018-10-25 NOTE — TELEPHONE ENCOUNTER
Patient is wanting to confirm if she is to have labs done prior to her upcoming appointment with Dr. More on 11/02/2018. Please advise.

## 2018-10-29 NOTE — TELEPHONE ENCOUNTER
RN called patient back. Patient stated headache x3  days and denies any numbness's or tingling. RN asked about the fluid intake and patient states she has not been good at it and her lips are starting to get dry and cracked and also the nurse at infusion said she appeared dehydrated and gave her the full bag of saline. RN instructed the patient to make slow position changes and to increase fluids and and Gatorade or pedialyte . Patient states she has been nauseas as well and the ibuprofen hurts her stomach. RN instructed Ibprofen requires food in her stomach otherwise it can be hard on her stomach and cause discomfort, nausea and vomiting. RN instructed patient to call if symptoms worsen and to make sure she keeps upcomming appointment with Dr. More. Patient verbalized understanding at this time.

## 2018-10-29 NOTE — TELEPHONE ENCOUNTER
Patient called back and I relayed message below. Patient was agreeable.     Patient also stating that since Saturday she's had a headache with no relief. She's been really nauseated. She's tried tylenol but it did not help. Please advise.

## 2018-10-31 ENCOUNTER — HOSPITAL ENCOUNTER (OUTPATIENT)
Dept: LAB | Facility: MEDICAL CENTER | Age: 53
End: 2018-10-31
Attending: INTERNAL MEDICINE
Payer: MEDICARE

## 2018-10-31 DIAGNOSIS — E53.8 VITAMIN B12 DEFICIENCY: ICD-10-CM

## 2018-10-31 DIAGNOSIS — D50.9 IRON DEFICIENCY ANEMIA, UNSPECIFIED IRON DEFICIENCY ANEMIA TYPE: ICD-10-CM

## 2018-10-31 DIAGNOSIS — E55.9 VITAMIN D INSUFFICIENCY: ICD-10-CM

## 2018-10-31 LAB — IRON SERPL-MCNC: 87 UG/DL (ref 40–170)

## 2018-10-31 PROCEDURE — 82306 VITAMIN D 25 HYDROXY: CPT

## 2018-10-31 PROCEDURE — 82728 ASSAY OF FERRITIN: CPT

## 2018-10-31 PROCEDURE — 85025 COMPLETE CBC W/AUTO DIFF WBC: CPT

## 2018-10-31 PROCEDURE — 83540 ASSAY OF IRON: CPT

## 2018-10-31 PROCEDURE — 82607 VITAMIN B-12: CPT

## 2018-10-31 PROCEDURE — 36415 COLL VENOUS BLD VENIPUNCTURE: CPT

## 2018-11-01 LAB
25(OH)D3 SERPL-MCNC: 25 NG/ML (ref 30–100)
ANISOCYTOSIS BLD QL SMEAR: ABNORMAL
BASOPHILS # BLD AUTO: 0.9 % (ref 0–1.8)
BASOPHILS # BLD: 0.07 K/UL (ref 0–0.12)
COMMENT 1642: NORMAL
DACRYOCYTES BLD QL SMEAR: NORMAL
EOSINOPHIL # BLD AUTO: 0.19 K/UL (ref 0–0.51)
EOSINOPHIL NFR BLD: 2.4 % (ref 0–6.9)
ERYTHROCYTE [DISTWIDTH] IN BLOOD BY AUTOMATED COUNT: 65.9 FL (ref 35.9–50)
FERRITIN SERPL-MCNC: 275.9 NG/ML (ref 10–291)
HCT VFR BLD AUTO: 42.9 % (ref 37–47)
HGB BLD-MCNC: 12.8 G/DL (ref 12–16)
IMM GRANULOCYTES # BLD AUTO: 0.04 K/UL (ref 0–0.11)
IMM GRANULOCYTES NFR BLD AUTO: 0.5 % (ref 0–0.9)
LG PLATELETS BLD QL SMEAR: NORMAL
LYMPHOCYTES # BLD AUTO: 1.54 K/UL (ref 1–4.8)
LYMPHOCYTES NFR BLD: 19.4 % (ref 22–41)
MCH RBC QN AUTO: 25 PG (ref 27–33)
MCHC RBC AUTO-ENTMCNC: 29.8 G/DL (ref 33.6–35)
MCV RBC AUTO: 83.6 FL (ref 81.4–97.8)
MICROCYTES BLD QL SMEAR: ABNORMAL
MONOCYTES # BLD AUTO: 0.43 K/UL (ref 0–0.85)
MONOCYTES NFR BLD AUTO: 5.4 % (ref 0–13.4)
MORPHOLOGY BLD-IMP: NORMAL
NEUTROPHILS # BLD AUTO: 5.68 K/UL (ref 2–7.15)
NEUTROPHILS NFR BLD: 71.4 % (ref 44–72)
NRBC # BLD AUTO: 0 K/UL
NRBC BLD-RTO: 0 /100 WBC
OVALOCYTES BLD QL SMEAR: NORMAL
PLATELET # BLD AUTO: 272 K/UL (ref 164–446)
PLATELET BLD QL SMEAR: NORMAL
PMV BLD AUTO: 11 FL (ref 9–12.9)
POIKILOCYTOSIS BLD QL SMEAR: NORMAL
RBC # BLD AUTO: 5.13 M/UL (ref 4.2–5.4)
RBC BLD AUTO: PRESENT
VIT B12 SERPL-MCNC: >1500 PG/ML (ref 211–911)
WBC # BLD AUTO: 8 K/UL (ref 4.8–10.8)

## 2018-11-02 ENCOUNTER — OFFICE VISIT (OUTPATIENT)
Dept: HEMATOLOGY ONCOLOGY | Facility: MEDICAL CENTER | Age: 53
End: 2018-11-02
Payer: MEDICARE

## 2018-11-02 VITALS
SYSTOLIC BLOOD PRESSURE: 122 MMHG | HEIGHT: 61 IN | BODY MASS INDEX: 34.24 KG/M2 | TEMPERATURE: 98.3 F | OXYGEN SATURATION: 95 % | WEIGHT: 181.33 LBS | HEART RATE: 68 BPM | RESPIRATION RATE: 16 BRPM | DIASTOLIC BLOOD PRESSURE: 84 MMHG

## 2018-11-02 DIAGNOSIS — D50.8 IRON DEFICIENCY ANEMIA DUE TO DIETARY CAUSES: ICD-10-CM

## 2018-11-02 PROCEDURE — 99213 OFFICE O/P EST LOW 20 MIN: CPT | Performed by: INTERNAL MEDICINE

## 2018-11-02 ASSESSMENT — PAIN SCALES - GENERAL: PAINLEVEL: NO PAIN

## 2018-11-02 NOTE — PROGRESS NOTES
Date of visit: 11/2/2018  9:27 AM      Chief Complaint-iron deficiency anemia gastric bypass      HPIunderwent gastric bypass surgery for obesity in 2006  Seen by Dr Larsen for symptomatic iron deficiency.  She received Venofer No. 5 infusions recently.  Currently feeling better.  Labs showed improved ferritin of 275.      Past Medical History:      Past Medical History:   Diagnosis Date   • Anxiety and depression    • Arthritis     back, neck   • Asthma    • Back injury    • Back pain    • Bronchitis 2010   • Cancer (HCC)     breast LT   • Chickenpox    • Chronic LBP    • Chronic neck pain    • Cold intolerance 1/24/2012   • COPD (chronic obstructive pulmonary disease) ([facility])    • Cystic fibrosis (HCC)    • Depression    • Diabetes    • GERD (gastroesophageal reflux disease)    • H/O gastric bypass 10/11/2013   • Herniated nucleus pulposus, L4-5 3/18/2010   • HX: breast cancer 10/11/2013   • Hypertension    • Itching due to drug 6/2/2011   • Kidney stone    • Nephrolithiasis 10/11/2013   • Obesity    • Osteoporosis    • Other specified symptom associated with female genital organs    • Panic disorder    • Pneumonia    • PTSD (post-traumatic stress disorder)    • Restless leg syndrome    • Rheumatoid arthritis (HCC)    • S/P laminectomy 10/22/2012   • Thoracic or lumbosacral neuritis or radiculitis, unspecified 10/22/2012   • Trauma     head trauma       Past surgical history:       Past Surgical History:   Procedure Laterality Date   • URETEROSCOPY  10/10/2013    Performed by Molly Resendiz M.D. at SURGERY [facility]   • LASERTRIPSY  10/10/2013    Performed by Molly Resendiz M.D. at SURGERY [facility]   • LUMBAR FUSION POSTERIOR  10/22/2012    Performed by Alfred Branham M.D. at SURGERY [facility]   • LUMBAR LAMINECTOMY DISKECTOMY  10/22/2012    Performed by Alfred Branham M.D. at SURGERY [facility]   • OTHER ORTHOPEDIC SURGERY  10/01/2012    lumbar fusion, Dr Branham   • OTHER  ABDOMINAL SURGERY  2006    gastric bypass   • ABDOMINAL HYSTERECTOMY TOTAL      DUE TO FIBROIDS   • BREAST RECONSTRUCTION      Lt breast   • CRANIOTOMY      DUE TO BRAIN INJURY   • HYSTERECTOMY LAPAROSCOPY     • LAMINOTOMY     • MASTECTOMY      Lt breast   • OTHER      left ear drum surgery   • OTHER ABDOMINAL SURGERY      hernia repair   • PRIMARY C SECTION      x 3   • SLEEVE,MAXIMILIAN VASO THIGH     • TONSILLECTOMY     • US-CYST ASPIRATION-BREAST INITIAL     • VENTILATOR CONTINUOUS         Allergies:       Suboxone    Medications:         Current Outpatient Prescriptions   Medication Sig Dispense Refill   • predniSONE (DELTASONE) 10 MG Tab Take 10 mg by mouth every day.     • cyanocobalamin (VITAMIN B-12) 100 MCG Tab Take 100 mcg by mouth every day.     • tiotropium (SPIRIVA) 18 MCG Cap Inhale 1 Cap by mouth every day. 30 Cap 3   • VENTOLIN  (90 Base) MCG/ACT Aero Soln inhalation aerosol INHALE 1 PUFF PO QID PRN  0   • ondansetron (ZOFRAN) 8 MG Tab TK 1 T PO QID PRN  1   • vitamin D, Ergocalciferol, (DRISDOL) 65798 units Cap capsule Take 1 Cap by mouth every 7 days. 4 Cap 0   • ferrous sulfate 324 (65 Fe) MG Tablet Delayed Response EC tablet Take 324 mg by mouth with dinner.     • ketoconazole (NIZORAL) 2 % shampoo Apply to skin one time daily in the shower and leave on for 5 minutes then rinse off. 2-4 weeks 1 Bottle 3   • Oxycodone HCl 20 MG Tab Take 20 mg by mouth 4 times a day.     • Nebulizers (VIOS AEROSOL DELIVERY SYSTEM) Misc U UTD  0   • ARIPiprazole (ABILIFY) 10 MG Tab Take 1 Tab by mouth every day. 30 Tab 2   • budesonide-formoterol (SYMBICORT) 80-4.5 MCG/ACT Aerosol Inhale 2 Puffs by mouth 2 Times a Day. 1 Inhaler 3   • fluticasone (FLONASE) 50 MCG/ACT nasal spray Spray 1 Spray in nose 2 times a day. Each Nostril 1 Bottle 3     No current facility-administered medications for this visit.          Social History:     Social History     Social History   • Marital status:      Spouse name: N/A    • Number of children: N/A   • Years of education: N/A     Occupational History   • Not on file.     Social History Main Topics   • Smoking status: Never Smoker   • Smokeless tobacco: Never Used   • Alcohol use No   • Drug use: No   • Sexual activity: Not Currently     Other Topics Concern   •  Service No   • Blood Transfusions Yes     In 2006   • Caffeine Concern No   • Occupational Exposure No   • Hobby Hazards No   • Sleep Concern No   • Stress Concern No   • Weight Concern Yes   • Special Diet No   • Back Care Yes   • Exercise No   • Bike Helmet No     Doesn't Ride Bike   • Seat Belt Yes   • Self-Exams No     Social History Narrative   • No narrative on file       Family History:      Family History   Problem Relation Age of Onset   • Diabetes Father    • Cancer Father    • Hypertension Father    • Hyperlipidemia Father    • Cancer Mother    • Other Mother         SLE   • Diabetes Mother    • Hypertension Mother    • Hyperlipidemia Mother    • Stroke Mother    • Sleep Apnea Brother    • Cancer Maternal Aunt    • Diabetes Maternal Aunt    • Diabetes Maternal Uncle    • Diabetes Maternal Grandmother    • Diabetes Maternal Grandfather        Review of Systems:  All other review of systems are negative except what was mentioned above in the HPI.    Constitutional: Negative for fever, chills, weight loss and improved malaise/fatigue.    HEENT: No new auditory or visual complaints. No sore throat and neck pain.     Respiratory: Negative for cough, sputum production, shortness of breath and wheezing.    Cardiovascular: Negative for chest pain, palpitations, orthopnea and leg swelling.    Gastrointestinal: Negative for heartburn, nausea, vomiting and abdominal pain.    Genitourinary: Negative for dysuria, hematuria    Musculoskeletal: No new arthralgias or myalgias   Skin: Negative for rash and itching.    Neurological: Negative for focal weakness and headaches.    Endo/Heme/Allergies: No abnormal bleed/bruise.  "   Psychiatric/Behavioral: No new depression/anxiety.    Physical Exam:  Vitals: /84 (BP Location: Right arm, Patient Position: Sitting, BP Cuff Size: Adult)   Pulse 68   Temp 36.8 °C (98.3 °F) (Temporal)   Resp 16   Ht 1.549 m (5' 0.98\")   Wt 82.2 kg (181 lb 5.3 oz)   LMP 10/05/1999   SpO2 95%   BMI 34.28 kg/m²     General: Not in acute distress, alert and oriented x 3  HEENT: No pallor, icterus. Oropharynx clear.   Neck: Supple, no palpable masses.  Lymph nodes: No palpable cervical, supraclavicular, axillary or inguinal lymphadenopathy.    CVS: regular rate and rhythm, no rubs or gallops  RESP: Clear to auscultate bilaterally, no wheezing or crackles.   ABD: Soft, non tender, non distended, positive bowel sounds, no palpable organomegaly  EXT: No edema or cyanosis  CNS: Alert and oriented x3, No focal deficits.  Skin- No rash      Labs:   Hospital Outpatient Visit on 10/31/2018   Component Date Value Ref Range Status   • Ferritin 10/31/2018 275.9  10.0 - 291.0 ng/mL Final   • WBC 10/31/2018 8.0  4.8 - 10.8 K/uL Final   • RBC 10/31/2018 5.13  4.20 - 5.40 M/uL Final   • Hemoglobin 10/31/2018 12.8  12.0 - 16.0 g/dL Final   • Hematocrit 10/31/2018 42.9  37.0 - 47.0 % Final   • MCV 10/31/2018 83.6  81.4 - 97.8 fL Final   • MCH 10/31/2018 25.0* 27.0 - 33.0 pg Final   • MCHC 10/31/2018 29.8* 33.6 - 35.0 g/dL Final   • RDW 10/31/2018 65.9* 35.9 - 50.0 fL Final   • Platelet Count 10/31/2018 272  164 - 446 K/uL Final   • MPV 10/31/2018 11.0  9.0 - 12.9 fL Final   • Neutrophils-Polys 10/31/2018 71.40  44.00 - 72.00 % Final   • Lymphocytes 10/31/2018 19.40* 22.00 - 41.00 % Final   • Monocytes 10/31/2018 5.40  0.00 - 13.40 % Final   • Eosinophils 10/31/2018 2.40  0.00 - 6.90 % Final   • Basophils 10/31/2018 0.90  0.00 - 1.80 % Final   • Immature Granulocytes 10/31/2018 0.50  0.00 - 0.90 % Final   • Nucleated RBC 10/31/2018 0.00  /100 WBC Final   • Neutrophils (Absolute) 10/31/2018 5.68  2.00 - 7.15 K/uL Final    " Includes immature neutrophils, if present.   • Lymphs (Absolute) 10/31/2018 1.54  1.00 - 4.80 K/uL Final   • Monos (Absolute) 10/31/2018 0.43  0.00 - 0.85 K/uL Final   • Eos (Absolute) 10/31/2018 0.19  0.00 - 0.51 K/uL Final   • Baso (Absolute) 10/31/2018 0.07  0.00 - 0.12 K/uL Final   • Immature Granulocytes (abs) 10/31/2018 0.04  0.00 - 0.11 K/uL Final   • NRBC (Absolute) 10/31/2018 0.00  K/uL Final   • Anisocytosis 10/31/2018 1+   Final   • Microcytosis 10/31/2018 1+   Final   • Iron 10/31/2018 87  40 - 170 ug/dL Final   • Vitamin B12 -True Cobalamin 10/31/2018 >1500* 211 - 911 pg/mL Final   • 25-Hydroxy   Vitamin D 25 10/31/2018 25* 30 - 100 ng/mL Final    Comment: Adult Ranges:   <20 ng/mL - Deficiency  20-29 ng/mL - Insufficiency   ng/mL - Sufficiency  The [employer] Centaur Vitamin D Assay is standardized to the  [facility] reference measurement procedures, a  reference method for the Vitamin D Standardization Program  (VDSP).  The VDSP aligns patient results among 25 (OH)  Vitamin D methods.     • Peripheral Smear Review 10/31/2018 see below   Final    Comment: Due to instrument suspect flags, further review of peripheral smear is  indicated on this patient sample. This review may or may not result in  abnormal findings.     • Plt Estimation 10/31/2018 Normal   Final   • RBC Morphology 10/31/2018 Present   Final   • Large Platelets 10/31/2018 1+   Final   • Poikilocytosis 10/31/2018 1+   Final   • Ovalocytes 10/31/2018 1+   Final   • Tear Drop Cells 10/31/2018 1+   Final   • Comments-Diff 10/31/2018 see below   Final    Results have been verified by peripheral blood smear review.             Assessment and Plan:    Chronic conditions in the setting of gastric bypass-she had good response to Venofer with improvement in her fatigue.  Iron stores are replenished.  She is aware of the fact that she will need periodic parenteral iron infusions due to the malabsorption from her gastric bypass.  We will  check her CBC and ferritin every 3 months and see her back in 6 months.  She has been taking B12 supplements with improvement in her B12 level.  Instructed her to lower the dose to 1000 mg Monday Wednesday Friday as her levels are supratherapeutic at this point.  She needs to follow-up with her PCP for management of vitamin D deficiency.    She agreed and verbalized  agreement and understanding with the current plan.  I answered all questions and concerns at this time         Please note that this dictation was created using voice recognition software. I have made every reasonable attempt to correct obvious errors, but I expect that there are errors of grammar and possibly content that I did not discover before finalizing the note.      SIGNATURES:  Jonny More    CC:  Bethany Saab, DARIN.P.R.N.  No ref. provider found

## 2018-11-08 ENCOUNTER — TELEPHONE (OUTPATIENT)
Dept: HEMATOLOGY ONCOLOGY | Facility: MEDICAL CENTER | Age: 53
End: 2018-11-08

## 2018-11-08 NOTE — TELEPHONE ENCOUNTER
Called the patient to let her know that she needs to follow up with her primary care doctor regarding vitamin D deficiency.

## 2018-11-13 ENCOUNTER — TELEPHONE (OUTPATIENT)
Dept: MEDICAL GROUP | Facility: PHYSICIAN GROUP | Age: 53
End: 2018-11-13

## 2018-11-13 DIAGNOSIS — E55.9 VITAMIN D INSUFFICIENCY: ICD-10-CM

## 2018-11-13 RX ORDER — ERGOCALCIFEROL 1.25 MG/1
50000 CAPSULE ORAL
Qty: 4 CAP | Refills: 0 | Status: SHIPPED | OUTPATIENT
Start: 2018-11-13 | End: 2018-11-20 | Stop reason: SDUPTHER

## 2018-11-13 NOTE — TELEPHONE ENCOUNTER
Would like patient to continue on high-dose vitamin D for 1 more month, this has been called in.  She can then take over-the-counter vitamin D supplement at 2000 units daily.

## 2018-11-13 NOTE — TELEPHONE ENCOUNTER
Please let pt know I would like her to do the weekly high dose vitamin for 1 more month, then she can start OTC vitamin D at 2000 units daily.

## 2018-11-20 ENCOUNTER — TELEMEDICINE2 (OUTPATIENT)
Dept: ENDOCRINOLOGY | Facility: MEDICAL CENTER | Age: 53
End: 2018-11-20
Payer: MEDICARE

## 2018-11-20 ENCOUNTER — PATIENT MESSAGE (OUTPATIENT)
Dept: SLEEP MEDICINE | Facility: MEDICAL CENTER | Age: 53
End: 2018-11-20

## 2018-11-20 ENCOUNTER — TELEPHONE (OUTPATIENT)
Dept: SLEEP MEDICINE | Facility: MEDICAL CENTER | Age: 53
End: 2018-11-20

## 2018-11-20 VITALS
HEART RATE: 78 BPM | WEIGHT: 183 LBS | OXYGEN SATURATION: 98 % | HEIGHT: 61 IN | BODY MASS INDEX: 34.55 KG/M2 | DIASTOLIC BLOOD PRESSURE: 72 MMHG | SYSTOLIC BLOOD PRESSURE: 128 MMHG

## 2018-11-20 DIAGNOSIS — N25.81 SECONDARY HYPERPARATHYROIDISM (HCC): ICD-10-CM

## 2018-11-20 DIAGNOSIS — Z98.84 HISTORY OF GASTRIC BYPASS: ICD-10-CM

## 2018-11-20 DIAGNOSIS — E55.9 VITAMIN D INSUFFICIENCY: ICD-10-CM

## 2018-11-20 PROCEDURE — 99204 OFFICE O/P NEW MOD 45 MIN: CPT | Mod: GT | Performed by: INTERNAL MEDICINE

## 2018-11-20 RX ORDER — ERGOCALCIFEROL 1.25 MG/1
50000 CAPSULE ORAL
Qty: 26 CAP | Refills: 0 | Status: SHIPPED | OUTPATIENT
Start: 2018-11-20 | End: 2019-04-16 | Stop reason: SDUPTHER

## 2018-11-20 NOTE — TELEPHONE ENCOUNTER
Call pt to inform her on the statues of her order that was placed back on 9/17/48. Called pt and informed her that I called [facility] and spoke Padmini and she stated that they needed a order sign from Dr. Richard. I informed Padmini if she can fax the document so I can have Dr. Richard sign it and I will fax it back to them right away. I advise her to keep contacting them regarding this so she can get her machine.

## 2018-11-20 NOTE — PROGRESS NOTES
New Patient Consult Note  Primary care provider: PATRICK Chris    Reason for consult: Hyperparathyroidism    HPI:  Patti Tello is a 53 y.o. old patient who presents for tele-consultation today for hyperparathyroidism.  Labs in September 2018 showed elevated PTH of 236.  She has normal serum calcium.  She has normal GFR.  Labs in September showed very low vitamin D of 11, which on repeat labs in October has improved to 25.  She takes vitamin D 50,000 IUs once a week.  She has history of gastric bypass surgery in 2006.  She does not take any calcium supplement.  She takes vitamin B12 supplement in addition to vitamin D supplement and iron supplement.  Does not take a multivitamin.    ROS:  Constitutional: Positive for fatigue and weight gain  Cardiac: No palpitations or racing heart  Resp: No shortness of breath  All other systems were reviewed and were negative.    Past Medical History:  Patient Active Problem List    Diagnosis Date Noted   • Lumbar stenosis 10/22/2012     Priority: High   • Elevated PTHrP level 10/09/2018   • Tinea versicolor 09/27/2018   • Vitamin B12 deficiency 09/27/2018   • Abnormal laboratory test 09/25/2018   • TOMY (obstructive sleep apnea) 09/17/2018   • History of diabetes mellitus, type II 09/04/2018   • Positive NAKUL (antinuclear antibody) 07/24/2018   • Arthritis, multiple joint involvement 07/24/2018   • Family history of systemic lupus erythematosus (SLE) in mother 07/24/2018   • Iron deficiency anemia 07/24/2018   • Skin lesions 07/24/2018   • Other fatigue 06/20/2018   • Heartburn 05/08/2018   • Muscle spasm of both lower legs 03/26/2018   • Calcaneal spur of both feet 03/15/2018   • Pain management contract broken 03/15/2018   • Obesity (BMI 30-39.9) 01/18/2018   • COPD (chronic obstructive pulmonary disease) ([facility]) 10/05/2017   • Bipolar affective disorder, currently depressed, moderate ([facility]) 01/24/2017   • History of gastric bypass 10/11/2013   • Chronic pain syndrome  09/16/2013   • Anxiety 12/06/2012   • Multilevel degenerative disc disease 10/01/2012   • Cervical radiculopathy, chronic 05/04/2011   • Vitamin D insufficiency 11/16/2010   • Arthritis    • Panic attacks    • PTSD (post-traumatic stress disorder)        Past Surgical History:  Past Surgical History:   Procedure Laterality Date   • URETEROSCOPY  10/10/2013    Performed by Molly Resendiz M.D. at SURGERY [facility]   • LASERTRIPSY  10/10/2013    Performed by Molly Resendiz M.D. at SURGERY [facility]   • LUMBAR FUSION POSTERIOR  10/22/2012    Performed by Alfred Branham M.D. at SURGERY [facility]   • LUMBAR LAMINECTOMY DISKECTOMY  10/22/2012    Performed by Alfred Branham M.D. at SURGERY [facility]   • OTHER ORTHOPEDIC SURGERY  10/01/2012    lumbar fusion, Dr Branham   • OTHER ABDOMINAL SURGERY  2006    gastric bypass   • ABDOMINAL HYSTERECTOMY TOTAL      DUE TO FIBROIDS   • BREAST RECONSTRUCTION      Lt breast   • CRANIOTOMY      DUE TO BRAIN INJURY   • HYSTERECTOMY LAPAROSCOPY     • LAMINOTOMY     • MASTECTOMY      Lt breast   • OTHER      left ear drum surgery   • OTHER ABDOMINAL SURGERY      hernia repair   • PRIMARY C SECTION      x 3   • SLEEVE,MAXIMILIAN VASO THIGH     • TONSILLECTOMY     • US-CYST ASPIRATION-BREAST INITIAL     • VENTILATOR CONTINUOUS         Allergies:  Suboxone    Social History:  Social History     Social History   • Marital status:      Spouse name: N/A   • Number of children: N/A   • Years of education: N/A     Occupational History   • Not on file.     Social History Main Topics   • Smoking status: Never Smoker   • Smokeless tobacco: Never Used   • Alcohol use No   • Drug use: No   • Sexual activity: Not Currently     Other Topics Concern   •  Service No   • Blood Transfusions Yes     In 2006   • Caffeine Concern No   • Occupational Exposure No   • Hobby Hazards No   • Sleep Concern No   • Stress Concern No   • Weight Concern Yes   • Special Diet No    • Back Care Yes   • Exercise No   • Bike Helmet No     Doesn't Ride Bike   • Seat Belt Yes   • Self-Exams No     Social History Narrative   • No narrative on file       Family History:  Family History   Problem Relation Age of Onset   • Diabetes Father    • Cancer Father    • Hypertension Father    • Hyperlipidemia Father    • Cancer Mother    • Other Mother         SLE   • Diabetes Mother    • Hypertension Mother    • Hyperlipidemia Mother    • Stroke Mother    • Sleep Apnea Brother    • Cancer Maternal Aunt    • Diabetes Maternal Aunt    • Diabetes Maternal Uncle    • Diabetes Maternal Grandmother    • Diabetes Maternal Grandfather        Medications:    Current Outpatient Prescriptions:   •  vitamin D, Ergocalciferol, (DRISDOL) 05023 units Cap capsule, Take 1 Cap by mouth 2X A WEEK., Disp: 26 Cap, Rfl: 0  •  predniSONE (DELTASONE) 10 MG Tab, Take 10 mg by mouth every day., Disp: , Rfl:   •  cyanocobalamin (VITAMIN B-12) 100 MCG Tab, Take 100 mcg by mouth every day., Disp: , Rfl:   •  tiotropium (SPIRIVA) 18 MCG Cap, Inhale 1 Cap by mouth every day., Disp: 30 Cap, Rfl: 3  •  VENTOLIN  (90 Base) MCG/ACT Aero Soln inhalation aerosol, INHALE 1 PUFF PO QID PRN, Disp: , Rfl: 0  •  ondansetron (ZOFRAN) 8 MG Tab, TK 1 T PO QID PRN, Disp: , Rfl: 1  •  ferrous sulfate 324 (65 Fe) MG Tablet Delayed Response EC tablet, Take 324 mg by mouth with dinner., Disp: , Rfl:   •  ketoconazole (NIZORAL) 2 % shampoo, Apply to skin one time daily in the shower and leave on for 5 minutes then rinse off. 2-4 weeks, Disp: 1 Bottle, Rfl: 3  •  Oxycodone HCl 20 MG Tab, Take 20 mg by mouth 4 times a day., Disp: , Rfl:   •  Nebulizers (VIOS AEROSOL DELIVERY SYSTEM) Misc, U UTD, Disp: , Rfl: 0  •  ARIPiprazole (ABILIFY) 10 MG Tab, Take 1 Tab by mouth every day., Disp: 30 Tab, Rfl: 2  •  budesonide-formoterol (SYMBICORT) 80-4.5 MCG/ACT Aerosol, Inhale 2 Puffs by mouth 2 Times a Day., Disp: 1 Inhaler, Rfl: 3  •  fluticasone (FLONASE) 50  "MCG/ACT nasal spray, Spray 1 Spray in nose 2 times a day. Each Nostril, Disp: 1 Bottle, Rfl: 3    Physical Examination:  Vital signs: /72 (BP Location: Left arm, Patient Position: Sitting)   Pulse 78   Ht 1.549 m (5' 1\")   Wt 83 kg (183 lb)   LMP 10/05/1999   SpO2 98%   BMI 34.58 kg/m²   General: No apparent distress, cooperative  Eyes: No obvious exophthalmos  ENMT: Normal on external inspection of nose, lips  Neck: On visual inspection no obvious mass seen  Resp: Normal effort, speaks in full sentences  CVS: Normal rate  Extremities: No edema   Neuro: Alert and oriented  Skin: No rash on visible skin  Psych: Normal mood and affect    Assessment and Plan:    Secondary hyperparathyroidism (HCC)  · She appears to have secondary hyperparathyroidism due to vitamin D deficiency and inadequate dietary calcium intake/absorption  · Advised to increase vitamin D to 50,000 IUs twice a week  · Advised to start supplemental calcium 600 mg 3 times a day with meals  · Repeat labs in 6 weeks:  -     BASIC METABOLIC PANEL; Future  -     PTH WITH CALCIUM; Future  -     VITAMIN D,25 HYDROXY; Future  -     URINE CALCIUM 24 HR; Future  -     URINE CREATININE 24 HR; Future  -     ALBUMIN; Future  -     PHOSPHORUS; Future  -     IONIZED CALCIUM; Future    Vitamin D insufficiency  -     vitamin D, Ergocalciferol, (DRISDOL) 72802 units Cap capsule; Take 1 Cap by mouth 2X A WEEK.  -     VITAMIN D,25 HYDROXY; Future    History of gastric bypass  · Advised to start a multivitamin, in addition to supplemental vitamin B12, vitamin D and iron      Return in about 6 weeks (around 1/1/2019).    Thank you for allowing me to participate in the care of this patient.    This consultation was conducted utilizing secure and encrypted videoconferencing equipment with the assistance of a trained tele-presenter at the originating site.    Alfredo Lezama M.D.  11/20/18    CC:   PATRICK Chris    This note was created using voice " recognition software (Dragon). The accuracy of the dictation is limited by the abilities of the software. I have reviewed the note prior to signing, however some errors in grammar and context are still possible. If you have any questions related to this note please do not hesitate to contact our office.

## 2018-11-20 NOTE — PATIENT INSTRUCTIONS
· Calcium 600 mg three times a day with meals  · Vitamin D 50,000 IU twice a week  · Repeat labs in 6 weeks

## 2018-11-27 ENCOUNTER — APPOINTMENT (OUTPATIENT)
Dept: RADIOLOGY | Facility: IMAGING CENTER | Age: 53
End: 2018-11-27
Attending: NURSE PRACTITIONER
Payer: MEDICARE

## 2018-11-27 ENCOUNTER — OFFICE VISIT (OUTPATIENT)
Dept: URGENT CARE | Facility: CLINIC | Age: 53
End: 2018-11-27
Payer: MEDICARE

## 2018-11-27 VITALS
HEART RATE: 78 BPM | DIASTOLIC BLOOD PRESSURE: 70 MMHG | WEIGHT: 183 LBS | HEIGHT: 61 IN | SYSTOLIC BLOOD PRESSURE: 128 MMHG | RESPIRATION RATE: 12 BRPM | OXYGEN SATURATION: 100 % | BODY MASS INDEX: 34.55 KG/M2 | TEMPERATURE: 97.8 F

## 2018-11-27 DIAGNOSIS — M25.511 ACUTE PAIN OF RIGHT SHOULDER: ICD-10-CM

## 2018-11-27 DIAGNOSIS — S46.911A MUSCLE STRAIN OF RIGHT SHOULDER, INITIAL ENCOUNTER: ICD-10-CM

## 2018-11-27 DIAGNOSIS — M62.838 TRAPEZIUS MUSCLE SPASM: ICD-10-CM

## 2018-11-27 PROCEDURE — 99214 OFFICE O/P EST MOD 30 MIN: CPT | Performed by: NURSE PRACTITIONER

## 2018-11-27 PROCEDURE — 73030 X-RAY EXAM OF SHOULDER: CPT | Mod: 26,RT | Performed by: NURSE PRACTITIONER

## 2018-11-27 ASSESSMENT — ENCOUNTER SYMPTOMS
JOINT SWELLING: 0
FALLS: 0
NUMBNESS: 0
NAUSEA: 0
VOMITING: 0
CHILLS: 0
WEAKNESS: 0
FEVER: 0
DIZZINESS: 0
EYE PAIN: 0
SORE THROAT: 0
MYALGIAS: 0
SHORTNESS OF BREATH: 0

## 2018-11-27 NOTE — PROGRESS NOTES
Subjective:   Patti Tello is a 53 y.o. female who presents for Shoulder Pain (right shoulder x1 month )  Patient is a 53-year-old female presents to clinic today for evaluation of right shoulder pain times 1 month.  Patient denies any mechanism of injury however pain has worsened.  She has limited range of motion of the right shoulder.  She complains of muscle stiffness of the right upper neck.  She does take oxycodone on a daily basis which pain at this time is 3 out of 10.  She has had a right shoulder strain years ago however denies surgical repair.      Shoulder Pain   This is a new problem. The current episode started more than 1 month ago. The problem occurs constantly. The problem has been gradually worsening. Pertinent negatives include no chest pain, chills, fever, joint swelling, myalgias, nausea, numbness, rash, sore throat, vomiting or weakness. Associated symptoms comments: Right shoulder and upper neck pain   . Exacerbated by: movement. She has tried rest and oral narcotics for the symptoms. The treatment provided no relief.     Review of Systems   Constitutional: Negative for chills and fever.   HENT: Negative for sore throat.    Eyes: Negative for pain.   Respiratory: Negative for shortness of breath.    Cardiovascular: Negative for chest pain.   Gastrointestinal: Negative for nausea and vomiting.   Genitourinary: Negative for hematuria.   Musculoskeletal: Positive for joint pain (right shoulder). Negative for falls, joint swelling and myalgias.   Skin: Negative for rash.   Neurological: Negative for dizziness, weakness and numbness.     Allergies   Allergen Reactions   • Suboxone Hives, Shortness of Breath and Swelling      Objective:   [facility] 10/05/1999   Physical Exam   Constitutional: She is oriented to person, place, and time. She appears well-developed and well-nourished. No distress.   HENT:   Head: Normocephalic and atraumatic.   Eyes: Pupils are equal, round, and reactive to light.  Conjunctivae and EOM are normal.   Cardiovascular: Normal rate and regular rhythm.    No murmur heard.  Pulmonary/Chest: Effort normal and breath sounds normal. No respiratory distress.   Abdominal: Soft. She exhibits no distension. There is no tenderness.   Musculoskeletal:        Right shoulder: She exhibits decreased range of motion, tenderness, bony tenderness, pain and decreased strength. She exhibits no swelling and no spasm.        Cervical back: She exhibits decreased range of motion, tenderness, pain and spasm.        Back:    Right shoulderTenderness of the posterior aspect.  Limited range of motion with overhead.  Rotator cuff appears to be intact.  Tenderness to palpation of the upper trapezius with muscle spasms elicited.   Neurological: She is alert and oriented to person, place, and time. She has normal reflexes. No sensory deficit.   Skin: Skin is warm and dry.   Psychiatric: She has a normal mood and affect.     Vitals:    11/27/18 0910   BP: 128/70   Pulse: 78   Resp: 12   Temp: 36.6 °C (97.8 °F)   SpO2: 100%             Assessment/Plan:     1. Acute pain of right shoulder  DX-SHOULDER 2+ RIGHT    REFERRAL TO SPORTS MEDICINE   2. Muscle strain of right shoulder, initial encounter  REFERRAL TO SPORTS MEDICINE   3. Trapezius muscle spasm       Xray results  No evidence of acute fracture or dislocation.  Spurring of the inferior aspect of the distal right clavicle. This can predispose to impingement.    Diff include impingement, Frozen shoulder as patient is unable to raise right arm over head with assistance .At this time will refer patient to sports medicine for further evaluation and management.  Advised to rest, ice, NSAID therapy, massage, gentle range of motion. Did  Provide sling to wear prn. Advised May continue taking prescribed oral narcotics as directed for increased pain.  Differential diagnosis, natural history, supportive care, and indications for immediate follow-up discussed.

## 2018-11-29 ENCOUNTER — OFFICE VISIT (OUTPATIENT)
Dept: MEDICAL GROUP | Facility: PHYSICIAN GROUP | Age: 53
End: 2018-11-29
Payer: MEDICARE

## 2018-11-29 VITALS
SYSTOLIC BLOOD PRESSURE: 116 MMHG | TEMPERATURE: 97.2 F | WEIGHT: 183 LBS | HEIGHT: 61 IN | OXYGEN SATURATION: 99 % | RESPIRATION RATE: 16 BRPM | DIASTOLIC BLOOD PRESSURE: 74 MMHG | BODY MASS INDEX: 34.55 KG/M2 | HEART RATE: 82 BPM

## 2018-11-29 DIAGNOSIS — D50.9 IRON DEFICIENCY ANEMIA, UNSPECIFIED IRON DEFICIENCY ANEMIA TYPE: ICD-10-CM

## 2018-11-29 DIAGNOSIS — M12.9 ARTHRITIS, MULTIPLE JOINT INVOLVEMENT: ICD-10-CM

## 2018-11-29 DIAGNOSIS — E55.9 VITAMIN D INSUFFICIENCY: ICD-10-CM

## 2018-11-29 DIAGNOSIS — E66.9 OBESITY (BMI 30-39.9): ICD-10-CM

## 2018-11-29 PROCEDURE — 99214 OFFICE O/P EST MOD 30 MIN: CPT | Performed by: NURSE PRACTITIONER

## 2018-11-29 NOTE — LETTER
November 29, 2018         Patient: Patti Tello   YOB: 1965   Date of Visit: 11/29/2018           To Whom it May Concern:    Patti Tello was seen in my clinic on 11/29/2018, she has ongoing . She {Return to school/sport/work:61992}.    If you have any questions or concerns, please don't hesitate to call.        Sincerely,           LUIS MIGUEL Chris.  Electronically Signed

## 2018-11-29 NOTE — PATIENT INSTRUCTIONS
Aim for no more than 1500 calories per day and no more than 75 gm of carbs per day    Get FMLA paperwork from your HR department    Drink at least 48-64 ounce of water per day    Continue care per your specialists--endocrinology, hematology and rheumatology    You need to make follow up appt with endocrinology for appt after first of the year    Call rheumatology to see when you need to be in again

## 2018-11-29 NOTE — PROGRESS NOTES
Chief Complaint   Patient presents with   • Weight Gain   • Arthritis     fv         This is a 53 y.o.female patient that presents today with the following: Follow-up visit    Obesity (BMI 30-39.9)  This is chronic, uncontrolled. Weight has been stable in the high 170s and low 180s. She is requesting diet pills. Initially would like her to work on healthy decreased calorie diet and lower carbs. Discussed other weight loss strategies, such as keeping a food diary, regular exercise as tolerated.     Vitamin D insufficiency  Pt continues to take high dose vitamin D supplement twice weekly. She was advised to increase this by endocrinology--she will be due for follow up labs before follow up appt with specialist.    Iron deficiency anemia  Pt has significant RERE and is now followed by hematology. She has had iron infusions and states feeling better.      Hospital Outpatient Visit on 10/31/2018   Component Date Value   • Ferritin 10/31/2018 275.9    • WBC 10/31/2018 8.0    • RBC 10/31/2018 5.13    • Hemoglobin 10/31/2018 12.8    • Hematocrit 10/31/2018 42.9    • MCV 10/31/2018 83.6    • MCH 10/31/2018 25.0*   • MCHC 10/31/2018 29.8*   • RDW 10/31/2018 65.9*   • Platelet Count 10/31/2018 272    • MPV 10/31/2018 11.0    • Neutrophils-Polys 10/31/2018 71.40    • Lymphocytes 10/31/2018 19.40*   • Monocytes 10/31/2018 5.40    • Eosinophils 10/31/2018 2.40    • Basophils 10/31/2018 0.90    • Immature Granulocytes 10/31/2018 0.50    • Nucleated RBC 10/31/2018 0.00    • Neutrophils (Absolute) 10/31/2018 5.68    • Lymphs (Absolute) 10/31/2018 1.54    • Monos (Absolute) 10/31/2018 0.43    • Eos (Absolute) 10/31/2018 0.19    • Baso (Absolute) 10/31/2018 0.07    • Immature Granulocytes (a* 10/31/2018 0.04    • NRBC (Absolute) 10/31/2018 0.00    • Anisocytosis 10/31/2018 1+    • Microcytosis 10/31/2018 1+    • Iron 10/31/2018 87    • Vitamin B12 -True Cobala* 10/31/2018 >1500*   • 25-Hydroxy   Vitamin D 25 10/31/2018 25*   •  Peripheral Smear Review 10/31/2018 see below    • Plt Estimation 10/31/2018 Normal    • RBC Morphology 10/31/2018 Present    • Large Platelets 10/31/2018 1+    • Poikilocytosis 10/31/2018 1+    • Ovalocytes 10/31/2018 1+    • Tear Drop Cells 10/31/2018 1+    • Comments-Diff 10/31/2018 see below          clinical course has been stable    Past Medical History:   Diagnosis Date   • Anxiety and depression    • Arthritis     back, neck   • Asthma    • Back injury    • Back pain    • Bronchitis 2010   • Cancer (HCC)     breast LT   • Chickenpox    • Chronic LBP    • Chronic neck pain    • Cold intolerance 1/24/2012   • COPD (chronic obstructive pulmonary disease) ([facility])    • Cystic fibrosis (HCC)    • Depression    • Diabetes    • GERD (gastroesophageal reflux disease)    • H/O gastric bypass 10/11/2013   • Herniated nucleus pulposus, L4-5 3/18/2010   • HX: breast cancer 10/11/2013   • Hypertension    • Itching due to drug 6/2/2011   • Kidney stone    • Nephrolithiasis 10/11/2013   • Obesity    • Osteoporosis    • Other specified symptom associated with female genital organs    • Panic disorder    • Pneumonia    • PTSD (post-traumatic stress disorder)    • Restless leg syndrome    • Rheumatoid arthritis ([facility])    • S/P laminectomy 10/22/2012   • Thoracic or lumbosacral neuritis or radiculitis, unspecified 10/22/2012   • Trauma     head trauma       Past Surgical History:   Procedure Laterality Date   • URETEROSCOPY  10/10/2013    Performed by Molly Resendiz M.D. at [facility]ERY [facility]   • LASERTRIPSY  10/10/2013    Performed by Molly Resendiz M.D. at SUR[facility]   • LUMBAR FUSION POSTERIOR  10/22/2012    Performed by Alfred Branham M.D. at SUR[facility]ERY [facility]   • LUMBAR LAMINECTOMY DISKECTOMY  10/22/2012    Performed by Alfred Branham M.D. at SUR[facility]   • OTHER ORTHOPEDIC SURGERY  10/01/2012    lumbar fusion, Dr Branham   • OTHER ABDOMINAL SURGERY  2006    gastric bypass   •  ABDOMINAL HYSTERECTOMY TOTAL      DUE TO FIBROIDS   • BREAST RECONSTRUCTION      Lt breast   • CRANIOTOMY      DUE TO BRAIN INJURY   • HYSTERECTOMY LAPAROSCOPY     • LAMINOTOMY     • MASTECTOMY      Lt breast   • OTHER      left ear drum surgery   • OTHER ABDOMINAL SURGERY      hernia repair   • PRIMARY C SECTION      x 3   • SLEEVE,MAXIMILIAN VASO THIGH     • TONSILLECTOMY     • US-CYST ASPIRATION-BREAST INITIAL     • VENTILATOR CONTINUOUS         Family History   Problem Relation Age of Onset   • Diabetes Father    • Cancer Father    • Hypertension Father    • Hyperlipidemia Father    • Cancer Mother    • Other Mother         SLE   • Diabetes Mother    • Hypertension Mother    • Hyperlipidemia Mother    • Stroke Mother    • Sleep Apnea Brother    • Cancer Maternal Aunt    • Diabetes Maternal Aunt    • Diabetes Maternal Uncle    • Diabetes Maternal Grandmother    • Diabetes Maternal Grandfather        Suboxone    Current Outpatient Prescriptions Ordered in [facility]   Medication Sig Dispense Refill   • vitamin D, Ergocalciferol, (DRISDOL) 50720 units Cap capsule Take 1 Cap by mouth 2X A WEEK. 26 Cap 0   • predniSONE (DELTASONE) 10 MG Tab Take 10 mg by mouth every day.     • cyanocobalamin (VITAMIN B-12) 100 MCG Tab Take 100 mcg by mouth every day.     • tiotropium (SPIRIVA) 18 MCG Cap Inhale 1 Cap by mouth every day. 30 Cap 3   • VENTOLIN  (90 Base) MCG/ACT Aero Soln inhalation aerosol INHALE 1 PUFF PO QID PRN  0   • ondansetron (ZOFRAN) 8 MG Tab TK 1 T PO QID PRN  1   • ferrous sulfate 324 (65 Fe) MG Tablet Delayed Response EC tablet Take 324 mg by mouth with dinner.     • ketoconazole (NIZORAL) 2 % shampoo Apply to skin one time daily in the shower and leave on for 5 minutes then rinse off. 2-4 weeks 1 Bottle 3   • Oxycodone HCl 20 MG Tab Take 20 mg by mouth 4 times a day.     • Nebulizers ([employer] AEROSOL DELIVERY SYSTEM) Misc U UTD  0   • ARIPiprazole (ABILIFY) 10 MG Tab Take 1 Tab by mouth every day. 30 Tab 2   •  "budesonide-formoterol (SYMBICORT) 80-4.5 MCG/ACT Aerosol Inhale 2 Puffs by mouth 2 Times a Day. 1 Inhaler 3   • fluticasone (FLONASE) 50 MCG/ACT nasal spray Spray 1 Spray in nose 2 times a day. Each Nostril 1 Bottle 3     No current Epic-ordered facility-administered medications on file.        Constitutional ROS: No unexpected change in weight, No weakness, No unexplained fevers, sweats, or chills  Pulmonary ROS: No chronic cough, sputum, or hemoptysis, No shortness of breath, No recent change in breathing  Cardiovascular ROS: No chest pain  Gastrointestinal ROS: No abdominal pain, No nausea, vomiting, diarrhea, or constipation  Musculoskeletal/Extremities ROS: Positive for HPI  Neurologic ROS: Normal development, No seizures, No weakness  Hematologic ROS: Positive per HPI  Endocrine ROS: Positive for HPI    Physical exam:  /74 (BP Location: Right arm, Patient Position: Sitting, BP Cuff Size: Adult)   Pulse 82   Temp 36.2 °C (97.2 °F) (Temporal)   Resp 16   Ht 1.549 m (5' 1\")   Wt 83 kg (183 lb)   LMP 10/05/1999   SpO2 99%   BMI 34.58 kg/m²   General Appearance: Middle-aged female, alert, no distress, obese, well-groomed  Skin: Skin color, texture, turgor normal. No rashes or lesions.  Lungs: negative findings: normal respiratory rate and rhythm, normal effort  Musculoskeletal: negative findings: no evidence of joint instability, no evidence of muscle atrophy, no deformities present  Neurologic: intact, CN II through XII grossly intact    Medical decision making/discussion: Discussed the importance of calorie deficit in order to lose weight, she is to aim for no more than 1500 emilee/day and no more than 75 g of carbs per day.  She is also advised to stay well-hydrated at least 48-64 ounces per day of water.  She is to continue care per her specialists including endocrinology, hematology and rheumatology.    Patti was seen today for weight gain and arthritis.    Diagnoses and all orders for this " visit:    Iron deficiency anemia, unspecified iron deficiency anemia type    Vitamin D insufficiency    Arthritis, multiple joint involvement    Obesity (BMI 30-39.9)          Please note that this dictation was created using voice recognition software. I have made every reasonable attempt to correct obvious errors, but I expect that there are errors of grammar and possibly content that I did not discover before finalizing the note.

## 2018-11-29 NOTE — LETTER
November 29, 2018         Patient: Patti Tello   YOB: 1965   Date of Visit: 11/29/2018           To Whom it May Concern:    Patti Tello was seen in my clinic on 11/29/2018, she has a chronic orthopedic condition that requires her to only work a max time of 6 hours her day. Please make the appropriate accommodations for her.    If you have any questions or concerns, please don't hesitate to call.        Sincerely,           LUIS MIGUEL Chris.  Electronically Signed

## 2018-12-02 NOTE — ASSESSMENT & PLAN NOTE
This is chronic, uncontrolled. Weight has been stable in the high 170s and low 180s. She is requesting diet pills. Initially would like her to work on healthy decreased calorie diet and lower carbs. Discussed other weight loss strategies, such as keeping a food diary, regular exercise as tolerated.

## 2018-12-02 NOTE — ASSESSMENT & PLAN NOTE
Pt has significant RERE and is now followed by hematology. She has had iron infusions and states feeling better.

## 2018-12-02 NOTE — ASSESSMENT & PLAN NOTE
Pt continues to take high dose vitamin D supplement twice weekly. She was advised to increase this by endocrinology--she will be due for follow up labs before follow up appt with specialist.

## 2018-12-18 ENCOUNTER — OFFICE VISIT (OUTPATIENT)
Dept: MEDICAL GROUP | Facility: CLINIC | Age: 53
End: 2018-12-18
Payer: MEDICARE

## 2018-12-18 VITALS
SYSTOLIC BLOOD PRESSURE: 120 MMHG | HEART RATE: 95 BPM | DIASTOLIC BLOOD PRESSURE: 82 MMHG | HEIGHT: 61 IN | OXYGEN SATURATION: 95 % | TEMPERATURE: 98.2 F | WEIGHT: 183 LBS | BODY MASS INDEX: 34.55 KG/M2 | RESPIRATION RATE: 18 BRPM

## 2018-12-18 DIAGNOSIS — M75.41 SUBACROMIAL IMPINGEMENT, RIGHT: ICD-10-CM

## 2018-12-18 DIAGNOSIS — M75.81 ROTATOR CUFF TENDINITIS, RIGHT: ICD-10-CM

## 2018-12-18 DIAGNOSIS — M54.2 CERVICALGIA: ICD-10-CM

## 2018-12-18 PROCEDURE — 99203 OFFICE O/P NEW LOW 30 MIN: CPT | Mod: 25 | Performed by: FAMILY MEDICINE

## 2018-12-18 PROCEDURE — 20610 DRAIN/INJ JOINT/BURSA W/O US: CPT | Mod: RT | Performed by: FAMILY MEDICINE

## 2018-12-18 RX ORDER — TRIAMCINOLONE ACETONIDE 40 MG/ML
40 INJECTION, SUSPENSION INTRA-ARTICULAR; INTRAMUSCULAR ONCE
Status: COMPLETED | OUTPATIENT
Start: 2018-12-18 | End: 2018-12-18

## 2018-12-18 RX ADMIN — TRIAMCINOLONE ACETONIDE 40 MG: 40 INJECTION, SUSPENSION INTRA-ARTICULAR; INTRAMUSCULAR at 10:54

## 2018-12-18 NOTE — PROGRESS NOTES
"CHIEF COMPLAINT:  Patti Tello female presenting at the request of PATRICK Whelan for evaluation of Shoulder pain.     Patti Tello is complaining of right shoulder pain (right-hand-dominant)  Date of injury, late October 2018 around [facility]  And sustained another fall after the first no fall where she slipped fell forward, causing weightbearing/pressure on her RIGHT shoulder  Pain is at the deltoid region and superior  Quality is sharp,  dull  Pain is Non-radiating but she does get some RIGHT sided cervical spine pain as well  Aggravated by movement, overhead activity, reaching back and POSITIVE night symptoms  Improved with  rest   POSITIVE prior history of shoulder pain in the RIGHT side  Prior Treatments: seen at  and has seen Dr. Tello in the past for her RIGHT shoulder in 2011   She has had \"injections in the past\" which DID NOT help  Prior studies: X-Ray of the RIGHT shoulder  Medications tried for pain include: Oxycodone, and arthritis medication for which she takes for her SLE  Mechanical Symptom history: No Locking     Sees pain management in [place] for unrelated issue (Dr. Rodriguez) since fall 2018 which is for lumbar spine management/chronic pain    REVIEW OF SYSTEMS  No Nausea, No Vomiting, No Chest Pain, No Shortness of Breath, No Dizziness, Headache which is relatively new since her injury/fall    PAST MEDICAL HISTORY:   History reviewed. No pertinent past medical history.    PMH:  has a past medical history of Anxiety and depression; Arthritis; Asthma; Back injury; Back pain; Bronchitis (2010); Cancer ([facility]); Chickenpox; Chronic LBP; Chronic neck pain; Cold intolerance (1/24/2012); COPD (chronic obstructive pulmonary disease) ([facility]); Cystic fibrosis ([facility]); Depression; Diabetes; GERD (gastroesophageal reflux disease); H/O gastric bypass (10/11/2013); Herniated nucleus pulposus, L4-5 (3/18/2010); breast cancer (10/11/2013); Hypertension; Itching due to drug (6/2/2011); Kidney " stone; Nephrolithiasis (10/11/2013); Obesity; Osteoporosis; Other specified symptom associated with female genital organs; Panic disorder; Pneumonia; PTSD (post-traumatic stress disorder); Restless leg syndrome; Rheumatoid arthritis (HCC); S/P laminectomy (10/22/2012); Thoracic or lumbosacral neuritis or radiculitis, unspecified (10/22/2012); and Trauma.  MEDS:   Current Outpatient Prescriptions:   •  vitamin D, Ergocalciferol, (DRISDOL) 94738 units Cap capsule, Take 1 Cap by mouth 2X A WEEK., Disp: 26 Cap, Rfl: 0  •  predniSONE (DELTASONE) 10 MG Tab, Take 10 mg by mouth every day., Disp: , Rfl:   •  cyanocobalamin (VITAMIN B-12) 100 MCG Tab, Take 100 mcg by mouth every day., Disp: , Rfl:   •  tiotropium (SPIRIVA) 18 MCG Cap, Inhale 1 Cap by mouth every day., Disp: 30 Cap, Rfl: 3  •  VENTOLIN  (90 Base) MCG/ACT Aero Soln inhalation aerosol, INHALE 1 PUFF PO QID PRN, Disp: , Rfl: 0  •  ondansetron (ZOFRAN) 8 MG Tab, TK 1 T PO QID PRN, Disp: , Rfl: 1  •  ferrous sulfate 324 (65 Fe) MG Tablet Delayed Response EC tablet, Take 324 mg by mouth with dinner., Disp: , Rfl:   •  ketoconazole (NIZORAL) 2 % shampoo, Apply to skin one time daily in the shower and leave on for 5 minutes then rinse off. 2-4 weeks, Disp: 1 Bottle, Rfl: 3  •  Oxycodone HCl 20 MG Tab, Take 20 mg by mouth 4 times a day., Disp: , Rfl:   •  Nebulizers ([employer] AEROSOL DELIVERY SYSTEM) Misc, U UTD, Disp: , Rfl: 0  •  ARIPiprazole (ABILIFY) 10 MG Tab, Take 1 Tab by mouth every day., Disp: 30 Tab, Rfl: 2  •  budesonide-formoterol (SYMBICORT) 80-4.5 MCG/ACT Aerosol, Inhale 2 Puffs by mouth 2 Times a Day., Disp: 1 Inhaler, Rfl: 3  •  fluticasone (FLONASE) 50 MCG/ACT nasal spray, Spray 1 Spray in nose 2 times a day. Each Nostril, Disp: 1 Bottle, Rfl: 3  ALLERGIES:   Allergies   Allergen Reactions   • Suboxone Hives, Shortness of Breath and Swelling     SURGHX:   Past Surgical History:   Procedure Laterality Date   • URETEROSCOPY  10/10/2013    Performed  "by Molly Resendiz M.D. at SURGERY [facility]   • LASERTRIPSY  10/10/2013    Performed by Molly Resendiz M.D. at SUR[facility]ERY [facility]   • LUMBAR FUSION POSTERIOR  10/22/2012    Performed by Alfred Branham M.D. at SUR[facility]ERY [facility]   • LUMBAR LAMINECTOMY DISKECTOMY  10/22/2012    Performed by Alfred Branham M.D. at SUR[facility]ERY [facility]   • OTHER ORTHOPEDIC SURGERY  10/01/2012    lumbar fusion, Dr Branham   • OTHER ABDOMINAL SURGERY  2006    gastric bypass   • ABDOMINAL HYSTERECTOMY TOTAL      DUE TO FIBROIDS   • BREAST RECONSTRUCTION      Lt breast   • CRANIOTOMY      DUE TO BRAIN INJURY   • HYSTERECTOMY LAPAROSCOPY     • LAMINOTOMY     • MASTECTOMY      Lt breast   • OTHER      left ear drum surgery   • OTHER ABDOMINAL SURGERY      hernia repair   • PRIMARY C SECTION      x 3   • SLEEVE,MAXIMILIAN VASO THIGH     • TONSILLECTOMY     • US-CYST ASPIRATION-BREAST INITIAL     • VENTILATOR CONTINUOUS       SOCHX:  reports that she has never smoked. She has never used smokeless tobacco. She reports that she does not drink alcohol or use drugs.  FH: Family history was reviewed, no pertinent findings to report     PHYSICAL EXAM:  /82 (BP Location: Left arm, Patient Position: Sitting, BP Cuff Size: Adult)   Pulse 95   Temp 36.8 °C (98.2 °F) (Temporal)   Resp 18   Ht 1.549 m (5' 1\")   Wt 83 kg (183 lb)   LMP 10/05/1999   SpO2 95%   BMI 34.58 kg/m²     Obese, in no apparent distress, alert and oriented x 3.  Gait: normal    Cervical spine:  Range of motion Slightly limited with Flexion, Markedly limited with Extension and Markedllimited with Lateral rotation  Spurling's testing is POSITIVE with pain radiating into the shoulder and ivania-scapular region on the RIGHT   Cervical spine tenderness NEGATIVE    Strength testing:     Deltoid, bilateral 5/5  Bicep, bilateral 5/5  Tricep, bilateral 5/5  Wrist Extension, bilateral 5/5  Wrist Flexion, bilateral 5/5  Finger Abduction, bilateral " 5/5    Sensation:  INTACT Bilaterally and DECREASED on the right at the level of T1        Reflexes:   Biceps: R 2+/L 2+  Triceps: R 2+/L  2+  Brachial radialis R 2+/L  2+  Martin's testing is NEGATIVE  The arms are otherwise neurovascularly intact     Shoulder Exam:    RIGHT Shoulder:  No visible swelling   Range of motion DIMINISHED with pain  Tenderness: Non-tender  Empty Can Testing 4/5  Internal Rotation 5/5  External Rotation 4/5  Lift Off Testing 5/5  Impingement testing Kumari  POSITIVE  Neer's testing POSITIVE  Apprehension testing POSITIVE    LEFT Shoulder:  No visible swelling   Range of motion INTACT  Tenderness: Non-tender  Empty Can Testing 5/5  Internal Rotation 5/5  External Rotation 5/5  Lift Off Testing 5/5  Impingement testing Kumari  NEGATIVE  Neer's testing NEGATIVE  Apprehension testing NEGATIVE    Additional Findings: Flexed Posture      1. Subacromial impingement, right  REFERRAL TO PHYSICAL THERAPY Reason for Therapy: Eval/Treat/Report    triamcinolone acetonide (KENALOG-40) injection 40 mg   2. Rotator cuff tendinitis, right  REFERRAL TO PHYSICAL THERAPY Reason for Therapy: Eval/Treat/Report   3. Cervicalgia  REFERRAL TO PHYSICAL THERAPY Reason for Therapy: Eval/Treat/Report     Cervical findings POSITIVE localized pain to the RIGHT shoulder Spurling's to the right  DECREASED sensation along the inner RIGHT upper arm, T1 dermatome  POSITIVE weakness with external rotation on the RIGHT compared to the left  POSITIVE weakness with empty can testing on the RIGHT compared to the left    Subacromial corticosteroid injection RIGHT shoulder performed in the office today (December 18, 2018)    Return in about 4 weeks (around 1/15/2019).  To see how she is doing after RIGHT subacromial corticosteroid injection and see how she is doing formal physical therapy ([facility])        11/27/2018 8:48 AM    HISTORY/REASON FOR EXAM:  Atraumatic Pain/Swelling/Deformity  Pain    TECHNIQUE/EXAM DESCRIPTION  AND NUMBER OF VIEWS:  3 views of the RIGHT shoulder.    COMPARISON: None    FINDINGS:  There is no evidence of fracture or dislocation. Glenohumeral and acromioclavicular articulation is maintained.  There is spurring of the inferior aspect of the distal clavicle. Visualized right lung field is clear.     Impression       No evidence of acute fracture or dislocation.    Spurring of the inferior aspect of the distal right clavicle. This can predispose to impingement.     Interpreted in the office today with the patient    done elsewhere and reviewed independently by me    Thank you DARIN Whelan.P.R.N. for allowing me to participate in caring for your patient.

## 2018-12-18 NOTE — PROCEDURES
PROCEDURE NOTE:  right Shoulder subacromial injection  Risks and benefits discussed  Informed consent obtained  Shoulder prepped in sterile fashion utilizing a posterior approach  40 mg of Kenalog and 5 cc of Marcaine injected into the subacromial space  Vapocoolant spray was utilized  Patient tolerated the procedure well  Postprocedure care and red flags discussed

## 2018-12-22 ENCOUNTER — HOSPITAL ENCOUNTER (OUTPATIENT)
Dept: LAB | Facility: MEDICAL CENTER | Age: 53
End: 2018-12-22
Attending: INTERNAL MEDICINE
Payer: MEDICARE

## 2018-12-22 ENCOUNTER — OFFICE VISIT (OUTPATIENT)
Dept: URGENT CARE | Facility: PHYSICIAN GROUP | Age: 53
End: 2018-12-22
Payer: MEDICARE

## 2018-12-22 VITALS
HEART RATE: 64 BPM | HEIGHT: 61 IN | OXYGEN SATURATION: 98 % | TEMPERATURE: 98 F | WEIGHT: 182 LBS | DIASTOLIC BLOOD PRESSURE: 80 MMHG | BODY MASS INDEX: 34.36 KG/M2 | RESPIRATION RATE: 16 BRPM | SYSTOLIC BLOOD PRESSURE: 130 MMHG

## 2018-12-22 DIAGNOSIS — E55.9 VITAMIN D INSUFFICIENCY: ICD-10-CM

## 2018-12-22 DIAGNOSIS — D50.8 IRON DEFICIENCY ANEMIA DUE TO DIETARY CAUSES: ICD-10-CM

## 2018-12-22 DIAGNOSIS — N25.81 SECONDARY HYPERPARATHYROIDISM (HCC): ICD-10-CM

## 2018-12-22 DIAGNOSIS — F41.9 ANXIETY: ICD-10-CM

## 2018-12-22 LAB
25(OH)D3 SERPL-MCNC: 20 NG/ML (ref 30–100)
ALBUMIN SERPL BCP-MCNC: 4.4 G/DL (ref 3.2–4.9)
ANION GAP SERPL CALC-SCNC: 9 MMOL/L (ref 0–11.9)
BASOPHILS # BLD AUTO: 0.8 % (ref 0–1.8)
BASOPHILS # BLD: 0.09 K/UL (ref 0–0.12)
BUN SERPL-MCNC: 14 MG/DL (ref 8–22)
CA-I SERPL-SCNC: 1.3 MMOL/L (ref 1.1–1.3)
CALCIUM SERPL-MCNC: 9.9 MG/DL (ref 8.5–10.5)
CHLORIDE SERPL-SCNC: 106 MMOL/L (ref 96–112)
CO2 SERPL-SCNC: 27 MMOL/L (ref 20–33)
CREAT SERPL-MCNC: 0.86 MG/DL (ref 0.5–1.4)
EOSINOPHIL # BLD AUTO: 0.14 K/UL (ref 0–0.51)
EOSINOPHIL NFR BLD: 1.2 % (ref 0–6.9)
ERYTHROCYTE [DISTWIDTH] IN BLOOD BY AUTOMATED COUNT: 56.7 FL (ref 35.9–50)
FERRITIN SERPL-MCNC: 113.3 NG/ML (ref 10–291)
GLUCOSE SERPL-MCNC: 89 MG/DL (ref 65–99)
HCT VFR BLD AUTO: 47.2 % (ref 37–47)
HGB BLD-MCNC: 14.7 G/DL (ref 12–16)
IMM GRANULOCYTES # BLD AUTO: 0.06 K/UL (ref 0–0.11)
IMM GRANULOCYTES NFR BLD AUTO: 0.5 % (ref 0–0.9)
IRON SATN MFR SERPL: 17 % (ref 15–55)
IRON SERPL-MCNC: 75 UG/DL (ref 40–170)
LYMPHOCYTES # BLD AUTO: 2.6 K/UL (ref 1–4.8)
LYMPHOCYTES NFR BLD: 22.7 % (ref 22–41)
MCH RBC QN AUTO: 26.8 PG (ref 27–33)
MCHC RBC AUTO-ENTMCNC: 31.1 G/DL (ref 33.6–35)
MCV RBC AUTO: 86 FL (ref 81.4–97.8)
MONOCYTES # BLD AUTO: 0.7 K/UL (ref 0–0.85)
MONOCYTES NFR BLD AUTO: 6.1 % (ref 0–13.4)
NEUTROPHILS # BLD AUTO: 7.85 K/UL (ref 2–7.15)
NEUTROPHILS NFR BLD: 68.7 % (ref 44–72)
NRBC # BLD AUTO: 0 K/UL
NRBC BLD-RTO: 0 /100 WBC
PHOSPHATE SERPL-MCNC: 4 MG/DL (ref 2.5–4.5)
PLATELET # BLD AUTO: 288 K/UL (ref 164–446)
PMV BLD AUTO: 11.2 FL (ref 9–12.9)
POTASSIUM SERPL-SCNC: 3.4 MMOL/L (ref 3.6–5.5)
PTH-INTACT SERPL-MCNC: 127 PG/ML (ref 14–72)
RBC # BLD AUTO: 5.49 M/UL (ref 4.2–5.4)
SODIUM SERPL-SCNC: 142 MMOL/L (ref 135–145)
TIBC SERPL-MCNC: 437 UG/DL (ref 250–450)
WBC # BLD AUTO: 11.4 K/UL (ref 4.8–10.8)

## 2018-12-22 PROCEDURE — 83970 ASSAY OF PARATHORMONE: CPT

## 2018-12-22 PROCEDURE — 82040 ASSAY OF SERUM ALBUMIN: CPT

## 2018-12-22 PROCEDURE — 82306 VITAMIN D 25 HYDROXY: CPT

## 2018-12-22 PROCEDURE — 83550 IRON BINDING TEST: CPT

## 2018-12-22 PROCEDURE — 99214 OFFICE O/P EST MOD 30 MIN: CPT | Performed by: NURSE PRACTITIONER

## 2018-12-22 PROCEDURE — 82330 ASSAY OF CALCIUM: CPT

## 2018-12-22 PROCEDURE — 84100 ASSAY OF PHOSPHORUS: CPT

## 2018-12-22 PROCEDURE — 80048 BASIC METABOLIC PNL TOTAL CA: CPT

## 2018-12-22 PROCEDURE — 36415 COLL VENOUS BLD VENIPUNCTURE: CPT

## 2018-12-22 PROCEDURE — 83540 ASSAY OF IRON: CPT

## 2018-12-22 PROCEDURE — 82728 ASSAY OF FERRITIN: CPT

## 2018-12-22 PROCEDURE — 85025 COMPLETE CBC W/AUTO DIFF WBC: CPT

## 2018-12-22 RX ORDER — ALPRAZOLAM 1 MG/1
1 TABLET ORAL 3 TIMES DAILY PRN
Qty: 6 TAB | Refills: 0 | Status: SHIPPED | OUTPATIENT
Start: 2018-12-22 | End: 2018-12-24

## 2018-12-22 ASSESSMENT — ENCOUNTER SYMPTOMS
NERVOUS/ANXIOUS: 1
WEAKNESS: 0
NAUSEA: 0
PALPITATIONS: 0
VOMITING: 0
MYALGIAS: 0
FEVER: 0
DEPRESSION: 0
FOCAL WEAKNESS: 0
DIZZINESS: 0
CHILLS: 0
ABDOMINAL PAIN: 0
HALLUCINATIONS: 0
TINGLING: 0
MEMORY LOSS: 0
INSOMNIA: 1
SPEECH CHANGE: 0
SENSORY CHANGE: 0
HEADACHES: 0
ORTHOPNEA: 0
SHORTNESS OF BREATH: 0

## 2018-12-22 ASSESSMENT — LIFESTYLE VARIABLES: SUBSTANCE_ABUSE: 0

## 2018-12-22 NOTE — PROGRESS NOTES
"Subjective:      Patti Tello is a 53 y.o. female who presents with Anxiety (Frequent bouts of anxiety)            HPI  C/o recent increased anxiety. States she takes oxycodone for chronic shoulder pain and sees Sports Med as well for this, takes Abilify for depression but states this is managed on this med. Requesting Xanax for recent high anxiety that she states \"is getting worse\". Last refill for this in 5/2018 by RAY Villafana in psychiatry. Does not have another appt with her since patient moved to [place]. Last dose taken in 6/2018 and has not had a problem until recently. Denies SI, HI and states takes medications as prescribed. Has taken Melatonin and lavernder warm bath which helped her anxiety last night. Has had difficulty sleeping and is not taking Ambien at this time which is increasing her anxiety.    PMH:  has a past medical history of Anxiety and depression; Arthritis; Asthma; Back injury; Back pain; Bronchitis (2010); Cancer ([facility]); Chickenpox; Chronic LBP; Chronic neck pain; Cold intolerance (1/24/2012); COPD (chronic obstructive pulmonary disease) ([facility]); Cystic fibrosis ([facility]); Depression; Diabetes; GERD (gastroesophageal reflux disease); H/O gastric bypass (10/11/2013); Herniated nucleus pulposus, L4-5 (3/18/2010); breast cancer (10/11/2013); Hypertension; Itching due to drug (6/2/2011); Kidney stone; Nephrolithiasis (10/11/2013); Obesity; Osteoporosis; Other specified symptom associated with female genital organs; Panic disorder; Pneumonia; PTSD (post-traumatic stress disorder); Restless leg syndrome; Rheumatoid arthritis ([facility]); S/P laminectomy (10/22/2012); Thoracic or lumbosacral neuritis or radiculitis, unspecified (10/22/2012); and Trauma.  MEDS:   Current Outpatient Prescriptions:   •  ALPRAZolam (XANAX) 1 MG Tab, Take 1 Tab by mouth 3 times a day as needed for Anxiety for up to 2 days., Disp: 6 Tab, Rfl: 0  •  vitamin D, Ergocalciferol, (DRISDOL) 96650 units Cap capsule, Take 1 Cap " by mouth 2X A WEEK., Disp: 26 Cap, Rfl: 0  •  predniSONE (DELTASONE) 10 MG Tab, Take 10 mg by mouth every day., Disp: , Rfl:   •  cyanocobalamin (VITAMIN B-12) 100 MCG Tab, Take 100 mcg by mouth every day., Disp: , Rfl:   •  tiotropium (SPIRIVA) 18 MCG Cap, Inhale 1 Cap by mouth every day., Disp: 30 Cap, Rfl: 3  •  VENTOLIN  (90 Base) MCG/ACT Aero Soln inhalation aerosol, INHALE 1 PUFF PO QID PRN, Disp: , Rfl: 0  •  ondansetron (ZOFRAN) 8 MG Tab, TK 1 T PO QID PRN, Disp: , Rfl: 1  •  ferrous sulfate 324 (65 Fe) MG Tablet Delayed Response EC tablet, Take 324 mg by mouth with dinner., Disp: , Rfl:   •  ketoconazole (NIZORAL) 2 % shampoo, Apply to skin one time daily in the shower and leave on for 5 minutes then rinse off. 2-4 weeks, Disp: 1 Bottle, Rfl: 3  •  Oxycodone HCl 20 MG Tab, Take 20 mg by mouth 4 times a day., Disp: , Rfl:   •  Nebulizers (VIOS AEROSOL DELIVERY SYSTEM) Misc, U UTD, Disp: , Rfl: 0  •  ARIPiprazole (ABILIFY) 10 MG Tab, Take 1 Tab by mouth every day., Disp: 30 Tab, Rfl: 2  •  budesonide-formoterol (SYMBICORT) 80-4.5 MCG/ACT Aerosol, Inhale 2 Puffs by mouth 2 Times a Day., Disp: 1 Inhaler, Rfl: 3  •  fluticasone (FLONASE) 50 MCG/ACT nasal spray, Spray 1 Spray in nose 2 times a day. Each Nostril, Disp: 1 Bottle, Rfl: 3  ALLERGIES:   Allergies   Allergen Reactions   • Suboxone Hives, Shortness of Breath and Swelling     SURGHX:   Past Surgical History:   Procedure Laterality Date   • URETEROSCOPY  10/10/2013    Performed by Molly Resendiz M.D. at SURGERY [facility]   • LASERTRIPSY  10/10/2013    Performed by Molly Resendiz M.D. at SURGERY [facility]   • LUMBAR FUSION POSTERIOR  10/22/2012    Performed by Alfred Branham M.D. at SURGERY [facility]   • LUMBAR LAMINECTOMY DISKECTOMY  10/22/2012    Performed by Alfred Branham M.D. at SURGERY [facility]   • OTHER ORTHOPEDIC SURGERY  10/01/2012    lumbar fusion, Dr Branham   • OTHER ABDOMINAL SURGERY  2006     "gastric bypass   • ABDOMINAL HYSTERECTOMY TOTAL      DUE TO FIBROIDS   • BREAST RECONSTRUCTION      Lt breast   • CRANIOTOMY      DUE TO BRAIN INJURY   • HYSTERECTOMY LAPAROSCOPY     • LAMINOTOMY     • MASTECTOMY      Lt breast   • OTHER      left ear drum surgery   • OTHER ABDOMINAL SURGERY      hernia repair   • PRIMARY C SECTION      x 3   • SLEEVE,MAXIMILIAN VASO THIGH     • TONSILLECTOMY     • US-CYST ASPIRATION-BREAST INITIAL     • VENTILATOR CONTINUOUS       SOCHX:  reports that she has never smoked. She has never used smokeless tobacco. She reports that she does not drink alcohol or use drugs.  FH: Family history was reviewed, no pertinent findings to report    Review of Systems   Constitutional: Negative for chills, fever and malaise/fatigue.   Respiratory: Negative for shortness of breath.    Cardiovascular: Negative for chest pain, palpitations and orthopnea.   Gastrointestinal: Negative for abdominal pain, nausea and vomiting.   Musculoskeletal: Negative for myalgias.   Neurological: Negative for dizziness, tingling, sensory change, speech change, focal weakness, weakness and headaches.   Psychiatric/Behavioral: Negative for depression, hallucinations, memory loss, substance abuse and suicidal ideas. The patient is nervous/anxious and has insomnia.    All other systems reviewed and are negative.         Objective:     /80 (BP Location: Left arm, Patient Position: Sitting, BP Cuff Size: Adult)   Pulse 64   Temp 36.7 °C (98 °F) (Temporal)   Resp 16   Ht 1.549 m (5' 1\")   Wt 82.6 kg (182 lb)   LMP 10/05/1999   SpO2 98%   BMI 34.39 kg/m²      Physical Exam   Constitutional: She is oriented to person, place, and time. Vital signs are normal. She appears well-developed and well-nourished. She is active and cooperative.  Non-toxic appearance. She does not have a sickly appearance. She does not appear ill. No distress.   HENT:   Head: Normocephalic.   Eyes: Pupils are equal, round, and reactive to light. " Conjunctivae and EOM are normal.   Neck: Normal range of motion. Neck supple.   Cardiovascular: Normal rate, regular rhythm and normal heart sounds.    Pulmonary/Chest: Effort normal and breath sounds normal. No accessory muscle usage. No respiratory distress.   Musculoskeletal: Normal range of motion.   Neurological: She is alert and oriented to person, place, and time. She has normal strength. No cranial nerve deficit or sensory deficit. Coordination and gait normal. GCS eye subscore is 4. GCS verbal subscore is 5. GCS motor subscore is 6.   Skin: Skin is warm and dry. She is not diaphoretic.   Psychiatric: Her speech is normal and behavior is normal. Judgment and thought content normal. Her mood appears anxious. She is not actively hallucinating. Cognition and memory are normal.   Clear speech when answering questions, but will get anxious when speaking of anxiety issues. Appropriate thought process. Consistent with facts and chart notes regarding health status and providers who treat her. Discussed that further refills must go through her psychiatrist, patient understands and agrees to this. She is attentive.             [facility] Aware web site evaluation: I have obtained and reviewed patient utilization report from [facility] pharmacy database prior to writing prescription for controlled substance II, III or IV. Based on the report and my clinical assessment the prescription is medically necessary, last refill of Xanax in 5/018 by same psychiatric provider, Macy Pruett.        Assessment/Plan:     1. Anxiety    - ALPRAZolam (XANAX) 1 MG Tab; Take 1 Tab by mouth 3 times a day as needed for Anxiety for up to 2 days.  Dispense: 6 Tab; Refill: 0    Practice good sleeping habits  Avoid alcohol or stimulants before bedtime  Avoid mental or emotional stimulus at bedtime  Avoid caffeine and eat 2 hrs prior to bedtime  Drink plenty of water daily and eat healthy diet  Identify and avoid stressors that induce  anxiety   Follow up with psychiatrist or PCP to address anxiety issues  Monitor for depression with SI/HI tendencies, must call 911 or go to ER, patient understands this  Keep appt with PCP on 1/11/18 for anxiety f/u

## 2019-01-10 ENCOUNTER — OFFICE VISIT (OUTPATIENT)
Dept: MEDICAL GROUP | Facility: CLINIC | Age: 54
End: 2019-01-10
Payer: MEDICARE

## 2019-01-10 ENCOUNTER — HOSPITAL ENCOUNTER (EMERGENCY)
Dept: HOSPITAL 8 - ED | Age: 54
Discharge: HOME | End: 2019-01-10
Payer: MEDICARE

## 2019-01-10 VITALS — SYSTOLIC BLOOD PRESSURE: 141 MMHG | DIASTOLIC BLOOD PRESSURE: 78 MMHG

## 2019-01-10 VITALS
RESPIRATION RATE: 18 BRPM | BODY MASS INDEX: 34.36 KG/M2 | OXYGEN SATURATION: 95 % | HEIGHT: 61 IN | SYSTOLIC BLOOD PRESSURE: 118 MMHG | WEIGHT: 182 LBS | DIASTOLIC BLOOD PRESSURE: 80 MMHG | HEART RATE: 93 BPM | TEMPERATURE: 98.2 F

## 2019-01-10 VITALS — WEIGHT: 186.73 LBS | BODY MASS INDEX: 34.36 KG/M2 | HEIGHT: 62 IN

## 2019-01-10 DIAGNOSIS — M54.12 RIGHT CERVICAL RADICULOPATHY: ICD-10-CM

## 2019-01-10 DIAGNOSIS — M25.511: ICD-10-CM

## 2019-01-10 DIAGNOSIS — G89.29: ICD-10-CM

## 2019-01-10 DIAGNOSIS — M75.41 SUBACROMIAL IMPINGEMENT, RIGHT: ICD-10-CM

## 2019-01-10 DIAGNOSIS — M50.13: Primary | ICD-10-CM

## 2019-01-10 DIAGNOSIS — M75.81 ROTATOR CUFF TENDINITIS, RIGHT: ICD-10-CM

## 2019-01-10 PROCEDURE — 72125 CT NECK SPINE W/O DYE: CPT

## 2019-01-10 PROCEDURE — 99284 EMERGENCY DEPT VISIT MOD MDM: CPT

## 2019-01-10 PROCEDURE — 80047 BASIC METABLC PNL IONIZED CA: CPT

## 2019-01-10 PROCEDURE — 36415 COLL VENOUS BLD VENIPUNCTURE: CPT

## 2019-01-10 PROCEDURE — 99214 OFFICE O/P EST MOD 30 MIN: CPT | Performed by: FAMILY MEDICINE

## 2019-01-10 RX ORDER — ALPRAZOLAM 0.5 MG/1
0.5 TABLET ORAL NIGHTLY PRN
COMMUNITY
End: 2019-04-11

## 2019-01-10 NOTE — PROGRESS NOTES
"CHIEF COMPLAINT:    Patti Tello is complaining of right shoulder pain (right-hand-dominant)  Date of injury, late October 2018 around [facility]  And sustained another fall after the first no fall where she slipped fell forward, causing weightbearing/pressure on her RIGHT shoulder  Pain is at the deltoid region and superior shoulder as well as the RIGHT cervical spine  Quality is sharp, achy  Radiating on occasion down the RIGHT arm to the RIGHT hand in a C8 distribution  Aggravated by movement, opening a bottle water, lying in the RIGHT side  Improved with rest and heating pad  She has had \"injections in the past\" which DID NOT help  Prior studies: X-Ray of the RIGHT shoulder, she was in excruciating pain last night (January 9, 2019) and as a result she went to the emergency room at [facility] where they performed a CT scan of her cervical spine  Medications tried for pain include: Oxycodone, and arthritis medication for which she takes for her SLE  Mechanical Symptom history: No Locking     Subacromial corticosteroid injection performed December 18, 2018 in the RIGHT shoulder helped for approximately 4 hours, then symptoms recurred.  She is now having more radicular symptoms which is new.    Sees pain management in [place] for unrelated issue (Dr. Rodriguez) since fall 2018 which is for lumbar spine management/chronic pain     PHYSICAL EXAM:  /80 (BP Location: Left arm, Patient Position: Sitting, BP Cuff Size: Adult)   Pulse 93   Temp 36.8 °C (98.2 °F) (Temporal)   Resp 18   Ht 1.549 m (5' 1\")   Wt 82.6 kg (182 lb)   LMP 10/05/1999   SpO2 95%   BMI 34.39 kg/m²     Obese, in no apparent distress, alert and oriented x 3.  Gait: normal    Cervical spine:  Range of motion Slightly limited with Flexion, Markedly limited with Extension and Markedllimited with Lateral rotation  Spurling's testing is POSITIVE with pain radiating into the shoulder and ivania-scapular region on the RIGHT   Cervical " spine tenderness NEGATIVE    Strength testing:     Deltoid, bilateral 5/5  Bicep, bilateral 5/5  Tricep, bilateral 5/5  Wrist Extension, bilateral 5/5  Wrist Flexion, bilateral 5/5  Finger Abduction, bilateral 5/5    Sensation:  INTACT Bilaterally and DECREASED on the right at the level of T1        Reflexes:   Biceps: R 2+/L 2+  Triceps: R 2+/L  2+  Brachial radialis R 1+/L  2+  Martin's testing is NEGATIVE  The arms are otherwise neurovascularly intact     Shoulder Exam:    RIGHT Shoulder:  No visible swelling   Range of motion DIMINISHED with pain  Tenderness: Non-tender  Empty Can Testing 4/5  Internal Rotation 5/5  External Rotation 4/5  Lift Off Testing 5/5  Impingement testing Kumari  POSITIVE  Neer's testing POSITIVE  Apprehension testing POSITIVE    LEFT Shoulder:  No visible swelling   Range of motion INTACT  Tenderness: Non-tender  Empty Can Testing 5/5  Internal Rotation 5/5  External Rotation 5/5  Lift Off Testing 5/5  Impingement testing Kumari  NEGATIVE  Neer's testing NEGATIVE  Apprehension testing NEGATIVE    Additional Findings: Flexed Posture    1. Subacromial impingement, right     2. Rotator cuff tendinitis, right     3. Right cervical radiculopathy  MR-CERVICAL SPINE-W/O    REFERRAL TO NEUROSURGERY    C8 distribution       Cervical findings POSITIVE localized pain to the RIGHT shoulder Spurling's to the right  DECREASED sensation along the inner RIGHT upper arm, T1 dermatome  Now having POSITIVE RIGHT cervical radiculopathy in a C8 distribution    Seen in the emergency room    Subacromial corticosteroid injection RIGHT shoulder performed in the office today (December 18, 2018)    Return in about 4 weeks (around 1/15/2019).  To see how she is doing after RIGHT subacromial corticosteroid injection and see how she is doing formal physical therapy ([facility])              11/27/2018 8:48 AM    HISTORY/REASON FOR EXAM:  Atraumatic Pain/Swelling/Deformity  Pain    TECHNIQUE/EXAM DESCRIPTION AND  NUMBER OF VIEWS:  3 views of the RIGHT shoulder.    COMPARISON: None    FINDINGS:  There is no evidence of fracture or dislocation. Glenohumeral and acromioclavicular articulation is maintained.  There is spurring of the inferior aspect of the distal clavicle. Visualized right lung field is clear.     Impression       No evidence of acute fracture or dislocation.    Spurring of the inferior aspect of the distal right clavicle. This can predispose to impingement.     Thank you DARIN [email or web address] for allowing me to participate in caring for your patient.

## 2019-01-10 NOTE — NUR
Patient given discharge instructions and they have confirmed that they 
understand the instructions.  Patient ambulatory with steady gait.

## 2019-01-21 ENCOUNTER — APPOINTMENT (OUTPATIENT)
Dept: RADIOLOGY | Facility: MEDICAL CENTER | Age: 54
End: 2019-01-21
Attending: FAMILY MEDICINE
Payer: MEDICARE

## 2019-02-01 ENCOUNTER — HOSPITAL ENCOUNTER (OUTPATIENT)
Dept: RADIOLOGY | Facility: MEDICAL CENTER | Age: 54
End: 2019-02-01
Attending: FAMILY MEDICINE
Payer: MEDICARE

## 2019-02-01 ENCOUNTER — HOSPITAL ENCOUNTER (OUTPATIENT)
Dept: RADIOLOGY | Facility: MEDICAL CENTER | Age: 54
End: 2019-02-01
Attending: NEUROLOGICAL SURGERY
Payer: MEDICARE

## 2019-02-01 DIAGNOSIS — M54.12 RIGHT CERVICAL RADICULOPATHY: ICD-10-CM

## 2019-02-01 PROCEDURE — 72110 X-RAY EXAM L-2 SPINE 4/>VWS: CPT

## 2019-02-01 PROCEDURE — 72050 X-RAY EXAM NECK SPINE 4/5VWS: CPT

## 2019-02-01 PROCEDURE — 72141 MRI NECK SPINE W/O DYE: CPT

## 2019-02-12 ENCOUNTER — OFFICE VISIT (OUTPATIENT)
Dept: URGENT CARE | Facility: PHYSICIAN GROUP | Age: 54
End: 2019-02-12
Payer: MEDICARE

## 2019-02-12 ENCOUNTER — APPOINTMENT (OUTPATIENT)
Dept: RADIOLOGY | Facility: IMAGING CENTER | Age: 54
End: 2019-02-12
Attending: PHYSICIAN ASSISTANT
Payer: MEDICARE

## 2019-02-12 VITALS
WEIGHT: 183 LBS | RESPIRATION RATE: 16 BRPM | DIASTOLIC BLOOD PRESSURE: 98 MMHG | BODY MASS INDEX: 34.58 KG/M2 | SYSTOLIC BLOOD PRESSURE: 142 MMHG | TEMPERATURE: 98.2 F | HEART RATE: 125 BPM | OXYGEN SATURATION: 96 %

## 2019-02-12 DIAGNOSIS — R00.0 TACHYCARDIA: ICD-10-CM

## 2019-02-12 DIAGNOSIS — R50.9 FEVER, UNSPECIFIED FEVER CAUSE: ICD-10-CM

## 2019-02-12 DIAGNOSIS — R68.89 FLU-LIKE SYMPTOMS: ICD-10-CM

## 2019-02-12 DIAGNOSIS — R06.02 SOB (SHORTNESS OF BREATH): ICD-10-CM

## 2019-02-12 LAB
FLUAV+FLUBV AG SPEC QL IA: NEGATIVE
INT CON NEG: NORMAL
INT CON POS: NORMAL

## 2019-02-12 PROCEDURE — 71046 X-RAY EXAM CHEST 2 VIEWS: CPT | Mod: TC,FY | Performed by: PHYSICIAN ASSISTANT

## 2019-02-12 PROCEDURE — 87804 INFLUENZA ASSAY W/OPTIC: CPT | Performed by: PHYSICIAN ASSISTANT

## 2019-02-12 PROCEDURE — 99214 OFFICE O/P EST MOD 30 MIN: CPT | Performed by: PHYSICIAN ASSISTANT

## 2019-02-12 RX ORDER — BENZONATATE 200 MG/1
200 CAPSULE ORAL 3 TIMES DAILY PRN
Qty: 30 CAP | Refills: 0 | Status: SHIPPED | OUTPATIENT
Start: 2019-02-12 | End: 2019-04-11

## 2019-02-12 RX ORDER — OSELTAMIVIR PHOSPHATE 75 MG/1
75 CAPSULE ORAL 2 TIMES DAILY
Qty: 10 CAP | Refills: 0 | Status: SHIPPED | OUTPATIENT
Start: 2019-02-12 | End: 2019-04-11

## 2019-02-12 NOTE — PROGRESS NOTES
Chief Complaint   Patient presents with   • Fever     runny nose, chest congestion x3 days       HISTORY OF PRESENT ILLNESS: Patient is a 53 y.o. female who presents today for the following:    Fever x around 48 hours  tmax 102.3  + body aches, chills, cough, SOB, h/o COPD, runny nose  Flu shot: yes  OTC meds today: none  Took left over cefdinir this morning     Patient Active Problem List    Diagnosis Date Noted   • Lumbar stenosis 10/22/2012     Priority: High   • Elevated PTHrP level 10/09/2018   • Tinea versicolor 09/27/2018   • Vitamin B12 deficiency 09/27/2018   • Abnormal laboratory test 09/25/2018   • TOMY (obstructive sleep apnea) 09/17/2018   • History of diabetes mellitus, type II 09/04/2018   • Positive NAKUL (antinuclear antibody) 07/24/2018   • Arthritis, multiple joint involvement 07/24/2018   • Family history of systemic lupus erythematosus (SLE) in mother 07/24/2018   • Iron deficiency anemia 07/24/2018   • Skin lesions 07/24/2018   • Other fatigue 06/20/2018   • Heartburn 05/08/2018   • Muscle spasm of both lower legs 03/26/2018   • Calcaneal spur of both feet 03/15/2018   • Pain management contract broken 03/15/2018   • Obesity (BMI 30-39.9) 01/18/2018   • COPD (chronic obstructive pulmonary disease) ([facility]) 10/05/2017   • Bipolar affective disorder, currently depressed, moderate ([facility]) 01/24/2017   • History of gastric bypass 10/11/2013   • Chronic pain syndrome 09/16/2013   • Anxiety 12/06/2012   • Multilevel degenerative disc disease 10/01/2012   • Cervical radiculopathy, chronic 05/04/2011   • Vitamin D insufficiency 11/16/2010   • Arthritis    • Panic attacks    • PTSD (post-traumatic stress disorder)        Allergies:Suboxone    Current Outpatient Prescriptions Ordered in [facility]   Medication Sig Dispense Refill   • oseltamivir (TAMIFLU) 75 MG Cap Take 1 Cap by mouth 2 times a day. 10 Cap 0   • benzonatate (TESSALON) 200 MG capsule Take 1 Cap by mouth 3 times a day as needed for Cough. 30 Cap 0   •  ALPRAZolam (XANAX) 0.5 MG Tab Take 0.5 mg by mouth at bedtime as needed for Sleep.     • vitamin D, Ergocalciferol, (DRISDOL) 36116 units Cap capsule Take 1 Cap by mouth 2X A WEEK. 26 Cap 0   • predniSONE (DELTASONE) 10 MG Tab Take 10 mg by mouth every day.     • cyanocobalamin (VITAMIN B-12) 100 MCG Tab Take 100 mcg by mouth every day.     • tiotropium (SPIRIVA) 18 MCG Cap Inhale 1 Cap by mouth every day. 30 Cap 3   • VENTOLIN  (90 Base) MCG/ACT Aero Soln inhalation aerosol INHALE 1 PUFF PO QID PRN  0   • ondansetron (ZOFRAN) 8 MG Tab TK 1 T PO QID PRN  1   • ferrous sulfate 324 (65 Fe) MG Tablet Delayed Response EC tablet Take 324 mg by mouth with dinner.     • ketoconazole (NIZORAL) 2 % shampoo Apply to skin one time daily in the shower and leave on for 5 minutes then rinse off. 2-4 weeks 1 Bottle 3   • Oxycodone HCl 20 MG Tab Take 20 mg by mouth 4 times a day.     • Nebulizers (NotoriousOS AEROSOL DELIVERY SYSTEM) Misc U UTD  0   • ARIPiprazole (ABILIFY) 10 MG Tab Take 1 Tab by mouth every day. 30 Tab 2   • budesonide-formoterol (SYMBICORT) 80-4.5 MCG/ACT Aerosol Inhale 2 Puffs by mouth 2 Times a Day. 1 Inhaler 3   • fluticasone (FLONASE) 50 MCG/ACT nasal spray Spray 1 Spray in nose 2 times a day. Each Nostril 1 Bottle 3     No current Epic-ordered facility-administered medications on file.        Past Medical History:   Diagnosis Date   • Anxiety and depression    • Arthritis     back, neck   • Asthma    • Back injury    • Back pain    • Bronchitis 2010   • Cancer (HCC)     breast LT   • Chickenpox    • Chronic LBP    • Chronic neck pain    • Cold intolerance 1/24/2012   • COPD (chronic obstructive pulmonary disease) (HCC)    • Cystic fibrosis (HCC)    • Depression    • Diabetes    • GERD (gastroesophageal reflux disease)    • H/O gastric bypass 10/11/2013   • Herniated nucleus pulposus, L4-5 3/18/2010   • HX: breast cancer 10/11/2013   • Hypertension    • Itching due to drug 6/2/2011   • Kidney stone    •  Nephrolithiasis 10/11/2013   • Obesity    • Osteoporosis    • Other specified symptom associated with female genital organs    • Panic disorder    • Pneumonia    • PTSD (post-traumatic stress disorder)    • Restless leg syndrome    • Rheumatoid arthritis (HCC)    • S/P laminectomy 10/22/2012   • Thoracic or lumbosacral neuritis or radiculitis, unspecified 10/22/2012   • Trauma     head trauma       Social History   Substance Use Topics   • Smoking status: Never Smoker   • Smokeless tobacco: Never Used   • Alcohol use No       Family Status   Relation Status   • Fa  at age 64   • Mo  at age 75   • Sis Alive   • Bro Alive   • MAunt (Not Specified)   • MUnc (Not Specified)   • MGMo (Not Specified)   • MGFa (Not Specified)     Family History   Problem Relation Age of Onset   • Diabetes Father    • Cancer Father    • Hypertension Father    • Hyperlipidemia Father    • Cancer Mother    • Other Mother         SLE   • Diabetes Mother    • Hypertension Mother    • Hyperlipidemia Mother    • Stroke Mother    • Sleep Apnea Brother    • Cancer Maternal Aunt    • Diabetes Maternal Aunt    • Diabetes Maternal Uncle    • Diabetes Maternal Grandmother    • Diabetes Maternal Grandfather        Review of Systems:   Constitutional ROS: No unexpected change in weight, No weakness, No fatigue  Eye ROS: No recent significant change in vision, No eye pain, redness, discharge  Ear ROS: No drainage, No tinnitus or vertigo, No recent change in hearing  Mouth/Throat ROS: No teeth or gum problems, No bleeding gums, No tongue complaints  Neck ROS: No swollen glands, No significant pain in neck  Pulmonary ROS: As needed for shortness of breath.  Cardiovascular ROS: No diaphoresis, No edema, No palpitations  Gastrointestinal ROS: No change in bowel habits, No significant change in appetite, No nausea, vomiting, diarrhea, or constipation  Musculoskeletal/Extremities ROS: No peripheral edema, No pain, redness or swelling on the  joints  Hematologic/Lymphatic ROS: Positive for body aches.  Skin/Integumentary ROS: No edema, No evidence of rash, No itching      Exam:  Blood pressure 142/98, pulse (!) 125, temperature 36.8 °C (98.2 °F), resp. rate 16, weight 83 kg (183 lb), last menstrual period 10/05/1999, SpO2 96 %, not currently breastfeeding.  General: Well developed, well nourished. No distress.  Eye: PERRL/EOMI; conjunctivae clear, lids normal.  ENMT: Lips without lesions, MMM. Oropharynx is clear. Bilateral TMs are within normal limits.  Pulmonary: Unlabored respiratory effort. Lungs clear to auscultation, no wheezes, no rhonchi.  Cardiovascular: Regular rate and rhythm without murmur.     Neurologic: Grossly nonfocal. No facial asymmetry noted.  Lymph: No cervical lymphadenopathy noted.  Skin: Warm, dry, good turgor. No rashes in visible areas.   Psych: Normal mood. Alert and oriented x3. Judgment and insight is normal.    Rapid influenza: Negative    Chest x-ray, per radiology:  Impression       1.  Unremarkable two view chest.     Assessment/Plan:  Chest x-ray is negative for pneumonia.  Discussed likely viral etiology, possibly influenza despite negative test results.  Given patient's chronic lung disease will start Tamiflu.  Follow up for worsening or persistent symptoms.  1. Flu-like symptoms  POCT Influenza A/B    oseltamivir (TAMIFLU) 75 MG Cap    benzonatate (TESSALON) 200 MG capsule   2. SOB (shortness of breath)  DX-CHEST-2 VIEWS   3. Tachycardia  DX-CHEST-2 VIEWS   4. Fever, unspecified fever cause  DX-CHEST-2 VIEWS

## 2019-02-12 NOTE — LETTER
February 12, 2019         Patient: Patti Tello   YOB: 1965   Date of Visit: 2/12/2019           To Whom it May Concern:    Patti Tello was seen in my clinic on 2/12/2019. She may return to work on 2/15/19.    If you have any questions or concerns, please don't hesitate to call.        Sincerely,           Patrica Goins P.A.-C.  Electronically Signed

## 2019-04-11 ENCOUNTER — OFFICE VISIT (OUTPATIENT)
Dept: MEDICAL GROUP | Facility: PHYSICIAN GROUP | Age: 54
End: 2019-04-11
Payer: MEDICARE

## 2019-04-11 VITALS
OXYGEN SATURATION: 96 % | HEART RATE: 97 BPM | SYSTOLIC BLOOD PRESSURE: 118 MMHG | DIASTOLIC BLOOD PRESSURE: 70 MMHG | TEMPERATURE: 97.8 F | WEIGHT: 182 LBS | BODY MASS INDEX: 34.36 KG/M2 | HEIGHT: 61 IN

## 2019-04-11 DIAGNOSIS — G89.4 CHRONIC PAIN SYNDROME: ICD-10-CM

## 2019-04-11 DIAGNOSIS — M25.811 SHOULDER IMPINGEMENT, RIGHT: ICD-10-CM

## 2019-04-11 DIAGNOSIS — L80 VITILIGO: ICD-10-CM

## 2019-04-11 DIAGNOSIS — J30.2 SEASONAL ALLERGIES: ICD-10-CM

## 2019-04-11 DIAGNOSIS — M53.9 MULTILEVEL DEGENERATIVE DISC DISEASE: ICD-10-CM

## 2019-04-11 DIAGNOSIS — H81.13 BENIGN PAROXYSMAL POSITIONAL VERTIGO DUE TO BILATERAL VESTIBULAR DISORDER: ICD-10-CM

## 2019-04-11 DIAGNOSIS — M54.12 CERVICAL RADICULOPATHY, CHRONIC: ICD-10-CM

## 2019-04-11 DIAGNOSIS — R11.0 NAUSEA: ICD-10-CM

## 2019-04-11 DIAGNOSIS — M48.061 SPINAL STENOSIS OF LUMBAR REGION, UNSPECIFIED WHETHER NEUROGENIC CLAUDICATION PRESENT: ICD-10-CM

## 2019-04-11 DIAGNOSIS — E55.9 VITAMIN D DEFICIENCY: ICD-10-CM

## 2019-04-11 DIAGNOSIS — Z12.31 ENCOUNTER FOR SCREENING MAMMOGRAM FOR BREAST CANCER: ICD-10-CM

## 2019-04-11 DIAGNOSIS — F31.32 BIPOLAR AFFECTIVE DISORDER, CURRENTLY DEPRESSED, MODERATE (HCC): ICD-10-CM

## 2019-04-11 DIAGNOSIS — R53.82 CHRONIC FATIGUE: ICD-10-CM

## 2019-04-11 DIAGNOSIS — E66.9 CLASS 1 OBESITY WITH BODY MASS INDEX (BMI) OF 34.0 TO 34.9 IN ADULT, UNSPECIFIED OBESITY TYPE, UNSPECIFIED WHETHER SERIOUS COMORBIDITY PRESENT: ICD-10-CM

## 2019-04-11 DIAGNOSIS — E21.3 HYPERPARATHYROIDISM (HCC): ICD-10-CM

## 2019-04-11 DIAGNOSIS — E66.9 OBESITY (BMI 30-39.9): ICD-10-CM

## 2019-04-11 PROBLEM — R06.09 EXERTIONAL DYSPNEA: Status: ACTIVE | Noted: 2017-10-05

## 2019-04-11 PROCEDURE — 99215 OFFICE O/P EST HI 40 MIN: CPT | Performed by: INTERNAL MEDICINE

## 2019-04-11 RX ORDER — NAPROXEN 25 MG/ML
SUSPENSION ORAL
COMMUNITY
End: 2019-12-09

## 2019-04-11 RX ORDER — CYCLOBENZAPRINE HCL 10 MG
TABLET ORAL
COMMUNITY
End: 2019-04-11

## 2019-04-11 RX ORDER — METHYLPREDNISOLONE 4 MG/1
TABLET ORAL
Refills: 0 | COMMUNITY
Start: 2019-01-15 | End: 2019-05-14

## 2019-04-11 RX ORDER — OXYCODONE HYDROCHLORIDE 20 MG/1
20 TABLET ORAL 4 TIMES DAILY
Status: CANCELLED | OUTPATIENT
Start: 2019-04-11

## 2019-04-11 RX ORDER — MECLIZINE HYDROCHLORIDE 25 MG/1
25 TABLET ORAL 3 TIMES DAILY PRN
Qty: 30 TAB | Refills: 0 | Status: SHIPPED | OUTPATIENT
Start: 2019-04-11 | End: 2019-12-09

## 2019-04-11 RX ORDER — ONDANSETRON HYDROCHLORIDE 8 MG/1
TABLET, FILM COATED ORAL
Qty: 15 TAB | Refills: 1 | Status: SHIPPED | OUTPATIENT
Start: 2019-04-11 | End: 2019-05-17 | Stop reason: SDUPTHER

## 2019-04-11 RX ORDER — CYCLOBENZAPRINE HCL 10 MG
10 TABLET ORAL 3 TIMES DAILY PRN
COMMUNITY
End: 2019-12-09

## 2019-04-11 NOTE — ASSESSMENT & PLAN NOTE
This is a chronic health problem that is uncontrolled with current medications and lifestyle measures. Recent GLF in 11/2018 and pt has developed Rt shoulder pain. She has pending MRI Shoulder given by recent [facility] visit in 03/2019.

## 2019-04-11 NOTE — ASSESSMENT & PLAN NOTE
This is a chronic health problem that is uncontrolled with current medications and lifestyle measures.  Last PTH levels in December 2018 was abnormal, also has vitamin D deficiency which might be causing this.

## 2019-04-11 NOTE — ASSESSMENT & PLAN NOTE
This is a chronic health problem that is uncontrolled with current medications and lifestyle measures.  Patient has both cervical disc disease and also lumbar disc disease, reviewed the imaging done in 2012 MRIs to confirm this.  Currently on oxycodone 5 mg twice daily as needed managed by pain management.   check done showing around 100 morphine equivalents she is requiring.    Pt says that she has incontinence with back pain since 3 months.

## 2019-04-11 NOTE — ASSESSMENT & PLAN NOTE
This is a chronic health problem that is well controlled with current medications and lifestyle measures.  Currently on medications Abilify 10 mg daily.

## 2019-04-11 NOTE — PROGRESS NOTES
CC: To establish care with new PCP, back pain.  Patient was previously seen by internal medicine physician in September 2016.    HISTORY OF THE PRESENT ILLNESS: Patient is a 53 y.o. female. This pleasant patient is here today to discuss her medical conditions as mentioned in HPI below.    Health Maintenance: Patient is due for mammogram okay to get in the order today, due for pneumonia vaccine will be offered in the next visit.      Seasonal allergies  This is a chronic health problem that is well controlled with current medications and lifestyle measures.  Recent pulmonary function tests and April 2018 was within normal limits and there was no COPD, currently on the albuterol as needed.    Multilevel degenerative disc disease  This is a chronic health problem that is uncontrolled with current medications and lifestyle measures.  Patient has both cervical disc disease and also lumbar disc disease, reviewed the imaging done in 2012 MRIs to confirm this.  Currently on oxycodone 5 mg twice daily as needed managed by pain management.   check done showing around 100 morphine equivalents she is requiring.    Pt says that she has incontinence with back pain since 3 months.    Hyperparathyroidism (HCC)  This is a chronic health problem that is uncontrolled with current medications and lifestyle measures.  Last PTH levels in December 2018 was abnormal, also has vitamin D deficiency which might be causing this.    Bipolar affective disorder, currently depressed, moderate (CMS-HCC)  This is a chronic health problem that is well controlled with current medications and lifestyle measures.  Currently on medications Abilify 10 mg daily.    Vitiligo  Newly diagnosed on the recent dermatology visit.     Chronic fatigue  This is a chronic health problem that is uncontrolled with current medications and lifestyle measures.Pt has HX of iron deficiency anemia.     Benign paroxysmal positional vertigo due to bilateral vestibular  disorder  This is a chronic health problem that is uncontrolled with current medications and lifestyle measures.Pt says that she has positional vertigo with nausea and requesting for Zofran PRN.    Shoulder impingement, right  This is a chronic health problem that is uncontrolled with current medications and lifestyle measures. Recent GLF in 11/2018 and pt has developed Rt shoulder pain. She has pending MRI Shoulder given by recent [facility] visit in 03/2019.    Lumbar stenosis  This is a chronic health problem that is uncontrolled with current medications and lifestyle measures. S/P Laminectomy in 2012, Instrumentation and Fusion - . Pt refuses to see him anymore and requesting for a new Neurosurgeon.    PHQ score 0, BMI > 34 , no tobacco, recent accidental fall injury in November 2018 with shoulder issues going on.    Allergies: Suboxone    Current Outpatient Prescriptions Ordered in [facility]   Medication Sig Dispense Refill   • cyclobenzaprine (FLEXERIL) 10 MG Tab Take 10 mg by mouth 3 times a day as needed.     • Buprenorphine HCl (BELBUCA) 150 MCG FILM Place  in mouth by cheek.     • vitamin D, Ergocalciferol, (DRISDOL) 46249 units Cap capsule Take 1 Cap by mouth 2X A WEEK. 26 Cap 0   • cyanocobalamin (VITAMIN B-12) 100 MCG Tab Take 100 mcg by mouth every day.     • ferrous sulfate 324 (65 Fe) MG Tablet Delayed Response EC tablet Take 324 mg by mouth with dinner.     • Oxycodone HCl 20 MG Tab Take 20 mg by mouth 4 times a day.     • ARIPiprazole (ABILIFY) 10 MG Tab Take 1 Tab by mouth every day. 30 Tab 2   • cyclobenzaprine (FLEXERIL) 10 MG Tab Flexeril     • naproxen (NAPROSYN) 125 MG/5ML suspension naproxen   2 PO QD     • MethylPREDNISolone (MEDROL DOSEPAK) 4 MG Tablet Therapy Pack TK UTD  0   • tiotropium (SPIRIVA) 18 MCG Cap Inhale 1 Cap by mouth every day. 30 Cap 3   • VENTOLIN  (90 Base) MCG/ACT Aero Soln inhalation aerosol INHALE 1 PUFF PO QID PRN  0   • ondansetron (ZOFRAN) 8 MG Tab  TK 1 T PO QID PRN  1   • ketoconazole (NIZORAL) 2 % shampoo Apply to skin one time daily in the shower and leave on for 5 minutes then rinse off. 2-4 weeks 1 Bottle 3   • budesonide-formoterol (SYMBICORT) 80-4.5 MCG/ACT Aerosol Inhale 2 Puffs by mouth 2 Times a Day. 1 Inhaler 3   • fluticasone (FLONASE) 50 MCG/ACT nasal spray Spray 1 Spray in nose 2 times a day. Each Nostril 1 Bottle 3     No current Epic-ordered facility-administered medications on file.        Past Medical History:   Diagnosis Date   • Anxiety and depression    • Arthritis     back, neck   • Asthma    • Back injury    • Back pain    • Bronchitis 2010   • Cancer ([facility])     breast LT   • Chickenpox    • Chronic LBP    • Chronic neck pain    • Cold intolerance 1/24/2012   • COPD (chronic obstructive pulmonary disease) ([facility])    • Cystic fibrosis ([facility])    • Depression    • Diabetes    • GERD (gastroesophageal reflux disease)    • H/O gastric bypass 10/11/2013   • Herniated nucleus pulposus, L4-5 3/18/2010   • HX: breast cancer 10/11/2013   • Hypertension    • Itching due to drug 6/2/2011   • Kidney stone    • Nephrolithiasis 10/11/2013   • Obesity    • Osteoporosis    • Other specified symptom associated with female genital organs    • Panic disorder    • Pneumonia    • PTSD (post-traumatic stress disorder)    • Restless leg syndrome    • Rheumatoid arthritis ([facility])    • S/P laminectomy 10/22/2012   • Thoracic or lumbosacral neuritis or radiculitis, unspecified 10/22/2012   • Trauma     head trauma       Past Surgical History:   Procedure Laterality Date   • URETEROSCOPY  10/10/2013    Performed by Molly Resendiz M.D. at SURGERY [facility] ORS   • LASERTRIPSY  10/10/2013    Performed by Molly Resendiz M.D. at SURGERY [facility] ORS   • LUMBAR FUSION POSTERIOR  10/22/2012    Performed by Alfred Branham M.D. at SURGERY [facility] ORS   • LUMBAR LAMINECTOMY DISKECTOMY  10/22/2012    Performed by Alfred Bran[place] M.[place] at [place]  [place]   • OTHER ORTHOPEDIC SURGERY  10/01/2012    lumbar fusion, Dr Branham   • OTHER ABDOMINAL SURGERY  2006    gastric bypass   • ABDOMINAL HYSTERECTOMY TOTAL      DUE TO FIBROIDS   • BREAST RECONSTRUCTION      Lt breast   • CRANIOTOMY      DUE TO BRAIN INJURY   • HYSTERECTOMY LAPAROSCOPY     • LAMINOTOMY     • MASTECTOMY      Lt breast   • OTHER      left ear drum surgery   • OTHER ABDOMINAL SURGERY      hernia repair   • PRIMARY C SECTION      x 3   • SLEEVE,MAXIMILIAN VASO THIGH     • TONSILLECTOMY     • US-CYST ASPIRATION-BREAST INITIAL     • VENTILATOR CONTINUOUS         Social History   Substance Use Topics   • Smoking status: Never Smoker   • Smokeless tobacco: Never Used   • Alcohol use No       Social History     Social History Narrative   • No narrative on file       Family History   Problem Relation Age of Onset   • Diabetes Father    • Cancer Father    • Hypertension Father    • Hyperlipidemia Father    • Cancer Mother    • Other Mother         SLE   • Diabetes Mother    • Hypertension Mother    • Hyperlipidemia Mother    • Stroke Mother    • Sleep Apnea Brother    • Cancer Maternal Aunt    • Diabetes Maternal Aunt    • Diabetes Maternal Uncle    • Diabetes Maternal Grandmother    • Diabetes Maternal Grandfather        ROS:     - Constitutional: Negative for fever, chills, unexpected weight change, and fatigue/generalized weakness.     - HEENT: Negative for headaches, vision changes, hearing changes, ear pain, ear discharge, rhinorrhea, sinus congestion, sore throat, and neck pain.      - Respiratory: Negative for cough, sputum production, chest congestion, dyspnea, wheezing, and crackles.      - Cardiovascular: Negative for chest pain, palpitations, orthopnea, and bilateral lower extremity edema.     - Gastrointestinal: Negative for heartburn, nausea, vomiting, abdominal pain, hematochezia, melena, diarrhea, constipation, and greasy/foul-smelling stools.     - Genitourinary: Occasional incontinence of stool with  "severe constipation takes over-the-counter stool softeners negative for dysuria, polyuria, hematuria, pyuria, urinary urgency, and urinary incontinence.     - Musculoskeletal: As mentioned in HPI above negative for myalgias.     - Skin: Negative for rash, itching, cyanotic skin color change.     - Neurological: Negative for dizziness, tingling, tremors, focal sensory deficit, focal weakness and headaches.     - Endo/Heme/Allergies: Does not bruise/bleed easily.     - Psychiatric/Behavioral: Negative for depression, suicidal/homicidal ideation and memory loss.        Last lab work in December 2018 reviewed and discussed with patient.    Exam: /70 (BP Location: Right arm, Patient Position: Sitting, BP Cuff Size: Large adult)   Pulse 97   Temp 36.6 °C (97.8 °F) (Temporal)   Ht 1.549 m (5' 1\")   Wt 82.6 kg (182 lb)   SpO2 96%  Body mass index is 34.39 kg/m².    General: Normal appearing. No distress.  HEENT: Normocephalic. Eyes conjunctiva clear lids without ptosis, pupils equal ears normal shape and contour.  Neck: Supple without JVD or bruit. Thyroid is not enlarged.  Pulmonary: Clear to ausculation.  Normal effort. No rales, ronchi, or wheezing.  Cardiovascular: Regular rate and rhythm without murmur. Carotid and radial pulses are intact and equal bilaterally.  Abdomen: Soft, nontender, nondistended. Normal bowel sounds. Liver and spleen are not palpable  Neurologic: Grossly nonfocal  Lymph: No cervical, supraclavicular or axillary lymph nodes are palpable  Skin: Warm and dry.  Positive for vitiligo on the abdomen, biopsy site is well-healed and dry.  Musculoskeletal: Normal gait. No extremity cyanosis, clubbing, or edema.  Back exam : Negative for tenderness on palpation of lumbosacral spine, does have sciatica to the right lower extremity.  More on ambulation, straight leg raising test not done due to pain.  Right shoulder exam : Positive for tenderness on palpation of acromioclavicular joint on the " right side, positive for painful arc.  Restricted range of movements on abduction, other movements not tested due to pain.  Psych: Normal mood and affect. Alert and oriented x3. Judgment and insight is normal.    Please note that this dictation was created using voice recognition software. I have made every reasonable attempt to correct obvious errors, but I expect that there are errors of grammar and possibly content that I did not discover before finalizing the note.      Assessment/Plan  1. Multilevel degenerative disc disease  2. Cervical radiculopathy, chronic  Stable, follows pain management Dr. Harvey.  Will get the records.  Continue current oxycodone managed by him as per the  it is showing more than 100 morphine equivalents of usage.  Continue current muscle relaxers and also she is on buprenorphine managed by her pain management.  - cyclobenzaprine (FLEXERIL) 10 MG Tab; Take 10 mg by mouth 3 times a day as needed.  - Buprenorphine HCl (BELBUCA) 150 MCG FILM; Place  in mouth by cheek.  - naproxen (NAPROSYN) 125 MG/5ML suspension; naproxen   2 PO QD      3. Spinal stenosis of lumbar region, unspecified whether neurogenic claudication present  Uncontrolled, patient believes that her incontinence might be related to her back pain.  But given her neurological exam with no focal neurological deficits in the lower extremities I do not suspect that she has any at this time but will need to follow-up with the neurosurgery for possible need of procedure.  She is requesting for neurology whom she believes the [place] people have asked her to follow up, will do as requested.  For now given the uncontrolled pain will give her a Medrol Dosepak for symptom relief.  - MethylPREDNISolone (MEDROL DOSEPAK) 4 MG Tablet Therapy Pack; TK UTD; Refill: 0  - REFERRAL TO NEUROLOGY  - REFERRAL TO NEUROSURGERY    4. Obesity (BMI 30-39.9)  5. Class 1 obesity with body mass index (BMI) of 34.0 to 34.9 in adult, unspecified obesity  type, unspecified whether serious comorbidity present  Pt educated on the increase of morbidity and mortality associated with excess weight including DM, Heart Disease, HTN, stroke, and sleep apnea.  Pt advised weight loss of 5% through reduced calorie, low fat diet and 150 mins of exercise a week  Recommend obesity clinic if patient is unsuccessful with weight loss    6. Chronic pain syndrome  Uncontrolled, patient believes that she might be having fibromyalgia.  I do not see rheumatoid factor done in the last autoimmune workup, will do the basic rheumatology panel along with NAKUL titers at this time before considering for fibromyalgia.  - CCP ANTIBODY; Future  - RHEUMATOID ARTHRITIS FACTOR; Future  - WESTERGREN SED RATE; Future  - NAKUL REFLEXIVE PROFILE; Future    7. Shoulder impingement, right  Uncontrolled, scheduled for an MRI of the shoulder in the recent visit with the [place] [facility].  Continue current oxycodone managed by pain management.    8. Bipolar affective disorder, currently depressed, moderate (HCC)  Well-controlled, continue current Abilify 10 mg daily.  Follow-up with your psychiatrist.  Will need to get the records.    9. Seasonal allergies  Stable, continue the current Spiriva and Symbicort.  These are normally given for COPD, I do not find any PFTs done on this diagnosis and her medical problems.  Will need to get further records before considering this.    10. Hyperparathyroidism (HCC)  Normocalcemic hyperparathyroidism most likely related to vitamin D deficiency.  We will recheck her PTH levels at this time and evaluate further.  - PTH INTACT; Future    11. Vitiligo  Newly diagnosed, as per physical exam findings above.  Will need to get the records of dermatology, to follow-up with them W.    12. Chronic fatigue  Uncontrolled, given her previous history of iron deficiency anemia and last thyroid checked long time back will recheck a TSH levels and CBC at this time.  - TSH WITH REFLEX TO  FT4; Future  - CBC WITH DIFFERENTIAL; Future    13. Benign paroxysmal positional vertigo due to bilateral vestibular disorder  14. Nausea  Stable, patient states that she was diagnosed with positional vertigo in the past and has on and off nausea along with this condition.  Requesting for Zofran refills, will start her on standard medication for BPPV including meclizine and also refill her Zofran.  - meclizine (ANTIVERT) 25 MG Tab; Take 1 Tab by mouth 3 times a day as needed for Dizziness, Nausea/Vomiting or Vertigo.  Dispense: 30 Tab; Refill: 0  - ondansetron (ZOFRAN) 8 MG Tab; TK 1 T PO QID PRN  Dispense: 15 Tab; Refill: 1    15. Encounter for screening mammogram for breast cancer  - MA-SCREENING MAMMO BILAT W/TOMOSYNTHESIS W/CAD; Future    16. Vitamin D deficiency  Previous history of vitamin D deficiency, patient completed a 3 months course of high-dose vitamin D and requesting for further refills.  Will recheck at this time and do the refills if needed.  For now continue vitamin D 2000 units over-the-counter every day.  - VITAMIN D,25 HYDROXY; Future

## 2019-04-11 NOTE — ASSESSMENT & PLAN NOTE
This is a chronic health problem that is uncontrolled with current medications and lifestyle measures.Pt has HX of iron deficiency anemia.

## 2019-04-11 NOTE — ASSESSMENT & PLAN NOTE
This is a chronic health problem that is uncontrolled with current medications and lifestyle measures. S/P Laminectomy in 2012, Instrumentation and Fusion - . Pt refuses to see him anymore and requesting for a new Neurosurgeon.

## 2019-04-11 NOTE — ASSESSMENT & PLAN NOTE
This is a chronic health problem that is uncontrolled with current medications and lifestyle measures.Pt says that she has positional vertigo with nausea and requesting for Zofran PRN.

## 2019-04-11 NOTE — ASSESSMENT & PLAN NOTE
This is a chronic health problem that is well controlled with current medications and lifestyle measures.  Recent pulmonary function tests and April 2018 was within normal limits and there was no COPD, currently on the albuterol as needed.

## 2019-04-14 ENCOUNTER — HOSPITAL ENCOUNTER (OUTPATIENT)
Dept: RADIOLOGY | Facility: MEDICAL CENTER | Age: 54
End: 2019-04-14
Attending: PHYSICIAN ASSISTANT
Payer: MEDICARE

## 2019-04-14 DIAGNOSIS — M25.511 RIGHT SHOULDER PAIN, UNSPECIFIED CHRONICITY: ICD-10-CM

## 2019-04-14 PROCEDURE — 73221 MRI JOINT UPR EXTREM W/O DYE: CPT | Mod: RT

## 2019-04-16 ENCOUNTER — HOSPITAL ENCOUNTER (OUTPATIENT)
Dept: LAB | Facility: MEDICAL CENTER | Age: 54
End: 2019-04-16
Attending: INTERNAL MEDICINE
Payer: MEDICARE

## 2019-04-16 DIAGNOSIS — E21.3 HYPERPARATHYROIDISM (HCC): ICD-10-CM

## 2019-04-16 DIAGNOSIS — E55.9 VITAMIN D DEFICIENCY: ICD-10-CM

## 2019-04-16 DIAGNOSIS — R53.82 CHRONIC FATIGUE: ICD-10-CM

## 2019-04-16 DIAGNOSIS — G89.4 CHRONIC PAIN SYNDROME: ICD-10-CM

## 2019-04-16 DIAGNOSIS — E55.9 VITAMIN D INSUFFICIENCY: ICD-10-CM

## 2019-04-16 LAB
25(OH)D3 SERPL-MCNC: 16 NG/ML (ref 30–100)
APPEARANCE UR: CLEAR
BACTERIA #/AREA URNS HPF: ABNORMAL /HPF
BASOPHILS # BLD AUTO: 0.5 % (ref 0–1.8)
BASOPHILS # BLD: 0.04 K/UL (ref 0–0.12)
BILIRUB UR QL STRIP.AUTO: NEGATIVE
COLOR UR: YELLOW
CREAT UR-MCNC: 97.4 MG/DL
EOSINOPHIL # BLD AUTO: 0.29 K/UL (ref 0–0.51)
EOSINOPHIL NFR BLD: 3.7 % (ref 0–6.9)
EPI CELLS #/AREA URNS HPF: NEGATIVE /HPF
ERYTHROCYTE [DISTWIDTH] IN BLOOD BY AUTOMATED COUNT: 48 FL (ref 35.9–50)
ERYTHROCYTE [SEDIMENTATION RATE] IN BLOOD BY WESTERGREN METHOD: 21 MM/HOUR (ref 0–30)
GLUCOSE UR STRIP.AUTO-MCNC: NEGATIVE MG/DL
HCT VFR BLD AUTO: 45.6 % (ref 37–47)
HGB BLD-MCNC: 14.3 G/DL (ref 12–16)
HYALINE CASTS #/AREA URNS LPF: ABNORMAL /LPF
IMM GRANULOCYTES # BLD AUTO: 0.03 K/UL (ref 0–0.11)
IMM GRANULOCYTES NFR BLD AUTO: 0.4 % (ref 0–0.9)
KETONES UR STRIP.AUTO-MCNC: NEGATIVE MG/DL
LEUKOCYTE ESTERASE UR QL STRIP.AUTO: ABNORMAL
LYMPHOCYTES # BLD AUTO: 1.78 K/UL (ref 1–4.8)
LYMPHOCYTES NFR BLD: 22.8 % (ref 22–41)
MCH RBC QN AUTO: 28.6 PG (ref 27–33)
MCHC RBC AUTO-ENTMCNC: 31.4 G/DL (ref 33.6–35)
MCV RBC AUTO: 91.2 FL (ref 81.4–97.8)
MICRO URNS: ABNORMAL
MONOCYTES # BLD AUTO: 0.39 K/UL (ref 0–0.85)
MONOCYTES NFR BLD AUTO: 5 % (ref 0–13.4)
NEUTROPHILS # BLD AUTO: 5.28 K/UL (ref 2–7.15)
NEUTROPHILS NFR BLD: 67.6 % (ref 44–72)
NITRITE UR QL STRIP.AUTO: NEGATIVE
NRBC # BLD AUTO: 0 K/UL
NRBC BLD-RTO: 0 /100 WBC
PH UR STRIP.AUTO: 6 [PH]
PLATELET # BLD AUTO: 258 K/UL (ref 164–446)
PMV BLD AUTO: 11.7 FL (ref 9–12.9)
PROT UR QL STRIP: NEGATIVE MG/DL
PROT UR-MCNC: 6.6 MG/DL (ref 0–15)
PROT/CREAT UR: 68 MG/G (ref 10–107)
PTH-INTACT SERPL-MCNC: 88.7 PG/ML (ref 14–72)
RBC # BLD AUTO: 5 M/UL (ref 4.2–5.4)
RBC # URNS HPF: ABNORMAL /HPF
RBC UR QL AUTO: NEGATIVE
RHEUMATOID FACT SER IA-ACNC: <10 IU/ML (ref 0–14)
SP GR UR STRIP.AUTO: 1.01
TSH SERPL DL<=0.005 MIU/L-ACNC: 0.74 UIU/ML (ref 0.38–5.33)
UROBILINOGEN UR STRIP.AUTO-MCNC: 0.2 MG/DL
WBC # BLD AUTO: 7.8 K/UL (ref 4.8–10.8)
WBC #/AREA URNS HPF: ABNORMAL /HPF

## 2019-04-16 PROCEDURE — 84156 ASSAY OF PROTEIN URINE: CPT

## 2019-04-16 PROCEDURE — 84443 ASSAY THYROID STIM HORMONE: CPT

## 2019-04-16 PROCEDURE — 86200 CCP ANTIBODY: CPT

## 2019-04-16 PROCEDURE — 81001 URINALYSIS AUTO W/SCOPE: CPT

## 2019-04-16 PROCEDURE — 85652 RBC SED RATE AUTOMATED: CPT

## 2019-04-16 PROCEDURE — 86038 ANTINUCLEAR ANTIBODIES: CPT

## 2019-04-16 PROCEDURE — 86431 RHEUMATOID FACTOR QUANT: CPT

## 2019-04-16 PROCEDURE — 83970 ASSAY OF PARATHORMONE: CPT

## 2019-04-16 PROCEDURE — 82306 VITAMIN D 25 HYDROXY: CPT

## 2019-04-16 PROCEDURE — 85025 COMPLETE CBC W/AUTO DIFF WBC: CPT

## 2019-04-16 PROCEDURE — 82570 ASSAY OF URINE CREATININE: CPT

## 2019-04-16 PROCEDURE — 86039 ANTINUCLEAR ANTIBODIES (ANA): CPT

## 2019-04-16 PROCEDURE — 36415 COLL VENOUS BLD VENIPUNCTURE: CPT

## 2019-04-16 RX ORDER — ERGOCALCIFEROL 1.25 MG/1
50000 CAPSULE ORAL
Qty: 26 CAP | Refills: 0 | Status: SHIPPED | OUTPATIENT
Start: 2019-04-16 | End: 2020-01-16

## 2019-04-17 NOTE — PROGRESS NOTES
Your Vitamin D is low, sent high dose to your pharmacy.  Your hyperparathyroidism most likely related to vitamin D deficiency which has improved from the past but still remaining abnormal at 87.  Follow-up with your endocrinologist.  Danielle Kay M.D.

## 2019-04-18 LAB
CCP IGG SERPL-ACNC: 6 UNITS (ref 0–19)
NUCLEAR IGG SER QL IA: DETECTED

## 2019-04-19 LAB
ANA INTERPRETIVE COMMENT Q5143: NORMAL
ANTINUCLEAR ANTIBODY (ANA), HEP-2, IGG Q5142: NORMAL

## 2019-04-23 ENCOUNTER — OFFICE VISIT (OUTPATIENT)
Dept: MEDICAL GROUP | Facility: PHYSICIAN GROUP | Age: 54
End: 2019-04-23
Payer: MEDICARE

## 2019-04-23 VITALS
HEIGHT: 61 IN | BODY MASS INDEX: 34.25 KG/M2 | TEMPERATURE: 98.5 F | DIASTOLIC BLOOD PRESSURE: 68 MMHG | RESPIRATION RATE: 16 BRPM | HEART RATE: 83 BPM | WEIGHT: 181.4 LBS | OXYGEN SATURATION: 96 % | SYSTOLIC BLOOD PRESSURE: 110 MMHG

## 2019-04-23 DIAGNOSIS — M19.90 ARTHRITIS: ICD-10-CM

## 2019-04-23 DIAGNOSIS — R76.8 POSITIVE ANA (ANTINUCLEAR ANTIBODY): ICD-10-CM

## 2019-04-23 DIAGNOSIS — M53.9 MULTILEVEL DEGENERATIVE DISC DISEASE: ICD-10-CM

## 2019-04-23 DIAGNOSIS — M25.811 SHOULDER IMPINGEMENT, RIGHT: ICD-10-CM

## 2019-04-23 DIAGNOSIS — G47.01 INSOMNIA DUE TO MEDICAL CONDITION: ICD-10-CM

## 2019-04-23 DIAGNOSIS — G89.4 CHRONIC PAIN SYNDROME: ICD-10-CM

## 2019-04-23 DIAGNOSIS — M48.061 SPINAL STENOSIS OF LUMBAR REGION, UNSPECIFIED WHETHER NEUROGENIC CLAUDICATION PRESENT: ICD-10-CM

## 2019-04-23 PROCEDURE — 99214 OFFICE O/P EST MOD 30 MIN: CPT | Performed by: NURSE PRACTITIONER

## 2019-04-23 RX ORDER — AMITRIPTYLINE HYDROCHLORIDE 25 MG/1
25 TABLET, FILM COATED ORAL NIGHTLY PRN
Qty: 90 TAB | Refills: 1 | Status: SHIPPED | OUTPATIENT
Start: 2019-04-23 | End: 2019-12-09

## 2019-04-23 NOTE — PROGRESS NOTES
"Chief Complaint   Patient presents with   • Follow-Up     Want to see a new Rheumatologist   • Results     labs that were done in [place].  Does not want to go back to [place] it is to far.         This is a 53 y.o.female patient that presents today with the following: Follow-up visit    Chronic pain syndrome  Patient suffers from chronic pain in multiple joints especially in her low back and her neck.  She also has been seen by [place] for positive NAKUL, further work-up is needed.  However they also feel that she has fibromyalgia.  She has been followed by pain management specialist who she tells me yesterday completely discontinued all of her opioids, stating that she was \"addicted.\"  At her visit in early April she was starting to be weaned down but she says yesterday he completely cut her off.  She is agreeable to a referral to a different pain management specialist, referral has been placed.    Positive NAKUL (antinuclear antibody)  Patient currently undergoing work-up for connective tissue disease, she does have a positive NAKUL.  She has been referred to [place] for which she is due for a follow-up appointment for additional labs.  She does have widespread body pain especially joint pain.  Initially she told me she would like to see someone closer that she did not realize how far [place] was from here.  I did discuss with her that I could put a referral into rheumatologist here however the the wait will be very long and since she is already established with [place] she was encouraged to follow-up with them for further evaluation and treatment.    Insomnia due to medical condition  Patient continues to suffer with insomnia mostly related to her comorbidities and pain associated with them.  She has been tried on multiple medications none that seem to work well for her.  However she tells me she has not been tried on amitriptyline, she will start this at 25 mg at bedtime.  We also discussed the importance of " sleep hygiene.      Hospital Outpatient Visit on 04/16/2019   Component Date Value   • WBC 04/16/2019 0-2    • RBC 04/16/2019 0-2    • Bacteria 04/16/2019 Moderate*   • Epithelial Cells 04/16/2019 Negative    • Hyaline Cast 04/16/2019 0-2    • Color 04/16/2019 Yellow    • Character 04/16/2019 Clear    • Specific Gravity 04/16/2019 1.013    • Ph 04/16/2019 6.0    • Glucose 04/16/2019 Negative    • Ketones 04/16/2019 Negative    • Protein 04/16/2019 Negative    • Bilirubin 04/16/2019 Negative    • Urobilinogen, Urine 04/16/2019 0.2    • Nitrite 04/16/2019 Negative    • Leukocyte Esterase 04/16/2019 Trace*   • Occult Blood 04/16/2019 Negative    • Micro Urine Req 04/16/2019 Microscopic    • Total Protein, Urine 04/16/2019 6.6    • Creatinine, Random Urine 04/16/2019 97.40    • Protein Creatinine Ratio 04/16/2019 68    Hospital Outpatient Visit on 04/16/2019   Component Date Value   • TSH 04/16/2019 0.740    • WBC 04/16/2019 7.8    • RBC 04/16/2019 5.00    • Hemoglobin 04/16/2019 14.3    • Hematocrit 04/16/2019 45.6    • MCV 04/16/2019 91.2    • MCH 04/16/2019 28.6    • MCHC 04/16/2019 31.4*   • RDW 04/16/2019 48.0    • Platelet Count 04/16/2019 258    • MPV 04/16/2019 11.7    • Neutrophils-Polys 04/16/2019 67.60    • Lymphocytes 04/16/2019 22.80    • Monocytes 04/16/2019 5.00    • Eosinophils 04/16/2019 3.70    • Basophils 04/16/2019 0.50    • Immature Granulocytes 04/16/2019 0.40    • Nucleated RBC 04/16/2019 0.00    • Neutrophils (Absolute) 04/16/2019 5.28    • Lymphs (Absolute) 04/16/2019 1.78    • Monos (Absolute) 04/16/2019 0.39    • Eos (Absolute) 04/16/2019 0.29    • Baso (Absolute) 04/16/2019 0.04    • Immature Granulocytes (a* 04/16/2019 0.03    • NRBC (Absolute) 04/16/2019 0.00    • 25-Hydroxy   Vitamin D 25 04/16/2019 16*   • Ccp Antibodies 04/16/2019 6    • Rheumatoid Factor -Neph- 04/16/2019 <10    • Sed Rate Westergren 04/16/2019 21    • Antinuclear Antibody 04/16/2019 Detected*   • Pth, Intact 04/16/2019  88.7*   • Antinuclear Antibody (AN* 04/16/2019 <1:80    • NAKUL Interpretive Comment 04/16/2019 See Note          clinical course has been stable    Past Medical History:   Diagnosis Date   • Anxiety and depression    • Arthritis     back, neck   • Asthma    • Back injury    • Back pain    • Bronchitis 2010   • Cancer (HCC)     breast LT   • Chickenpox    • Chronic LBP    • Chronic neck pain    • Cold intolerance 1/24/2012   • COPD (chronic obstructive pulmonary disease) ([facility])    • Cystic fibrosis ([facility])    • Depression    • Diabetes    • GERD (gastroesophageal reflux disease)    • H/O gastric bypass 10/11/2013   • Herniated nucleus pulposus, L4-5 3/18/2010   • HX: breast cancer 10/11/2013   • Hypertension    • Itching due to drug 6/2/2011   • Kidney stone    • Nephrolithiasis 10/11/2013   • Obesity    • Osteoporosis    • Other specified symptom associated with female genital organs    • Panic disorder    • Pneumonia    • PTSD (post-traumatic stress disorder)    • Restless leg syndrome    • Rheumatoid arthritis ([facility])    • S/P laminectomy 10/22/2012   • Thoracic or lumbosacral neuritis or radiculitis, unspecified 10/22/2012   • Trauma     head trauma       Past Surgical History:   Procedure Laterality Date   • URETEROSCOPY  10/10/2013    Performed by Molly Resendiz M.D. at SURGERY [facility]   • LASERTRIPSY  10/10/2013    Performed by Molly Resendiz M.D. at SURGERY [facility]   • LUMBAR FUSION POSTERIOR  10/22/2012    Performed by Alfred Branham M.D. at SURGERY [facility]   • LUMBAR LAMINECTOMY DISKECTOMY  10/22/2012    Performed by Alfred Branham M.D. at SURGERY [facility]   • OTHER ORTHOPEDIC SURGERY  10/01/2012    lumbar fusion, Dr Branham   • OTHER ABDOMINAL SURGERY  2006    gastric bypass   • ABDOMINAL HYSTERECTOMY TOTAL      DUE TO FIBROIDS   • BREAST RECONSTRUCTION      Lt breast   • CRANIOTOMY      DUE TO BRAIN INJURY   • HYSTERECTOMY LAPAROSCOPY     • LAMINOTOMY     • MASTECTOMY       Lt breast   • OTHER      left ear drum surgery   • OTHER ABDOMINAL SURGERY      hernia repair   • PRIMARY C SECTION      x 3   • SLEEVE,MAXIMILIAN VASO THIGH     • TONSILLECTOMY     • US-CYST ASPIRATION-BREAST INITIAL     • VENTILATOR CONTINUOUS         Family History   Problem Relation Age of Onset   • Diabetes Father    • Cancer Father    • Hypertension Father    • Hyperlipidemia Father    • Cancer Mother    • Other Mother         SLE   • Diabetes Mother    • Hypertension Mother    • Hyperlipidemia Mother    • Stroke Mother    • Sleep Apnea Brother    • Cancer Maternal Aunt    • Diabetes Maternal Aunt    • Diabetes Maternal Uncle    • Diabetes Maternal Grandmother    • Diabetes Maternal Grandfather        Suboxone    Current Outpatient Prescriptions Ordered in [facility]   Medication Sig Dispense Refill   • amitriptyline (ELAVIL) 25 MG Tab Take 1 Tab by mouth at bedtime as needed. 90 Tab 1   • vitamin D, Ergocalciferol, (DRISDOL) 92231 units Cap capsule Take 1 Cap by mouth 2X A WEEK. 26 Cap 0   • cyclobenzaprine (FLEXERIL) 10 MG Tab Take 10 mg by mouth 3 times a day as needed.     • naproxen (NAPROSYN) 125 MG/5ML suspension naproxen   2 PO QD     • meclizine (ANTIVERT) 25 MG Tab Take 1 Tab by mouth 3 times a day as needed for Dizziness, Nausea/Vomiting or Vertigo. 30 Tab 0   • ondansetron (ZOFRAN) 8 MG Tab TK 1 T PO QID PRN 15 Tab 1   • cyanocobalamin (VITAMIN B-12) 100 MCG Tab Take 100 mcg by mouth every day.     • tiotropium (SPIRIVA) 18 MCG Cap Inhale 1 Cap by mouth every day. 30 Cap 3   • ferrous sulfate 324 (65 Fe) MG Tablet Delayed Response EC tablet Take 324 mg by mouth with dinner.     • ketoconazole (NIZORAL) 2 % shampoo Apply to skin one time daily in the shower and leave on for 5 minutes then rinse off. 2-4 weeks 1 Bottle 3   • ARIPiprazole (ABILIFY) 10 MG Tab Take 1 Tab by mouth every day. 30 Tab 2   • budesonide-formoterol (SYMBICORT) 80-4.5 MCG/ACT Aerosol Inhale 2 Puffs by mouth 2 Times a Day. 1 Inhaler 3  "  • fluticasone (FLONASE) 50 MCG/ACT nasal spray Spray 1 Spray in nose 2 times a day. Each Nostril 1 Bottle 3   • Buprenorphine HCl (BELBUCA) 150 MCG FILM Place  in mouth by cheek.     • MethylPREDNISolone (MEDROL DOSEPAK) 4 MG Tablet Therapy Pack TK UTD  0   • VENTOLIN  (90 Base) MCG/ACT Aero Soln inhalation aerosol INHALE 1 PUFF PO QID PRN  0   • Oxycodone HCl 20 MG Tab Take 20 mg by mouth 4 times a day.       No current [facility]-ordered facility-administered medications on file.        Constitutional ROS: No unexpected change in weight, No weakness, No unexplained fevers, sweats, or chills, Positive for fatigue and insomnia   Pulmonary ROS: No chronic cough, sputum, or hemoptysis, No shortness of breath, No recent change in breathing  Cardiovascular ROS: No chest pain  Gastrointestinal ROS: No abdominal pain, No nausea, vomiting, diarrhea, or constipation  Musculoskeletal/Extremities ROS: Positive per HPI  Neurologic ROS: Normal development, No seizures, No weakness    Physical exam:  /68   Pulse 83   Temp 36.9 °C (98.5 °F) (Temporal)   Resp 16   Ht 1.549 m (5' 1\")   Wt 82.3 kg (181 lb 6.4 oz)   LMP 10/05/1999   SpO2 96%   BMI 34.28 kg/m²   General Appearance: Pleasant middle-aged female alert, no distress but appears uncomfortable, teary at times,, obese, well-groomed  Skin: Skin color, texture, turgor normal. No rashes or lesions.  Lungs: negative findings: normal respiratory rate and rhythm, normal effort  Musculoskeletal: positive findings: Positive for decreased range of motion to right upper extremity/shoulder as well as lumbar spine due to significant pain  Neurologic: intact    Medical decision making/discussion: Patient was strongly advised to make follow-up appointment with [facility] since her NAKUL was again positive, she needs to be further evaluated for connective tissue disease.  In the interim we will refer her to a new pain management specialist, she has significant pain in her low " back as well as her right shoulder which she tells me surgical intervention is needed.  Referral has been placed for her.  I would like to see her back at her already scheduled appointment with me in early July.  In the interim for her insomnia she is going to try amitriptyline as mentioned above.    Patti was seen today for follow-up and results.    Diagnoses and all orders for this visit:    Chronic pain syndrome  -     REFERRAL TO PAIN CLINIC    Spinal stenosis of lumbar region, unspecified whether neurogenic claudication present  -     REFERRAL TO PAIN CLINIC    Shoulder impingement, right  -     REFERRAL TO PAIN CLINIC    Arthritis  -     REFERRAL TO PAIN CLINIC    Multilevel degenerative disc disease  -     REFERRAL TO PAIN CLINIC    Insomnia due to medical condition  -     amitriptyline (ELAVIL) 25 MG Tab; Take 1 Tab by mouth at bedtime as needed.    Positive NAKUL (antinuclear antibody)        No Follow-up on file.        Please note that this dictation was created using voice recognition software. I have made every reasonable attempt to correct obvious errors, but I expect that there are errors of grammar and possibly content that I did not discover before finalizing the note.

## 2019-04-24 NOTE — ASSESSMENT & PLAN NOTE
"Patient suffers from chronic pain in multiple joints especially in her low back and her neck.  She also has been seen by [facility] for positive NAKUL, further work-up is needed.  However they also feel that she has fibromyalgia.  She has been followed by pain management specialist who she tells me yesterday completely discontinued all of her opioids, stating that she was \"addicted.\"  At her visit in early April she was starting to be weaned down but she says yesterday he completely cut her off.  She is agreeable to a referral to a different pain management specialist, referral has been placed.  "

## 2019-04-24 NOTE — ASSESSMENT & PLAN NOTE
Patient currently undergoing work-up for connective tissue disease, she does have a positive NAKUL.  She has been referred to [facility] for which she is due for a follow-up appointment for additional labs.  She does have widespread body pain especially joint pain.  Initially she told me she would like to see someone closer that she did not realize how far [place] was from here.  I did discuss with her that I could put a referral into rheumatologist here however the the wait will be very long and since she is already established with [facility] she was encouraged to follow-up with them for further evaluation and treatment.

## 2019-04-24 NOTE — ASSESSMENT & PLAN NOTE
Patient continues to suffer with insomnia mostly related to her comorbidities and pain associated with them.  She has been tried on multiple medications none that seem to work well for her.  However she tells me she has not been tried on amitriptyline, she will start this at 25 mg at bedtime.  We also discussed the importance of sleep hygiene.

## 2019-05-02 ENCOUNTER — OFFICE VISIT (OUTPATIENT)
Dept: HEMATOLOGY ONCOLOGY | Facility: MEDICAL CENTER | Age: 54
End: 2019-05-02
Payer: MEDICARE

## 2019-05-02 ENCOUNTER — HOSPITAL ENCOUNTER (OUTPATIENT)
Dept: LAB | Facility: MEDICAL CENTER | Age: 54
End: 2019-05-02
Attending: INTERNAL MEDICINE
Payer: MEDICARE

## 2019-05-02 VITALS
SYSTOLIC BLOOD PRESSURE: 122 MMHG | TEMPERATURE: 98.3 F | RESPIRATION RATE: 16 BRPM | HEIGHT: 61 IN | BODY MASS INDEX: 34.65 KG/M2 | OXYGEN SATURATION: 100 % | HEART RATE: 73 BPM | WEIGHT: 183.53 LBS | DIASTOLIC BLOOD PRESSURE: 82 MMHG

## 2019-05-02 DIAGNOSIS — D50.9 IRON DEFICIENCY ANEMIA, UNSPECIFIED IRON DEFICIENCY ANEMIA TYPE: ICD-10-CM

## 2019-05-02 LAB
BASOPHILS # BLD AUTO: 1 % (ref 0–1.8)
BASOPHILS # BLD: 0.09 K/UL (ref 0–0.12)
EOSINOPHIL # BLD AUTO: 0.25 K/UL (ref 0–0.51)
EOSINOPHIL NFR BLD: 2.8 % (ref 0–6.9)
ERYTHROCYTE [DISTWIDTH] IN BLOOD BY AUTOMATED COUNT: 45.2 FL (ref 35.9–50)
FERRITIN SERPL-MCNC: 77.9 NG/ML (ref 10–291)
HCT VFR BLD AUTO: 45.7 % (ref 37–47)
HGB BLD-MCNC: 14.5 G/DL (ref 12–16)
IMM GRANULOCYTES # BLD AUTO: 0.06 K/UL (ref 0–0.11)
IMM GRANULOCYTES NFR BLD AUTO: 0.7 % (ref 0–0.9)
LYMPHOCYTES # BLD AUTO: 2.35 K/UL (ref 1–4.8)
LYMPHOCYTES NFR BLD: 26 % (ref 22–41)
MCH RBC QN AUTO: 28.2 PG (ref 27–33)
MCHC RBC AUTO-ENTMCNC: 31.7 G/DL (ref 33.6–35)
MCV RBC AUTO: 88.9 FL (ref 81.4–97.8)
MONOCYTES # BLD AUTO: 0.58 K/UL (ref 0–0.85)
MONOCYTES NFR BLD AUTO: 6.4 % (ref 0–13.4)
NEUTROPHILS # BLD AUTO: 5.7 K/UL (ref 2–7.15)
NEUTROPHILS NFR BLD: 63.1 % (ref 44–72)
NRBC # BLD AUTO: 0 K/UL
NRBC BLD-RTO: 0 /100 WBC
PLATELET # BLD AUTO: 279 K/UL (ref 164–446)
PMV BLD AUTO: 11.2 FL (ref 9–12.9)
RBC # BLD AUTO: 5.14 M/UL (ref 4.2–5.4)
VIT B12 SERPL-MCNC: 361 PG/ML (ref 211–911)
WBC # BLD AUTO: 9 K/UL (ref 4.8–10.8)

## 2019-05-02 PROCEDURE — 82607 VITAMIN B-12: CPT

## 2019-05-02 PROCEDURE — 85025 COMPLETE CBC W/AUTO DIFF WBC: CPT

## 2019-05-02 PROCEDURE — 36415 COLL VENOUS BLD VENIPUNCTURE: CPT

## 2019-05-02 PROCEDURE — 82728 ASSAY OF FERRITIN: CPT

## 2019-05-02 PROCEDURE — 99213 OFFICE O/P EST LOW 20 MIN: CPT | Performed by: INTERNAL MEDICINE

## 2019-05-02 ASSESSMENT — PAIN SCALES - GENERAL: PAINLEVEL: 10=SEVERE PAIN

## 2019-05-02 NOTE — PROGRESS NOTES
Date of visit: 5/2/2019  3:47 PM      Chief Complaint-   -iron deficiency anemia gastric bypass        HPIunderwent gastric bypass surgery for obesity in 2006  Seen by Dr Larsen for symptomatic iron deficiency.  She received Venofer No. 5 infusions recently.  Currently feeling better.  Labs showed improved ferritin of 275.   Interim history-  11/2018 seen by me.  Ferritin level has improved and she is on observation.  She has some chronic complaints otherwise feels okay labs are pending at the time of interview.    Past Medical History:      Past Medical History:   Diagnosis Date   • Anxiety and depression    • Arthritis     back, neck   • Asthma    • Back injury    • Back pain    • Bronchitis 2010   • Cancer (HCC)     breast LT   • Chickenpox    • Chronic LBP    • Chronic neck pain    • Cold intolerance 1/24/2012   • COPD (chronic obstructive pulmonary disease) (HCC)    • Cystic fibrosis (HCC)    • Depression    • Diabetes    • GERD (gastroesophageal reflux disease)    • H/O gastric bypass 10/11/2013   • Herniated nucleus pulposus, L4-5 3/18/2010   • HX: breast cancer 10/11/2013   • Hypertension    • Itching due to drug 6/2/2011   • Kidney stone    • Nephrolithiasis 10/11/2013   • Obesity    • Osteoporosis    • Other specified symptom associated with female genital organs    • Panic disorder    • Pneumonia    • PTSD (post-traumatic stress disorder)    • Restless leg syndrome    • Rheumatoid arthritis (HCC)    • S/P laminectomy 10/22/2012   • Thoracic or lumbosacral neuritis or radiculitis, unspecified 10/22/2012   • Trauma     head trauma       Past surgical history:       Past Surgical History:   Procedure Laterality Date   • URETEROSCOPY  10/10/2013    Performed by Molly Resendiz M.D. at [facility]   • LASERTRIPSY  10/10/2013    Performed by Molly Resendiz M.D. at [facility] [place]   • LUMBAR FUSION POSTERIOR  10/22/2012    Performed by Alfred Branham M.D. at SURGERY [facility]  [facility]   • LUMBAR LAMINECTOMY DISKECTOMY  10/22/2012    Performed by Alfred Branham M.D. at SURGERY [facility]   • OTHER ORTHOPEDIC SURGERY  10/01/2012    lumbar fusion, Dr Branham   • OTHER ABDOMINAL SURGERY  2006    gastric bypass   • ABDOMINAL HYSTERECTOMY TOTAL      DUE TO FIBROIDS   • BREAST RECONSTRUCTION      Lt breast   • CRANIOTOMY      DUE TO BRAIN INJURY   • HYSTERECTOMY LAPAROSCOPY     • LAMINOTOMY     • MASTECTOMY      Lt breast   • OTHER      left ear drum surgery   • OTHER ABDOMINAL SURGERY      hernia repair   • PRIMARY C SECTION      x 3   • SLEEVE,MAXIMILIAN VASO THIGH     • TONSILLECTOMY     • US-CYST ASPIRATION-BREAST INITIAL     • VENTILATOR CONTINUOUS         Allergies:       Suboxone    Medications:         Current Outpatient Prescriptions   Medication Sig Dispense Refill   • cyanocobalamin (VITAMIN B-12) 100 MCG Tab Take 100 mcg by mouth every day.     • ARIPiprazole (ABILIFY) 10 MG Tab Take 1 Tab by mouth every day. 30 Tab 2   • amitriptyline (ELAVIL) 25 MG Tab Take 1 Tab by mouth at bedtime as needed. (Patient not taking: Reported on 5/2/2019) 90 Tab 1   • vitamin D, Ergocalciferol, (DRISDOL) 88736 units Cap capsule Take 1 Cap by mouth 2X A WEEK. (Patient not taking: Reported on 5/2/2019) 26 Cap 0   • cyclobenzaprine (FLEXERIL) 10 MG Tab Take 10 mg by mouth 3 times a day as needed.     • Buprenorphine HCl (BELBUCA) 150 MCG FILM Place  in mouth by cheek.     • naproxen (NAPROSYN) 125 MG/5ML suspension naproxen   2 PO QD     • MethylPREDNISolone (MEDROL DOSEPAK) 4 MG Tablet Therapy Pack TK UTD  0   • meclizine (ANTIVERT) 25 MG Tab Take 1 Tab by mouth 3 times a day as needed for Dizziness, Nausea/Vomiting or Vertigo. (Patient not taking: Reported on 5/2/2019) 30 Tab 0   • ondansetron (ZOFRAN) 8 MG Tab TK 1 T PO QID PRN (Patient not taking: Reported on 5/2/2019) 15 Tab 1   • tiotropium (SPIRIVA) 18 MCG Cap Inhale 1 Cap by mouth every day. (Patient not taking: Reported on 5/2/2019) 30 Cap 3   •  VENTOLIN  (90 Base) MCG/ACT Aero Soln inhalation aerosol INHALE 1 PUFF PO QID PRN  0   • ferrous sulfate 324 (65 Fe) MG Tablet Delayed Response EC tablet Take 324 mg by mouth with dinner.     • ketoconazole (NIZORAL) 2 % shampoo Apply to skin one time daily in the shower and leave on for 5 minutes then rinse off. 2-4 weeks (Patient not taking: Reported on 5/2/2019) 1 Bottle 3   • Oxycodone HCl 20 MG Tab Take 20 mg by mouth 4 times a day.     • budesonide-formoterol (SYMBICORT) 80-4.5 MCG/ACT Aerosol Inhale 2 Puffs by mouth 2 Times a Day. (Patient not taking: Reported on 5/2/2019) 1 Inhaler 3   • fluticasone (FLONASE) 50 MCG/ACT nasal spray Spray 1 Spray in nose 2 times a day. Each Nostril (Patient not taking: Reported on 5/2/2019) 1 Bottle 3     No current facility-administered medications for this visit.          Social History:     Social History     Social History   • Marital status:      Spouse name: N/A   • Number of children: N/A   • Years of education: N/A     Occupational History   • Not on file.     Social History Main Topics   • Smoking status: Never Smoker   • Smokeless tobacco: Never Used   • Alcohol use No   • Drug use: No   • Sexual activity: Not Currently     Other Topics Concern   •  Service No   • Blood Transfusions Yes     In 2006   • Caffeine Concern No   • Occupational Exposure No   • Hobby Hazards No   • Sleep Concern No   • Stress Concern No   • Weight Concern Yes   • Special Diet No   • Back Care Yes   • Exercise No   • Bike Helmet No     Doesn't Ride Bike   • Seat Belt Yes   • Self-Exams No     Social History Narrative   • No narrative on file       Family History:      Family History   Problem Relation Age of Onset   • Diabetes Father    • Cancer Father    • Hypertension Father    • Hyperlipidemia Father    • Cancer Mother    • Other Mother         SLE   • Diabetes Mother    • Hypertension Mother    • Hyperlipidemia Mother    • Stroke Mother    • Sleep Apnea Brother   "  • Cancer Maternal Aunt    • Diabetes Maternal Aunt    • Diabetes Maternal Uncle    • Diabetes Maternal Grandmother    • Diabetes Maternal Grandfather        Review of Systems:  All other review of systems are negative except what was mentioned above in the HPI.    Constitutional: Negative for fever, chills, weight loss and malaise/fatigue.    HEENT: No new auditory or visual complaints. No sore throat and neck pain.     Respiratory: Negative for cough, sputum production, shortness of breath and wheezing.    Cardiovascular: Negative for chest pain, palpitations, orthopnea and leg swelling.    Gastrointestinal: Negative for heartburn, nausea, vomiting and abdominal pain.    Genitourinary: Negative for dysuria, hematuria    Musculoskeletal: No new arthralgias or myalgias   Skin: Negative for rash and itching.    Neurological: Negative for focal weakness and headaches.    Endo/Heme/Allergies: No abnormal bleed/bruise.    Psychiatric/Behavioral: No new depression/anxiety.    Physical Exam:  Vitals: /82 (BP Location: Right arm, Patient Position: Sitting, BP Cuff Size: Adult)   Pulse 73   Temp 36.8 °C (98.3 °F) (Temporal)   Resp 16   Ht 1.549 m (5' 1\")   Wt 83.3 kg (183 lb 8.5 oz)   LMP 10/05/1999   SpO2 100%   BMI 34.68 kg/m²     General: Not in acute distress, alert and oriented x 3  HEENT: No pallor, icterus. Oropharynx clear.   Neck: Supple, no palpable masses.  Lymph nodes: No palpable cervical, supraclavicular, axillary or inguinal lymphadenopathy.    CVS: regular rate and rhythm, no rubs or gallops  RESP: Clear to auscultate bilaterally, no wheezing or crackles.   ABD: Soft, non tender, non distended, positive bowel sounds, no palpable organomegaly  EXT: No edema or cyanosis  CNS: Alert and oriented x3, No focal deficits.  Skin- No rash      Labs:   No visits with results within 1 Week(s) from this visit.   Latest known visit with results is:   Hospital Outpatient Visit on 04/16/2019   Component Date " Value Ref Range Status   • WBC 04/16/2019 0-2  /hpf Final    Comment: Female  <12 Yr 0-2  >12 Yr 0-5  Male   None     • RBC 04/16/2019 0-2  /hpf Final    Comment: Female  >12 Yr 0-2  Male   None     • Bacteria 04/16/2019 Moderate* None /hpf Final   • Epithelial Cells 04/16/2019 Negative  /hpf Final   • Hyaline Cast 04/16/2019 0-2  /lpf Final   • Color 04/16/2019 Yellow   Final   • Character 04/16/2019 Clear   Final   • Specific Gravity 04/16/2019 1.013  <1.035 Final   • Ph 04/16/2019 6.0  5.0 - 8.0 Final   • Glucose 04/16/2019 Negative  Negative mg/dL Final   • Ketones 04/16/2019 Negative  Negative mg/dL Final   • Protein 04/16/2019 Negative  Negative mg/dL Final   • Bilirubin 04/16/2019 Negative  Negative Final   • Urobilinogen, Urine 04/16/2019 0.2  Negative Final   • Nitrite 04/16/2019 Negative  Negative Final   • Leukocyte Esterase 04/16/2019 Trace* Negative Final   • Occult Blood 04/16/2019 Negative  Negative Final   • Micro Urine Req 04/16/2019 Microscopic   Final   • Total Protein, Urine 04/16/2019 6.6  0.0 - 15.0 mg/dL Final   • Creatinine, Random Urine 04/16/2019 97.40  mg/dL Final    Comment: Reference ranges have not been established for this specimen type.  Result interpretation should include consideration of patient's  medical condition and clinical presentation.     • Protein Creatinine Ratio 04/16/2019 68  10 - 107 mg/g Final       Assessment and Plan:  ChronicIron deficiency in the setting of gastric bypass-she had good response to Venofer with improvement in her fatigue.  Iron stores are replenished.  She is aware of the fact that she will need periodic parenteral iron infusions due to the malabsorption from her gastric bypass.    Her ferritin level is still in acceptable range with no anemia and will hold off on Venofer at this time.  We will check her CBC and ferritin every 3 months and see her back in 6 months.  She has been taking B12 supplements with improvement in her B12 level.  Instructed  her to lower the dose to 1000 mg Monday Wednesday Friday as her levels were supratherapeutic.  B12 levels are adequate at this time and we will monitor this every 3 months.  She needs to follow-up with her PCP for management of vitamin D deficiency.       She agreed and verbalized  agreement and understanding with the current plan.  I answered all questions and concerns at this time         Please note that this dictation was created using voice recognition software. I have made every reasonable attempt to correct obvious errors, but I expect that there are errors of grammar and possibly content that I did not discover before finalizing the note.      SIGNATURES:  Jonny More    CC:  Danielle Kay M.D.  No ref. provider found

## 2019-05-03 ENCOUNTER — TELEPHONE (OUTPATIENT)
Dept: HEMATOLOGY ONCOLOGY | Facility: MEDICAL CENTER | Age: 54
End: 2019-05-03

## 2019-05-03 DIAGNOSIS — E53.8 VITAMIN B12 DEFICIENCY: ICD-10-CM

## 2019-05-03 DIAGNOSIS — Z98.84 HISTORY OF GASTRIC BYPASS: ICD-10-CM

## 2019-05-03 DIAGNOSIS — D50.9 IRON DEFICIENCY ANEMIA, UNSPECIFIED IRON DEFICIENCY ANEMIA TYPE: ICD-10-CM

## 2019-05-03 NOTE — TELEPHONE ENCOUNTER
Pt called for lab results.  Explained to pt per Dr. More that she would not need IV iron, and he would like her to return to the clinic in 6 months with lab work.  Told pt that orders would be in the [facility] system for her to get labs drawn prior.  Pt asked about B12 injections and explained that they were not ordered.  Pt verbalized understanding and plans to return in late Oct early Nov 2019.

## 2019-05-03 NOTE — TELEPHONE ENCOUNTER
Patient called to inquire whether or not she will need Vitamin B12 injections or other Infusion appointments based on her lab results from yesterday. I informed her I will speak with Dr. More and return her call. Patient agreed.

## 2019-05-14 ENCOUNTER — OFFICE VISIT (OUTPATIENT)
Dept: MEDICAL GROUP | Facility: PHYSICIAN GROUP | Age: 54
End: 2019-05-14
Payer: MEDICARE

## 2019-05-14 VITALS
SYSTOLIC BLOOD PRESSURE: 138 MMHG | OXYGEN SATURATION: 92 % | HEIGHT: 61 IN | BODY MASS INDEX: 34.36 KG/M2 | RESPIRATION RATE: 14 BRPM | HEART RATE: 78 BPM | TEMPERATURE: 97.8 F | DIASTOLIC BLOOD PRESSURE: 92 MMHG | WEIGHT: 182 LBS

## 2019-05-14 DIAGNOSIS — G89.4 CHRONIC PAIN SYNDROME: ICD-10-CM

## 2019-05-14 DIAGNOSIS — H10.13 ALLERGIC CONJUNCTIVITIS OF BOTH EYES: ICD-10-CM

## 2019-05-14 PROCEDURE — 99213 OFFICE O/P EST LOW 20 MIN: CPT | Performed by: NURSE PRACTITIONER

## 2019-05-14 RX ORDER — CROMOLYN SODIUM 40 MG/ML
SOLUTION/ DROPS OPHTHALMIC
Qty: 1 BOTTLE | Refills: 1 | Status: SHIPPED | OUTPATIENT
Start: 2019-05-14 | End: 2019-12-09

## 2019-05-14 NOTE — PROGRESS NOTES
CC:  eye allergies, chronic pain    HISTORY OF THE PRESENT ILLNESS: Patient is a 53 y.o. female. This pleasant patient is here today for evaluation management of the following health problems.  Patient is new to me.  Her primary care provider is QUINN Madrid.      Chronic pain syndrome  Patient continues to suffer from chronic pain in multiple joints, especially in low back and neck.  She reports she had consultation appointment with neurologist, Dr. Doty at [facility], yesterday.  I do not have records yet.  Patient reports that he referred her to physical therapy and neurosurgery.    She also reports that he suggested her primary care provider prescribe her oxycodone 15 mg 4 times a day as she was previously taking.  Her pain management physician, Dr. Rodriguez, apparently has been decreasing her oxycodone to 5 mg 4 times a day and has also prescribe Suboxone.   is consistent with these findings.  Patient reports that she does not know why she was being tapered off of medications.  Patient reports that her pain is keeping her from functioning well and she is afraid she will lose her new job.  She reports she has an appointment with new pain management in July.  I explained to patient that it would be best if she followed up with her current pain provider for further prescriptions until she can get established with new pain provider.  I do not feel comfortable prescribing the increased dose of oxycodone as I am uncertain if she is still taking Suboxone.  I explained it is best managed under one provider, her pain management provider.  Patient is tearful but verbalizes understanding.  I did advise on comfort measures including heat, ice, light stretching.  Denies saddle paresthesia, foot drop, urinary fecal incontinence, fever.    Allergic conjunctivitis of both eyes  Patient reports dry itchy eyes.  Reports she gets this every spring.  Denies discharge, redness, vision changes.  Will prescribe  cromolyn eyedrops.      Allergies: Suboxone    Current Outpatient Prescriptions Ordered in [facility]   Medication Sig Dispense Refill   • cromolyn (OPTICROM) 4 % ophthalmic solution Apply 2 drops to each eye twice a day as needed for itchy eyes. 1 Bottle 1   • vitamin D, Ergocalciferol, (DRISDOL) 85179 units Cap capsule Take 1 Cap by mouth 2X A WEEK. 26 Cap 0   • cyclobenzaprine (FLEXERIL) 10 MG Tab Take 10 mg by mouth 3 times a day as needed.     • Buprenorphine HCl (BELBUCA) 150 MCG FILM Place  in mouth by cheek.     • naproxen (NAPROSYN) 125 MG/5ML suspension naproxen   2 PO QD     • meclizine (ANTIVERT) 25 MG Tab Take 1 Tab by mouth 3 times a day as needed for Dizziness, Nausea/Vomiting or Vertigo. 30 Tab 0   • ondansetron (ZOFRAN) 8 MG Tab TK 1 T PO QID PRN 15 Tab 1   • ferrous sulfate 324 (65 Fe) MG Tablet Delayed Response EC tablet Take 324 mg by mouth with dinner.     • ARIPiprazole (ABILIFY) 10 MG Tab Take 1 Tab by mouth every day. 30 Tab 2   • amitriptyline (ELAVIL) 25 MG Tab Take 1 Tab by mouth at bedtime as needed. 90 Tab 1   • cyanocobalamin (VITAMIN B-12) 100 MCG Tab Take 100 mcg by mouth every day.     • VENTOLIN  (90 Base) MCG/ACT Aero Soln inhalation aerosol INHALE 1 PUFF PO QID PRN  0   • Oxycodone HCl 20 MG Tab Take 20 mg by mouth 4 times a day.     • budesonide-formoterol (SYMBICORT) 80-4.5 MCG/ACT Aerosol Inhale 2 Puffs by mouth 2 Times a Day. (Patient not taking: Reported on 5/2/2019) 1 Inhaler 3   • fluticasone (FLONASE) 50 MCG/ACT nasal spray Spray 1 Spray in nose 2 times a day. Each Nostril (Patient not taking: Reported on 5/2/2019) 1 Bottle 3     No current Epic-ordered facility-administered medications on file.        Past Medical History:   Diagnosis Date   • Anxiety and depression    • Arthritis     back, neck   • Asthma    • Back injury    • Back pain    • Bronchitis 2010   • Cancer (HCC)     breast LT   • Chickenpox    • Chronic LBP    • Chronic neck pain    • Cold intolerance  1/24/2012   • COPD (chronic obstructive pulmonary disease) ([facility])    • Cystic fibrosis ([facility])    • Depression    • Diabetes    • GERD (gastroesophageal reflux disease)    • H/O gastric bypass 10/11/2013   • Herniated nucleus pulposus, L4-5 3/18/2010   • HX: breast cancer 10/11/2013   • Hypertension    • Itching due to drug 6/2/2011   • Kidney stone    • Nephrolithiasis 10/11/2013   • Obesity    • Osteoporosis    • Other specified symptom associated with female genital organs    • Panic disorder    • Pneumonia    • PTSD (post-traumatic stress disorder)    • Restless leg syndrome    • Rheumatoid arthritis ([facility])    • S/P laminectomy 10/22/2012   • Thoracic or lumbosacral neuritis or radiculitis, unspecified 10/22/2012   • Trauma     head trauma       Past Surgical History:   Procedure Laterality Date   • URETEROSCOPY  10/10/2013    Performed by Molly Resendiz M.D. at [facility]   • LASERTRIPSY  10/10/2013    Performed by Molly Resendiz M.D. at [facility]   • LUMBAR FUSION POSTERIOR  10/22/2012    Performed by Alfred Branham M.D. at SUR[facility]   • LUMBAR LAMINECTOMY DISKECTOMY  10/22/2012    Performed by Alfred Branham M.D. at SUR[facility]   • OTHER ORTHOPEDIC SURGERY  10/01/2012    lumbar fusion, Dr Branham   • OTHER ABDOMINAL SURGERY  2006    gastric bypass   • ABDOMINAL HYSTERECTOMY TOTAL      DUE TO FIBROIDS   • BREAST RECONSTRUCTION      Lt breast   • CRANIOTOMY      DUE TO BRAIN INJURY   • HYSTERECTOMY LAPAROSCOPY     • LAMINOTOMY     • MASTECTOMY      Lt breast   • OTHER      left ear drum surgery   • OTHER ABDOMINAL SURGERY      hernia repair   • PRIMARY C SECTION      x 3   • SLEEVE,MAXIMILIAN VASO THIGH     • TONSILLECTOMY     • US-CYST ASPIRATION-BREAST INITIAL     • VENTILATOR CONTINUOUS         Social History   Substance Use Topics   • Smoking status: Never Smoker   • Smokeless tobacco: Never Used   • Alcohol use No       Family History   Problem  "Relation Age of Onset   • Diabetes Father    • Cancer Father    • Hypertension Father    • Hyperlipidemia Father    • Cancer Mother    • Other Mother         SLE   • Diabetes Mother    • Hypertension Mother    • Hyperlipidemia Mother    • Stroke Mother    • Sleep Apnea Brother    • Cancer Maternal Aunt    • Diabetes Maternal Aunt    • Diabetes Maternal Uncle    • Diabetes Maternal Grandmother    • Diabetes Maternal Grandfather        ROS:   As in HPI, otherwise negative for chest pain, dyspnea, abdominal pain, dysuria, blood in stool, fever             Exam: /92 (BP Location: Right arm, Patient Position: Sitting, BP Cuff Size: Adult)   Pulse 78   Temp 36.6 °C (97.8 °F)   Resp 14   Ht 1.549 m (5' 1\")   Wt 82.6 kg (182 lb)   SpO2 92%  Body mass index is 34.39 kg/m².    General: Alert, pleasant, obese habitus, well nourished, well developed female in NAD  HEENT: Normocephalic. Eyes conjunctiva clear, lids without ptosis, pupils equal and reactive to light, ears normal shape and contour, canals are clear bilaterally, tympanic membranes are pearly gray with good light reflex, nasal mucosa without erythema and drainage, oropharynx is without erythema, edema or exudates.   Neck: Supple without bruit. Thyroid is not enlarged.  Pulmonary: Clear to ausculation.  Normal effort. No rales, ronchi, or wheezing.  Cardiovascular: Normal rate and rhythm without murmur. Carotid and radial pulses are intact and equal bilaterally.    Neurologic: Grossly nonfocal  Lymph: No cervical or supraclavicular lymph nodes are palpable  Skin: Warm and dry.  No obvious lesions.  Musculoskeletal: Normal gait.   Psych: Normal mood and affect. Alert and oriented. Judgment and insight is normal.    Please note that this dictation was created using voice recognition software. I have made every reasonable attempt to correct obvious errors, but I expect that there are errors of grammar and possibly content that I did not discover before " finalizing the note.      Assessment/Plan  1. Allergic conjunctivitis of both eyes  Patient will trial cromolyn eyedrops to see if this helps with symptoms.  Likely allergic conjunctivitis.  - cromolyn (OPTICROM) 4 % ophthalmic solution; Apply 2 drops to each eye twice a day as needed for itchy eyes.  Dispense: 1 Bottle; Refill: 1    2. Chronic pain syndrome  Patient advised to follow-up with current pain management physician, Dr. Rodriguez, until she can establish with new pain management.  We will request records from Dr. Doty at [facility].      Patient will return to clinic as previously scheduled with primary care provider, QUINN Madrid, on 7/2/2019 or sooner if needed.

## 2019-05-14 NOTE — LETTER
[facility]  PATRICK Chris  [address] [place] [address]  Fax: [phone]   Authorization for Release/Disclosure of   Protected Health Information   Name: PATTI TELLO : 1965 SSN: [id number]   Address: [place] [address] Phone:    [phone] (home)    I authorize the entity listed below to release/disclose the PHI below to:   [facility]/PATRICK Chris and PATRICK Ang   Provider or Entity Name:     Address   City, State, Zip   Phone:      Fax:     Reason for request: continuity of care   Information to be released:    [  ] LAST COLONOSCOPY,  including any PATH REPORT and follow-up  [  ] LAST FIT/COLOGUARD RESULT [  ] LAST DEXA  [  ] LAST MAMMOGRAM  [  ] LAST PAP  [  ] LAST LABS [  ] RETINA EXAM REPORT  [  ] IMMUNIZATION RECORDS  [  ] Release all info      [  ] Check here and initial the line next to each item to release ALL health information INCLUDING  _____ Care and treatment for drug and / or alcohol abuse  _____ HIV testing, infection status, or AIDS  _____ Genetic Testing    DATES OF SERVICE OR TIME PERIOD TO BE DISCLOSED: _____________  I understand and acknowledge that:  * This Authorization may be revoked at any time by you in writing, except if your health information has already been used or disclosed.  * Your health information that will be used or disclosed as a result of you signing this authorization could be re-disclosed by the recipient. If this occurs, your re-disclosed health information may no longer be protected by State or Federal laws.  * You may refuse to sign this Authorization. Your refusal will not affect your ability to obtain treatment.  * This Authorization becomes effective upon signing and will  on (date) __________.      If no date is indicated, this Authorization will  one (1) year from the signature date.    Name: Patti Tello    Signature:   Date:     2019       PLEASE FAX  REQUESTED RECORDS BACK TO: [phone]

## 2019-05-15 PROBLEM — H10.13 ALLERGIC CONJUNCTIVITIS OF BOTH EYES: Status: ACTIVE | Noted: 2019-05-15

## 2019-05-15 NOTE — ASSESSMENT & PLAN NOTE
Patient reports dry itchy eyes.  Reports she gets this every spring.  Denies discharge, redness, vision changes.  Will prescribe cromolyn eyedrops.

## 2019-05-15 NOTE — ASSESSMENT & PLAN NOTE
Patient continues to suffer from chronic pain in multiple joints, especially in low back and neck.  She reports she had consultation appointment with neurologist, Dr. Doty at [facility] neurosurgery, yesterday.  I do not have records yet.  Patient reports that he referred her to physical therapy and neurosurgery.    She also reports that he suggested her primary care provider prescribe her oxycodone 15 mg 4 times a day as she was previously taking.  Her pain management physician, Dr. Rodriguez, apparently has been decreasing her oxycodone to 5 mg 4 times a day and has also prescribe Suboxone.   is consistent with these findings.  Patient reports that she does not know why she was being tapered off of medications.  Patient reports that her pain is keeping her from functioning well and she is afraid she will lose her new job.  She reports she has an appointment with new pain management in July.  I explained to patient that it would be best if she followed up with her current pain provider for further prescriptions until she can get established with new pain provider.  I do not feel comfortable prescribing the increased dose of oxycodone as I am uncertain if she is still taking Suboxone.  I explained it is best managed under one provider, her pain management provider.  Patient is tearful but verbalizes understanding.  I did advise on comfort measures including heat, ice, light stretching.  Denies saddle paresthesia, foot drop, urinary fecal incontinence, fever.

## 2019-05-17 DIAGNOSIS — R11.0 NAUSEA: ICD-10-CM

## 2019-05-17 NOTE — TELEPHONE ENCOUNTER
Was the patient seen in the last year in this department? Yes    Does patient have an active prescription for medications requested? no    Received Request Via: Patient

## 2019-05-18 RX ORDER — ONDANSETRON HYDROCHLORIDE 8 MG/1
TABLET, FILM COATED ORAL
Qty: 15 TAB | Refills: 1 | Status: SHIPPED | OUTPATIENT
Start: 2019-05-18 | End: 2019-07-10 | Stop reason: SDUPTHER

## 2019-06-04 ENCOUNTER — OFFICE VISIT (OUTPATIENT)
Dept: MEDICAL GROUP | Facility: PHYSICIAN GROUP | Age: 54
End: 2019-06-04
Payer: MEDICARE

## 2019-06-04 VITALS
DIASTOLIC BLOOD PRESSURE: 64 MMHG | HEIGHT: 61 IN | HEART RATE: 61 BPM | RESPIRATION RATE: 12 BRPM | WEIGHT: 183 LBS | SYSTOLIC BLOOD PRESSURE: 128 MMHG | TEMPERATURE: 98.3 F | BODY MASS INDEX: 34.55 KG/M2 | OXYGEN SATURATION: 97 %

## 2019-06-04 DIAGNOSIS — L02.91 ABSCESS: ICD-10-CM

## 2019-06-04 DIAGNOSIS — L80 VITILIGO: ICD-10-CM

## 2019-06-04 DIAGNOSIS — Z12.39 SCREENING FOR BREAST CANCER: ICD-10-CM

## 2019-06-04 PROCEDURE — 99213 OFFICE O/P EST LOW 20 MIN: CPT | Performed by: NURSE PRACTITIONER

## 2019-06-04 ASSESSMENT — PAIN SCALES - GENERAL: PAINLEVEL: 8=MODERATE-SEVERE PAIN

## 2019-06-04 NOTE — PROGRESS NOTES
Chief Complaint   Patient presents with   • Abscess     Rt side-groin area   • Referral Needed     mammogram         This is a 53 y.o.female patient that presents today with the following: Soft tissue mass    Abscess  Patient reports abscess in right groin, however by the time she got in today to be seen this has nearly resolved.  Discussed with her that she should follow-up if this should return.  In the interim she can continue to apply warm moist compresses.    Vitiligo  Patient newly diagnosed with vitiligo via dermatology.  She is closely followed by that specialty.      No visits with results within 1 Month(s) from this visit.   Latest known visit with results is:   Hospital Outpatient Visit on 05/02/2019   Component Date Value   • WBC 05/02/2019 9.0    • RBC 05/02/2019 5.14    • Hemoglobin 05/02/2019 14.5    • Hematocrit 05/02/2019 45.7    • MCV 05/02/2019 88.9    • MCH 05/02/2019 28.2    • MCHC 05/02/2019 31.7*   • RDW 05/02/2019 45.2    • Platelet Count 05/02/2019 279    • MPV 05/02/2019 11.2    • Neutrophils-Polys 05/02/2019 63.10    • Lymphocytes 05/02/2019 26.00    • Monocytes 05/02/2019 6.40    • Eosinophils 05/02/2019 2.80    • Basophils 05/02/2019 1.00    • Immature Granulocytes 05/02/2019 0.70    • Nucleated RBC 05/02/2019 0.00    • Neutrophils (Absolute) 05/02/2019 5.70    • Lymphs (Absolute) 05/02/2019 2.35    • Monos (Absolute) 05/02/2019 0.58    • Eos (Absolute) 05/02/2019 0.25    • Baso (Absolute) 05/02/2019 0.09    • Immature Granulocytes (a* 05/02/2019 0.06    • NRBC (Absolute) 05/02/2019 0.00    • Ferritin 05/02/2019 77.9    • Vitamin B12 -True Cobala* 05/02/2019 361          clinical course has been stable    Past Medical History:   Diagnosis Date   • Anxiety and depression    • Arthritis     back, neck   • Asthma    • Back injury    • Back pain    • Bronchitis 2010   • Cancer (HCC)     breast LT   • Chickenpox    • Chronic LBP    • Chronic neck pain    • Cold intolerance 1/24/2012   • COPD  (chronic obstructive pulmonary disease) (HCC)    • Cystic fibrosis (HCC)    • Depression    • Diabetes    • GERD (gastroesophageal reflux disease)    • H/O gastric bypass 10/11/2013   • Herniated nucleus pulposus, L4-5 3/18/2010   • HX: breast cancer 10/11/2013   • Hypertension    • Itching due to drug 6/2/2011   • Kidney stone    • Nephrolithiasis 10/11/2013   • Obesity    • Osteoporosis    • Other specified symptom associated with female genital organs    • Panic disorder    • Pneumonia    • PTSD (post-traumatic stress disorder)    • Restless leg syndrome    • Rheumatoid arthritis (HCC)    • S/P laminectomy 10/22/2012   • Thoracic or lumbosacral neuritis or radiculitis, unspecified 10/22/2012   • Trauma     head trauma       Past Surgical History:   Procedure Laterality Date   • URETEROSCOPY  10/10/2013    Performed by Molly Resendiz M.D. at SUR[facility]ERY [facility]   • LASERTRIPSY  10/10/2013    Performed by Molly Resendiz M.D. at SUR[facility]ERY [facility]   • LUMBAR FUSION POSTERIOR  10/22/2012    Performed by Alfred Branham M.D. at SURGERY [facility]   • LUMBAR LAMINECTOMY DISKECTOMY  10/22/2012    Performed by Alfred Branham M.D. at SUR[facility]ERY [facility]   • OTHER ORTHOPEDIC SURGERY  10/01/2012    lumbar fusion, Dr Branham   • OTHER ABDOMINAL SURGERY  2006    gastric bypass   • ABDOMINAL HYSTERECTOMY TOTAL      DUE TO FIBROIDS   • BREAST RECONSTRUCTION      Lt breast   • CRANIOTOMY      DUE TO BRAIN INJURY   • HYSTERECTOMY LAPAROSCOPY     • LAMINOTOMY     • MASTECTOMY      Lt breast   • OTHER      left ear drum surgery   • OTHER ABDOMINAL SURGERY      hernia repair   • PRIMARY C SECTION      x 3   • SLEEVE,MAXIMILIAN VASO THIGH     • TONSILLECTOMY     • US-CYST ASPIRATION-BREAST INITIAL     • VENTILATOR CONTINUOUS         Family History   Problem Relation Age of Onset   • Diabetes Father    • Cancer Father    • Hypertension Father    • Hyperlipidemia Father    • Cancer Mother    • Other Mother          SLE   • Diabetes Mother    • Hypertension Mother    • Hyperlipidemia Mother    • Stroke Mother    • Sleep Apnea Brother    • Cancer Maternal Aunt    • Diabetes Maternal Aunt    • Diabetes Maternal Uncle    • Diabetes Maternal Grandmother    • Diabetes Maternal Grandfather        Suboxone    Current Outpatient Prescriptions Ordered in [facility]   Medication Sig Dispense Refill   • cyclobenzaprine (FLEXERIL) 10 MG Tab Take 10 mg by mouth 3 times a day as needed.     • naproxen (NAPROSYN) 125 MG/5ML suspension naproxen   2 PO QD     • ferrous sulfate 324 (65 Fe) MG Tablet Delayed Response EC tablet Take 324 mg by mouth with dinner.     • Oxycodone HCl 20 MG Tab Take 20 mg by mouth 4 times a day.     • ARIPiprazole (ABILIFY) 10 MG Tab Take 1 Tab by mouth every day. 30 Tab 2   • ondansetron (ZOFRAN) 8 MG Tab TK 1 T PO TID PRN 15 Tab 1   • cromolyn (OPTICROM) 4 % ophthalmic solution Apply 2 drops to each eye twice a day as needed for itchy eyes. 1 Bottle 1   • amitriptyline (ELAVIL) 25 MG Tab Take 1 Tab by mouth at bedtime as needed. (Patient not taking: Reported on 6/4/2019) 90 Tab 1   • vitamin D, Ergocalciferol, (DRISDOL) 06441 units Cap capsule Take 1 Cap by mouth 2X A WEEK. (Patient not taking: Reported on 6/4/2019) 26 Cap 0   • Buprenorphine HCl (BELBUCA) 150 MCG FILM Place  in mouth by cheek.     • meclizine (ANTIVERT) 25 MG Tab Take 1 Tab by mouth 3 times a day as needed for Dizziness, Nausea/Vomiting or Vertigo. 30 Tab 0   • cyanocobalamin (VITAMIN B-12) 100 MCG Tab Take 100 mcg by mouth every day.     • VENTOLIN  (90 Base) MCG/ACT Aero Soln inhalation aerosol INHALE 1 PUFF PO QID PRN  0   • budesonide-formoterol (SYMBICORT) 80-4.5 MCG/ACT Aerosol Inhale 2 Puffs by mouth 2 Times a Day. (Patient not taking: Reported on 5/2/2019) 1 Inhaler 3   • fluticasone (FLONASE) 50 MCG/ACT nasal spray Spray 1 Spray in nose 2 times a day. Each Nostril (Patient not taking: Reported on 5/2/2019) 1 Bottle 3     No current  "Epic-ordered facility-administered medications on file.        Constitutional ROS: No unexpected change in weight, No weakness, No unexplained fevers, sweats, or chills  Pulmonary ROS: No chronic cough, sputum, or hemoptysis, No shortness of breath, No recent change in breathing  Cardiovascular ROS: No chest pain  Musculoskeletal/Extremities ROS: Positive for chronic pain  Skin/Integumentary ROS: Positive per HPI  Neurologic ROS: Normal development    Physical exam:  /64 (BP Location: Left arm, Patient Position: Sitting, BP Cuff Size: Adult)   Pulse 61   Temp 36.8 °C (98.3 °F) (Temporal)   Resp 12   Ht 1.549 m (5' 1\")   Wt 83 kg (183 lb)   LMP 10/05/1999   SpO2 97%   Breastfeeding? No   BMI 34.58 kg/m²   General Appearance: Pleasant middle-aged female, alert, no distress, obese, well-groomed  Skin: Skin color, texture, turgor normal. No rashes or lesions.  Lungs: negative findings: normal respiratory rate and rhythm, lungs clear to auscultation  Musculoskeletal: negative findings: no evidence of joint instability, no evidence of muscle atrophy, no deformities present  Neurologic: intact    Medical decision making/discussion: Patient to continue with warm moist compresses to affected area, if not better her symptoms return she is to come in for follow-up.  She is to continue care per her specialists including pain management as well as dermatology.  She is to keep her already scheduled appointment with me in early July.  She is due for mammogram, this is been ordered.    Patti was seen today for abscess and referral needed.    Diagnoses and all orders for this visit:    Abscess    Vitiligo    Screening for breast cancer  -     MA-SCREEN MAMMO W/CAD-BILAT; Future        Return in about 1 month (around 7/4/2019) for Follow-up.        Please note that this dictation was created using voice recognition software. I have made every reasonable attempt to correct obvious errors, but I expect that there are " errors of grammar and possibly content that I did not discover before finalizing the note.

## 2019-06-04 NOTE — LETTER
June 4, 2019         Patient: Patti Tello   YOB: 1965   Date of Visit: 6/4/2019           To Whom it May Concern:    Patti Tello was seen in my clinic on 6/4/2019. She has chronic orthopedic conditions that prevent her from working full time until she has built up strength and stamina. Please allow her to work part-time, 24 hours per week for the next month. .    If you have any questions or concerns, please don't hesitate to call.        Sincerely,           LUIS MIGUEL Chris.  Electronically Signed

## 2019-06-07 NOTE — ASSESSMENT & PLAN NOTE
Patient reports abscess in right groin, however by the time she got in today to be seen this has nearly resolved.  Discussed with her that she should follow-up if this should return.  In the interim she can continue to apply warm moist compresses.

## 2019-06-07 NOTE — ASSESSMENT & PLAN NOTE
Patient newly diagnosed with vitiligo via dermatology.  She is closely followed by that specialty.

## 2019-06-28 ENCOUNTER — OCCUPATIONAL MEDICINE (OUTPATIENT)
Dept: URGENT CARE | Facility: CLINIC | Age: 54
End: 2019-06-28
Payer: COMMERCIAL

## 2019-06-28 VITALS
OXYGEN SATURATION: 98 % | HEART RATE: 60 BPM | SYSTOLIC BLOOD PRESSURE: 126 MMHG | WEIGHT: 176.59 LBS | RESPIRATION RATE: 16 BRPM | TEMPERATURE: 98.3 F | DIASTOLIC BLOOD PRESSURE: 70 MMHG | HEIGHT: 61 IN | BODY MASS INDEX: 33.34 KG/M2

## 2019-06-28 DIAGNOSIS — T22.211A: Primary | ICD-10-CM

## 2019-06-28 DIAGNOSIS — T22.111A: ICD-10-CM

## 2019-06-28 PROCEDURE — 99214 OFFICE O/P EST MOD 30 MIN: CPT | Mod: 29 | Performed by: PHYSICIAN ASSISTANT

## 2019-06-28 RX ORDER — GABAPENTIN 100 MG/1
100 CAPSULE ORAL 3 TIMES DAILY
COMMUNITY
End: 2019-12-09

## 2019-06-28 RX ORDER — OXYCODONE AND ACETAMINOPHEN 10; 325 MG/1; MG/1
1-2 TABLET ORAL EVERY 4 HOURS PRN
COMMUNITY
End: 2019-12-09

## 2019-06-28 NOTE — PATIENT INSTRUCTIONS
Burn Care  Your skin is a natural barrier to infection. It is the largest organ of your body. Burns damage this natural protection. To help prevent infection, it is very important to follow your caregiver's instructions in the care of your burn.  Burns are classified as:  · First degree. There is only redness of the skin (erythema). No scarring is expected.  · Second degree. There is blistering of the skin. Scarring may occur with deeper burns.  · Third degree. All layers of the skin are injured, and scarring is expected.  HOME CARE INSTRUCTIONS   · Wash your hands well before changing your bandage.  · Change your bandage as often as directed by your caregiver.  ¨ Remove the old bandage. If the bandage sticks, you may soak it off with cool, clean water.  ¨ Cleanse the burn thoroughly but gently with mild soap and water.  ¨ Pat the area dry with a clean, dry cloth.  ¨ Apply a thin layer of antibacterial cream to the burn.  ¨ Apply a clean bandage as instructed by your caregiver.  ¨ Keep the bandage as clean and dry as possible.  · Elevate the affected area for the first 24 hours, then as instructed by your caregiver.  · Only take over-the-counter or prescription medicines for pain, discomfort, or fever as directed by your caregiver.  SEEK IMMEDIATE MEDICAL CARE IF:   · You develop excessive pain.  · You develop redness, tenderness, swelling, or red streaks near the burn.  · The burned area develops yellowish-white fluid (pus) or a bad smell.  · You have a fever.  MAKE SURE YOU:   · Understand these instructions.  · Will watch your condition.  · Will get help right away if you are not doing well or get worse.     This information is not intended to replace advice given to you by your health care provider. Make sure you discuss any questions you have with your health care provider.     Document Released: 12/18/2006 Document Revised: 03/11/2013 Document Reviewed: 05/09/2012  [employer] Interactive Patient Education ©2016  [employer]

## 2019-06-28 NOTE — LETTER
[facility]  [address] [facility] [address], [address] [place] [address]  Phone:  [phone] - Fax:  [phone]   [facility] Progress Report and Disability Certification  Date of Service: 6/28/2019   No Show:  No  Date / Time of Next Visit: 7/2/2019   Claim Information   Patient Name: Patti Tello  Claim Number:     Employer: [employer]  Date of Injury: 6/24/2019     Insurer / TPA: [employer]  ID / SSN:     Occupation: [employer]  Diagnosis: The primary encounter diagnosis was Blisters with epidermal loss due to burn (second degree) of forearm, right, initial encounter. A diagnosis of First degree burn of forearm, right, initial encounter was also pertinent to this visit.    Medical Information   Related to Industrial Injury? Yes    Subjective Complaints:  DOI: 6/24/2019.  PT here for evaluation of burn to right forearm that occurred while at work 4 days ago.  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  Pt states she has been using triple antibiotic ointment and bandages without issues until the blister popped and the skin came off of part of the burn.  Pt is here to make sure it is not infected.  PT tdap is up to date.    Objective Findings: 8cm x 6m area of healing first degree burn with 1cm round area of new appearing skin (this is were the blister popped and peeled) to lateral edge of burn on right forearm.  No evidence of infection.    Pre-Existing Condition(s):     Assessment:   Initial Visit    Status: Additional Care Required  Permanent Disability:No    Plan:   Comments:otc triple antibiotic ointment    Diagnostics:      Comments:       Disability Information   Status: Released to Full Duty    From:  6/28/2019  Through: 7/2/2019 Restrictions are:     Physical Restrictions   Sitting:    Standing:    Stooping:    Bending:      Squatting:    Walking:    Climbing:    Pushing:      Pulling:   Other:    Reaching Above Shoulder (L):   Reaching Above Shoulder (R):       Reaching Below Shoulder (L):    Reaching Below Shoulder (R):      Not to exceed Weight Limits   Carrying(hrs):   Weight Limit(lb):   Lifting(hrs):   Weight  Limit(lb):     Comments: Keep skin clean dry and covered while at work.  Needs wound check in a few days, anticipate MMI at next visit.     Repetitive Actions   Hands: i.e. Fine Manipulations from Grasping:     Feet: i.e. Operating Foot Controls:     Driving / Operate Machinery:     Physician Name: Yolis Wallis P.A.-C. Physician Signature: YOLIS Landry P.A.-C. e-Signature: Dr. Narciso Yusuf, Medical Director   Clinic Name / Location: [address], [place] [address] Clinic Phone Number: Dept: [phone]   Appointment Time: 12:15 Pm Visit Start Time: 12:40 PM   Check-In Time:  12:18 Pm Visit Discharge Time:  1:19 PM   Original-Treating Physician or Chiropractor    Page 2-Insurer/TPA    Page 3-Employer    Page 4-Employee

## 2019-06-28 NOTE — LETTER
"EMPLOYEE’S CLAIM FOR COMPENSATION/ REPORT OF INITIAL TREATMENT  FORM C-4    EMPLOYEE’S CLAIM - PROVIDE ALL INFORMATION REQUESTED   First Name  Patti Last Name  Omer Birthdate                    1965                Sex  female Claim Number   Home Address  [address] Age  53 y.o. 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 (if applicable)  Baking putting cart in oven.    How did this injury or occupational disease occur? (Be specific an answer in detail. Use additional sheet if necessary)  I was putting cart in oven and door shut on my arm.   If you believe that you have an occupational disease, when did you first have knowledge of the disability and it relationship to your employment?  n/a Witnesses to the Accident  Tracie      Nature of Injury or Occupational Disease  Workers' Compensation  Part(s) of Body Injured or Affected  Lower Arm (R), ,     I certify that the above is true and correct to the best of my knowledge and that I have provided this information in order to obtain the benefits of [place]’s [place] and " [place]).  I hereby authorize any physician, chiropractor, surgeon, practitioner, or other person, any hospital, including [facility] or [facility], any medical service organization, any insurance company, or other institution or organization to release to each other, any medical or other information, including benefits paid or payable, pertinent to this injury or disease, except information relative to diagnosis, treatment and/or counseling for AIDS, psychological conditions, alcohol or controlled substances, for which I must give specific authorization.  A Photostat of this authorization shall be as valid as the original.     Date 6/28/19   Place [facility]   Employee’s Signature   THIS REPORT MUST BE COMPLETED AND MAILED WITHIN 3 WORKING DAYS OF TREATMENT   Place  [facility]  Name of Facility  [facility]   Date  6/28/2019 Diagnosis  (T22.211A) Blisters with epidermal loss due to burn (second degree) of forearm, right, initial encounter  (primary encounter diagnosis)  (T22.111A) First degree burn of forearm, right, initial encounter Is there evidence the injured employee was under the influence of alcohol and/or another controlled substance at the time of accident?   Hour  12:40 PM Description of Injury or Disease  The primary encounter diagnosis was Blisters with epidermal loss due to burn (second degree) of forearm, right, initial encounter. A diagnosis of First degree burn of forearm, right, initial encounter was also pertinent to this visit. No   Treatment  Triple antibiotic and bandage while at work, may remove while at home if desired.   Have you advised the patient to remain off work five days or more? No   X-Ray Findings      If Yes   From Date  To Date      From information given by the employee, together with medical evidence, can you directly connect this injury or occupational disease  "as job incurred?  Yes If No Full Duty  Yes Modified Duty      Is additional medical care by a physician indicated?  Yes If Modified Duty, Specify any Limitations / Restrictions      Do you know of any previous injury or disease contributing to this condition or occupational disease?                            No   Date  6/28/2019 Print Doctor’s Name Yolis Wallis P.A.-C. I certify the employer’s copy of  this form was mailed on:   Address  [address], [address] Insurer’s Use Only     [facility] Zip  [address]    Provider’s Tax ID Number  [id number] Telephone  Dept: [phone]        e-SignSCOGGYOLIS GREENFIELD P.A.-C.   e-Signature: Dr. Narciso Yusuf, Medical Director Degree  [place]        ORIGINAL-TREATING PHYSICIAN OR CHIROPRACTOR    PAGE 2-INSURER/TPA    PAGE 3-EMPLOYER    PAGE 4-EMPLOYEE             Form C-4 (rev10/07)              BRIEF DESCRIPTION OF RIGHTS AND BENEFITS  (Pursuant to [place]RS 616C.050)    Notice of Injury or Occupational Disease (Incident Report Form C-1): If an injury or occupational disease (OD) arises out of and in the  course of employment, you must provide written notice to your employer as soon as practicable, but no later than 7 days after the accident or  OD. Your employer shall maintain a sufficient supply of the required forms.    Claim for Compensation (Form C-4): If medical treatment is sought, the form C-4 is available at the place of initial treatment. A completed  \"Claim for Compensation\" (Form C-4) must be filed within 90 days after an accident or OD. The treating physician or chiropractor must,  within 3 working days after treatment, complete and mail to the employer, the employer's insurer and third-party , the Claim for  Compensation.    Medical Treatment: If you require medical treatment for your on-the-job injury or OD, you may be required to select a physician or  chiropractor from a list provided by your workers’ compensation insurer, if it has " contracted with an Organization for Managed Care (MCO) or  Preferred Provider Organization (PPO) or providers of health care. If your employer has not entered into a contract with an MCO or PPO, you  may select a physician or chiropractor from the Panel of Physicians and Chiropractors. Any medical costs related to your industrial injury or  OD will be paid by your insurer.    Temporary Total Disability (TTD): If your doctor has certified that you are unable to work for a period of at least 5 consecutive days, or 5  cumulative days in a 20-day period, or places restrictions on you that your employer does not accommodate, you may be entitled to TTD  compensation.    Temporary Partial Disability (TPD): If the wage you receive upon reemployment is less than the compensation for TTD to which you are  entitled, the insurer may be required to pay you TPD compensation to make up the difference. TPD can only be paid for a maximum of 24  months.    Permanent Partial Disability (PPD): When your medical condition is stable and there is an indication of a PPD as a result of your injury or  OD, within 30 days, your insurer must arrange for an evaluation by a rating physician or chiropractor to determine the degree of your PPD. The  amount of your PPD award depends on the date of injury, the results of the PPD evaluation and your age and wage.    Permanent Total Disability (PTD): If you are medically certified by a treating physician or chiropractor as permanently and totally disabled  and have been granted a PTD status by your insurer, you are entitled to receive monthly benefits not to exceed 66 2/3% of your average  monthly wage. The amount of your PTD payments is subject to reduction if you previously received a PPD award.    Vocational Rehabilitation Services: You may be eligible for vocational rehabilitation services if you are unable to return to the job due to a  permanent physical impairment or permanent restrictions as a  result of your injury or occupational disease.    Transportation and Per Kathy Reimbursement: You may be eligible for travel expenses and per kathy associated with medical treatment.    Reopening: You may be able to reopen your claim if your condition worsens after claim closure.    Appeal Process: If you disagree with a written determination issued by the insurer or the insurer does not respond to your request, you may  appeal to the Department of Administration, , by following the instructions contained in your determination letter. You must  appeal the determination within 70 days from the date of the determination letter at [address], [address], [place], [place]  [address], or [address], [address], [place], [place] [address]. If you disagree with the  decision, you may appeal to the  Department of Administration, . You must file your appeal within 30 days from the date of the  decision  letter at [address], [address], [place], [place] [address], or [address] [facility] [facility] [address], [place], [place] [address]. If you  disagree with a decision of an , you may file a petition for judicial review with the District Court. You must do so within 30  days of the Appeal Officer’s decision. You may be represented by an  at your own expense or you may contact the [facility] for possible  representation.    [place]  for Injured Workers (NAIW): If you disagree with a  decision, you may request that NAIW represent you  without charge at an  Hearing. For information regarding denial of benefits, you may contact the [facility] at: [address], [address], [place] [address], [phone], or [address], [address], [place] [address], [phone]    To File a Complaint with the Division: If you wish to file a complaint with the  of the Division of  Industrial Relations (DIR),  please contact the Workers’ Compensation Section, [address], [address], [place], [place] [address], telephone [phone]) [phone], or  [address], [address], [place], [place] [address], telephone [phone]) [phone].    For assistance with Workers’ Compensation Issues: you may contact the Office of the [facility] Consumer Health Assistance, [address] [place] [address], [address], [place], [place] [address], Toll Free 1-383.424.3661, Web site: [email or web address]., E-mail  [email or web address].                                                                                                                                                                                                                                   __________________________________________________________________                                                                   ______6/28/19___________                Employee Name / Signature                                                                                                                                                       Date                                                                                                                                                                                                     D-2 (rev. 10/07)

## 2019-06-29 NOTE — PROGRESS NOTES
"Subjective:      Patti Tello is a 53 y.o. female who presents with No chief complaint on file.    Pt PMH, SocHx, SurgHx, FamHx, Drug allergies and medications reviewed with pt/EPIC.      Family history reviewed, it is not pertinent to this complaint.        DOI: 6/24/2019.  PT here for evaluation of burn to right forearm that occurred while at work 4 days ago.  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  Pt states she has been using triple antibiotic ointment and bandages without issues until the blister popped and the skin came off of part of the burn.  Pt is here to make sure it is not infected.  PT tdap is up to date.      Work comp injury: burn to right arm .   Tdap is up to date        Review of Systems   Skin:        Burn to right arm   Neurological:        Chronic numbness to BUE, unchanged   All other systems reviewed and are negative.         Objective:     /70 (BP Location: Left arm, Patient Position: Sitting, BP Cuff Size: Large adult)   Pulse 60   Temp 36.8 °C (98.3 °F) (Temporal)   Resp 16   Ht 1.549 m (5' 1\")   Wt 80.1 kg (176 lb 9.4 oz)   LMP 10/05/1999   SpO2 98%   BMI 33.37 kg/m²      Physical Exam   Constitutional: She is oriented to person, place, and time. She appears well-developed and well-nourished.   HENT:   Head: Normocephalic and atraumatic.   Nose: Nose normal.   Eyes: Pupils are equal, round, and reactive to light. Conjunctivae and EOM are normal.   Neck: Normal range of motion. Neck supple.   Cardiovascular: Normal rate, regular rhythm and normal heart sounds.    Pulmonary/Chest: Effort normal and breath sounds normal.   Abdominal: Soft.   Musculoskeletal: Normal range of motion.   Neurological: She is alert and oriented to person, place, and time. Gait normal.   Skin: Skin is warm and dry. Capillary refill takes less than 2 seconds.        Psychiatric: She has a normal mood and affect.   Nursing note " and vitals reviewed.      8cm x 6m area of healing first degree burn with 1cm round area of new appearing skin (this is were the blister popped and peeled) to lateral edge of burn on right forearm.  No evidence of infection.        Assessment/Plan:     1. Blisters with epidermal loss due to burn (second degree) of forearm, right, initial encounter     2. First degree burn of forearm, right, initial encounter       Keep burn clean dry and covered while at work.     Follow D-39 instructions/restrictions. Return to clinic as scheduled.

## 2019-07-02 ENCOUNTER — OCCUPATIONAL MEDICINE (OUTPATIENT)
Dept: URGENT CARE | Facility: CLINIC | Age: 54
End: 2019-07-02
Payer: COMMERCIAL

## 2019-07-02 ENCOUNTER — HOSPITAL ENCOUNTER (OUTPATIENT)
Facility: MEDICAL CENTER | Age: 54
End: 2019-07-02
Attending: NURSE PRACTITIONER
Payer: MEDICARE

## 2019-07-02 ENCOUNTER — OFFICE VISIT (OUTPATIENT)
Dept: MEDICAL GROUP | Facility: PHYSICIAN GROUP | Age: 54
End: 2019-07-02
Payer: MEDICARE

## 2019-07-02 VITALS
WEIGHT: 177.2 LBS | SYSTOLIC BLOOD PRESSURE: 112 MMHG | OXYGEN SATURATION: 97 % | DIASTOLIC BLOOD PRESSURE: 64 MMHG | BODY MASS INDEX: 33.46 KG/M2 | RESPIRATION RATE: 16 BRPM | HEIGHT: 61 IN | HEART RATE: 89 BPM | TEMPERATURE: 98 F

## 2019-07-02 VITALS
HEIGHT: 61 IN | SYSTOLIC BLOOD PRESSURE: 108 MMHG | BODY MASS INDEX: 33.42 KG/M2 | HEART RATE: 76 BPM | WEIGHT: 177.03 LBS | RESPIRATION RATE: 16 BRPM | OXYGEN SATURATION: 97 % | DIASTOLIC BLOOD PRESSURE: 70 MMHG | TEMPERATURE: 98.2 F

## 2019-07-02 DIAGNOSIS — M53.9 MULTILEVEL DEGENERATIVE DISC DISEASE: ICD-10-CM

## 2019-07-02 DIAGNOSIS — T30.0 BURN: ICD-10-CM

## 2019-07-02 DIAGNOSIS — E66.9 OBESITY (BMI 30-39.9): ICD-10-CM

## 2019-07-02 DIAGNOSIS — M54.12 CERVICAL RADICULOPATHY, CHRONIC: ICD-10-CM

## 2019-07-02 DIAGNOSIS — J44.9 CHRONIC OBSTRUCTIVE PULMONARY DISEASE, UNSPECIFIED COPD TYPE (HCC): ICD-10-CM

## 2019-07-02 DIAGNOSIS — Z13.6 SCREENING FOR CARDIOVASCULAR CONDITION: ICD-10-CM

## 2019-07-02 DIAGNOSIS — E55.9 VITAMIN D DEFICIENCY: ICD-10-CM

## 2019-07-02 DIAGNOSIS — R82.90 MALODOROUS URINE: ICD-10-CM

## 2019-07-02 DIAGNOSIS — R76.8 POSITIVE ANA (ANTINUCLEAR ANTIBODY): ICD-10-CM

## 2019-07-02 DIAGNOSIS — T22.211D PARTIAL THICKNESS BURN OF RIGHT FOREARM, SUBSEQUENT ENCOUNTER: ICD-10-CM

## 2019-07-02 LAB
APPEARANCE UR: CLEAR
BILIRUB UR STRIP-MCNC: NORMAL MG/DL
COLOR UR AUTO: YELLOW
GLUCOSE UR STRIP.AUTO-MCNC: NORMAL MG/DL
KETONES UR STRIP.AUTO-MCNC: NORMAL MG/DL
LEUKOCYTE ESTERASE UR QL STRIP.AUTO: NORMAL
NITRITE UR QL STRIP.AUTO: NORMAL
PH UR STRIP.AUTO: 6 [PH] (ref 5–8)
PROT UR QL STRIP: NORMAL MG/DL
RBC UR QL AUTO: NORMAL
SP GR UR STRIP.AUTO: 1.03
UROBILINOGEN UR STRIP-MCNC: 0.2 MG/DL

## 2019-07-02 PROCEDURE — 81002 URINALYSIS NONAUTO W/O SCOPE: CPT | Performed by: NURSE PRACTITIONER

## 2019-07-02 PROCEDURE — 99214 OFFICE O/P EST MOD 30 MIN: CPT | Performed by: NURSE PRACTITIONER

## 2019-07-02 PROCEDURE — 87086 URINE CULTURE/COLONY COUNT: CPT

## 2019-07-02 PROCEDURE — 99213 OFFICE O/P EST LOW 20 MIN: CPT | Mod: 29 | Performed by: PHYSICIAN ASSISTANT

## 2019-07-02 RX ORDER — OXYCODONE HYDROCHLORIDE 15 MG/1
TABLET ORAL
Refills: 0 | COMMUNITY
Start: 2019-06-12 | End: 2022-04-08

## 2019-07-02 ASSESSMENT — ENCOUNTER SYMPTOMS
TINGLING: 0
CHILLS: 0
FEVER: 0
SENSORY CHANGE: 0
NAUSEA: 0
MYALGIAS: 0
VOMITING: 0
FOCAL WEAKNESS: 0

## 2019-07-02 ASSESSMENT — PAIN SCALES - GENERAL: PAINLEVEL: NO PAIN

## 2019-07-02 NOTE — LETTER
[facility]  [address], [address] [place] [address]  Phone:  [phone] - Fax:  [phone]   [facility] Progress Report and Disability Certification  Date of Service: 7/2/2019   No Show:  No  Date / Time of Next Visit: 7/9/2019   Claim Information   Patient Name: Patti Tello  Claim Number:     Employer: [employer]  Date of Injury: 6/24/2019     Insurer / TPA: [employer]  ID / SSN:     Occupation: [employer]  Diagnosis: The encounter diagnosis was Partial thickness burn of right forearm, subsequent encounter.    Medical Information   Related to Industrial Injury? Yes    Subjective Complaints:  DOI: 6/24/19. Patient is here for a follow-up on a burn of her right arm after putting a cart in the oven. She has been using OTC Triple antibiotic ointment and keeping it covered. She notes mild improvement but is concerned about whitish discoloration over wound. No history of fever or chills.    Objective Findings: Vitals reviewed.    Right forearm: 8cmX 6cm area of healing second degree burn. White eschar noted. No surrounding edema or erythema. No streaking. No purulent drainage. No signs of infection.   Pre-Existing Condition(s):     Assessment:   Condition Improved    Status: Additional Care Required  Permanent Disability:No    Plan: Medication  Comments:continue wound care, OTC antibiotic ointment. Keep wound clean covered and dry at work. RTC in 1 week    Diagnostics:      Comments:       Disability Information   Status: Released to Full Duty    From:  7/2/2019  Through: 7/9/2019 Restrictions are:     Physical Restrictions   Sitting:    Standing:    Stooping:    Bending:      Squatting:    Walking:    Climbing:    Pushing:      Pulling:    Other:    Reaching Above Shoulder (L):   Reaching Above Shoulder (R):       Reaching Below Shoulder (L):    Reaching Below Shoulder (R):      Not to exceed Weight Limits   Carrying(hrs):   Weight Limit(lb):    Lifting(hrs):   Weight  Limit(lb):     Comments:      Repetitive Actions   Hands: i.e. Fine Manipulations from Grasping:     Feet: i.e. Operating Foot Controls:     Driving / Operate Machinery:     Physician Name: Adore Armstrong P.A.-C. Physician Signature: ADORE Escobar P.A.-C. e-Signature: Dr. Narciso Yusuf, Medical Director   [address] [place] [address] Clinic Phone Number: Dept: [phone]   Appointment Time: 2:30 Pm Visit Start Time: 11:00 AM   Check-In Time:  10:56 Am Visit Discharge Time:  11:13 AM   Original-Treating Physician or Chiropractor    Page 2-Insurer/TPA    Page 3-Employer    Page 4-Employee

## 2019-07-02 NOTE — PROGRESS NOTES
Subjective:      Patti Tello is a 53 y.o. female who presents with Burn ([facility], Pt states she is improving however she is concerned about blistering)      DOI: 6/24/19. Patient is here for a follow-up on a burn of her right arm after putting a cart in the oven. She has been using OTC Triple antibiotic ointment and keeping it covered. She notes mild improvement but is concerned about whitish discoloration over wound. No history of fever or chills.      HPI    Past Medical History:   Diagnosis Date   • Anxiety and depression    • Arthritis     back, neck   • Asthma    • Back injury    • Back pain    • Bronchitis 2010   • Cancer (HCC)     breast LT   • Chickenpox    • Chronic LBP    • Chronic neck pain    • Cold intolerance 1/24/2012   • COPD (chronic obstructive pulmonary disease) ([facility])    • Cystic fibrosis (HCC)    • Depression    • Diabetes    • GERD (gastroesophageal reflux disease)    • H/O gastric bypass 10/11/2013   • Herniated nucleus pulposus, L4-5 3/18/2010   • HX: breast cancer 10/11/2013   • Hypertension    • Itching due to drug 6/2/2011   • Kidney stone    • Nephrolithiasis 10/11/2013   • Obesity    • Osteoporosis    • Other specified symptom associated with female genital organs    • Panic disorder    • Pneumonia    • PTSD (post-traumatic stress disorder)    • Restless leg syndrome    • Rheumatoid arthritis (HCC)    • S/P laminectomy 10/22/2012   • Thoracic or lumbosacral neuritis or radiculitis, unspecified 10/22/2012   • Trauma     head trauma       Past Surgical History:   Procedure Laterality Date   • URETEROSCOPY  10/10/2013    Performed by Molly Resendiz M.D. at SURGERY [facility]   • LASERTRIPSY  10/10/2013    Performed by Molly Resendiz M.D. at SURGERY [facility]   • LUMBAR FUSION POSTERIOR  10/22/2012    Performed by Alfred Branham M.D. at SURGERY [facility]   • LUMBAR LAMINECTOMY DISKECTOMY  10/22/2012    Performed by Alfred [facility] M.D. at [facility]  "ORS   • OTHER ORTHOPEDIC SURGERY  10/01/2012    lumbar fusion, Dr Branham   • OTHER ABDOMINAL SURGERY  2006    gastric bypass   • ABDOMINAL HYSTERECTOMY TOTAL      DUE TO FIBROIDS   • BREAST RECONSTRUCTION      Lt breast   • CRANIOTOMY      DUE TO BRAIN INJURY   • HYSTERECTOMY LAPAROSCOPY     • LAMINOTOMY     • MASTECTOMY      Lt breast   • OTHER      left ear drum surgery   • OTHER ABDOMINAL SURGERY      hernia repair   • PRIMARY C SECTION      x 3   • SLEEVE,MAXIMILIAN VASO THIGH     • TONSILLECTOMY     • US-CYST ASPIRATION-BREAST INITIAL     • VENTILATOR CONTINUOUS         Family History   Problem Relation Age of Onset   • Diabetes Father    • Cancer Father    • Hypertension Father    • Hyperlipidemia Father    • Cancer Mother    • Other Mother         SLE   • Diabetes Mother    • Hypertension Mother    • Hyperlipidemia Mother    • Stroke Mother    • Sleep Apnea Brother    • Cancer Maternal Aunt    • Diabetes Maternal Aunt    • Diabetes Maternal Uncle    • Diabetes Maternal Grandmother    • Diabetes Maternal Grandfather        Allergies   Allergen Reactions   • Suboxone Hives, Shortness of Breath and Swelling       Medications, Allergies, and current problem list reviewed today in Epic    Review of Systems   Constitutional: Negative for chills, fever and malaise/fatigue.   Gastrointestinal: Negative for nausea and vomiting.   Musculoskeletal: Negative for joint pain and myalgias.   Skin:        Burn to right forearm   Neurological: Negative for tingling, sensory change and focal weakness.     All other systems reviewed and are negative.        Objective:     /70   Pulse 76   Temp 36.8 °C (98.2 °F) (Temporal)   Resp 16   Ht 1.549 m (5' 1\")   Wt 80.3 kg (177 lb 0.5 oz)   LMP 10/05/1999   SpO2 97%   BMI 33.45 kg/m²      Physical Exam   Constitutional: She is oriented to person, place, and time. She appears well-developed and well-nourished. No distress.   HENT:   Head: Normocephalic and atraumatic.   Eyes: " Conjunctivae are normal.   Pulmonary/Chest: Effort normal. No respiratory distress.   Neurological: She is alert and oriented to person, place, and time.   Psychiatric: She has a normal mood and affect. Her behavior is normal. Judgment and thought content normal.       Vitals reviewed.    Right forearm: 8cmX 6cm area of healing second degree burn. White eschar noted. No surrounding edema or erythema. No streaking. No purulent drainage. No signs of infection.       Assessment/Plan:     1. Partial thickness burn of right forearm, subsequent encounter    Healing well.  Continue wound care and OTC antibiotic ointment.  Open wound- still at risk for infection- Therefore, will have patient RTC in 1 week.  Full duty- keep wound clean, covered and dry at work.  Let it air out at home.     Differential diagnoses, Supportive care, and indications for immediate follow-up discussed with patient.   Instructed to return to clinic or nearest emergency department for any change in condition, further concerns, or worsening of symptoms.    The patient demonstrated a good understanding and agreed with the treatment plan.    Valerie Armstrong P.A.-C.

## 2019-07-03 PROBLEM — R82.90 MALODOROUS URINE: Status: ACTIVE | Noted: 2019-07-03

## 2019-07-03 PROBLEM — T30.0 BURN: Status: ACTIVE | Noted: 2019-07-03

## 2019-07-03 NOTE — ASSESSMENT & PLAN NOTE
Patient does have chronic neck and low back pain, recently diagnosed with bulging disc in the cervical spine and is now followed by spine specialist and pain management.

## 2019-07-03 NOTE — ASSESSMENT & PLAN NOTE
Patient continues to be undergoing work-up for connective tissue disease she does have a positive NAKUL.  She was referred to [place] as there is a shortage of rheumatologist in the area and when she was seen before she does not wish to go back as she did not have a good relationship with that provider.  She continues to have widespread body pain especially joint pain.  We will work to find her a local rheumatologist, however this may have to be outside of [facility].  She was given a list of rheumatologist outside of the [facility] to include local and out of area providers.

## 2019-07-03 NOTE — ASSESSMENT & PLAN NOTE
Patient suffered burn while working, she works now as a baker and burned the lateral aspect of her right arm just distal to her lateral epicondylitis.  Wound appears to be dry and intact and healing.  She was advised to keep this wound clean and dry

## 2019-07-03 NOTE — ASSESSMENT & PLAN NOTE
See additional notes recently hospitalized at [facility] for significant neck pain and bilateral upper extremity numbness and tingling.  She is now followed by neurosurgeon as well as pain management.

## 2019-07-03 NOTE — ASSESSMENT & PLAN NOTE
She reports malodorous urine but does deny dysuria.  In office urinalysis is essentially negative except for a trace amount of blood but negative nitrites and leukocytes.  We will go ahead and send this out for culture for further evaluation, she will be notified if she should need to start antibiotic.  She is advised to stay well-hydrated, do not delay urge to void, always wipe front to back and work on underwear.

## 2019-07-03 NOTE — ASSESSMENT & PLAN NOTE
This is a chronic condition, stable.  She reports to me that she is stopped taking all of her COPD medications due to better breathing.  I did discuss with her that the Symbicort is a maintenance medication and that she should be on this daily and use albuterol as needed.

## 2019-07-03 NOTE — ASSESSMENT & PLAN NOTE
This is chronic and stable, patient's weight is essentially unchanged from her last visit in June.  She does understand the risks associated with her weight especially in the setting of her comorbid conditions.  She continues to try and work on this, she does have difficulty with physical activity due to her joint pain.

## 2019-07-04 LAB
BACTERIA UR CULT: NORMAL
SIGNIFICANT IND 70042: NORMAL
SITE SITE: NORMAL
SOURCE SOURCE: NORMAL

## 2019-07-10 DIAGNOSIS — R11.0 NAUSEA: ICD-10-CM

## 2019-07-11 NOTE — TELEPHONE ENCOUNTER
Was the patient seen in the last year in this department? Yes    Does patient have an active prescription for medications requested? No     Received Request Via: Pharmacy      Pt met protocol?: Yes, OV last week

## 2019-07-12 RX ORDER — ONDANSETRON HYDROCHLORIDE 8 MG/1
TABLET, FILM COATED ORAL
Qty: 15 TAB | Refills: 1 | Status: SHIPPED | OUTPATIENT
Start: 2019-07-12 | End: 2019-09-27 | Stop reason: SDUPTHER

## 2019-08-06 ENCOUNTER — OFFICE VISIT (OUTPATIENT)
Dept: MEDICAL GROUP | Facility: PHYSICIAN GROUP | Age: 54
End: 2019-08-06
Payer: MEDICARE

## 2019-08-06 VITALS
WEIGHT: 173 LBS | HEIGHT: 61 IN | OXYGEN SATURATION: 97 % | DIASTOLIC BLOOD PRESSURE: 68 MMHG | HEART RATE: 84 BPM | TEMPERATURE: 97.3 F | RESPIRATION RATE: 16 BRPM | SYSTOLIC BLOOD PRESSURE: 110 MMHG | BODY MASS INDEX: 32.66 KG/M2

## 2019-08-06 DIAGNOSIS — Z23 NEED FOR PNEUMOCOCCAL VACCINATION: ICD-10-CM

## 2019-08-06 DIAGNOSIS — R76.8 POSITIVE ANA (ANTINUCLEAR ANTIBODY): ICD-10-CM

## 2019-08-06 DIAGNOSIS — F41.9 ANXIETY: ICD-10-CM

## 2019-08-06 DIAGNOSIS — F31.32 BIPOLAR AFFECTIVE DISORDER, CURRENTLY DEPRESSED, MODERATE (HCC): ICD-10-CM

## 2019-08-06 DIAGNOSIS — T30.0 BURN: ICD-10-CM

## 2019-08-06 DIAGNOSIS — L98.9 SKIN LESIONS: ICD-10-CM

## 2019-08-06 PROCEDURE — G0009 ADMIN PNEUMOCOCCAL VACCINE: HCPCS | Performed by: NURSE PRACTITIONER

## 2019-08-06 PROCEDURE — 90732 PPSV23 VACC 2 YRS+ SUBQ/IM: CPT | Performed by: NURSE PRACTITIONER

## 2019-08-06 PROCEDURE — 99214 OFFICE O/P EST MOD 30 MIN: CPT | Mod: 25 | Performed by: NURSE PRACTITIONER

## 2019-08-06 ASSESSMENT — PAIN SCALES - GENERAL: PAINLEVEL: 5=MODERATE PAIN

## 2019-08-06 NOTE — PATIENT INSTRUCTIONS
Refill Elavil, take 2 at bedtime    New referral to dermatology    Keep wound on arm clean and dry    Get in with psychiatry    Keep appt with me in January    Get in with endocrinology

## 2019-08-06 NOTE — PROGRESS NOTES
Chief Complaint   Patient presents with   • Finger Pain     Rt side         This is a 53 y.o.female patient that presents today with the following: Concerning skin lesion, other concerning issues    Anxiety  Patient has history of anxiety, depression as well as bipolar affective disorder.  Previously followed by psychiatry, has not been seen in a while.  She is requesting alprazolam for anxiety she states her  is very sick.  I did discuss with her this contraindicated as she is on oxycodone for pain, this is through her pain management specialist.  I did strongly encourage her to get back in touch with her psychiatrist.  Of note she has been on several medications in the past none of which she is taking including Abilify, amitriptyline, has been tried on several other SSRIs.  I did discuss with her the importance of bringing an exact med list when she comes in for her next appointment with her several medications on her list that we are not sure if she is taking or not, patient is not very clear in which she takes every day.    Bipolar affective disorder, currently depressed, moderate (CMS-HCC)  See additional notes, patient strongly encouraged to get appointment with psychiatrist.    Positive NAKUL (antinuclear antibody)  Patient has positive NAKUL, she continues to have multiple joint pain is followed by pain management.  At her last visit she was given a list of rheumatologist both local and out of state, she was to call these offices to see if she can get in for further evaluation but she has not done so.  Other than her NAKUL the rest of the rheumatologic work-up is negative.    Skin lesions  Patient has concerning skin lesion on the palmar aspect of her right thumb, states that she does have past history of cancerous lesion in the same area and is concerned and wants this checked by dermatology.  Referral has been placed.      No visits with results within 1 Month(s) from this visit.   Latest known visit with  results is:   Hospital Outpatient Visit on 07/02/2019   Component Date Value   • Significant Indicator 07/02/2019 NEG    • Source 07/02/2019 UR    • Site 07/02/2019 -    • Culture Result 07/02/2019 Mixed skin meghan 10-50,000 cfu/mL          clinical course has been stable    Past Medical History:   Diagnosis Date   • Anxiety and depression    • Arthritis     back, neck   • Asthma    • Back injury    • Back pain    • Bronchitis 2010   • Cancer (HCC)     breast LT   • Chickenpox    • Chronic LBP    • Chronic neck pain    • Cold intolerance 1/24/2012   • COPD (chronic obstructive pulmonary disease) ([facility])    • Cystic fibrosis ([facility])    • Depression    • Diabetes    • GERD (gastroesophageal reflux disease)    • H/O gastric bypass 10/11/2013   • Herniated nucleus pulposus, L4-5 3/18/2010   • HX: breast cancer 10/11/2013   • Hypertension    • Itching due to drug 6/2/2011   • Kidney stone    • Nephrolithiasis 10/11/2013   • Obesity    • Osteoporosis    • Other specified symptom associated with female genital organs    • Panic disorder    • Pneumonia    • PTSD (post-traumatic stress disorder)    • Restless leg syndrome    • Rheumatoid arthritis ([facility])    • S/P laminectomy 10/22/2012   • Thoracic or lumbosacral neuritis or radiculitis, unspecified 10/22/2012   • Trauma     head trauma       Past Surgical History:   Procedure Laterality Date   • URETEROSCOPY  10/10/2013    Performed by Molly Resendiz M.D. at [facility]   • LASERTRIPSY  10/10/2013    Performed by Molly Resendiz M.D. at SURGERY [facility]   • LUMBAR FUSION POSTERIOR  10/22/2012    Performed by Alfred Branham M.D. at SURGERY [facility]   • LUMBAR LAMINECTOMY DISKECTOMY  10/22/2012    Performed by Alfred Branham M.D. at [facility]   • OTHER ORTHOPEDIC SURGERY  10/01/2012    lumbar fusion, Dr Branham   • OTHER ABDOMINAL SURGERY  2006    gastric bypass   • ABDOMINAL HYSTERECTOMY TOTAL      DUE TO FIBROIDS   • BREAST  RECONSTRUCTION      Lt breast   • CRANIOTOMY      DUE TO BRAIN INJURY   • HYSTERECTOMY LAPAROSCOPY     • LAMINOTOMY     • MASTECTOMY      Lt breast   • OTHER      left ear drum surgery   • OTHER ABDOMINAL SURGERY      hernia repair   • PRIMARY C SECTION      x 3   • SLEEVE,MAXIMILIAN VASO THIGH     • TONSILLECTOMY     • US-CYST ASPIRATION-BREAST INITIAL     • VENTILATOR CONTINUOUS         Family History   Problem Relation Age of Onset   • Diabetes Father    • Cancer Father    • Hypertension Father    • Hyperlipidemia Father    • Cancer Mother    • Other Mother         SLE   • Diabetes Mother    • Hypertension Mother    • Hyperlipidemia Mother    • Stroke Mother    • Sleep Apnea Brother    • Cancer Maternal Aunt    • Diabetes Maternal Aunt    • Diabetes Maternal Uncle    • Diabetes Maternal Grandmother    • Diabetes Maternal Grandfather        Buprenorphine-naloxone; Suboxone; and Morphine    Current Outpatient Medications Ordered in Epic   Medication Sig Dispense Refill   • ondansetron (ZOFRAN) 8 MG Tab TAKE 1 TABLET BY MOUTH 3 TIMES A DAY AS NEEDED 15 Tab 1   • oxycodone (OXY-IR) 15 MG immediate release tablet TAKE 1 TABLET BY MOUTH EVERY 8 HOURS FOR 30 DAYS M54 DNF 6/12/19  0   • gabapentin (NEURONTIN) 100 MG Cap Take 100 mg by mouth 3 times a day.     • oxyCODONE-acetaminophen (PERCOCET-10)  MG Tab Take 1-2 Tabs by mouth every four hours as needed for Severe Pain.     • cromolyn (OPTICROM) 4 % ophthalmic solution Apply 2 drops to each eye twice a day as needed for itchy eyes. (Patient not taking: Reported on 6/28/2019) 1 Bottle 1   • amitriptyline (ELAVIL) 25 MG Tab Take 1 Tab by mouth at bedtime as needed. (Patient not taking: Reported on 6/4/2019) 90 Tab 1   • vitamin D, Ergocalciferol, (DRISDOL) 51726 units Cap capsule Take 1 Cap by mouth 2X A WEEK. (Patient not taking: Reported on 6/4/2019) 26 Cap 0   • cyclobenzaprine (FLEXERIL) 10 MG Tab Take 10 mg by mouth 3 times a day as needed.     • Buprenorphine HCl  "(BELBUCA) 150 MCG FILM Place  in mouth by cheek.     • naproxen (NAPROSYN) 125 MG/5ML suspension naproxen   2 PO QD     • meclizine (ANTIVERT) 25 MG Tab Take 1 Tab by mouth 3 times a day as needed for Dizziness, Nausea/Vomiting or Vertigo. (Patient not taking: Reported on 6/28/2019) 30 Tab 0   • cyanocobalamin (VITAMIN B-12) 100 MCG Tab Take 100 mcg by mouth every day.     • VENTOLIN  (90 Base) MCG/ACT Aero Soln inhalation aerosol INHALE 1 PUFF PO QID PRN  0   • ferrous sulfate 324 (65 Fe) MG Tablet Delayed Response EC tablet Take 324 mg by mouth with dinner.     • Oxycodone HCl 20 MG Tab Take 20 mg by mouth 4 times a day.     • ARIPiprazole (ABILIFY) 10 MG Tab Take 1 Tab by mouth every day. (Patient not taking: Reported on 6/28/2019) 30 Tab 2   • budesonide-formoterol (SYMBICORT) 80-4.5 MCG/ACT Aerosol Inhale 2 Puffs by mouth 2 Times a Day. (Patient not taking: Reported on 5/2/2019) 1 Inhaler 3   • fluticasone (FLONASE) 50 MCG/ACT nasal spray Spray 1 Spray in nose 2 times a day. Each Nostril (Patient not taking: Reported on 5/2/2019) 1 Bottle 3     No current Epic-ordered facility-administered medications on file.        Constitutional ROS: No unexpected change in weight, No weakness, No unexplained fevers, sweats, or chills  Pulmonary ROS: No chronic cough, sputum, or hemoptysis, No shortness of breath, No recent change in breathing  Cardiovascular ROS: No chest pain, No edema, No palpitations  Musculoskeletal/Extremities ROS: Positive per HPI  Skin/Integumentary ROS: Positive per HPI  Neurologic ROS: Normal development, No seizures, No weakness  Psychiatric ROS: Positive for depression and anxiety    Physical exam:  /68 (BP Location: Right arm, Patient Position: Sitting, BP Cuff Size: Adult)   Pulse 84   Temp 36.3 °C (97.3 °F) (Temporal)   Resp 16   Ht 1.537 m (5' 0.5\")   Wt 78.5 kg (173 lb)   LMP 10/05/1999   SpO2 97%   Breastfeeding? No   BMI 33.23 kg/m²   General Appearance: Middle-aged " female, alert, no distress, obese, well-groomed  Skin: Positive for whitish irregularly-shaped lesion on the volar aspect of right thumb, positive for burn wound on the lateral aspect of her left forearm, no evidence of infection, no bloody or purulent drainage  Lungs: negative findings: normal respiratory rate and rhythm, normal effort  Musculoskeletal: negative findings: no evidence of joint instability, no evidence of muscle atrophy, no deformities present  Neurologic: intact    Medical decision making/discussion: Patient was strongly advised to make sure she is following up with all of her specialists that she has been referred to.  New referral to dermatology has been placed for her, she agrees to have a Pneumovax 23 today.  With regards to the wound on her arm she was advised to keep this clean, dry, she can apply antibiotic ointment twice daily for no more than 3 to 4 days, keep covered while at work, can leave open to air at night.    Patti was seen today for finger pain.    Diagnoses and all orders for this visit:    Skin lesions  -     REFERRAL TO DERMATOLOGY    Anxiety    Bipolar affective disorder, currently depressed, moderate (HCC)    Positive NAKUL (antinuclear antibody)    Burn    Need for pneumococcal vaccination  -     PneumoVax PPV23 =>2yo        No follow-ups on file.        Please note that this dictation was created using voice recognition software. I have made every reasonable attempt to correct obvious errors, but I expect that there are errors of grammar and possibly content that I did not discover before finalizing the note.

## 2019-08-07 NOTE — ASSESSMENT & PLAN NOTE
Patient has positive NAKUL, she continues to have multiple joint pain is followed by pain management.  At her last visit she was given a list of rheumatologist both local and out of state, she was to call these offices to see if she can get in for further evaluation but she has not done so.  Other than her NAKUL the rest of the rheumatologic work-up is negative.

## 2019-08-07 NOTE — ASSESSMENT & PLAN NOTE
Patient has concerning skin lesion on the palmar aspect of her right thumb, states that she does have past history of cancerous lesion in the same area and is concerned and wants this checked by dermatology.  Referral has been placed.

## 2019-08-07 NOTE — ASSESSMENT & PLAN NOTE
Patient has history of anxiety, depression as well as bipolar affective disorder.  Previously followed by psychiatry, has not been seen in a while.  She is requesting alprazolam for anxiety she states her  is very sick.  I did discuss with her this contraindicated as she is on oxycodone for pain, this is through her pain management specialist.  I did strongly encourage her to get back in touch with her psychiatrist.  Of note she has been on several medications in the past none of which she is taking including Abilify, amitriptyline, has been tried on several other SSRIs.  I did discuss with her the importance of bringing an exact med list when she comes in for her next appointment with her several medications on her list that we are not sure if she is taking or not, patient is not very clear in which she takes every day.

## 2019-08-08 ENCOUNTER — OFFICE VISIT (OUTPATIENT)
Dept: URGENT CARE | Facility: PHYSICIAN GROUP | Age: 54
End: 2019-08-08
Payer: MEDICARE

## 2019-08-08 VITALS
RESPIRATION RATE: 18 BRPM | OXYGEN SATURATION: 98 % | HEART RATE: 78 BPM | TEMPERATURE: 97.5 F | BODY MASS INDEX: 32.65 KG/M2 | WEIGHT: 170 LBS | SYSTOLIC BLOOD PRESSURE: 124 MMHG | DIASTOLIC BLOOD PRESSURE: 88 MMHG

## 2019-08-08 DIAGNOSIS — R10.9 LEFT FLANK PAIN: ICD-10-CM

## 2019-08-08 DIAGNOSIS — R35.0 URINARY FREQUENCY: ICD-10-CM

## 2019-08-08 DIAGNOSIS — R19.7 DIARRHEA, UNSPECIFIED TYPE: ICD-10-CM

## 2019-08-08 DIAGNOSIS — R30.0 DYSURIA: ICD-10-CM

## 2019-08-08 LAB
APPEARANCE UR: NORMAL
BILIRUB UR STRIP-MCNC: NORMAL MG/DL
COLOR UR AUTO: YELLOW
GLUCOSE UR STRIP.AUTO-MCNC: NORMAL MG/DL
KETONES UR STRIP.AUTO-MCNC: NORMAL MG/DL
LEUKOCYTE ESTERASE UR QL STRIP.AUTO: NORMAL
NITRITE UR QL STRIP.AUTO: NORMAL
PH UR STRIP.AUTO: 6.5 [PH] (ref 5–8)
PROT UR QL STRIP: NORMAL MG/DL
RBC UR QL AUTO: NORMAL
SP GR UR STRIP.AUTO: 1.01
UROBILINOGEN UR STRIP-MCNC: 0.2 MG/DL

## 2019-08-08 PROCEDURE — 99214 OFFICE O/P EST MOD 30 MIN: CPT | Performed by: PHYSICIAN ASSISTANT

## 2019-08-08 PROCEDURE — 81002 URINALYSIS NONAUTO W/O SCOPE: CPT | Performed by: PHYSICIAN ASSISTANT

## 2019-08-08 RX ORDER — TIZANIDINE 4 MG/1
4 TABLET ORAL EVERY 6 HOURS PRN
Qty: 30 TAB | Refills: 0 | Status: SHIPPED | OUTPATIENT
Start: 2019-08-08 | End: 2019-12-09

## 2019-08-08 NOTE — PROGRESS NOTES
Chief Complaint   Patient presents with   • Flank Pain     Left side x 1 week        HISTORY OF PRESENT ILLNESS: Patient is a 53 y.o. female who presents today for the following:    Patient comes in for evaluation of left flank pain for the last week.  She reports a history of chronic back pain and renal stones.  She has not noticed any blood in her urine.  She does report intermittent dysuria and increased urinary frequency and urgency, multiple watery stools daily over the last week as well.  She denies nausea and vomiting.  She reports intermittent hot flashes but also as she is going to menopause.  She reports anxiety and states it has increased over the last 24 hours, worse today.  She recently saw her primary care provider for the same.       Patient Active Problem List    Diagnosis Date Noted   • Burn 07/03/2019   • Malodorous urine 07/03/2019   • Chronic obstructive pulmonary disease (HCC) 07/02/2019   • Abscess 06/04/2019   • Allergic conjunctivitis of both eyes 05/15/2019   • Hyperparathyroidism (HCC) 04/11/2019   • Benign paroxysmal positional vertigo due to bilateral vestibular disorder 04/11/2019   • Shoulder impingement, right 04/11/2019   • Elevated PTHrP level 10/09/2018   • Vitiligo 09/27/2018   • Vitamin B12 deficiency 09/27/2018   • Abnormal laboratory test 09/25/2018   • TOMY (obstructive sleep apnea) 09/17/2018   • History of diabetes mellitus, type II 09/04/2018   • Positive NAKUL (antinuclear antibody) 07/24/2018   • Arthritis, multiple joint involvement 07/24/2018   • Family history of systemic lupus erythematosus (SLE) in mother 07/24/2018   • Iron deficiency anemia 07/24/2018   • Skin lesions 07/24/2018   • Chronic fatigue 06/20/2018   • Heartburn 05/08/2018   • Muscle spasm of both lower legs 03/26/2018   • Calcaneal spur of both feet 03/15/2018   • Pain management contract broken 03/15/2018   • Obesity (BMI 30-39.9) 01/18/2018   • Seasonal allergies 10/05/2017   • Bipolar affective disorder,  currently depressed, moderate (HCC) 01/24/2017   • History of gastric bypass 10/11/2013   • Chronic pain syndrome 09/16/2013   • Anxiety 12/06/2012   • Lumbar stenosis 10/22/2012   • Multilevel degenerative disc disease 10/01/2012   • Cervical radiculopathy, chronic 05/04/2011   • Vitamin D deficiency 11/16/2010   • Arthritis    • Panic attacks    • PTSD (post-traumatic stress disorder)    • Insomnia due to medical condition 11/10/2010       Allergies:Buprenorphine-naloxone; Suboxone; and Morphine    Current Outpatient Medications Ordered in Epic   Medication Sig Dispense Refill   • tizanidine (ZANAFLEX) 4 MG Tab Take 1 Tab by mouth every 6 hours as needed (pain). 30 Tab 0   • ondansetron (ZOFRAN) 8 MG Tab TAKE 1 TABLET BY MOUTH 3 TIMES A DAY AS NEEDED 15 Tab 1   • oxyCODONE-acetaminophen (PERCOCET-10)  MG Tab Take 1-2 Tabs by mouth every four hours as needed for Severe Pain.     • gabapentin (NEURONTIN) 100 MG Cap Take 100 mg by mouth 3 times a day.     • oxycodone (OXY-IR) 15 MG immediate release tablet TAKE 1 TABLET BY MOUTH EVERY 8 HOURS FOR 30 DAYS M54 DNF 6/12/19  0   • cromolyn (OPTICROM) 4 % ophthalmic solution Apply 2 drops to each eye twice a day as needed for itchy eyes. (Patient not taking: Reported on 6/28/2019) 1 Bottle 1   • amitriptyline (ELAVIL) 25 MG Tab Take 1 Tab by mouth at bedtime as needed. (Patient not taking: Reported on 6/4/2019) 90 Tab 1   • vitamin D, Ergocalciferol, (DRISDOL) 59496 units Cap capsule Take 1 Cap by mouth 2X A WEEK. (Patient not taking: Reported on 6/4/2019) 26 Cap 0   • cyclobenzaprine (FLEXERIL) 10 MG Tab Take 10 mg by mouth 3 times a day as needed.     • Buprenorphine HCl (BELBUCA) 150 MCG FILM Place  in mouth by cheek.     • naproxen (NAPROSYN) 125 MG/5ML suspension naproxen   2 PO QD     • meclizine (ANTIVERT) 25 MG Tab Take 1 Tab by mouth 3 times a day as needed for Dizziness, Nausea/Vomiting or Vertigo. (Patient not taking: Reported on 6/28/2019) 30 Tab 0   •  cyanocobalamin (VITAMIN B-12) 100 MCG Tab Take 100 mcg by mouth every day.     • VENTOLIN  (90 Base) MCG/ACT Aero Soln inhalation aerosol INHALE 1 PUFF PO QID PRN  0   • ferrous sulfate 324 (65 Fe) MG Tablet Delayed Response EC tablet Take 324 mg by mouth with dinner.     • Oxycodone HCl 20 MG Tab Take 20 mg by mouth 4 times a day.     • ARIPiprazole (ABILIFY) 10 MG Tab Take 1 Tab by mouth every day. (Patient not taking: Reported on 6/28/2019) 30 Tab 2   • budesonide-formoterol (SYMBICORT) 80-4.5 MCG/ACT Aerosol Inhale 2 Puffs by mouth 2 Times a Day. (Patient not taking: Reported on 5/2/2019) 1 Inhaler 3   • fluticasone (FLONASE) 50 MCG/ACT nasal spray Spray 1 Spray in nose 2 times a day. Each Nostril (Patient not taking: Reported on 5/2/2019) 1 Bottle 3     No current Epic-ordered facility-administered medications on file.        Past Medical History:   Diagnosis Date   • Anxiety and depression    • Arthritis     back, neck   • Asthma    • Back injury    • Back pain    • Bronchitis 2010   • Cancer (HCC)     breast LT   • Chickenpox    • Chronic LBP    • Chronic neck pain    • Cold intolerance 1/24/2012   • COPD (chronic obstructive pulmonary disease) ([facility])    • Cystic fibrosis ([facility])    • Depression    • Diabetes    • GERD (gastroesophageal reflux disease)    • H/O gastric bypass 10/11/2013   • Herniated nucleus pulposus, L4-5 3/18/2010   • HX: breast cancer 10/11/2013   • Hypertension    • Itching due to drug 6/2/2011   • Kidney stone    • Nephrolithiasis 10/11/2013   • Obesity    • Osteoporosis    • Other specified symptom associated with female genital organs    • Panic disorder    • Pneumonia    • PTSD (post-traumatic stress disorder)    • Restless leg syndrome    • Rheumatoid arthritis (HCC)    • S/P laminectomy 10/22/2012   • Thoracic or lumbosacral neuritis or radiculitis, unspecified 10/22/2012   • Trauma     head trauma       Social History     Tobacco Use   • Smoking status: Never Smoker   •  Smokeless tobacco: Never Used   Substance Use Topics   • Alcohol use: No   • Drug use: No       Family Status   Relation Name Status   • Fa   at age 64   • Mo   at age 75   • Sis 2 Alive   • Bro  Alive   • MAunt  (Not Specified)   • MUnc  (Not Specified)   • MGMo  (Not Specified)   • MGFa  (Not Specified)     Family History   Problem Relation Age of Onset   • Diabetes Father    • Cancer Father    • Hypertension Father    • Hyperlipidemia Father    • Cancer Mother    • Other Mother         SLE   • Diabetes Mother    • Hypertension Mother    • Hyperlipidemia Mother    • Stroke Mother    • Sleep Apnea Brother    • Cancer Maternal Aunt    • Diabetes Maternal Aunt    • Diabetes Maternal Uncle    • Diabetes Maternal Grandmother    • Diabetes Maternal Grandfather        Review of Systems:    Constitutional ROS: No unexpected change in weight, No weakness, No fatigue  Eye ROS: No recent significant change in vision, No eye pain, redness, discharge  Ear ROS: No drainage, No tinnitus or vertigo, No recent change in hearing  Mouth/Throat ROS: No teeth or gum problems, No bleeding gums, No tongue complaints  Neck ROS: No swollen glands, No significant pain in neck  Pulmonary ROS: No chronic cough, sputum, or hemoptysis, No dyspnea on exertion, No wheezing  Cardiovascular ROS: No diaphoresis, No edema, No palpitations  Gastrointestinal ROS: Positive for loose stool.  Positive for mild lower abdominal discomfort.  Musculoskeletal/Extremities ROS: Positive for left flank pain.  Hematologic/Lymphatic ROS: No chills, No night sweats, No weight loss  Skin/Integumentary ROS: No edema, No evidence of rash, No itching      Exam:  /88   Pulse 78   Temp 36.4 °C (97.5 °F)   Resp 18   Wt 77.1 kg (170 lb)   SpO2 98%   General: Well developed, well nourished. No distress.  HEENT: Head is grossly normal.  Pulmonary: Unlabored respiratory effort. Lungs clear to auscultation, no wheezes, no rhonchi.  Cardiovascular:  Regular rate and rhythm without murmur.   Back: Mild discomfort in the left flank.  No CVA tenderness noted.  Neurologic: Grossly nonfocal. No facial asymmetry noted.  Skin: Warm, dry, good turgor. No rashes in visible areas.   Psych: Normal mood. Alert and oriented x3. Judgment and insight is normal.    UA: Negative    Assessment/Plan:  Discussed differential diagnosis with the patient including but not limited to muscle strain, renal stone, constipation, among others.  UA is negative.  We will have patient try tizanidine to see if this helps alleviate some of her pain.  Advised not taking this medication with oxycodone as it may cause drowsiness.  Discussed red flags and ER precautions.  Follow-up with primary care for worsening or persistent symptoms.    A message was sent to the patient's primary care provider regarding anxiety medication.  1. Left flank pain  tizanidine (ZANAFLEX) 4 MG Tab    POCT Urinalysis   2. Diarrhea, unspecified type     3. Dysuria     4. Urinary frequency

## 2019-08-08 NOTE — LETTER
August 8, 2019         Patient: Patti Tello   YOB: 1965   Date of Visit: 8/8/2019           To Whom it May Concern:    Patti Tello was seen in my clinic on 8/8/2019. She may return to work on 8/10/19, sooner if feeling better.    If you have any questions or concerns, please don't hesitate to call.        Sincerely,           Patrica Goins P.A.-C.  Electronically Signed

## 2019-08-14 ENCOUNTER — APPOINTMENT (RX ONLY)
Dept: URBAN - METROPOLITAN AREA CLINIC 4 | Facility: CLINIC | Age: 54
Setting detail: DERMATOLOGY
End: 2019-08-14

## 2019-08-14 DIAGNOSIS — Z85.828 PERSONAL HISTORY OF OTHER MALIGNANT NEOPLASM OF SKIN: ICD-10-CM

## 2019-08-14 DIAGNOSIS — L57.8 OTHER SKIN CHANGES DUE TO CHRONIC EXPOSURE TO NONIONIZING RADIATION: ICD-10-CM

## 2019-08-14 PROBLEM — F32.9 MAJOR DEPRESSIVE DISORDER, SINGLE EPISODE, UNSPECIFIED: Status: ACTIVE | Noted: 2019-08-14

## 2019-08-14 PROBLEM — J44.9 CHRONIC OBSTRUCTIVE PULMONARY DISEASE, UNSPECIFIED: Status: ACTIVE | Noted: 2019-08-14

## 2019-08-14 PROBLEM — Z85.3 PERSONAL HISTORY OF MALIGNANT NEOPLASM OF BREAST: Status: ACTIVE | Noted: 2019-08-14

## 2019-08-14 PROBLEM — M12.9 ARTHROPATHY, UNSPECIFIED: Status: ACTIVE | Noted: 2019-08-14

## 2019-08-14 PROBLEM — D48.5 NEOPLASM OF UNCERTAIN BEHAVIOR OF SKIN: Status: ACTIVE | Noted: 2019-08-14

## 2019-08-14 PROBLEM — F41.9 ANXIETY DISORDER, UNSPECIFIED: Status: ACTIVE | Noted: 2019-08-14

## 2019-08-14 PROCEDURE — ? ADDITIONAL NOTES

## 2019-08-14 PROCEDURE — 11102 TANGNTL BX SKIN SINGLE LES: CPT

## 2019-08-14 PROCEDURE — ? COUNSELING

## 2019-08-14 PROCEDURE — 99202 OFFICE O/P NEW SF 15 MIN: CPT | Mod: 25

## 2019-08-14 PROCEDURE — ? BIOPSY BY SHAVE METHOD

## 2019-08-14 ASSESSMENT — LOCATION ZONE DERM
LOCATION ZONE: FACE
LOCATION ZONE: FINGER

## 2019-08-14 ASSESSMENT — LOCATION SIMPLE DESCRIPTION DERM
LOCATION SIMPLE: RIGHT CHEEK
LOCATION SIMPLE: RIGHT THUMB

## 2019-08-14 ASSESSMENT — LOCATION DETAILED DESCRIPTION DERM
LOCATION DETAILED: RIGHT CENTRAL MALAR CHEEK
LOCATION DETAILED: RIGHT DISTAL RADIAL THUMB

## 2019-08-14 NOTE — PROCEDURE: ADDITIONAL NOTES
Detail Level: Simple
Additional Notes: Includes spots of concern on intake.\\n3 pieces were biopsied; 1 large and 2 small. \\nWork note given to patient for no heavy lifting.

## 2019-08-14 NOTE — HPI: SKIN LESION
Is This A New Presentation, Or A Follow-Up?: Skin Lesion
What Type Of Note Output Would You Prefer (Optional)?: Standard Output
How Severe Is Your Skin Lesion?: mild
Has Your Skin Lesion Been Treated?: been treated
Additional History: Patient was seen by dermatologist Dr. Mayo in [facility] 5/2019 who did a biopsy and came back as skin cancer(unknown). Exc. was performed.
When Was It Treated?: 5/2019

## 2019-08-14 NOTE — PROCEDURE: BIOPSY BY SHAVE METHOD
Consent: Written consent was obtained and risks were reviewed including but not limited to scarring, infection, bleeding, scabbing, incomplete removal, nerve damage and allergy to anesthesia.
Biopsy Type: H and E
Wound Care: Aquaphor
Lab Facility: 
Additional Anesthesia Volume In Cc (Will Not Render If 0): 0
Cryotherapy Text: The wound bed was treated with cryotherapy after the biopsy was performed.
Anesthesia Type: 1% lidocaine with epinephrine
Depth Of Biopsy: dermis
Render Post-Care Instructions In Note?: no
Lab: [id number]
Curettage Text: The wound bed was treated with curettage after the biopsy was performed.
Size Of Lesion In Cm: 0.6
Billing Type: Third-Party Bill
Hemostasis: Aluminum Chloride and Electrocautery
Silver Nitrate Text: The wound bed was treated with silver nitrate after the biopsy was performed.
Detail Level: Detailed
Biopsy Method: 15 blade
Post-Care Instructions: I reviewed with the patient in detail post-care instructions. Patient is to keep the biopsy site dry overnight, and then apply bacitracin twice daily until healed. Patient may apply hydrogen peroxide soaks to remove any crusting.
Dressing: Band-Aid
Electrodesiccation And Curettage Text: The wound bed was treated with electrodesiccation and curettage after the biopsy was performed.
Type Of Destruction Used: Curettage
Was A Bandage Applied: Yes
Notification Instructions: Patient will be notified of biopsy results. However, patient instructed to call the office if not contacted within 2 weeks.
Electrodesiccation Text: The wound bed was treated with electrodesiccation after the biopsy was performed.

## 2019-09-27 ENCOUNTER — PATIENT MESSAGE (OUTPATIENT)
Dept: MEDICAL GROUP | Facility: PHYSICIAN GROUP | Age: 54
End: 2019-09-27

## 2019-09-27 DIAGNOSIS — R11.0 NAUSEA: ICD-10-CM

## 2019-09-27 NOTE — TELEPHONE ENCOUNTER
From: Patti Tello  To: PATRICK Chris  Sent: 9/27/2019 3:51 PM PDT  Subject: Prescription Question    Mishel Sims,    I like to request a refill for zofran 8 mg and ibprofens 800 mg . Thank you Patti Tello

## 2019-09-30 RX ORDER — ONDANSETRON HYDROCHLORIDE 8 MG/1
TABLET, FILM COATED ORAL
Qty: 15 TAB | Refills: 1 | Status: SHIPPED | OUTPATIENT
Start: 2019-09-30 | End: 2019-09-30 | Stop reason: SDUPTHER

## 2019-09-30 RX ORDER — ONDANSETRON HYDROCHLORIDE 8 MG/1
TABLET, FILM COATED ORAL
Qty: 15 TAB | Refills: 1 | Status: SHIPPED | OUTPATIENT
Start: 2019-09-30 | End: 2019-12-04 | Stop reason: SDUPTHER

## 2019-09-30 RX ORDER — IBUPROFEN 800 MG/1
800 TABLET ORAL EVERY 8 HOURS PRN
Qty: 90 TAB | Refills: 1 | Status: SHIPPED | OUTPATIENT
Start: 2019-09-30 | End: 2019-11-20 | Stop reason: SDUPTHER

## 2019-09-30 NOTE — TELEPHONE ENCOUNTER
Was the patient seen in the last year in this department? Yes    Does patient have an active prescription for medications requested? No     Received Request Via: Pharmacy      Pt met protocol?: Yes   Pt last ov 8/2019   Lab Results   Component Value Date/Time    SODIUM 142 12/22/2018 11:16 AM    POTASSIUM 3.4 (L) 12/22/2018 11:16 AM    CHLORIDE 106 12/22/2018 11:16 AM    CO2 27 12/22/2018 11:16 AM    GLUCOSE 89 12/22/2018 11:16 AM    BUN 14 12/22/2018 11:16 AM    CREATININE 0.86 12/22/2018 11:16 AM    CREATININE 0.9 10/25/2005 12:10 PM

## 2019-10-01 ENCOUNTER — HOSPITAL ENCOUNTER (OUTPATIENT)
Dept: LAB | Facility: MEDICAL CENTER | Age: 54
End: 2019-10-01
Attending: ANESTHESIOLOGY
Payer: MEDICARE

## 2019-10-01 LAB — HCT VFR BLD AUTO: 46.2 % (ref 37–47)

## 2019-10-01 PROCEDURE — 36415 COLL VENOUS BLD VENIPUNCTURE: CPT

## 2019-10-01 PROCEDURE — 85014 HEMATOCRIT: CPT

## 2019-10-09 ENCOUNTER — HOSPITAL ENCOUNTER (OUTPATIENT)
Dept: RADIOLOGY | Facility: MEDICAL CENTER | Age: 54
End: 2019-10-09
Attending: OPHTHALMOLOGY
Payer: OTHER MISCELLANEOUS

## 2019-10-09 DIAGNOSIS — H05.421: ICD-10-CM

## 2019-10-09 DIAGNOSIS — S09.93XD FACIAL TRAUMA, SUBSEQUENT ENCOUNTER: ICD-10-CM

## 2019-10-09 PROCEDURE — 70487 CT MAXILLOFACIAL W/DYE: CPT

## 2019-10-09 PROCEDURE — 700117 HCHG RX CONTRAST REV CODE 255: Performed by: OPHTHALMOLOGY

## 2019-10-09 RX ADMIN — IOHEXOL 80 ML: 350 INJECTION, SOLUTION INTRAVENOUS at 11:29

## 2019-11-01 ENCOUNTER — HOSPITAL ENCOUNTER (OUTPATIENT)
Dept: LAB | Facility: MEDICAL CENTER | Age: 54
End: 2019-11-01
Attending: INTERNAL MEDICINE
Payer: MEDICARE

## 2019-11-01 ENCOUNTER — HOSPITAL ENCOUNTER (OUTPATIENT)
Dept: LAB | Facility: MEDICAL CENTER | Age: 54
End: 2019-11-01
Attending: NURSE PRACTITIONER
Payer: MEDICARE

## 2019-11-01 DIAGNOSIS — Z13.6 SCREENING FOR CARDIOVASCULAR CONDITION: ICD-10-CM

## 2019-11-01 DIAGNOSIS — D50.9 IRON DEFICIENCY ANEMIA, UNSPECIFIED IRON DEFICIENCY ANEMIA TYPE: ICD-10-CM

## 2019-11-01 DIAGNOSIS — E53.8 VITAMIN B12 DEFICIENCY: ICD-10-CM

## 2019-11-01 DIAGNOSIS — E55.9 VITAMIN D DEFICIENCY: ICD-10-CM

## 2019-11-01 DIAGNOSIS — Z98.84 HISTORY OF GASTRIC BYPASS: ICD-10-CM

## 2019-11-01 LAB
25(OH)D3 SERPL-MCNC: 15 NG/ML (ref 30–100)
ALBUMIN SERPL BCP-MCNC: 4.2 G/DL (ref 3.2–4.9)
ALBUMIN/GLOB SERPL: 1.5 G/DL
ALP SERPL-CCNC: 119 U/L (ref 30–99)
ALT SERPL-CCNC: 14 U/L (ref 2–50)
ANION GAP SERPL CALC-SCNC: 9 MMOL/L (ref 0–11.9)
AST SERPL-CCNC: 15 U/L (ref 12–45)
BASOPHILS # BLD AUTO: 0.8 % (ref 0–1.8)
BASOPHILS # BLD: 0.07 K/UL (ref 0–0.12)
BILIRUB SERPL-MCNC: 0.4 MG/DL (ref 0.1–1.5)
BUN SERPL-MCNC: 12 MG/DL (ref 8–22)
CALCIUM SERPL-MCNC: 9.4 MG/DL (ref 8.5–10.5)
CHLORIDE SERPL-SCNC: 108 MMOL/L (ref 96–112)
CHOLEST SERPL-MCNC: 173 MG/DL (ref 100–199)
CO2 SERPL-SCNC: 25 MMOL/L (ref 20–33)
CREAT SERPL-MCNC: 0.75 MG/DL (ref 0.5–1.4)
EOSINOPHIL # BLD AUTO: 0.82 K/UL (ref 0–0.51)
EOSINOPHIL NFR BLD: 9.2 % (ref 0–6.9)
ERYTHROCYTE [DISTWIDTH] IN BLOOD BY AUTOMATED COUNT: 47.7 FL (ref 35.9–50)
FERRITIN SERPL-MCNC: 109 NG/ML (ref 10–291)
GLOBULIN SER CALC-MCNC: 2.8 G/DL (ref 1.9–3.5)
GLUCOSE SERPL-MCNC: 85 MG/DL (ref 65–99)
HCT VFR BLD AUTO: 44.9 % (ref 37–47)
HDLC SERPL-MCNC: 56 MG/DL
HGB BLD-MCNC: 14.3 G/DL (ref 12–16)
IMM GRANULOCYTES # BLD AUTO: 0.04 K/UL (ref 0–0.11)
IMM GRANULOCYTES NFR BLD AUTO: 0.4 % (ref 0–0.9)
LDLC SERPL CALC-MCNC: 93 MG/DL
LYMPHOCYTES # BLD AUTO: 2.03 K/UL (ref 1–4.8)
LYMPHOCYTES NFR BLD: 22.7 % (ref 22–41)
MCH RBC QN AUTO: 29.2 PG (ref 27–33)
MCHC RBC AUTO-ENTMCNC: 31.8 G/DL (ref 33.6–35)
MCV RBC AUTO: 91.8 FL (ref 81.4–97.8)
MONOCYTES # BLD AUTO: 0.64 K/UL (ref 0–0.85)
MONOCYTES NFR BLD AUTO: 7.2 % (ref 0–13.4)
NEUTROPHILS # BLD AUTO: 5.33 K/UL (ref 2–7.15)
NEUTROPHILS NFR BLD: 59.7 % (ref 44–72)
NRBC # BLD AUTO: 0 K/UL
NRBC BLD-RTO: 0 /100 WBC
PLATELET # BLD AUTO: 289 K/UL (ref 164–446)
PMV BLD AUTO: 11.5 FL (ref 9–12.9)
POTASSIUM SERPL-SCNC: 3.6 MMOL/L (ref 3.6–5.5)
PROT SERPL-MCNC: 7 G/DL (ref 6–8.2)
RBC # BLD AUTO: 4.89 M/UL (ref 4.2–5.4)
SODIUM SERPL-SCNC: 142 MMOL/L (ref 135–145)
TRIGL SERPL-MCNC: 119 MG/DL (ref 0–149)
WBC # BLD AUTO: 8.9 K/UL (ref 4.8–10.8)

## 2019-11-01 PROCEDURE — 80053 COMPREHEN METABOLIC PANEL: CPT

## 2019-11-01 PROCEDURE — 85025 COMPLETE CBC W/AUTO DIFF WBC: CPT

## 2019-11-01 PROCEDURE — 82728 ASSAY OF FERRITIN: CPT

## 2019-11-01 PROCEDURE — 80061 LIPID PANEL: CPT

## 2019-11-01 PROCEDURE — 82306 VITAMIN D 25 HYDROXY: CPT

## 2019-11-01 PROCEDURE — 36415 COLL VENOUS BLD VENIPUNCTURE: CPT

## 2019-11-06 RX ORDER — ERGOCALCIFEROL 1.25 MG/1
50000 CAPSULE ORAL
Qty: 12 CAP | Refills: 1 | Status: SHIPPED | OUTPATIENT
Start: 2019-11-06 | End: 2019-12-09

## 2019-11-07 ENCOUNTER — OFFICE VISIT (OUTPATIENT)
Dept: HEMATOLOGY ONCOLOGY | Facility: MEDICAL CENTER | Age: 54
End: 2019-11-07
Payer: MEDICARE

## 2019-11-07 VITALS
DIASTOLIC BLOOD PRESSURE: 72 MMHG | OXYGEN SATURATION: 97 % | WEIGHT: 173.06 LBS | HEIGHT: 61 IN | HEART RATE: 82 BPM | RESPIRATION RATE: 14 BRPM | TEMPERATURE: 97.4 F | SYSTOLIC BLOOD PRESSURE: 124 MMHG | BODY MASS INDEX: 32.67 KG/M2

## 2019-11-07 DIAGNOSIS — E55.9 VITAMIN D DEFICIENCY: ICD-10-CM

## 2019-11-07 DIAGNOSIS — D50.9 IRON DEFICIENCY ANEMIA, UNSPECIFIED IRON DEFICIENCY ANEMIA TYPE: ICD-10-CM

## 2019-11-07 DIAGNOSIS — Z09 ENCOUNTER FOR HEMATOLOGY FOLLOW-UP: ICD-10-CM

## 2019-11-07 DIAGNOSIS — Z98.84 H/O GASTRIC BYPASS: ICD-10-CM

## 2019-11-07 PROCEDURE — 99213 OFFICE O/P EST LOW 20 MIN: CPT | Performed by: NURSE PRACTITIONER

## 2019-11-07 ASSESSMENT — ENCOUNTER SYMPTOMS
NAUSEA: 1
MYALGIAS: 1
FEVER: 0
HEADACHES: 1
NERVOUS/ANXIOUS: 1
PALPITATIONS: 0
WEIGHT LOSS: 1
CHILLS: 0
DIARRHEA: 0
WHEEZING: 0
VOMITING: 0
SHORTNESS OF BREATH: 1
COUGH: 0
TINGLING: 0
DIZZINESS: 1
BACK PAIN: 1
CONSTIPATION: 0

## 2019-11-07 NOTE — PROGRESS NOTES
Subjective:      Patti Tello is a 54 y.o. female who presents for Follow-Up (6 month follow up visit) routine surveillance evaluation of iron deficiency anemia       HPI   Ms. Tello is established with our office for continued surveillance of symptomatic iron deficiency anemia secondary to gastric bypass in 2016. Symptoms resolved with 5 treatments of Venofer in 10/2018 with noted improvement of iron stores. She presents today for routine evaluation and is unaccompanied for today's visit.    Patient is lost 10 pounds since her visit in May.  She had rotator cuff surgery last month and is scheduled for surgical repair of right orbital socket in December.  She is not tolerating activity very well and gets short of breath with exertion.  She is not using CPAP at night.  In regards to anemia she is otherwise asymptomatic.      Allergies   Allergen Reactions   • Buprenorphine-Naloxone Hives, Shortness of Breath and Swelling   • Suboxone Hives, Shortness of Breath and Swelling   • Morphine Rash     tachycardia           Current Outpatient Medications on File Prior to Visit   Medication Sig Dispense Refill   • ergocalciferol (DRISDOL) 14969 UNIT capsule Take 1 Cap by mouth every 7 days. (Patient not taking: Reported on 11/7/2019) 12 Cap 1   • ibuprofen (MOTRIN) 800 MG Tab Take 1 Tab by mouth every 8 hours as needed. (Patient not taking: Reported on 11/7/2019) 90 Tab 1   • ondansetron (ZOFRAN) 8 MG Tab TAKE 1 TABLET BY MOUTH THREE TIMES A DAY AS NEEDED (Patient not taking: Reported on 11/7/2019) 15 Tab 1   • tizanidine (ZANAFLEX) 4 MG Tab Take 1 Tab by mouth every 6 hours as needed (pain). (Patient not taking: Reported on 11/7/2019) 30 Tab 0   • oxycodone (OXY-IR) 15 MG immediate release tablet TAKE 1 TABLET BY MOUTH EVERY 8 HOURS FOR 30 DAYS M54 DNF 6/12/19  0   • oxyCODONE-acetaminophen (PERCOCET-10)  MG Tab Take 1-2 Tabs by mouth every four hours as needed for Severe Pain.     • gabapentin (NEURONTIN) 100 MG  Cap Take 100 mg by mouth 3 times a day.     • cromolyn (OPTICROM) 4 % ophthalmic solution Apply 2 drops to each eye twice a day as needed for itchy eyes. (Patient not taking: Reported on 6/28/2019) 1 Bottle 1   • amitriptyline (ELAVIL) 25 MG Tab Take 1 Tab by mouth at bedtime as needed. (Patient not taking: Reported on 6/4/2019) 90 Tab 1   • vitamin D, Ergocalciferol, (DRISDOL) 80209 units Cap capsule Take 1 Cap by mouth 2X A WEEK. (Patient not taking: Reported on 6/4/2019) 26 Cap 0   • cyclobenzaprine (FLEXERIL) 10 MG Tab Take 10 mg by mouth 3 times a day as needed.     • Buprenorphine HCl (BELBUCA) 150 MCG FILM Place  in mouth by cheek.     • naproxen (NAPROSYN) 125 MG/5ML suspension naproxen   2 PO QD     • meclizine (ANTIVERT) 25 MG Tab Take 1 Tab by mouth 3 times a day as needed for Dizziness, Nausea/Vomiting or Vertigo. (Patient not taking: Reported on 6/28/2019) 30 Tab 0   • cyanocobalamin (VITAMIN B-12) 100 MCG Tab Take 100 mcg by mouth every day.     • VENTOLIN  (90 Base) MCG/ACT Aero Soln inhalation aerosol INHALE 1 PUFF PO QID PRN  0   • ferrous sulfate 324 (65 Fe) MG Tablet Delayed Response EC tablet Take 324 mg by mouth with dinner.     • Oxycodone HCl 20 MG Tab Take 20 mg by mouth 4 times a day.     • ARIPiprazole (ABILIFY) 10 MG Tab Take 1 Tab by mouth every day. (Patient not taking: Reported on 6/28/2019) 30 Tab 2   • budesonide-formoterol (SYMBICORT) 80-4.5 MCG/ACT Aerosol Inhale 2 Puffs by mouth 2 Times a Day. (Patient not taking: Reported on 5/2/2019) 1 Inhaler 3   • fluticasone (FLONASE) 50 MCG/ACT nasal spray Spray 1 Spray in nose 2 times a day. Each Nostril (Patient not taking: Reported on 5/2/2019) 1 Bottle 3     No current facility-administered medications on file prior to visit.            Review of Systems   Constitutional: Positive for weight loss (10 lbs. down since May visit). Negative for chills, fever and malaise/fatigue (was better when working; doing PT since surgery).  "  Respiratory: Positive for shortness of breath (when laying down - better with pillow elevation:  states patient \"gasping for air\" she is NOT using CPAP; worsening activity tolerance). Negative for cough and wheezing.    Cardiovascular: Negative for chest pain, palpitations and leg swelling.   Gastrointestinal: Positive for nausea (after surgery - better now). Negative for constipation, diarrhea and vomiting.   Genitourinary: Negative for dysuria.   Musculoskeletal: Positive for back pain (back and legs - not a lot or purposeful activity - better whn more active) and myalgias.   Neurological: Positive for dizziness (d/t h/o vertigo) and headaches (d/t h/o vertigo). Negative for tingling.   Psychiatric/Behavioral: The patient is nervous/anxious.           Objective:     /72 (BP Location: Left arm, Patient Position: Sitting, BP Cuff Size: Adult)   Pulse 82   Temp 36.3 °C (97.4 °F) (Temporal)   Resp 14   Ht 1.549 m (5' 1\")   Wt 78.5 kg (173 lb 1 oz)   LMP 10/05/1999   SpO2 97%   BMI 32.70 kg/m²        Physical Exam  Vitals signs reviewed.   Constitutional:       General: She is not in acute distress.     Appearance: She is well-developed. She is not diaphoretic.   HENT:      Head: Normocephalic and atraumatic.      Mouth/Throat:      Pharynx: No oropharyngeal exudate.   Eyes:      General: No scleral icterus.        Right eye: No discharge.         Left eye: No discharge.      Conjunctiva/sclera: Conjunctivae normal.      Pupils: Pupils are equal, round, and reactive to light.   Neck:      Musculoskeletal: Normal range of motion and neck supple.   Cardiovascular:      Rate and Rhythm: Normal rate and regular rhythm.      Heart sounds: Normal heart sounds. No murmur. No friction rub. No gallop.    Pulmonary:      Effort: Pulmonary effort is normal. No respiratory distress.      Breath sounds: Normal breath sounds. No wheezing.   Abdominal:      General: Bowel sounds are normal. There is no " distension.      Palpations: Abdomen is soft.      Tenderness: There is no tenderness.   Musculoskeletal: Normal range of motion.         General: Tenderness (RUE - s/p rotator cuff repair 10/2019) present.   Skin:     General: Skin is warm and dry.      Coloration: Skin is not pale.      Findings: No erythema or rash.   Neurological:      Mental Status: She is alert and oriented to person, place, and time.   Psychiatric:         Behavior: Behavior normal.         No visits with results within 1 Day(s) from this visit.   Latest known visit with results is:   Hospital Outpatient Visit on 11/01/2019   Component Date Value Ref Range Status   • Sodium 11/01/2019 142  135 - 145 mmol/L Final   • Potassium 11/01/2019 3.6  3.6 - 5.5 mmol/L Final   • Chloride 11/01/2019 108  96 - 112 mmol/L Final   • Co2 11/01/2019 25  20 - 33 mmol/L Final   • Anion Gap 11/01/2019 9.0  0.0 - 11.9 Final   • Glucose 11/01/2019 85  65 - 99 mg/dL Final   • Bun 11/01/2019 12  8 - 22 mg/dL Final   • Creatinine 11/01/2019 0.75  0.50 - 1.40 mg/dL Final   • Calcium 11/01/2019 9.4  8.5 - 10.5 mg/dL Final   • AST(SGOT) 11/01/2019 15  12 - 45 U/L Final   • ALT(SGPT) 11/01/2019 14  2 - 50 U/L Final   • Alkaline Phosphatase 11/01/2019 119* 30 - 99 U/L Final   • Total Bilirubin 11/01/2019 0.4  0.1 - 1.5 mg/dL Final   • Albumin 11/01/2019 4.2  3.2 - 4.9 g/dL Final   • Total Protein 11/01/2019 7.0  6.0 - 8.2 g/dL Final   • Globulin 11/01/2019 2.8  1.9 - 3.5 g/dL Final   • A-G Ratio 11/01/2019 1.5  g/dL Final   • Cholesterol,Tot 11/01/2019 173  100 - 199 mg/dL Final   • Triglycerides 11/01/2019 119  0 - 149 mg/dL Final   • HDL 11/01/2019 56  >=40 mg/dL Final   • LDL 11/01/2019 93  <100 mg/dL Final   • 25-Hydroxy   Vitamin D 25 11/01/2019 15* 30 - 100 ng/mL Final    Comment: Adult Ranges:   <20 ng/mL - Deficiency  20-29 ng/mL - Insufficiency   ng/mL - Sufficiency  The [employer] Centaur Vitamin D Assay is standardized to the  [employer] reference  measurement procedures, a  reference method for the Vitamin D Standardization Program  (VDSP).  The VDSP aligns patient results among 25 (OH)  Vitamin D methods.     • GFR If  11/01/2019 >60  >60 mL/min/1.73 m 2 Final   • GFR If Non  11/01/2019 >60  >60 mL/min/1.73 m 2 Final                                    Assessment/Plan:       1. Iron deficiency anemia, unspecified iron deficiency anemia type  CBC WITH DIFFERENTIAL    Comp Metabolic Panel    FERRITIN    REFERRAL TO NUTRITION SERVICES   2. Encounter for hematology follow-up     3. H/O gastric bypass  REFERRAL TO NUTRITION SERVICES   4. Vitamin D deficiency           1.  Vit D: Patient has not been taking vitamin D as per PCP.  She reports that there has been a new prescription called and that she will resume and follow-up with PCP accordingly.    2.  History of gastric bypass: Patient underwent gastric bypass in 2006 but there appears to be knowledge deficit regards to ongoing dietary needs, vitamin needs, varying weight loss/gain, dietary restrictions (namely soda intake).  Patient no longer follows up with surgeon and is referred to dietary for reevaluation of dietary needs and additional teaching as indicated.    3.  Iron deficiency anemia: Much improved following Venofer in 2018.  Ferritin is noted to be improved to 109.  Patient reported fatigue is likely secondary to surgical procedure last month.  She will continue to monitor.  Given stable lab results she will repeat labs every 6-months and return for reevaluation in 1 year, sooner as needed.            The patient verbalized agreement and understanding of current plan. All questions and concerns were addressed at time of visit.    Please note that this dictation was created using voice recognition software. I have made every reasonable attempt to correct obvious errors, but I expect that there are errors of grammar and possibly content that I did not discover before  finalizing the note.

## 2019-11-08 ENCOUNTER — OFFICE VISIT (OUTPATIENT)
Dept: DERMATOLOGY | Facility: IMAGING CENTER | Age: 54
End: 2019-11-08
Payer: MEDICARE

## 2019-11-08 VITALS — TEMPERATURE: 97.3 F | HEIGHT: 63 IN | BODY MASS INDEX: 30.12 KG/M2 | WEIGHT: 170 LBS

## 2019-11-08 DIAGNOSIS — L80 VITILIGO: ICD-10-CM

## 2019-11-08 PROCEDURE — 99203 OFFICE O/P NEW LOW 30 MIN: CPT | Performed by: DERMATOLOGY

## 2019-11-08 NOTE — PROGRESS NOTES
CC: white patches of skin apperring    Subjective: NEW seen patient here for White patches of skin appearing on back and abdomen; Had 2 spots for 3 mths and appearing more and more worried about spreading. Does not want it to affect face.    Reports biopsy done previously with vitiligo given on dx.    History of skin cancer: Yes, Details: Rt thumb 07/2018 unknown type   History of precancers/actinic keratoses: Yes, Details: unknown type   History of biopsies:Yes, Details: r thumb mole 07/2018   History of blistering/severe sunburns:No  Family history of skin cancer:No  Family history of atypical moles:No    ROS: no fevers/chills. No itch.  Diarrhea noted.  Hx of left arm pain, improved after surgery  DermPMH: no skin cancer/melanoma  No problem-specific Assessment & Plan notes found for this encounter.    Relevant PMH:hx positive NAKUL 1:80, seasonal allergies, arthitis. Hx gastric bypass  Social:denies well water.  Eats pork, cooked.  Never smoker    PE: Gen:WDWN female in NAD.  Skin: face/eyes/lips/neck - not affected.  Chest/abdomen/back - scattered depigmented macules and patches predominant on waistband and breast region of skin.  Declined lower body exam.    Labs: 4/16/19  TSH 0.380 - 5.330 uIU/mL 0.740  1.360      Antinuclear Antibody (NAKUL), HEp-2, IgG <1:80 <1:80      Labs 5/2/19  Vitamin B12 -True Cobalamin 211 - 911 pg/mL 361        Results for SHELL BRICEÑO (MRN [id number]) as of 11/8/2019 14:34  Results for SHELL BRICEÑO (MRN [id number]) as of 11/8/2019 14:34   Ref. Range 11/1/2019 06:22   WBC Latest Ref Range: 4.8 - 10.8 K/uL 8.9   RBC Latest Ref Range: 4.20 - 5.40 M/uL 4.89   Hemoglobin Latest Ref Range: 12.0 - 16.0 g/dL 14.3   Hematocrit Latest Ref Range: 37.0 - 47.0 % 44.9   MCV Latest Ref Range: 81.4 - 97.8 fL 91.8   MCH Latest Ref Range: 27.0 - 33.0 pg 29.2   MCHC Latest Ref Range: 33.6 - 35.0 g/dL 31.8 (L)   RDW Latest Ref Range: 35.9 - 50.0 fL 47.7   Platelet Count Latest Ref Range: 164 - 446 K/uL  289   MPV Latest Ref Range: 9.0 - 12.9 fL 11.5      Ref. Range 11/1/2019 06:20   Sodium Latest Ref Range: 135 - 145 mmol/L 142   Potassium Latest Ref Range: 3.6 - 5.5 mmol/L 3.6   Chloride Latest Ref Range: 96 - 112 mmol/L 108   Co2 Latest Ref Range: 20 - 33 mmol/L 25   Anion Gap Latest Ref Range: 0.0 - 11.9  9.0   Glucose Latest Ref Range: 65 - 99 mg/dL 85   Bun Latest Ref Range: 8 - 22 mg/dL 12   Creatinine Latest Ref Range: 0.50 - 1.40 mg/dL 0.75   GFR If  Latest Ref Range: >60 mL/min/1.73 m 2 >60   GFR If Non  Latest Ref Range: >60 mL/min/1.73 m 2 >60   Calcium Latest Ref Range: 8.5 - 10.5 mg/dL 9.4   AST(SGOT) Latest Ref Range: 12 - 45 U/L 15   ALT(SGPT) Latest Ref Range: 2 - 50 U/L 14   Alkaline Phosphatase Latest Ref Range: 30 - 99 U/L 119 (H)   Total Bilirubin Latest Ref Range: 0.1 - 1.5 mg/dL 0.4   Albumin Latest Ref Range: 3.2 - 4.9 g/dL 4.2   Total Protein Latest Ref Range: 6.0 - 8.2 g/dL 7.0   Globulin Latest Ref Range: 1.9 - 3.5 g/dL 2.8   A-G Ratio Latest Units: g/dL 1.5       A/P: vitiligo:   -counseled re: dx/tx  -trial lidex cream BID X 6-8 weeks  -consider change to protopic 0.1% ointment/UVB exposure for additional trx in future  -consider repeat labs: CBC-diff/Vit B12/TSH-T4, thyroid and pernicious anemia Ab, glucose?  -f/u 6-8 weeks    I have reviewed medications relevant to my specialty.    This was a 30-minute face-to-face visit; greater than 50% was spent counseling the patient regarding skin findings and treatments and sun protection/skin cancer detection.

## 2019-11-19 ENCOUNTER — TELEPHONE (OUTPATIENT)
Dept: MEDICAL GROUP | Facility: PHYSICIAN GROUP | Age: 54
End: 2019-11-19

## 2019-11-19 NOTE — TELEPHONE ENCOUNTER
Phone Number Called: [phone] (home)       Call outcome: spoke to patient regarding message below    Message: FYI: Patient left a vm stating that she has had a high heart rate the past few days. Patient was trying to get in with PCP but there were no openings. Patient was advised to head into ER not UC to be evaluated. Patient is currently at [facility]. Patient will sign a release of information and try and get her records after she is discharged. Thank you.

## 2019-11-20 NOTE — TELEPHONE ENCOUNTER
Was the patient seen in the last year in this department? Yes    Does patient have an active prescription for medications requested? No     Received Request Via: Pharmacy      Pt met protocol?: Yes, ov 8/19

## 2019-11-21 RX ORDER — IBUPROFEN 800 MG/1
TABLET ORAL
Qty: 90 TAB | Refills: 0 | Status: SHIPPED | OUTPATIENT
Start: 2019-11-21 | End: 2019-12-18

## 2019-11-25 DIAGNOSIS — K22.2 ESOPHAGEAL STRICTURE: ICD-10-CM

## 2019-11-25 DIAGNOSIS — K21.9 GASTROESOPHAGEAL REFLUX DISEASE, ESOPHAGITIS PRESENCE NOT SPECIFIED: ICD-10-CM

## 2019-12-04 DIAGNOSIS — R11.0 NAUSEA: ICD-10-CM

## 2019-12-05 RX ORDER — ONDANSETRON HYDROCHLORIDE 8 MG/1
TABLET, FILM COATED ORAL
Qty: 15 TAB | Refills: 0 | Status: SHIPPED | OUTPATIENT
Start: 2019-12-05 | End: 2020-01-06 | Stop reason: SDUPTHER

## 2019-12-09 ENCOUNTER — OFFICE VISIT (OUTPATIENT)
Dept: MEDICAL GROUP | Facility: PHYSICIAN GROUP | Age: 54
End: 2019-12-09
Payer: MEDICARE

## 2019-12-09 VITALS
SYSTOLIC BLOOD PRESSURE: 124 MMHG | OXYGEN SATURATION: 95 % | HEART RATE: 71 BPM | TEMPERATURE: 97.9 F | WEIGHT: 177 LBS | RESPIRATION RATE: 14 BRPM | HEIGHT: 63 IN | DIASTOLIC BLOOD PRESSURE: 74 MMHG | BODY MASS INDEX: 31.36 KG/M2

## 2019-12-09 DIAGNOSIS — R13.19 OTHER DYSPHAGIA: ICD-10-CM

## 2019-12-09 DIAGNOSIS — K22.2 ESOPHAGEAL STRICTURE: ICD-10-CM

## 2019-12-09 DIAGNOSIS — J44.9 CHRONIC OBSTRUCTIVE PULMONARY DISEASE, UNSPECIFIED COPD TYPE (HCC): ICD-10-CM

## 2019-12-09 PROBLEM — M75.100 SUPRASPINATUS SYNDROME: Status: ACTIVE | Noted: 2019-04-11

## 2019-12-09 PROCEDURE — 99214 OFFICE O/P EST MOD 30 MIN: CPT | Performed by: NURSE PRACTITIONER

## 2019-12-09 RX ORDER — BUDESONIDE AND FORMOTEROL FUMARATE DIHYDRATE 80; 4.5 UG/1; UG/1
2 AEROSOL RESPIRATORY (INHALATION)
COMMUNITY
Start: 2018-04-02 | End: 2019-12-09 | Stop reason: SDUPTHER

## 2019-12-09 RX ORDER — KETOCONAZOLE 20 MG/ML
SHAMPOO TOPICAL
COMMUNITY
Start: 2018-07-12 | End: 2019-12-09

## 2019-12-09 RX ORDER — LAMOTRIGINE 25 MG/1
TABLET ORAL
Refills: 0 | COMMUNITY
Start: 2019-11-19 | End: 2019-12-09

## 2019-12-09 RX ORDER — ZOSTER VACCINE RECOMBINANT, ADJUVANTED 50 MCG/0.5
KIT INTRAMUSCULAR
Refills: 1 | COMMUNITY
Start: 2019-10-29 | End: 2019-12-09

## 2019-12-09 RX ORDER — CYCLOBENZAPRINE HCL 10 MG
TABLET ORAL
COMMUNITY
End: 2019-12-09

## 2019-12-09 RX ORDER — ALBUTEROL SULFATE 90 UG/1
AEROSOL, METERED RESPIRATORY (INHALATION)
Qty: 8.5 G | Refills: 2 | Status: SHIPPED | OUTPATIENT
Start: 2019-12-09 | End: 2020-08-20

## 2019-12-09 RX ORDER — ALBUTEROL SULFATE 90 UG/1
AEROSOL, METERED RESPIRATORY (INHALATION)
COMMUNITY
Start: 2018-09-18 | End: 2019-12-09 | Stop reason: SDUPTHER

## 2019-12-09 RX ORDER — ARIPIPRAZOLE 10 MG/1
TABLET ORAL
COMMUNITY
Start: 2018-06-06 | End: 2019-12-09

## 2019-12-09 RX ORDER — LAMOTRIGINE 100 MG/1
TABLET ORAL
Refills: 1 | COMMUNITY
Start: 2019-11-19 | End: 2019-12-09

## 2019-12-09 RX ORDER — BUDESONIDE AND FORMOTEROL FUMARATE DIHYDRATE 80; 4.5 UG/1; UG/1
2 AEROSOL RESPIRATORY (INHALATION) 2 TIMES DAILY
Qty: 1 INHALER | Refills: 3 | Status: SHIPPED | OUTPATIENT
Start: 2019-12-09 | End: 2020-08-20

## 2019-12-09 RX ORDER — PROPRANOLOL HYDROCHLORIDE 10 MG/1
10 TABLET ORAL 3 TIMES DAILY
COMMUNITY
End: 2022-04-08

## 2019-12-09 NOTE — PATIENT INSTRUCTIONS
Call the referral department ([phone]), follow the prompts to referrals and let them know you want them to process referral for DHA    Inhalers refilled

## 2019-12-10 NOTE — ASSESSMENT & PLAN NOTE
This is a chronic and stable condition, needs refills on Symbicort and albuterol, this is been called in for her.  She was reminded the Symbicort is a daily maintenance medication that she should take on a daily basis and albuterol is to be used as needed.  Has not had any recent exacerbations.

## 2019-12-10 NOTE — ASSESSMENT & PLAN NOTE
Patient has history of esophageal stricture for which she has undergone esophageal dilation, this was done last year by gastroenterology.  A couple weeks ago she sent phone message that she needed a new referral, this was placed for her.  Today she presents requesting a referral, reminded her the referral is Tre javy that she needs to call the referral department to have them continue processing the referral.

## 2019-12-18 RX ORDER — IBUPROFEN 800 MG/1
TABLET ORAL
Qty: 90 TAB | Refills: 0 | Status: SHIPPED | OUTPATIENT
Start: 2019-12-18 | End: 2020-01-16

## 2019-12-18 NOTE — TELEPHONE ENCOUNTER
Was the patient seen in the last year in this department? Yes    Does patient have an active prescription for medications requested? No     Received Request Via: Pharmacy    Pt met protocol?: Yes     Last OV 12/09/19

## 2020-01-06 DIAGNOSIS — R11.0 NAUSEA: ICD-10-CM

## 2020-01-06 RX ORDER — ONDANSETRON HYDROCHLORIDE 8 MG/1
TABLET, FILM COATED ORAL
Qty: 15 TAB | Refills: 1 | Status: SHIPPED | OUTPATIENT
Start: 2020-01-06 | End: 2020-03-04 | Stop reason: SDUPTHER

## 2020-01-16 ENCOUNTER — SLEEP CENTER VISIT (OUTPATIENT)
Dept: SLEEP MEDICINE | Facility: MEDICAL CENTER | Age: 55
End: 2020-01-16
Payer: MEDICARE

## 2020-01-16 VITALS
RESPIRATION RATE: 16 BRPM | HEART RATE: 50 BPM | WEIGHT: 173 LBS | OXYGEN SATURATION: 95 % | HEIGHT: 63 IN | SYSTOLIC BLOOD PRESSURE: 110 MMHG | BODY MASS INDEX: 30.65 KG/M2 | DIASTOLIC BLOOD PRESSURE: 70 MMHG

## 2020-01-16 DIAGNOSIS — R53.82 CHRONIC FATIGUE: ICD-10-CM

## 2020-01-16 DIAGNOSIS — J45.40 MODERATE PERSISTENT ASTHMA WITHOUT COMPLICATION: ICD-10-CM

## 2020-01-16 DIAGNOSIS — G47.33 OSA (OBSTRUCTIVE SLEEP APNEA): ICD-10-CM

## 2020-01-16 PROCEDURE — 99214 OFFICE O/P EST MOD 30 MIN: CPT | Performed by: NURSE PRACTITIONER

## 2020-01-16 ASSESSMENT — ENCOUNTER SYMPTOMS
EYE PAIN: 0
WEIGHT LOSS: 0
RESPIRATORY NEGATIVE: 1
CHILLS: 0
EYE DISCHARGE: 0
FEVER: 0
DIAPHORESIS: 0
BRUISES/BLEEDS EASILY: 0
CARDIOVASCULAR NEGATIVE: 1
FALLS: 0
NEUROLOGICAL NEGATIVE: 1
BACK PAIN: 1
NECK PAIN: 1
PSYCHIATRIC NEGATIVE: 1
MYALGIAS: 0

## 2020-01-16 NOTE — PROGRESS NOTES
Chief Complaint   Patient presents with   • Apnea     Last Seen 09/17/18         HPI: This patient is a 54 y.o. female, who presents for follow up TOMY.     PSG 2018 indicates moderate obstructive sleep apnea with an AHI of 26, supine index 64, minimum oxygen saturation of 77 %.  She was ordered auto CPAP 8 - 15 cm H2O after her visit in September 2018 but never pursued this.  She returns today to discuss.    She was recently hospitalized for elective neck surgery.  During her hospitalization she was told she stop breathing at night and required 4 L of oxygen while sleeping.  She reports significant fatigue.  She sleeps through the night but wakes feeling tired.  She naps during the day.  Her  reports loud snoring and resuscitative gasps.  She is aware her sleep apnea may be causing long-term effects.  He is interested in pursuing CPAP therapy.    She has a history of lifelong asthma, she is a never smoker.  She is using Symbicort 160/4.5, 2 puffs twice a day, Spiriva daily, albuterol as needed.  This is managed by her PCP.  Former PFT showed FEV1 of 2.58 L 95% predicted.  Her breathing is stable and she denies complaints today.    Past Medical History:   Diagnosis Date   • Anxiety and depression    • Arthritis     back, neck   • Asthma    • Back injury    • Back pain    • Bronchitis 2010   • Cancer (HCC)     breast LT   • Chickenpox    • Chronic LBP    • Chronic neck pain    • Cold intolerance 1/24/2012   • COPD (chronic obstructive pulmonary disease) (HCC)    • Cystic fibrosis (HCC)    • Depression    • Diabetes    • GERD (gastroesophageal reflux disease)    • H/O gastric bypass 10/11/2013   • Herniated nucleus pulposus, L4-5 3/18/2010   • HX: breast cancer 10/11/2013   • Hypertension    • Itching due to drug 6/2/2011   • Kidney stone    • Nephrolithiasis 10/11/2013   • Obesity    • Osteoporosis    • Other specified symptom associated with female genital organs    • Panic disorder    • Pneumonia    • PTSD  (post-traumatic stress disorder)    • Restless leg syndrome    • Rheumatoid arthritis (HCC)    • S/P laminectomy 10/22/2012   • Thoracic or lumbosacral neuritis or radiculitis, unspecified 10/22/2012   • Trauma     head trauma       Social History     Tobacco Use   • Smoking status: Never Smoker   • Smokeless tobacco: Never Used   Substance Use Topics   • Alcohol use: No   • Drug use: No       Family History   Problem Relation Age of Onset   • Diabetes Father    • Cancer Father    • Hypertension Father    • Hyperlipidemia Father    • Cancer Mother    • Other Mother         SLE   • Diabetes Mother    • Hypertension Mother    • Hyperlipidemia Mother    • Stroke Mother    • Sleep Apnea Brother    • Cancer Maternal Aunt    • Diabetes Maternal Aunt    • Diabetes Maternal Uncle    • Diabetes Maternal Grandmother    • Diabetes Maternal Grandfather        Immunization History   Administered Date(s) Administered   • Influenza Seasonal Injectable 10/02/2012, 10/02/2012   • Influenza TIV (IM) 10/02/2012   • Influenza Vaccine Quad Inj (Pf) 12/30/2016, 10/05/2017, 09/18/2018, 08/12/2019   • Pneumococcal Conjugate Vaccine (Prevnar/PCV-13) 09/18/2018   • Pneumococcal polysaccharide vaccine (PPSV-23) 08/06/2019   • Recombinant Zoster Vaccine (RZV) (Shingrix) 10/29/2019   • Tdap Vaccine 08/19/2018       Current medications as of today   Current Outpatient Medications   Medication Sig Dispense Refill   • ondansetron (ZOFRAN) 8 MG Tab TAKE 1 TABLET BY MOUTH THREE TIMES A DAY AS NEEDED 15 Tab 1   • ibuprofen (MOTRIN) 800 MG Tab TAKE 1 TABLET BY MOUTH EVERY 8 HOURS AS NEEDED 90 Tab 0   • propranolol (INDERAL) 10 MG Tab Take 10 mg by mouth 3 times a day.     • albuterol (VENTOLIN HFA) 108 (90 Base) MCG/ACT Aero Soln inhalation aerosol INHALE 1 PUFF PO QID PRN 8.5 g 2   • budesonide-formoterol (SYMBICORT) 80-4.5 MCG/ACT Aerosol Inhale 2 Puffs by mouth 2 Times a Day. 1 Inhaler 3   • oxycodone (OXY-IR) 15 MG immediate release tablet TAKE 1  "TABLET BY MOUTH EVERY 8 HOURS FOR 30 DAYS M54 DNF 6/12/19  0   • vitamin D, Ergocalciferol, (DRISDOL) 84125 units Cap capsule Take 1 Cap by mouth 2X A WEEK. 26 Cap 0   • VENTOLIN  (90 Base) MCG/ACT Aero Soln inhalation aerosol INHALE 1 PUFF PO QID PRN  0     No current facility-administered medications for this visit.        Allergies: Buprenorphine-naloxone; Suboxone; and Morphine    /70 (BP Location: Right arm, Patient Position: Sitting, BP Cuff Size: Adult)   Pulse (!) 50   Resp 16   Ht 1.6 m (5' 3\")   Wt 78.5 kg (173 lb)   SpO2 95%       Review of Systems   Constitutional: Positive for malaise/fatigue. Negative for chills, diaphoresis, fever and weight loss.        + loud snoring, witnessed apneas   HENT: Negative.    Eyes: Negative for pain and discharge.   Respiratory: Negative.    Cardiovascular: Negative.    Musculoskeletal: Positive for back pain and neck pain. Negative for falls, joint pain and myalgias.   Neurological: Negative.    Endo/Heme/Allergies: Negative for environmental allergies. Does not bruise/bleed easily.   Psychiatric/Behavioral: Negative.        Physical Exam   Constitutional: She is oriented to person, place, and time and well-developed, well-nourished, and in no distress.   Neck brace in place   HENT:   Head: Normocephalic and atraumatic.   Eyes: Pupils are equal, round, and reactive to light.   Neck: Normal range of motion. Neck supple. No tracheal deviation present.   Cardiovascular: Normal rate, regular rhythm and normal heart sounds.   Pulmonary/Chest: Effort normal and breath sounds normal. No respiratory distress. She has no wheezes. She has no rales.   Neurological: She is alert and oriented to person, place, and time.   Skin: Skin is warm and dry.   Psychiatric: Mood, memory, affect and judgment normal.   Vitals reviewed.      Diagnoses/Plan:    1. TOMY (obstructive sleep apnea)  I reviewed with her the pathophysiology of obstructive sleep apnea as well as " cardiovascular risks associated with untreated sleep apnea.  She is interested in starting CPAP therapy.  We will perform split-night study to requalify patient for therapy and to titrate CPAP/BiPAP.  She will follow-up afterwards to review results so we can order therapy.  - Polysomnography, 4 or More; Future    2. Moderate persistent asthma without complication  Clinically stable, continue Symbicort twice daily, Spiriva daily, albuterol as needed    3. Chronic fatigue  Likely multifactorial but in part related to untreated sleep apnea.    Follow-up after SS, sooner if needed      This dictation was created using voice recognition software. The accuracy of the dictation is limited to the abilities of the software. I expect there may be some errors of grammar and possibly content. ;

## 2020-02-04 ENCOUNTER — SLEEP STUDY (OUTPATIENT)
Dept: SLEEP MEDICINE | Facility: MEDICAL CENTER | Age: 55
End: 2020-02-04
Attending: NURSE PRACTITIONER
Payer: MEDICARE

## 2020-02-04 DIAGNOSIS — G47.33 OSA (OBSTRUCTIVE SLEEP APNEA): ICD-10-CM

## 2020-02-04 PROCEDURE — 95811 POLYSOM 6/>YRS CPAP 4/> PARM: CPT | Performed by: INTERNAL MEDICINE

## 2020-02-05 NOTE — PROCEDURES
Clinical Comments:    The patient underwent a split night polysomnogram with a CPAP titration using the standard montage for measurement of paramaters of sleep, respiratory events, movement abnormalities, heart rate and rhythm.  A Microphone was used to monitior snoring.  Interpretation:  Study start time was 10:07:28 PM.  Total recording time was 2h 46.0m (166 minutes) with a total sleep time of 2h 29.5m (149 minutes) resulting in a sleep efficiency of 90.06%.  Sleep latency from the start fo the study was 05 minutes minutes and REM latency from sleep onset was 00 minutes minutes.  Respiratory:   There were 31 apneas in total consisting of 29 obstructive apneas, 0 mixed apneas, and 2 central apneas.  There were 21 hypopneas in total.  The apnea index was 12.44 per hour and the hypopnea index was 8.43 per hour.  The overall AHI was 20.9, with a REM AHI of 0.00, and a supine AHI of 26.37.  Limb Movements:  There were a total of 11 periodic leg movements, of which 1 were PLMS arousals.  This resulted in a PLMS index of 4.4 and a PLMS arousal index of 0.4  Oximetry:  The mean SaO2 was 92.0% for the diagnostic portion of the study, with a minimum SaO2 of 84.0%.    Treatment:  Interpretation:  Treatment recording time was 4h 6.0m (246 minutes) with a total sleep time of 3h 41.5m (221 minutes) resulting in a sleep efficiency of 90.0%.    Sleep latency from the start of treatment was 03 minutes minutes and REM latency from sleep onset was 1h 14.5m minutes.    The patient had 31 arousals in total for an arousal index of 8.4.  Respiratory:   There were 29 apneas in total consisting of  4 obstructive apneas, 25 central apneas, and 0 mixed apneas for an apnea index of 7.86.    The patient had 1 hypopneas in total, which resulted in a hypopnea index of 0.27.    The overall AHI was 8.13, with a REM AHI of 1.29, and a supine AHI of 16.71.     Limb Movements:  There were a total of 230 periodic leg movements, of which 7 were PLMS  arousals.  This resulted in a PLMS index of 62.3 and a PLMS arousal index of 1.9.  Oximetry:  The mean SaO2 during treatment was 94.0%, with a minimum oxygen saturation of 85.0%.    CPAP was tried from 6cm to 8cm. Bilevel was tried at 10/6cm.    On the baseline component of the sleep study sleep efficiency was 90%.  Sleep architecture showed a lack of REM sleep.  Sleep latency was 5 minutes.  No significant periodic limb movements observed.  EKG showed sinus rhythm.    Once asleep the patient had moderately loud snoring associated with obstructive apneas and hypopneas.  The overall AHI was 20.9/h and associated with oxygen desaturations to a terrie of 84%.    After meeting split-night criteria CPAP therapy was started with REM rebound noted.  CPAP at 6 cm of water reduced the AHI to 4.5 with mean oximetry 94%.  Higher CPAP pressures resulted in central apneas.  Elevated periodic limb movements noted on higher CPAP pressures.    Interpretation:  Moderate TOMY, AHI: 20/h with desaturations to 84% SPO2.  Optimal CPAP pressure: 6 cm of water.    Recommendations:  Treatment options for moderate TOMY include CPAP, an oral appliance or potentially UPPP.  If CPAP is pursued then AutoPap:5-7 cm H20 is recommended using a medium Eson 2 mask.

## 2020-02-25 ENCOUNTER — SLEEP CENTER VISIT (OUTPATIENT)
Dept: SLEEP MEDICINE | Facility: MEDICAL CENTER | Age: 55
End: 2020-02-25
Payer: MEDICARE

## 2020-02-25 VITALS
BODY MASS INDEX: 33.57 KG/M2 | HEART RATE: 59 BPM | WEIGHT: 171 LBS | SYSTOLIC BLOOD PRESSURE: 132 MMHG | OXYGEN SATURATION: 96 % | DIASTOLIC BLOOD PRESSURE: 70 MMHG | HEIGHT: 60 IN

## 2020-02-25 DIAGNOSIS — G47.33 OSA (OBSTRUCTIVE SLEEP APNEA): ICD-10-CM

## 2020-02-25 PROCEDURE — 99213 OFFICE O/P EST LOW 20 MIN: CPT | Performed by: INTERNAL MEDICINE

## 2020-02-25 RX ORDER — SERTRALINE HYDROCHLORIDE 100 MG/1
100 TABLET, FILM COATED ORAL DAILY
COMMUNITY
Start: 2020-02-13

## 2020-02-25 ASSESSMENT — ENCOUNTER SYMPTOMS
PSYCHIATRIC NEGATIVE: 1
NEUROLOGICAL NEGATIVE: 1
EYES NEGATIVE: 1
MUSCULOSKELETAL NEGATIVE: 1
RESPIRATORY NEGATIVE: 1
GASTROINTESTINAL NEGATIVE: 1
CARDIOVASCULAR NEGATIVE: 1

## 2020-02-25 NOTE — PROGRESS NOTES
"Sleep Clinic follow up    Date of Service: 2/25/2020    Reason for follow up:  Follow-Up (TOMY Last seen on 01/16/2020) and Results (Sleep Study 02/04/2020)      Problem List Items Addressed This Visit     TOMY (obstructive sleep apnea)     Initial sleep assessment  _x___ Discussed the pathophysiology of sleep disordered breathing including risks for hypertension, heart attack, and stroke   Also discussed treatment options including CPAP, an oral appliance, and surgical intervention.   __x___ Discussed weight control program with goal of achieving and maintaining ideal body weight.   __x___ Polysomnography results was discussed    __x_       follow up 6-8 weeks from starting CPAP  __x___ PAP prescription auto CPAP 5-7 cmh20  __x___Discussed weight control programs with goal of achieving and maintaining ideal body weight   __x___Discussed the diagnosis , management and pathophysiology TOMY   _x___Discussed the risks associated with driving and operating equipment while drowsy. The patient verbalized an understanding of this and agreed to take appropriate measures to eliminate these risks.   __x___Discussed the cardiovascular and neuropsychiatric risks of untreated TOMY; including but not limited to: HTN, DM, MI, ASCVD, CVA, CHF, traffic accidents   __x___ Discussed positive airway pressure (PAP) as the preferred therapy for severe TOMY;   _____ Discussed risks of anesthesia/analgesia associated with TOMY and the need to use PAP in the pre, ivania, and post -operative periods.         Relevant Orders    DME CPAP            History of Present Illness: Patti Tello is a 54 y.o. female with a past medical history  Of TOMY here for follow up of results of her sleep apnea  \"Interpretation:  Moderate TOMY, AHI: 20/h with desaturations to 84% SPO2.  Optimal CPAP pressure: 6 cm of water.     Recommendations:  Treatment options for moderate TOMY include CPAP, an oral appliance or potentially UPPP.  If CPAP is pursued then AutoPap:5-7 " "cm H20 is recommended using a medium Eson 2 mask. \"    Review of Systems   Constitutional: Positive for malaise/fatigue.   HENT: Negative.    Eyes: Negative.    Respiratory: Negative.    Cardiovascular: Negative.    Gastrointestinal: Negative.    Genitourinary: Negative.    Musculoskeletal: Negative.    Skin: Negative.    Neurological: Negative.    Endo/Heme/Allergies: Negative.    Psychiatric/Behavioral: Negative.        Current Outpatient Medications on File Prior to Visit   Medication Sig Dispense Refill   • sertraline (ZOLOFT) 100 MG Tab Take  by mouth. Take 1 tablet by mouth every morning     • ondansetron (ZOFRAN) 8 MG Tab TAKE 1 TABLET BY MOUTH THREE TIMES A DAY AS NEEDED 15 Tab 1   • propranolol (INDERAL) 10 MG Tab Take 10 mg by mouth 3 times a day.     • albuterol (VENTOLIN HFA) 108 (90 Base) MCG/ACT Aero Soln inhalation aerosol INHALE 1 PUFF PO QID PRN 8.5 g 2   • budesonide-formoterol (SYMBICORT) 80-4.5 MCG/ACT Aerosol Inhale 2 Puffs by mouth 2 Times a Day. 1 Inhaler 3   • oxycodone (OXY-IR) 15 MG immediate release tablet TAKE 1 TABLET BY MOUTH EVERY 8 HOURS FOR 30 DAYS M54 DNF 6/12/19  0   • VENTOLIN  (90 Base) MCG/ACT Aero Soln inhalation aerosol INHALE 1 PUFF PO QID PRN  0     No current facility-administered medications on file prior to visit.        Social History     Tobacco Use   • Smoking status: Never Smoker   • Smokeless tobacco: Never Used   Substance Use Topics   • Alcohol use: No   • Drug use: No        Past Medical History:   Diagnosis Date   • Anxiety and depression    • Arthritis     back, neck   • Asthma    • Back injury    • Back pain    • Bronchitis 2010   • Cancer (HCC)     breast LT   • Chickenpox    • Chronic LBP    • Chronic neck pain    • Cold intolerance 1/24/2012   • COPD (chronic obstructive pulmonary disease) ([facility])    • Cystic fibrosis (HCC)    • Depression    • Diabetes    • GERD (gastroesophageal reflux disease)    • H/O gastric bypass 10/11/2013   • Herniated nucleus " pulposus, L4-5 3/18/2010   • HX: breast cancer 10/11/2013   • Hypertension    • Itching due to drug 6/2/2011   • Kidney stone    • Nephrolithiasis 10/11/2013   • Obesity    • Osteoporosis    • Other specified symptom associated with female genital organs    • Panic disorder    • Pneumonia    • PTSD (post-traumatic stress disorder)    • Restless leg syndrome    • Rheumatoid arthritis (HCC)    • S/P laminectomy 10/22/2012   • Thoracic or lumbosacral neuritis or radiculitis, unspecified 10/22/2012   • Trauma     head trauma       Past Surgical History:   Procedure Laterality Date   • URETEROSCOPY  10/10/2013    Performed by Molly Resendiz M.D. at SURGERY [facility]   • LASERTRIPSY  10/10/2013    Performed by Molly Resendiz M.D. at SURGERY [facility]   • LUMBAR FUSION POSTERIOR  10/22/2012    Performed by Alfred Branham M.D. at SURGERY [facility]   • LUMBAR LAMINECTOMY DISKECTOMY  10/22/2012    Performed by Alfred Branham M.D. at SURGERY [facility]   • OTHER ORTHOPEDIC SURGERY  10/01/2012    lumbar fusion, Dr Branham   • OTHER ABDOMINAL SURGERY  2006    gastric bypass   • ABDOMINAL HYSTERECTOMY TOTAL      DUE TO FIBROIDS   • BREAST RECONSTRUCTION      Lt breast   • CRANIOTOMY      DUE TO BRAIN INJURY   • HYSTERECTOMY LAPAROSCOPY     • LAMINOTOMY     • MASTECTOMY      Lt breast   • OTHER      left ear drum surgery   • OTHER ABDOMINAL SURGERY      hernia repair   • PRIMARY C SECTION      x 3   • SLEEVE,MAXIMILIAN VASO THIGH     • TONSILLECTOMY     • US-CYST ASPIRATION-BREAST INITIAL     • VENTILATOR CONTINUOUS         Allergies: Buprenorphine-naloxone; Suboxone; and Morphine    Family History   Problem Relation Age of Onset   • Diabetes Father    • Cancer Father    • Hypertension Father    • Hyperlipidemia Father    • Cancer Mother    • Other Mother         SLE   • Diabetes Mother    • Hypertension Mother    • Hyperlipidemia Mother    • Stroke Mother    • Sleep Apnea Brother    • Cancer Maternal  Aunt    • Diabetes Maternal Aunt    • Diabetes Maternal Uncle    • Diabetes Maternal Grandmother    • Diabetes Maternal Grandfather        Vitals:    02/25/20 0914   Height: 1.524 m (5')   Weight: 77.6 kg (171 lb)   Weight % change since last entry.: 0 %   BP: 132/70   Pulse: (!) 59   BMI (Calculated): 33.4       Physical Examination  Physical Exam   Constitutional: She is oriented to person, place, and time. No distress.   HENT:   Head: Normocephalic and atraumatic.   Right Ear: External ear normal.   Left Ear: External ear normal.   Mouth/Throat: Oropharynx is clear and moist.   mallampati IV   Eyes: Pupils are equal, round, and reactive to light. Conjunctivae and EOM are normal.   Neck: Normal range of motion. Neck supple. No JVD present. No tracheal deviation present. No thyromegaly present.   Cardiovascular: Normal rate, regular rhythm and intact distal pulses. Exam reveals no gallop and no friction rub.   No murmur heard.  Pulmonary/Chest: No stridor. No respiratory distress. She has no wheezes. She has no rales. She exhibits no tenderness.   Abdominal: Soft. Bowel sounds are normal.   Musculoskeletal:         General: No tenderness, deformity or edema.   Lymphadenopathy:     She has no cervical adenopathy.   Neurological: She is alert and oriented to person, place, and time. No cranial nerve deficit. Gait normal. Coordination normal. GCS score is 15.   Skin: Skin is warm and dry. No rash noted. She is not diaphoretic.   Psychiatric: Mood, affect and judgment normal.       Immunization History   Administered Date(s) Administered   • Influenza Seasonal Injectable 10/02/2012, 10/02/2012   • Influenza TIV (IM) 10/02/2012   • Influenza Vaccine Quad Inj (Pf) 12/30/2016, 10/05/2017, 09/18/2018, 08/12/2019   • Pneumococcal Conjugate Vaccine (Prevnar/PCV-13) 09/18/2018   • Pneumococcal polysaccharide vaccine (PPSV-23) 08/06/2019   • Recombinant Zoster Vaccine (RZV) (Shingrix) 08/12/2019, 10/29/2019   • Tdap Vaccine  08/19/2018         Alli Kwok M.D., MD [facility]  [facility]

## 2020-02-25 NOTE — ASSESSMENT & PLAN NOTE
Initial sleep assessment  _x___ Discussed the pathophysiology of sleep disordered breathing including risks for hypertension, heart attack, and stroke   Also discussed treatment options including CPAP, an oral appliance, and surgical intervention.   __x___ Discussed weight control program with goal of achieving and maintaining ideal body weight.   __x___ Polysomnography results was discussed    __x_       follow up 6-8 weeks from starting CPAP  __x___ PAP prescription auto CPAP 5-7 cmh20  __x___Discussed weight control programs with goal of achieving and maintaining ideal body weight   __x___Discussed the diagnosis , management and pathophysiology TOMY   _x___Discussed the risks associated with driving and operating equipment while drowsy. The patient verbalized an understanding of this and agreed to take appropriate measures to eliminate these risks.   __x___Discussed the cardiovascular and neuropsychiatric risks of untreated TOMY; including but not limited to: HTN, DM, MI, ASCVD, CVA, CHF, traffic accidents   __x___ Discussed positive airway pressure (PAP) as the preferred therapy for severe TOMY;   _____ Discussed risks of anesthesia/analgesia associated with TOMY and the need to use PAP in the pre, ivania, and post -operative periods.

## 2020-02-27 ENCOUNTER — TELEPHONE (OUTPATIENT)
Dept: SLEEP MEDICINE | Facility: MEDICAL CENTER | Age: 55
End: 2020-02-27

## 2020-02-27 NOTE — TELEPHONE ENCOUNTER
Pt called today stating you lilianaimani had discussed surgery to correct her TOMY, she had said no at the time but now would like to pursue that, she would like to know where she would be referred to for that.

## 2020-03-02 ENCOUNTER — OFFICE VISIT (OUTPATIENT)
Dept: MEDICAL GROUP | Facility: PHYSICIAN GROUP | Age: 55
End: 2020-03-02
Payer: MEDICARE

## 2020-03-02 VITALS
SYSTOLIC BLOOD PRESSURE: 122 MMHG | HEART RATE: 99 BPM | OXYGEN SATURATION: 97 % | WEIGHT: 176 LBS | DIASTOLIC BLOOD PRESSURE: 76 MMHG | HEIGHT: 60 IN | RESPIRATION RATE: 12 BRPM | TEMPERATURE: 98.1 F | BODY MASS INDEX: 34.55 KG/M2

## 2020-03-02 DIAGNOSIS — J30.9 ALLERGIC RHINITIS, UNSPECIFIED SEASONALITY, UNSPECIFIED TRIGGER: ICD-10-CM

## 2020-03-02 DIAGNOSIS — R05.9 COUGH: ICD-10-CM

## 2020-03-02 DIAGNOSIS — H10.13 ALLERGIC CONJUNCTIVITIS OF BOTH EYES: ICD-10-CM

## 2020-03-02 PROCEDURE — 99214 OFFICE O/P EST MOD 30 MIN: CPT | Performed by: NURSE PRACTITIONER

## 2020-03-02 RX ORDER — FLUTICASONE PROPIONATE 50 MCG
1 SPRAY, SUSPENSION (ML) NASAL DAILY
Qty: 1 BOTTLE | Refills: 3 | Status: SHIPPED | OUTPATIENT
Start: 2020-03-02 | End: 2020-08-20

## 2020-03-02 RX ORDER — BENZONATATE 100 MG/1
100 CAPSULE ORAL 3 TIMES DAILY PRN
Qty: 60 CAP | Refills: 0 | Status: SHIPPED | OUTPATIENT
Start: 2020-03-02 | End: 2020-04-02

## 2020-03-02 ASSESSMENT — FIBROSIS 4 INDEX: FIB4 SCORE: 0.76

## 2020-03-02 NOTE — PROGRESS NOTES
Chief Complaint   Patient presents with   • Sore Throat         This is a 54 y.o.female patient that presents today with the following: Sore throat, nasal congestion    Cough  Patient describes symptoms very consistent with viral upper respiratory infection versus sinusitis.  She has nasal congestion, cough, mild sore throat, denies body aches, ear pain or fever.  The symptoms started about 3 to 4 days ago, they have not worsened.  Discussed this is viral in nature and antibiotics at this time are not warranted.  However we will give her medication for cough, she is requesting Tessalon as this is worked for the past, this is been called in for her.  Discussed the importance of over-the-counter cough and cold medications, rest, fluids, following up in 7 to 10 days if symptoms do not improve, worsen or develops any other associated symptoms.    Allergic rhinitis  This is chronic, stable, however she is suffering from viral upper respiratory infection (see additional notes).  She takes Flonase on a daily basis and needs refill, this was called in for her.      No visits with results within 1 Month(s) from this visit.   Latest known visit with results is:   Hospital Outpatient Visit on 11/01/2019   Component Date Value   • Sodium 11/01/2019 142    • Potassium 11/01/2019 3.6    • Chloride 11/01/2019 108    • Co2 11/01/2019 25    • Anion Gap 11/01/2019 9.0    • Glucose 11/01/2019 85    • Bun 11/01/2019 12    • Creatinine 11/01/2019 0.75    • Calcium 11/01/2019 9.4    • AST(SGOT) 11/01/2019 15    • ALT(SGPT) 11/01/2019 14    • Alkaline Phosphatase 11/01/2019 119*   • Total Bilirubin 11/01/2019 0.4    • Albumin 11/01/2019 4.2    • Total Protein 11/01/2019 7.0    • Globulin 11/01/2019 2.8    • A-G Ratio 11/01/2019 1.5    • Cholesterol,Tot 11/01/2019 173    • Triglycerides 11/01/2019 119    • HDL 11/01/2019 56    • LDL 11/01/2019 93    • 25-Hydroxy   Vitamin D 25 11/01/2019 15*   • GFR If  11/01/2019 >60    •  GFR If Non  Ameri* 11/01/2019 >60          clinical course has been stable    Past Medical History:   Diagnosis Date   • Anxiety and depression    • Arthritis     back, neck   • Asthma    • Back injury    • Back pain    • Bronchitis 2010   • Cancer (HCC)     breast LT   • Chickenpox    • Chronic LBP    • Chronic neck pain    • Cold intolerance 1/24/2012   • COPD (chronic obstructive pulmonary disease) ([facility])    • Cystic fibrosis ([facility])    • Depression    • Diabetes    • GERD (gastroesophageal reflux disease)    • H/O gastric bypass 10/11/2013   • Herniated nucleus pulposus, L4-5 3/18/2010   • HX: breast cancer 10/11/2013   • Hypertension    • Itching due to drug 6/2/2011   • Kidney stone    • Nephrolithiasis 10/11/2013   • Obesity    • Osteoporosis    • Other specified symptom associated with female genital organs    • Panic disorder    • Pneumonia    • PTSD (post-traumatic stress disorder)    • Restless leg syndrome    • Rheumatoid arthritis ([facility])    • S/P laminectomy 10/22/2012   • Thoracic or lumbosacral neuritis or radiculitis, unspecified 10/22/2012   • Trauma     head trauma       Past Surgical History:   Procedure Laterality Date   • URETEROSCOPY  10/10/2013    Performed by Molly Resendiz M.D. at SURGERY [facility]   • LASERTRIPSY  10/10/2013    Performed by Molly Resendiz M.D. at SURGERY [facility]   • LUMBAR FUSION POSTERIOR  10/22/2012    Performed by Alfred Branham M.D. at SURGERY [facility]   • LUMBAR LAMINECTOMY DISKECTOMY  10/22/2012    Performed by Alfred Branham M.D. at SURGERY [facility]   • OTHER ORTHOPEDIC SURGERY  10/01/2012    lumbar fusion, Dr Branham   • OTHER ABDOMINAL SURGERY  2006    gastric bypass   • ABDOMINAL HYSTERECTOMY TOTAL      DUE TO FIBROIDS   • BREAST RECONSTRUCTION      Lt breast   • CRANIOTOMY      DUE TO BRAIN INJURY   • HYSTERECTOMY LAPAROSCOPY     • LAMINOTOMY     • MASTECTOMY      Lt breast   • OTHER      left ear drum surgery   • OTHER  ABDOMINAL SURGERY      hernia repair   • PRIMARY C SECTION      x 3   • SLEEVE,MAXIMILIAN VASO THIGH     • TONSILLECTOMY     • US-CYST ASPIRATION-BREAST INITIAL     • VENTILATOR CONTINUOUS         Family History   Problem Relation Age of Onset   • Diabetes Father    • Cancer Father    • Hypertension Father    • Hyperlipidemia Father    • Cancer Mother    • Other Mother         SLE   • Diabetes Mother    • Hypertension Mother    • Hyperlipidemia Mother    • Stroke Mother    • Sleep Apnea Brother    • Cancer Maternal Aunt    • Diabetes Maternal Aunt    • Diabetes Maternal Uncle    • Diabetes Maternal Grandmother    • Diabetes Maternal Grandfather        Buprenorphine-naloxone; Suboxone; and Morphine    Current Outpatient Medications Ordered in Epic   Medication Sig Dispense Refill   • benzonatate (TESSALON) 100 MG Cap Take 1 Cap by mouth 3 times a day as needed for Cough. 60 Cap 0   • fluticasone (FLONASE) 50 MCG/ACT nasal spray Spray 1 Spray in nose every day. 1 Bottle 3   • sertraline (ZOLOFT) 100 MG Tab Take  by mouth. Take 1 tablet by mouth every morning     • ondansetron (ZOFRAN) 8 MG Tab TAKE 1 TABLET BY MOUTH THREE TIMES A DAY AS NEEDED 15 Tab 1   • propranolol (INDERAL) 10 MG Tab Take 10 mg by mouth 3 times a day.     • albuterol (VENTOLIN HFA) 108 (90 Base) MCG/ACT Aero Soln inhalation aerosol INHALE 1 PUFF PO QID PRN 8.5 g 2   • budesonide-formoterol (SYMBICORT) 80-4.5 MCG/ACT Aerosol Inhale 2 Puffs by mouth 2 Times a Day. 1 Inhaler 3   • oxycodone (OXY-IR) 15 MG immediate release tablet TAKE 1 TABLET BY MOUTH EVERY 8 HOURS FOR 30 DAYS M54 DNF 6/12/19  0   • VENTOLIN  (90 Base) MCG/ACT Aero Soln inhalation aerosol INHALE 1 PUFF PO QID PRN  0     No current [facility]-ordered facility-administered medications on file.        Constitutional ROS: No unexpected change in weight, No weakness, No unexplained fevers, sweats, or chills  Pulmonary ROS: Positive per HPI  Cardiovascular ROS: No chest  pain  Musculoskeletal/Extremities ROS: No clubbing, No peripheral edema, No pain, redness or swelling on the joints  Neurologic ROS: Normal development, No seizures, No weakness    Physical exam:  /76   Pulse 99   Temp 36.7 °C (98.1 °F) (Temporal)   Resp 12   Ht 1.524 m (5')   Wt 79.8 kg (176 lb)   LMP 10/05/1999   SpO2 97%   BMI 34.37 kg/m²   General Appearance: Pleasant middle-aged female, alert, no distress, obese, well-groomed  Skin: Skin color, texture, turgor normal. No rashes or lesions.  Oropharynx: positive findings: mild oropharyngeal erythema  Lungs: negative findings: normal respiratory rate and rhythm, lungs clear to auscultation  Heart: negative. RRR without murmur, gallop, or rubs.  No ectopy.  Abdomen: Abdomen soft, non-tender. BS normal. No masses,  No organomegaly  Musculoskeletal: negative findings: no evidence of joint instability, no evidence of muscle atrophy, no deformities present  Neurologic: intact, CN II through XII grossly intact    Medical decision making/discussion:     Flonase refilled    Discussed importance of supportive care    Tessalon called in for cough    Follow-up if not better in 7 to 10 days, symptoms worsen, develops any other associated symptoms    Patti was seen today for sore throat.    Diagnoses and all orders for this visit:    Cough  -     benzonatate (TESSALON) 100 MG Cap; Take 1 Cap by mouth 3 times a day as needed for Cough.    Allergic conjunctivitis of both eyes    Allergic rhinitis, unspecified seasonality, unspecified trigger  -     fluticasone (FLONASE) 50 MCG/ACT nasal spray; Spray 1 Spray in nose every day.        Return if symptoms worsen or fail to improve, for Follow-up.        Please note that this dictation was created using voice recognition software. I have made every reasonable attempt to correct obvious errors, but I expect that there are errors of grammar and possibly content that I did not discover before finalizing the note.

## 2020-03-03 PROBLEM — R05.9 COUGH: Status: ACTIVE | Noted: 2020-03-03

## 2020-03-03 PROBLEM — J30.9 ALLERGIC RHINITIS: Status: ACTIVE | Noted: 2020-03-03

## 2020-03-03 NOTE — ASSESSMENT & PLAN NOTE
Patient describes symptoms very consistent with viral upper respiratory infection versus sinusitis.  She has nasal congestion, cough, mild sore throat, denies body aches, ear pain or fever.  The symptoms started about 3 to 4 days ago, they have not worsened.  Discussed this is viral in nature and antibiotics at this time are not warranted.  However we will give her medication for cough, she is requesting Tessalon as this is worked for the past, this is been called in for her.  Discussed the importance of over-the-counter cough and cold medications, rest, fluids, following up in 7 to 10 days if symptoms do not improve, worsen or develops any other associated symptoms.

## 2020-03-03 NOTE — ASSESSMENT & PLAN NOTE
This is chronic, stable, however she is suffering from viral upper respiratory infection (see additional notes).  She takes Flonase on a daily basis and needs refill, this was called in for her.

## 2020-03-04 DIAGNOSIS — R11.0 NAUSEA: ICD-10-CM

## 2020-03-04 RX ORDER — ONDANSETRON HYDROCHLORIDE 8 MG/1
TABLET, FILM COATED ORAL
Qty: 15 TAB | Refills: 1 | Status: SHIPPED | OUTPATIENT
Start: 2020-03-04 | End: 2020-03-17 | Stop reason: SDUPTHER

## 2020-03-04 NOTE — TELEPHONE ENCOUNTER
Was the patient seen in the last year in this department? Yes    Does patient have an active prescription for medications requested? No     Received Request Via: Pharmacy      Pt met protocol?: Yes    LAST OV 03/02/2020

## 2020-03-16 ENCOUNTER — PATIENT MESSAGE (OUTPATIENT)
Dept: MEDICAL GROUP | Facility: PHYSICIAN GROUP | Age: 55
End: 2020-03-16

## 2020-03-16 DIAGNOSIS — R11.0 NAUSEA: ICD-10-CM

## 2020-03-17 RX ORDER — ONDANSETRON HYDROCHLORIDE 8 MG/1
TABLET, FILM COATED ORAL
Qty: 15 TAB | Refills: 1 | Status: SHIPPED | OUTPATIENT
Start: 2020-03-17 | End: 2020-05-26 | Stop reason: SDUPTHER

## 2020-03-17 NOTE — TELEPHONE ENCOUNTER
From: Patti Tello  To: PATRICK Chris  Sent: 3/16/2020 10:27 PM PDT  Subject: Non-Urgent Medical Question    Hi Bethany Saab,  I am messaging You because I would like to request a refill on zofran and I need allergy medicine Claratin     Thank you   Patti Tello     Please send to [facility] on [address] in [place]

## 2020-03-19 ENCOUNTER — APPOINTMENT (RX ONLY)
Dept: URBAN - METROPOLITAN AREA CLINIC 4 | Facility: CLINIC | Age: 55
Setting detail: DERMATOLOGY
End: 2020-03-19

## 2020-03-19 DIAGNOSIS — L57.8 OTHER SKIN CHANGES DUE TO CHRONIC EXPOSURE TO NONIONIZING RADIATION: ICD-10-CM

## 2020-03-19 PROBLEM — D48.5 NEOPLASM OF UNCERTAIN BEHAVIOR OF SKIN: Status: ACTIVE | Noted: 2020-03-19

## 2020-03-19 PROCEDURE — 11102 TANGNTL BX SKIN SINGLE LES: CPT

## 2020-03-19 PROCEDURE — ? COUNSELING

## 2020-03-19 PROCEDURE — 99212 OFFICE O/P EST SF 10 MIN: CPT | Mod: 25

## 2020-03-19 PROCEDURE — ? BIOPSY BY SHAVE METHOD

## 2020-03-19 ASSESSMENT — LOCATION ZONE DERM: LOCATION ZONE: FACE

## 2020-03-19 ASSESSMENT — LOCATION SIMPLE DESCRIPTION DERM: LOCATION SIMPLE: LEFT CHEEK

## 2020-03-19 ASSESSMENT — LOCATION DETAILED DESCRIPTION DERM: LOCATION DETAILED: LEFT INFERIOR MEDIAL MALAR CHEEK

## 2020-03-19 NOTE — PROCEDURE: BIOPSY BY SHAVE METHOD
Detail Level: Detailed
Depth Of Biopsy: dermis
Was A Bandage Applied: Yes
Size Of Lesion In Cm: 0
Biopsy Type: H and E
Biopsy Method: 15 blade
Anesthesia Type: 1% lidocaine with epinephrine
Anesthesia Volume In Cc: 1.5
Hemostasis: Aluminum Chloride and Electrocautery
Wound Care: Aquaphor
Dressing: Band-Aid
Destruction After The Procedure: No
Type Of Destruction Used: Curettage
Curettage Text: The wound bed was treated with curettage after the biopsy was performed.
Cryotherapy Text: The wound bed was treated with cryotherapy after the biopsy was performed.
Electrodesiccation Text: The wound bed was treated with electrodesiccation after the biopsy was performed.
Electrodesiccation And Curettage Text: The wound bed was treated with electrodesiccation and curettage after the biopsy was performed.
Silver Nitrate Text: The wound bed was treated with silver nitrate after the biopsy was performed.
Lab: [id number]
Lab Facility: 
Consent: Written consent was obtained and risks were reviewed including but not limited to scarring, infection, bleeding, scabbing, incomplete removal, nerve damage and allergy to anesthesia.
Post-Care Instructions: I reviewed with the patient in detail post-care instructions. Patient is to keep the biopsy site dry overnight, and then apply bacitracin twice daily until healed. Patient may apply hydrogen peroxide soaks to remove any crusting.
Notification Instructions: Patient will be notified of biopsy results. However, patient instructed to call the office if not contacted within 2 weeks.
Billing Type: Third-Party Bill
Information: Selecting Yes will display possible errors in your note based on the variables you have selected. This validation is only offered as a suggestion for you. PLEASE NOTE THAT THE VALIDATION TEXT WILL BE REMOVED WHEN YOU FINALIZE YOUR NOTE. IF YOU WANT TO FAX A PRELIMINARY NOTE YOU WILL NEED TO TOGGLE THIS TO 'NO' IF YOU DO NOT WANT IT IN YOUR FAXED NOTE.

## 2020-03-19 NOTE — HPI: SKIN LESION
Is This A New Presentation, Or A Follow-Up?: Growth
What Type Of Note Output Would You Prefer (Optional)?: Bullet Format
How Severe Is Your Skin Lesion?: moderate
Has Your Skin Lesion Been Treated?: not been treated
Additional History: No hx of trauma

## 2020-04-02 ENCOUNTER — OFFICE VISIT (OUTPATIENT)
Dept: MEDICAL GROUP | Facility: PHYSICIAN GROUP | Age: 55
End: 2020-04-02
Payer: MEDICARE

## 2020-04-02 VITALS — BODY MASS INDEX: 33.38 KG/M2 | HEIGHT: 60 IN | WEIGHT: 170 LBS

## 2020-04-02 DIAGNOSIS — J30.9 ALLERGIC RHINITIS, UNSPECIFIED SEASONALITY, UNSPECIFIED TRIGGER: ICD-10-CM

## 2020-04-02 DIAGNOSIS — G47.33 OSA (OBSTRUCTIVE SLEEP APNEA): ICD-10-CM

## 2020-04-02 PROCEDURE — 99442 PR PHYSICIAN TELEPHONE EVALUATION 11-20 MIN: CPT | Performed by: NURSE PRACTITIONER

## 2020-04-02 ASSESSMENT — PATIENT HEALTH QUESTIONNAIRE - PHQ9: CLINICAL INTERPRETATION OF PHQ2 SCORE: 0

## 2020-04-02 ASSESSMENT — FIBROSIS 4 INDEX: FIB4 SCORE: 0.76

## 2020-04-02 NOTE — PROGRESS NOTES
Telemedicine Visit: Established Patient     This encounter was conducted via Zoom .   Verbal consent was obtained. Patient's identity was verified.    Subjective:   CC:   Patti Tello is a 54 y.o. female presenting for evaluation and management of:    No problem-specific Assessment & Plan notes found for this encounter.        ROS   Denies any recent fevers or chills. No nausea or vomiting. No chest pains or shortness of breath.     Allergies   Allergen Reactions   • Buprenorphine-Naloxone Hives, Shortness of Breath and Swelling   • Suboxone Hives, Shortness of Breath and Swelling   • Morphine Rash     tachycardia       Current medicines (including changes today)  Current Outpatient Medications   Medication Sig Dispense Refill   • ondansetron (ZOFRAN) 8 MG Tab TAKE 1 TABLET BY MOUTH THREE TIMES A DAY AS NEEDED 15 Tab 1   • fluticasone (FLONASE) 50 MCG/ACT nasal spray Spray 1 Spray in nose every day. 1 Bottle 3   • sertraline (ZOLOFT) 100 MG Tab Take  by mouth. Take 1 tablet by mouth every morning     • propranolol (INDERAL) 10 MG Tab Take 10 mg by mouth 3 times a day.     • albuterol (VENTOLIN HFA) 108 (90 Base) MCG/ACT Aero Soln inhalation aerosol INHALE 1 PUFF PO QID PRN 8.5 g 2   • budesonide-formoterol (SYMBICORT) 80-4.5 MCG/ACT Aerosol Inhale 2 Puffs by mouth 2 Times a Day. 1 Inhaler 3   • oxycodone (OXY-IR) 15 MG immediate release tablet TAKE 1 TABLET BY MOUTH EVERY 8 HOURS FOR 30 DAYS M54 DNF 6/12/19  0   • VENTOLIN  (90 Base) MCG/ACT Aero Soln inhalation aerosol INHALE 1 PUFF PO QID PRN  0   • benzonatate (TESSALON) 100 MG Cap Take 1 Cap by mouth 3 times a day as needed for Cough. 60 Cap 0     No current facility-administered medications for this visit.        Patient Active Problem List    Diagnosis Date Noted   • Lumbar stenosis 10/22/2012     Priority: High   • Cough 03/03/2020   • Allergic rhinitis 03/03/2020   • Moderate persistent asthma without complication 01/16/2020   • Other dysphagia  12/09/2019   • Esophageal stricture 12/09/2019   • Burn 07/03/2019   • Malodorous urine 07/03/2019   • Chronic obstructive pulmonary disease (HCC) 07/02/2019   • Abscess 06/04/2019   • Allergic conjunctivitis of both eyes 05/15/2019   • Hyperparathyroidism (HCC) 04/11/2019   • Benign paroxysmal positional vertigo due to bilateral vestibular disorder 04/11/2019   • 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   • Elevated PTHrP level 10/09/2018   • Vitiligo 09/27/2018   • Vitamin B12 deficiency 09/27/2018   • Abnormal laboratory test 09/25/2018   • TOMY (obstructive sleep apnea) 09/17/2018   • History of diabetes mellitus, type II 09/04/2018   • Positive NAKUL (antinuclear antibody) 07/24/2018   • Arthritis, multiple joint involvement 07/24/2018   • Family history of systemic lupus erythematosus (SLE) in mother 07/24/2018   • Iron deficiency anemia 07/24/2018   • Skin lesions 07/24/2018   • Chronic fatigue 06/20/2018   • Heartburn 05/08/2018   • Muscle spasm of both lower legs 03/26/2018   • Calcaneal spur of both feet 03/15/2018   • Pain management contract broken 03/15/2018   • Obesity (BMI 30-39.9) 01/18/2018   • Seasonal allergies 10/05/2017   • Bipolar affective disorder, currently depressed, moderate ([facility]) 01/24/2017   • History of gastric bypass 10/11/2013   • Chronic pain syndrome 09/16/2013   • Anxiety 12/06/2012   • Multilevel degenerative disc disease 10/01/2012   • Cervical radiculopathy, chronic 05/04/2011   • Vitamin D deficiency 11/16/2010   • Arthritis    • Panic attacks    • PTSD (post-traumatic stress disorder)    • Insomnia due to medical condition 11/10/2010       Family History   Problem Relation Age of Onset   • Diabetes Father    • Cancer Father    • Hypertension Father    • Hyperlipidemia Father    • Cancer Mother    • Other Mother         SLE   • Diabetes Mother    • Hypertension Mother    • Hyperlipidemia Mother    • Stroke Mother    • Sleep Apnea Brother    • Cancer Maternal Aunt    • Diabetes Maternal  Aunt    • Diabetes Maternal Uncle    • Diabetes Maternal Grandmother    • Diabetes Maternal Grandfather        She  has a past medical history of Anxiety and depression, Arthritis, Asthma, Back injury, Back pain, Bronchitis (2010), Cancer ([facility]), Chickenpox, Chronic LBP, Chronic neck pain, Cold intolerance (1/24/2012), COPD (chronic obstructive pulmonary disease) ([facility]), Cystic fibrosis ([facility]), Depression, Diabetes, GERD (gastroesophageal reflux disease), H/O gastric bypass (10/11/2013), Herniated nucleus pulposus, L4-5 (3/18/2010), breast cancer (10/11/2013), Hypertension, Itching due to drug (6/2/2011), Kidney stone, Nephrolithiasis (10/11/2013), Obesity, Osteoporosis, Other specified symptom associated with female genital organs, Panic disorder, Pneumonia, PTSD (post-traumatic stress disorder), Restless leg syndrome, Rheumatoid arthritis ([facility]), S/P laminectomy (10/22/2012), Thoracic or lumbosacral neuritis or radiculitis, unspecified (10/22/2012), and Trauma.  She  has a past surgical history that includes primary c section; mastectomy; breast reconstruction; abdominal hysterectomy total; craniotomy; US-CYST ASPIRATION-BREAST INITIAL; other orthopedic surgery (10/01/2012); other; other abdominal surgery (2006); other abdominal surgery; lumbar fusion posterior (10/22/2012); lumbar laminectomy diskectomy (10/22/2012); ureteroscopy (10/10/2013); lasertripsy (10/10/2013); laminotomy; Sleeve,Chance Vaso Thigh; hysterectomy laparoscopy; tonsillectomy; and Ventilator - Continuous.       Objective:   Vitals obtained by patient:        Physical Exam:  Constitutional: Alert, no distress, well-groomed.  Skin: No rashes in visible areas.  Eye: Round. Conjunctiva clear, lids normal. No icterus.   ENMT: Lips pink without lesions, good dentition, moist mucous membranes. Phonation normal.  Neck: No masses, no thyromegaly. Moves freely without pain.  CV: Pulse as reported by patient  Respiratory: Unlabored respiratory effort, no cough  or audible wheeze  Psych: Alert and oriented x3, normal affect and mood.       Assessment and Plan:   The following treatment plan was discussed:     There are no diagnoses linked to this encounter.      Follow-up: No follow-ups on file.

## 2020-04-06 NOTE — ASSESSMENT & PLAN NOTE
Please note visit was conducted via telephone visit as patient had difficulty with setting up on demand video:  Patient presents today to discuss sleep apnea and symptom she has been suffering ever since starting CPAP.  She is closely followed by pulmonology was found to have TOMY and started on CPAP.  She has been using this for about a month and reports that she now has a persistent dry scratchy throat, occasional cough and runny nose.  She does have allergic rhinitis but does not appear to be taking her medications very consistently.  She has tried several times to reach out to pulmonology she discussed her symptoms but has not received a return call.  She was advised to call DME as her settings may need to be adjusted as well as her humidification.  She will reach out to them.  In the interim we discussed the importance of consistent allergy regimen to include Flonase and second-generation antihistamine as well as generous use of nasal saline.

## 2020-04-06 NOTE — PROGRESS NOTES
Telephone Appointment Visit   As a means of avoiding spread of COVID-19, this visit is being conducted by telephone. This telephone visit was initiated by the patient and they verbally consented.    Time at start of call: 345 PM    Reason for Call:  concerns about CPAP    Patient Comments / History:   TOMY (obstructive sleep apnea)  Please note visit was conducted via telephone visit as patient had difficulty with setting up on demand video:  Patient presents today to discuss sleep apnea and symptom she has been suffering ever since starting CPAP.  She is closely followed by pulmonology was found to have TOMY and started on CPAP.  She has been using this for about a month and reports that she now has a persistent dry scratchy throat, occasional cough and runny nose.  She does have allergic rhinitis but does not appear to be taking her medications very consistently.  She has tried several times to reach out to pulmonology she discussed her symptoms but has not received a return call.  She was advised to call DME as her settings may need to be adjusted as well as her humidification.  She will reach out to them.  In the interim we discussed the importance of consistent allergy regimen to include Flonase and second-generation antihistamine as well as generous use of nasal saline.         Labs / Images Reviewed   n/a     Assessment and Plan:     1. TOMY (obstructive sleep apnea)    2. Allergic rhinitis, unspecified seasonality, unspecified trigger      Follow-up: Return if symptoms worsen or fail to improve, for Follow-up.    Time at end of call: 400 PM   Total Time Spent: 11-20 minutes    LUIS MIGUEL Chris.

## 2020-04-07 ENCOUNTER — TELEPHONE (OUTPATIENT)
Dept: SLEEP MEDICINE | Facility: MEDICAL CENTER | Age: 55
End: 2020-04-07

## 2020-04-07 NOTE — TELEPHONE ENCOUNTER
Pt called before wanting to pursue surgery for TOMY per her visit with Lorri.    She is calling again stating she can NOT tolerate CPAP. This is her second time trying cpap and she is getting extremely dry throat and she has her humidity all the way up.    She is wondering if surgery is an option and if you have any suggestions about her CPAP struggles.     I moved her appt up to 4/27/2020

## 2020-04-25 PROBLEM — J45.20 MILD INTERMITTENT ASTHMA WITHOUT COMPLICATION: Status: ACTIVE | Noted: 2020-04-25

## 2020-04-25 PROBLEM — Z78.9 NON-SMOKER: Status: ACTIVE | Noted: 2020-04-25

## 2020-04-25 NOTE — PROGRESS NOTES
Telemedicine Visit: Established Patient     This encounter was conducted via Zoom .   Verbal consent was obtained. Patient's identity was verified.    Subjective:   CC: first compliance review    HPI:  Patit Tello is a 54 y.o. female who rtc for clinical and compliance review. Her 2/4/20 PSG showed and ahi of 20/terrie sat 84%. On cpap 6 cm water her ahi was 4.5 with a mean sat of 94%.    She was placed on auto titrating CPAP 5-7 cm water.  Her auto CPAP mean pressure is been 5.7.  Her 30-day compliance is 100% for 5 hours and 2 minutes.  She has an average residual estimated apnea hypopnea index of 9.0.  Her average time in large leak per day is 1 hour and 27 minutes.    Following the initiation of CPAP she has had difficulty tolerating it.  She called on 2/27/2020 about surgical referral.  She called again on 4/7/2020 requesting a surgical referral.  She also conferred with her primary RAY Holcomb about the same issues.    Dr. Richard thought she might be a good candidate for the [facility].  She would need a referral to [place] for this as our [facility] has not yet set up.    Not using cpap. Had to call paramedics as she developed numbness.  The paramedics felt that she had the symptoms because she was not using her CPAP.  She has not been using her CPAP because of dry mouth and other symptoms that would not seem related to its use.  She is also have mask difficulties.  I have advised her to try the new mask that she just got.  She should turn the humidifier setting to the maximal as currently she is only on 3.  If she has any questions about how to use her machine she needs to talk to her [employer] company.    We will also increase her AutoPap to 5-9 cm water in an attempt to improve her AHI even further.    If she is not satisfied with CPAP at the time of her next visit in 3 months we can make a referral to the [place] inspire program.  Currently because of the COVID 19 pandemic the [place]  sleep center is closed.    Significant co-morbidities and modifying factors include  asthma, never smoker, chronic pain, obesity, and bipolar.    ROS   Denies any recent fevers or chills. No nausea or vomiting. No chest pains or shortness of breath.     Allergies   Allergen Reactions   • Buprenorphine-Naloxone Hives, Shortness of Breath and Swelling   • Suboxone Hives, Shortness of Breath and Swelling   • Morphine Rash     tachycardia       Current medicines (including changes today)  Current Outpatient Medications   Medication Sig Dispense Refill   • REXULTI 1 MG Tab Take 1 tablet by mouth. Take 1 tablet by mouth every night at bedtime     • ondansetron (ZOFRAN) 8 MG Tab TAKE 1 TABLET BY MOUTH THREE TIMES A DAY AS NEEDED 15 Tab 1   • fluticasone (FLONASE) 50 MCG/ACT nasal spray Spray 1 Spray in nose every day. 1 Bottle 3   • sertraline (ZOLOFT) 100 MG Tab Take  by mouth. Take 1 tablet by mouth every morning     • propranolol (INDERAL) 10 MG Tab Take 10 mg by mouth 3 times a day.     • albuterol (VENTOLIN HFA) 108 (90 Base) MCG/ACT Aero Soln inhalation aerosol INHALE 1 PUFF PO QID PRN 8.5 g 2   • budesonide-formoterol (SYMBICORT) 80-4.5 MCG/ACT Aerosol Inhale 2 Puffs by mouth 2 Times a Day. 1 Inhaler 3   • oxycodone (OXY-IR) 15 MG immediate release tablet TAKE 1 TABLET BY MOUTH EVERY 8 HOURS FOR 30 DAYS M54 DNF 6/12/19  0   • VENTOLIN  (90 Base) MCG/ACT Aero Soln inhalation aerosol INHALE 1 PUFF PO QID PRN  0     No current facility-administered medications for this visit.        Patient Active Problem List    Diagnosis Date Noted   • Lumbar stenosis 10/22/2012     Priority: High   • Non-smoker 04/25/2020   • Mild intermittent asthma without complication 04/25/2020   • Cough 03/03/2020   • Allergic rhinitis 03/03/2020   • Moderate persistent asthma without complication 01/16/2020   • Other dysphagia 12/09/2019   • Esophageal stricture 12/09/2019   • Burn 07/03/2019   • Malodorous urine 07/03/2019   • Chronic  obstructive pulmonary disease (HCC) 07/02/2019   • Abscess 06/04/2019   • Allergic conjunctivitis of both eyes 05/15/2019   • Hyperparathyroidism (HCC) 04/11/2019   • Benign paroxysmal positional vertigo due to bilateral vestibular disorder 04/11/2019   • 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   • Elevated PTHrP level 10/09/2018   • Vitiligo 09/27/2018   • Vitamin B12 deficiency 09/27/2018   • Abnormal laboratory test 09/25/2018   • TOMY (obstructive sleep apnea) 09/17/2018   • History of diabetes mellitus, type II 09/04/2018   • Positive NAKUL (antinuclear antibody) 07/24/2018   • Arthritis, multiple joint involvement 07/24/2018   • Family history of systemic lupus erythematosus (SLE) in mother 07/24/2018   • Iron deficiency anemia 07/24/2018   • Skin lesions 07/24/2018   • Chronic fatigue 06/20/2018   • Heartburn 05/08/2018   • Muscle spasm of both lower legs 03/26/2018   • Calcaneal spur of both feet 03/15/2018   • Pain management contract broken 03/15/2018   • Obesity (BMI 30-39.9) 01/18/2018   • Seasonal allergies 10/05/2017   • Bipolar affective disorder, currently depressed, moderate ([facility]) 01/24/2017   • History of gastric bypass 10/11/2013   • Chronic pain syndrome 09/16/2013   • Anxiety 12/06/2012   • Multilevel degenerative disc disease 10/01/2012   • Cervical radiculopathy, chronic 05/04/2011   • Vitamin D deficiency 11/16/2010   • Arthritis    • Panic attacks    • PTSD (post-traumatic stress disorder)    • Insomnia due to medical condition 11/10/2010       Family History   Problem Relation Age of Onset   • Diabetes Father    • Cancer Father    • Hypertension Father    • Hyperlipidemia Father    • Cancer Mother    • Other Mother         SLE   • Diabetes Mother    • Hypertension Mother    • Hyperlipidemia Mother    • Stroke Mother    • Sleep Apnea Brother    • Cancer Maternal Aunt    • Diabetes Maternal Aunt    • Diabetes Maternal Uncle    • Diabetes Maternal Grandmother    • Diabetes Maternal Grandfather         She  has a past medical history of Anxiety and depression, Arthritis, Asthma, Back injury, Back pain, Bronchitis (2010), Cancer ([facility]), Chickenpox, Chronic LBP, Chronic neck pain, Cold intolerance (1/24/2012), COPD (chronic obstructive pulmonary disease) ([facility]), Cystic fibrosis ([facility]), Depression, Diabetes, GERD (gastroesophageal reflux disease), H/O gastric bypass (10/11/2013), Herniated nucleus pulposus, L4-5 (3/18/2010), breast cancer (10/11/2013), Hypertension, Itching due to drug (6/2/2011), Kidney stone, Nephrolithiasis (10/11/2013), Obesity, Osteoporosis, Other specified symptom associated with female genital organs, Panic disorder, Pneumonia, PTSD (post-traumatic stress disorder), Restless leg syndrome, Rheumatoid arthritis ([facility]), S/P laminectomy (10/22/2012), Thoracic or lumbosacral neuritis or radiculitis, unspecified (10/22/2012), and Trauma.  She  has a past surgical history that includes primary c section; mastectomy; breast reconstruction; abdominal hysterectomy total; craniotomy; US-CYST ASPIRATION-BREAST INITIAL; other orthopedic surgery (10/01/2012); other; other abdominal surgery (2006); other abdominal surgery; lumbar fusion posterior (10/22/2012); lumbar laminectomy diskectomy (10/22/2012); ureteroscopy (10/10/2013); lasertripsy (10/10/2013); laminotomy; Sleeve,Chance Vaso Thigh; hysterectomy laparoscopy; tonsillectomy; and Ventilator - Continuous.       Objective:   Vitals obtained by patient:  Weight 77.1 kg  BMI 33.2 kg/m²          Physical Exam:  Obese  Constitutional: Alert, no distress, well-groomed.  Skin: No rashes in visible areas.  Eye: Round. Conjunctiva clear, lids normal. No icterus.   ENMT: Lips pink without lesions, good dentition, moist mucous membranes. Phonation normal.  Neck: No masses, no thyromegaly. Moves freely without pain.  CV: Pulse as reported by patient  Respiratory: Unlabored respiratory effort, no cough or audible wheeze  Psych: Alert and oriented x3, normal affect  and mood.       Assessment and Plan:   The following treatment plan was discussed:     1. TOMY (obstructive sleep apnea)  - DME Other    2. Obesity (BMI 30-39.9)  - OBESITY COUNSELING (No Charge): Patient identified as having weight management issue.  Appropriate orders and counseling given.    3. Non-smoker    4. Mild intermittent asthma without complication    Other orders  - REXULTI 1 MG Tab; Take 1 tablet by mouth. Take 1 tablet by mouth every night at bedtime    She has not been using her CPAP because of dry mouth and other symptoms that would not seem related to its use.  She is also have mask difficulties.  I have advised her to try the new mask that she just got.  She should turn the humidifier setting to the maximal as currently she is only on 3.  If she has any questions about how to use her machine she needs to talk to her DME company.    We will also increase her AutoPap to 5-9 cm water in an attempt to improve her AHI even further.    If she is not satisfied with CPAP at the time of her next visit in 3 months we can make a referral to the [place] [facility] program.  Currently because of the COVID 19 pandemic the [place] sleep center is closed.      Follow-up: Return in about 3 months (around 7/27/2020).

## 2020-04-27 ENCOUNTER — TELEMEDICINE (OUTPATIENT)
Dept: SLEEP MEDICINE | Facility: MEDICAL CENTER | Age: 55
End: 2020-04-27
Payer: MEDICARE

## 2020-04-27 VITALS — WEIGHT: 170 LBS | BODY MASS INDEX: 33.38 KG/M2 | HEIGHT: 60 IN

## 2020-04-27 DIAGNOSIS — J45.20 MILD INTERMITTENT ASTHMA WITHOUT COMPLICATION: ICD-10-CM

## 2020-04-27 DIAGNOSIS — Z78.9 NON-SMOKER: ICD-10-CM

## 2020-04-27 DIAGNOSIS — E66.9 OBESITY (BMI 30-39.9): ICD-10-CM

## 2020-04-27 DIAGNOSIS — G47.33 OSA (OBSTRUCTIVE SLEEP APNEA): ICD-10-CM

## 2020-04-27 PROCEDURE — 99214 OFFICE O/P EST MOD 30 MIN: CPT | Mod: 95,CR | Performed by: INTERNAL MEDICINE

## 2020-04-27 RX ORDER — BREXPIPRAZOLE 1 MG/1
1 TABLET ORAL
COMMUNITY
Start: 2020-03-31 | End: 2020-11-02

## 2020-04-27 ASSESSMENT — FIBROSIS 4 INDEX: FIB4 SCORE: 0.76

## 2020-05-08 ENCOUNTER — TELEPHONE (OUTPATIENT)
Dept: URGENT CARE | Facility: PHYSICIAN GROUP | Age: 55
End: 2020-05-08

## 2020-05-08 NOTE — TELEPHONE ENCOUNTER
Patient called stating that she thinks she has lyme disease because she has been fatigued, and has a red patch on her leg with a bite alfred in the middle. Patient also thinks she has diabetes because her thighs are purple and that her thighs are numb. I scheduled an appointment with Brea on 5/13/2020 at 9:20a

## 2020-05-13 ENCOUNTER — TELEPHONE (OUTPATIENT)
Dept: HEMATOLOGY ONCOLOGY | Facility: MEDICAL CENTER | Age: 55
End: 2020-05-13

## 2020-05-13 DIAGNOSIS — D50.9 IRON DEFICIENCY ANEMIA, UNSPECIFIED IRON DEFICIENCY ANEMIA TYPE: Primary | ICD-10-CM

## 2020-05-13 NOTE — TELEPHONE ENCOUNTER
Pt called with complaints of increased fatigue and headaches, all of which she experiences when her iron is low. Pt is currently due for her 6 month follow up labs (last drawn 11/19). I let pt know I will speak with a provider regarding her symptoms and return her call. Pt agreeable.    Return call to pt after speaking with Shavon BELL. Per RAY pt needs to have her labs drawn so we can evaluate her levels. Pt verbalizes understanding and states she will go to the lab tomorrow (5/14). Pt states no further questions at this time.

## 2020-05-14 NOTE — TELEPHONE ENCOUNTER
Spoke with pt in regards to phone call on 5/14. Pt states she forgot to do her labs today and will go first thing tomorrow morning (5/15). Pt states no further questions or concerns.

## 2020-05-18 ENCOUNTER — HOSPITAL ENCOUNTER (OUTPATIENT)
Dept: LAB | Facility: MEDICAL CENTER | Age: 55
End: 2020-05-18
Attending: NURSE PRACTITIONER
Payer: MEDICARE

## 2020-05-18 DIAGNOSIS — D50.9 IRON DEFICIENCY ANEMIA, UNSPECIFIED IRON DEFICIENCY ANEMIA TYPE: ICD-10-CM

## 2020-05-18 LAB
ALBUMIN SERPL BCP-MCNC: 3.9 G/DL (ref 3.2–4.9)
ALBUMIN/GLOB SERPL: 1.3 G/DL
ALP SERPL-CCNC: 153 U/L (ref 30–99)
ALT SERPL-CCNC: 10 U/L (ref 2–50)
ANION GAP SERPL CALC-SCNC: 11 MMOL/L (ref 7–16)
AST SERPL-CCNC: 13 U/L (ref 12–45)
BASOPHILS # BLD AUTO: 0.8 % (ref 0–1.8)
BASOPHILS # BLD: 0.07 K/UL (ref 0–0.12)
BILIRUB SERPL-MCNC: 0.2 MG/DL (ref 0.1–1.5)
BUN SERPL-MCNC: 8 MG/DL (ref 8–22)
CALCIUM SERPL-MCNC: 9.4 MG/DL (ref 8.5–10.5)
CHLORIDE SERPL-SCNC: 105 MMOL/L (ref 96–112)
CO2 SERPL-SCNC: 23 MMOL/L (ref 20–33)
CREAT SERPL-MCNC: 0.67 MG/DL (ref 0.5–1.4)
EOSINOPHIL # BLD AUTO: 0.33 K/UL (ref 0–0.51)
EOSINOPHIL NFR BLD: 4 % (ref 0–6.9)
ERYTHROCYTE [DISTWIDTH] IN BLOOD BY AUTOMATED COUNT: 46.4 FL (ref 35.9–50)
FERRITIN SERPL-MCNC: 32.7 NG/ML (ref 10–291)
GLOBULIN SER CALC-MCNC: 3 G/DL (ref 1.9–3.5)
GLUCOSE SERPL-MCNC: 108 MG/DL (ref 65–99)
HCT VFR BLD AUTO: 40.8 % (ref 37–47)
HGB BLD-MCNC: 12.5 G/DL (ref 12–16)
IMM GRANULOCYTES # BLD AUTO: 0.04 K/UL (ref 0–0.11)
IMM GRANULOCYTES NFR BLD AUTO: 0.5 % (ref 0–0.9)
IRON SATN MFR SERPL: 10 % (ref 15–55)
IRON SERPL-MCNC: 35 UG/DL (ref 40–170)
LYMPHOCYTES # BLD AUTO: 2.11 K/UL (ref 1–4.8)
LYMPHOCYTES NFR BLD: 25.5 % (ref 22–41)
MCH RBC QN AUTO: 26.2 PG (ref 27–33)
MCHC RBC AUTO-ENTMCNC: 30.6 G/DL (ref 33.6–35)
MCV RBC AUTO: 85.4 FL (ref 81.4–97.8)
MONOCYTES # BLD AUTO: 0.49 K/UL (ref 0–0.85)
MONOCYTES NFR BLD AUTO: 5.9 % (ref 0–13.4)
NEUTROPHILS # BLD AUTO: 5.25 K/UL (ref 2–7.15)
NEUTROPHILS NFR BLD: 63.3 % (ref 44–72)
NRBC # BLD AUTO: 0 K/UL
NRBC BLD-RTO: 0 /100 WBC
PLATELET # BLD AUTO: 279 K/UL (ref 164–446)
PMV BLD AUTO: 11.7 FL (ref 9–12.9)
POTASSIUM SERPL-SCNC: 3.7 MMOL/L (ref 3.6–5.5)
PROT SERPL-MCNC: 6.9 G/DL (ref 6–8.2)
RBC # BLD AUTO: 4.78 M/UL (ref 4.2–5.4)
SODIUM SERPL-SCNC: 139 MMOL/L (ref 135–145)
TIBC SERPL-MCNC: 348 UG/DL (ref 250–450)
UIBC SERPL-MCNC: 313 UG/DL (ref 110–370)
WBC # BLD AUTO: 8.3 K/UL (ref 4.8–10.8)

## 2020-05-18 PROCEDURE — 36415 COLL VENOUS BLD VENIPUNCTURE: CPT

## 2020-05-18 PROCEDURE — 83550 IRON BINDING TEST: CPT

## 2020-05-18 PROCEDURE — 82728 ASSAY OF FERRITIN: CPT

## 2020-05-18 PROCEDURE — 80053 COMPREHEN METABOLIC PANEL: CPT

## 2020-05-18 PROCEDURE — 83540 ASSAY OF IRON: CPT

## 2020-05-18 PROCEDURE — 85025 COMPLETE CBC W/AUTO DIFF WBC: CPT

## 2020-05-19 ENCOUNTER — TELEPHONE (OUTPATIENT)
Dept: HEMATOLOGY ONCOLOGY | Facility: MEDICAL CENTER | Age: 55
End: 2020-05-19

## 2020-05-19 DIAGNOSIS — D50.9 IRON DEFICIENCY ANEMIA, UNSPECIFIED IRON DEFICIENCY ANEMIA TYPE: Primary | ICD-10-CM

## 2020-05-19 NOTE — TELEPHONE ENCOUNTER
Called pt per the request of Shavon BELL. Pt aware the per APRN her iron is low and we will be scheduling her at the [facility] for Venofer infusions x 5 doses. Pt aware of need for follow-up appointment in 1-2 months with repeat labs prior. Pt aware a return call to her will be made today from Brissa SANTAMARIA, with her appointment times. Pt appreciative and states she hopes this will help with her fatigue. Pt states no further questions at this time.

## 2020-05-26 ENCOUNTER — OFFICE VISIT (OUTPATIENT)
Dept: MEDICAL GROUP | Facility: PHYSICIAN GROUP | Age: 55
End: 2020-05-26
Payer: MEDICARE

## 2020-05-26 VITALS
DIASTOLIC BLOOD PRESSURE: 80 MMHG | RESPIRATION RATE: 14 BRPM | SYSTOLIC BLOOD PRESSURE: 126 MMHG | HEIGHT: 60 IN | OXYGEN SATURATION: 96 % | BODY MASS INDEX: 34.16 KG/M2 | HEART RATE: 63 BPM | TEMPERATURE: 97.8 F | WEIGHT: 174 LBS

## 2020-05-26 DIAGNOSIS — R26.89 POOR BALANCE: ICD-10-CM

## 2020-05-26 DIAGNOSIS — R11.0 NAUSEA: ICD-10-CM

## 2020-05-26 DIAGNOSIS — M54.12 CERVICAL RADICULOPATHY, CHRONIC: ICD-10-CM

## 2020-05-26 DIAGNOSIS — R05.9 COUGH: ICD-10-CM

## 2020-05-26 PROCEDURE — 99214 OFFICE O/P EST MOD 30 MIN: CPT | Performed by: NURSE PRACTITIONER

## 2020-05-26 RX ORDER — ONDANSETRON HYDROCHLORIDE 8 MG/1
TABLET, FILM COATED ORAL
Qty: 15 TAB | Refills: 1 | Status: SHIPPED | OUTPATIENT
Start: 2020-05-26 | End: 2020-11-02

## 2020-05-26 RX ORDER — DIAZEPAM 5 MG/1
TABLET ORAL
COMMUNITY
Start: 2020-04-30 | End: 2020-05-26

## 2020-05-26 RX ORDER — BENZONATATE 100 MG/1
100 CAPSULE ORAL 3 TIMES DAILY PRN
Qty: 60 CAP | Refills: 0 | Status: SHIPPED | OUTPATIENT
Start: 2020-05-26 | End: 2020-07-28

## 2020-05-26 RX ORDER — DULOXETIN HYDROCHLORIDE 30 MG/1
CAPSULE, DELAYED RELEASE ORAL
COMMUNITY
Start: 2020-05-22 | End: 2020-05-26

## 2020-05-26 RX ORDER — CROMOLYN SODIUM 40 MG/ML
SOLUTION/ DROPS OPHTHALMIC
COMMUNITY
Start: 2020-04-30 | End: 2020-05-26

## 2020-05-26 RX ORDER — PROPRANOLOL HYDROCHLORIDE 20 MG/1
TABLET ORAL
COMMUNITY
Start: 2020-05-04 | End: 2020-05-26

## 2020-05-26 RX ORDER — METHYLPREDNISOLONE 4 MG/1
TABLET ORAL
COMMUNITY
Start: 2020-04-10 | End: 2020-05-26

## 2020-05-26 RX ORDER — HYDROXYCHLOROQUINE SULFATE 200 MG/1
TABLET, FILM COATED ORAL
COMMUNITY
Start: 2020-05-07 | End: 2020-05-26

## 2020-05-26 RX ORDER — MELOXICAM 15 MG/1
TABLET ORAL
COMMUNITY
Start: 2020-05-14 | End: 2020-05-26

## 2020-05-26 RX ORDER — ALPRAZOLAM 0.5 MG/1
TABLET ORAL
COMMUNITY
Start: 2020-03-27 | End: 2020-05-26

## 2020-05-26 RX ORDER — GABAPENTIN 300 MG/1
CAPSULE ORAL
COMMUNITY
Start: 2020-04-30 | End: 2020-05-26

## 2020-05-26 ASSESSMENT — FIBROSIS 4 INDEX: FIB4 SCORE: 0.81

## 2020-05-26 NOTE — PATIENT INSTRUCTIONS
Express concerns regarding stimulator with spine specialist    Continue care per specialists--talk to hematologist regarding concern for blood disorder in family, continue care per Rheumatology    Follow up with me in 4 months    zofran and Tessalon refilled

## 2020-05-26 NOTE — PROGRESS NOTES
Chief Complaint   Patient presents with   • Follow-Up   • Arm Problem     Bilateral numbness          This is a 54 y.o.female patient that presents today with the following: follow up visit    Cervical radiculopathy, chronic  Pt continues to have radicular pain, even status post surgical intervention with spine specialist, however, she only had done in the past couple of months. She does have an upcoming appt with specialist.    Cough  Pt has cough associated with seasonal allergies for which she takes tessalon as needed. She does need refills, this was called in for her. Denies fever, shortness of breath or body aches.    Nausea  Pt has chronic nausea, she takes zofran as needed, needs refills, this was called in for her    Poor balance  Pt has been struggling with poor balance as of last, fell over the weekend. She does have chronic RERE and has an upcoming appt for iron infusion. Reports drinking adequate amounts of water.       Hospital Outpatient Visit on 05/18/2020   Component Date Value   • Iron 05/18/2020 35*   • Total Iron Binding 05/18/2020 348    • Unsat Iron Binding 05/18/2020 313    • % Saturation 05/18/2020 10*   Hospital Outpatient Visit on 05/18/2020   Component Date Value   • Ferritin 05/18/2020 32.7    • Sodium 05/18/2020 139    • Potassium 05/18/2020 3.7    • Chloride 05/18/2020 105    • Co2 05/18/2020 23    • Anion Gap 05/18/2020 11.0    • Glucose 05/18/2020 108*   • Bun 05/18/2020 8    • Creatinine 05/18/2020 0.67    • Calcium 05/18/2020 9.4    • AST(SGOT) 05/18/2020 13    • ALT(SGPT) 05/18/2020 10    • Alkaline Phosphatase 05/18/2020 153*   • Total Bilirubin 05/18/2020 0.2    • Albumin 05/18/2020 3.9    • Total Protein 05/18/2020 6.9    • Globulin 05/18/2020 3.0    • A-G Ratio 05/18/2020 1.3    • WBC 05/18/2020 8.3    • RBC 05/18/2020 4.78    • Hemoglobin 05/18/2020 12.5    • Hematocrit 05/18/2020 40.8    • MCV 05/18/2020 85.4    • MCH 05/18/2020 26.2*   • MCHC 05/18/2020 30.6*   • RDW  05/18/2020 46.4    • Platelet Count 05/18/2020 279    • MPV 05/18/2020 11.7    • Neutrophils-Polys 05/18/2020 63.30    • Lymphocytes 05/18/2020 25.50    • Monocytes 05/18/2020 5.90    • Eosinophils 05/18/2020 4.00    • Basophils 05/18/2020 0.80    • Immature Granulocytes 05/18/2020 0.50    • Nucleated RBC 05/18/2020 0.00    • Neutrophils (Absolute) 05/18/2020 5.25    • Lymphs (Absolute) 05/18/2020 2.11    • Monos (Absolute) 05/18/2020 0.49    • Eos (Absolute) 05/18/2020 0.33    • Baso (Absolute) 05/18/2020 0.07    • Immature Granulocytes (a* 05/18/2020 0.04    • NRBC (Absolute) 05/18/2020 0.00    • GFR If  05/18/2020 >60    • GFR If Non  Ameri* 05/18/2020 >60          clinical course has been stable    Past Medical History:   Diagnosis Date   • Anxiety and depression    • Arthritis     back, neck   • Asthma    • Back injury    • Back pain    • Bronchitis 2010   • Cancer (HCC)     breast LT   • Chickenpox    • Chronic LBP    • Chronic neck pain    • Cold intolerance 1/24/2012   • COPD (chronic obstructive pulmonary disease) ([facility])    • Cystic fibrosis ([facility])    • Depression    • Diabetes    • GERD (gastroesophageal reflux disease)    • H/O gastric bypass 10/11/2013   • Herniated nucleus pulposus, L4-5 3/18/2010   • HX: breast cancer 10/11/2013   • Hypertension    • Itching due to drug 6/2/2011   • Kidney stone    • Nephrolithiasis 10/11/2013   • Obesity    • Osteoporosis    • Other specified symptom associated with female genital organs    • Panic disorder    • Pneumonia    • PTSD (post-traumatic stress disorder)    • Restless leg syndrome    • Rheumatoid arthritis (HCC)    • S/P laminectomy 10/22/2012   • Thoracic or lumbosacral neuritis or radiculitis, unspecified 10/22/2012   • Trauma     head trauma       Past Surgical History:   Procedure Laterality Date   • URETEROSCOPY  10/10/2013    Performed by Molly Resendiz M.D. at [facility]   • LASERTRIPSY  10/10/2013    Performed  by Molly Resendiz M.D. at [facility]ERY [facility]   • LUMBAR FUSION POSTERIOR  10/22/2012    Performed by Alfred Branham M.D. at SURGERY [facility]   • LUMBAR LAMINECTOMY DISKECTOMY  10/22/2012    Performed by Alfred Branham M.D. at SURGERY [facility]   • OTHER ORTHOPEDIC SURGERY  10/01/2012    lumbar fusion, Dr Branham   • OTHER ABDOMINAL SURGERY  2006    gastric bypass   • ABDOMINAL HYSTERECTOMY TOTAL      DUE TO FIBROIDS   • BREAST RECONSTRUCTION      Lt breast   • CRANIOTOMY      DUE TO BRAIN INJURY   • HYSTERECTOMY LAPAROSCOPY     • LAMINOTOMY     • MASTECTOMY      Lt breast   • OTHER      left ear drum surgery   • OTHER ABDOMINAL SURGERY      hernia repair   • PRIMARY C SECTION      x 3   • SLEEVE,MAXIMILIAN VASO THIGH     • TONSILLECTOMY     • US-CYST ASPIRATION-BREAST INITIAL     • VENTILATOR CONTINUOUS         Family History   Problem Relation Age of Onset   • Diabetes Father    • Cancer Father    • Hypertension Father    • Hyperlipidemia Father    • Cancer Mother    • Other Mother         SLE   • Diabetes Mother    • Hypertension Mother    • Hyperlipidemia Mother    • Stroke Mother    • Sleep Apnea Brother    • Cancer Maternal Aunt    • Diabetes Maternal Aunt    • Diabetes Maternal Uncle    • Diabetes Maternal Grandmother    • Diabetes Maternal Grandfather        Buprenorphine-naloxone; Gabapentin; Suboxone; and Morphine    Current Outpatient Medications Ordered in Epic   Medication Sig Dispense Refill   • ondansetron (ZOFRAN) 8 MG Tab TAKE 1 TABLET BY MOUTH THREE TIMES A DAY AS NEEDED 15 Tab 1   • benzonatate (TESSALON) 100 MG Cap Take 1 Cap by mouth 3 times a day as needed for Cough. 60 Cap 0   • REXULTI 1 MG Tab Take 1 tablet by mouth. Take 1 tablet by mouth every night at bedtime     • fluticasone (FLONASE) 50 MCG/ACT nasal spray Spray 1 Spray in nose every day. 1 Bottle 3   • sertraline (ZOLOFT) 100 MG Tab Take  by mouth. Take 1 tablet by mouth every morning     • propranolol (INDERAL) 10 MG  Tab Take 10 mg by mouth 3 times a day.     • albuterol (VENTOLIN HFA) 108 (90 Base) MCG/ACT Aero Soln inhalation aerosol INHALE 1 PUFF PO QID PRN 8.5 g 2   • budesonide-formoterol (SYMBICORT) 80-4.5 MCG/ACT Aerosol Inhale 2 Puffs by mouth 2 Times a Day. 1 Inhaler 3   • oxycodone (OXY-IR) 15 MG immediate release tablet TAKE 1 TABLET BY MOUTH EVERY 8 HOURS FOR 30 DAYS M54 DNF 6/12/19  0   • VENTOLIN  (90 Base) MCG/ACT Aero Soln inhalation aerosol INHALE 1 PUFF PO QID PRN  0     No current Epic-ordered facility-administered medications on file.        Constitutional ROS: No unexpected change in weight, No weakness, No unexplained fevers, sweats, or chills  Pulmonary ROS: Positive for cough with allergies  Cardiovascular ROS: No chest pain  Gastrointestinal ROS: positive for chronic nausea  Musculoskeletal/Extremities ROS: positive per HPI  Neurologic ROS: Normal development, No seizures, No weakness. Positive for numbness and tingling    Physical exam:  /80   Pulse 63   Temp 36.6 °C (97.8 °F) (Temporal)   Resp 14   Ht 1.524 m (5')   Wt 78.9 kg (174 lb)   LMP 10/05/1999   SpO2 96%   BMI 33.98 kg/m²   General Appearance: pleasant middle aged female, alert, no distress, obese, well groomed  Skin: positive for ecchymosis to LUE and LLE  Lungs: negative findings: normal respiratory rate and rhythm, normal effort  Musculoskeletal: negative findings: no evidence of joint instability, no evidence of muscle atrophy, no deformities present  Neurologic: intact, CN 2-12 grossly intact    Medical decision making/discussion:     Express concerns regarding stimulator with spine specialist    Continue care per specialists--talk to hematologist regarding concern for blood disorder in family, continue care per Rheumatology    Follow up with me in 4 months    zofran and Tessalon refilled    Patti was seen today for follow-up and arm problem.    Diagnoses and all orders for this visit:    Cervical radiculopathy,  chronic    Poor balance  -     REFERRAL TO PHYSICAL THERAPY Reason for Therapy: Eval/Treat/Report    Nausea  -     ondansetron (ZOFRAN) 8 MG Tab; TAKE 1 TABLET BY MOUTH THREE TIMES A DAY AS NEEDED    Cough  -     benzonatate (TESSALON) 100 MG Cap; Take 1 Cap by mouth 3 times a day as needed for Cough.        Return in about 4 months (around 9/26/2020) for Discuss Labs, Follow-up.        Please note that this dictation was created using voice recognition software. I have made every reasonable attempt to correct obvious errors, but I expect that there are errors of grammar and possibly content that I did not discover before finalizing the note.

## 2020-05-27 NOTE — ASSESSMENT & PLAN NOTE
Pt has been struggling with poor balance as of last, fell over the weekend. She does have chronic RERE and has an upcoming appt for iron infusion. Reports drinking adequate amounts of water.

## 2020-05-27 NOTE — ASSESSMENT & PLAN NOTE
Pt has cough associated with seasonal allergies for which she takes tessalon as needed. She does need refills, this was called in for her. Denies fever, shortness of breath or body aches.

## 2020-05-27 NOTE — ASSESSMENT & PLAN NOTE
Pt continues to have radicular pain, even status post surgical intervention with spine specialist, however, she only had done in the past couple of months. She does have an upcoming appt with specialist.

## 2020-05-28 ENCOUNTER — TELEPHONE (OUTPATIENT)
Dept: ONCOLOGY | Facility: MEDICAL CENTER | Age: 55
End: 2020-05-28

## 2020-05-29 ENCOUNTER — OUTPATIENT INFUSION SERVICES (OUTPATIENT)
Dept: ONCOLOGY | Facility: MEDICAL CENTER | Age: 55
End: 2020-05-29
Attending: NURSE PRACTITIONER
Payer: MEDICARE

## 2020-05-29 VITALS
SYSTOLIC BLOOD PRESSURE: 120 MMHG | DIASTOLIC BLOOD PRESSURE: 53 MMHG | WEIGHT: 176.15 LBS | HEIGHT: 60 IN | HEART RATE: 80 BPM | TEMPERATURE: 97.2 F | OXYGEN SATURATION: 97 % | RESPIRATION RATE: 18 BRPM | BODY MASS INDEX: 34.58 KG/M2

## 2020-05-29 PROCEDURE — 700111 HCHG RX REV CODE 636 W/ 250 OVERRIDE (IP): Performed by: NURSE PRACTITIONER

## 2020-05-29 PROCEDURE — 306780 HCHG STAT FOR TRANSFUSION PER CASE

## 2020-05-29 PROCEDURE — 96374 THER/PROPH/DIAG INJ IV PUSH: CPT

## 2020-05-29 RX ADMIN — IRON SUCROSE 200 MG: 20 INJECTION, SOLUTION INTRAVENOUS at 14:49

## 2020-05-29 ASSESSMENT — FIBROSIS 4 INDEX: FIB4 SCORE: 0.81

## 2020-05-29 NOTE — PROGRESS NOTES
Pt ambulatory to [facility] for day 1 Venofer.  POC discussed with patient, pt agrees with POC.  Pt states she has received Venofer in the past and it helped tremendously with fatigue.  PIV placed to left AC, brisk blood return observed.  Venofer administered per MD orders over 10 minutes, pt observed for 30 minutes post administration.  Pt c/o headache post infusion, otherwise tolerated well, no s/s adverse reaction noted.  PIV flushed and removed, gauze and coban to site.  Pt discharged to self care in [facility], appointment schedule printed and given to patient.

## 2020-05-31 ENCOUNTER — OUTPATIENT INFUSION SERVICES (OUTPATIENT)
Dept: ONCOLOGY | Facility: MEDICAL CENTER | Age: 55
End: 2020-05-31
Attending: NURSE PRACTITIONER
Payer: MEDICARE

## 2020-05-31 VITALS
DIASTOLIC BLOOD PRESSURE: 66 MMHG | SYSTOLIC BLOOD PRESSURE: 126 MMHG | TEMPERATURE: 97.5 F | OXYGEN SATURATION: 98 % | HEART RATE: 77 BPM | HEIGHT: 60 IN | RESPIRATION RATE: 18 BRPM | BODY MASS INDEX: 34.41 KG/M2 | WEIGHT: 175.27 LBS

## 2020-05-31 PROCEDURE — 700111 HCHG RX REV CODE 636 W/ 250 OVERRIDE (IP): Performed by: NURSE PRACTITIONER

## 2020-05-31 PROCEDURE — 306780 HCHG STAT FOR TRANSFUSION PER CASE

## 2020-05-31 PROCEDURE — 96374 THER/PROPH/DIAG INJ IV PUSH: CPT

## 2020-05-31 RX ADMIN — IRON SUCROSE 200 MG: 20 INJECTION, SOLUTION INTRAVENOUS at 14:35

## 2020-05-31 ASSESSMENT — FIBROSIS 4 INDEX: FIB4 SCORE: 0.81

## 2020-05-31 NOTE — PROGRESS NOTES
Patient arrived ambulatory to the [facility] for Venofer dose 2/5. Reviewed vital signs, labs, and physician order. Patient denies S&S of infection, or bleeding. IV access established in right forearm, visualized brisk blood return. Venofer administered over 15 mins due to pt tolerance, no adverse reaction observed. Pt remained in clinic for 30 min observations, no adverse reaction observed. IV flushed per protocol, catheter removed with tip intact, gauze and coban dressing placed. Confirmed upcoming appointment date and time with patient. Patient left the [facility] ambulatory in no sign of distress.

## 2020-06-02 ENCOUNTER — OUTPATIENT INFUSION SERVICES (OUTPATIENT)
Dept: ONCOLOGY | Facility: MEDICAL CENTER | Age: 55
End: 2020-06-02
Attending: NURSE PRACTITIONER
Payer: MEDICARE

## 2020-06-02 VITALS
HEART RATE: 74 BPM | TEMPERATURE: 97.3 F | HEIGHT: 61 IN | DIASTOLIC BLOOD PRESSURE: 62 MMHG | SYSTOLIC BLOOD PRESSURE: 116 MMHG | OXYGEN SATURATION: 98 % | RESPIRATION RATE: 18 BRPM | BODY MASS INDEX: 33.51 KG/M2 | WEIGHT: 177.47 LBS

## 2020-06-02 PROCEDURE — 306780 HCHG STAT FOR TRANSFUSION PER CASE

## 2020-06-02 PROCEDURE — 96374 THER/PROPH/DIAG INJ IV PUSH: CPT

## 2020-06-02 PROCEDURE — 700111 HCHG RX REV CODE 636 W/ 250 OVERRIDE (IP): Performed by: NURSE PRACTITIONER

## 2020-06-02 RX ADMIN — IRON SUCROSE 200 MG: 20 INJECTION, SOLUTION INTRAVENOUS at 15:27

## 2020-06-02 ASSESSMENT — FIBROSIS 4 INDEX: FIB4 SCORE: 0.81

## 2020-06-02 NOTE — PROGRESS NOTES
Patient presents for Venofer 3/5. Patient voices no new concerns today. PIV established, flushes well with brisk blood return. Venofer given slow IV push. Patient observed for thirty minutes post medication and remains stable. PIV removed, tip intact, compression dressing to site. Next appointment confirmed. Patient released in no acute distress.

## 2020-06-04 ENCOUNTER — OUTPATIENT INFUSION SERVICES (OUTPATIENT)
Dept: ONCOLOGY | Facility: MEDICAL CENTER | Age: 55
End: 2020-06-04
Attending: NURSE PRACTITIONER
Payer: MEDICARE

## 2020-06-04 VITALS
HEART RATE: 63 BPM | HEIGHT: 60 IN | RESPIRATION RATE: 18 BRPM | OXYGEN SATURATION: 95 % | TEMPERATURE: 97.6 F | DIASTOLIC BLOOD PRESSURE: 66 MMHG | BODY MASS INDEX: 34.71 KG/M2 | SYSTOLIC BLOOD PRESSURE: 116 MMHG | WEIGHT: 176.81 LBS

## 2020-06-04 PROCEDURE — 306780 HCHG STAT FOR TRANSFUSION PER CASE

## 2020-06-04 PROCEDURE — 96374 THER/PROPH/DIAG INJ IV PUSH: CPT

## 2020-06-04 PROCEDURE — 700111 HCHG RX REV CODE 636 W/ 250 OVERRIDE (IP): Performed by: NURSE PRACTITIONER

## 2020-06-04 RX ADMIN — IRON SUCROSE 200 MG: 20 INJECTION, SOLUTION INTRAVENOUS at 15:39

## 2020-06-04 ASSESSMENT — FIBROSIS 4 INDEX: FIB4 SCORE: 0.81

## 2020-06-04 NOTE — PROGRESS NOTES
Pt [facility]er, Day 4. Pt denied fever, signs or symptoms of infection or acute illness today. POC discussed and pt verbalized understanding.     PIV established and Venofer administered per provider orders; pt monitored for 30 minutes post administration and pt tolerated well. No signs or symptoms of reaction or complications noted. PIV flushed and removed; gauze and paper tape applied at this time.     Follow-up care and next appointment confirmed with pt and pt verbalized understanding. Pt discharged home to self care in no apparent distress at this time.

## 2020-06-05 ENCOUNTER — TELEPHONE (OUTPATIENT)
Dept: ONCOLOGY | Facility: MEDICAL CENTER | Age: 55
End: 2020-06-05

## 2020-06-06 ENCOUNTER — OUTPATIENT INFUSION SERVICES (OUTPATIENT)
Dept: ONCOLOGY | Facility: MEDICAL CENTER | Age: 55
End: 2020-06-06
Attending: INTERNAL MEDICINE
Payer: MEDICARE

## 2020-06-06 VITALS
WEIGHT: 176.59 LBS | OXYGEN SATURATION: 94 % | RESPIRATION RATE: 18 BRPM | HEIGHT: 61 IN | DIASTOLIC BLOOD PRESSURE: 77 MMHG | SYSTOLIC BLOOD PRESSURE: 118 MMHG | TEMPERATURE: 97.1 F | BODY MASS INDEX: 33.34 KG/M2 | HEART RATE: 77 BPM

## 2020-06-06 PROCEDURE — 306780 HCHG STAT FOR TRANSFUSION PER CASE

## 2020-06-06 PROCEDURE — 700111 HCHG RX REV CODE 636 W/ 250 OVERRIDE (IP): Performed by: NURSE PRACTITIONER

## 2020-06-06 PROCEDURE — 96374 THER/PROPH/DIAG INJ IV PUSH: CPT

## 2020-06-06 RX ADMIN — IRON SUCROSE 200 MG: 20 INJECTION, SOLUTION INTRAVENOUS at 14:00

## 2020-06-06 ASSESSMENT — FIBROSIS 4 INDEX: FIB4 SCORE: 0.81

## 2020-06-06 NOTE — PROGRESS NOTES
Patient arrived to [facility] for #5/5 Venofer.  Denies any acute changes.  PIV established with good blood return noted.  Venofer given IV push per order, pt tolerated well.  Monitored for 30 minutes post with no s/s reaction noted.  PIV flushed, removed, and gauze/coban placed.  She ambulated out of clinic in no apparent distress.

## 2020-06-08 ENCOUNTER — HOSPITAL ENCOUNTER (OUTPATIENT)
Dept: LAB | Facility: MEDICAL CENTER | Age: 55
End: 2020-06-08
Attending: NURSE PRACTITIONER
Payer: MEDICARE

## 2020-06-08 ENCOUNTER — TELEPHONE (OUTPATIENT)
Dept: HEMATOLOGY ONCOLOGY | Facility: MEDICAL CENTER | Age: 55
End: 2020-06-08

## 2020-06-08 DIAGNOSIS — D50.9 IRON DEFICIENCY ANEMIA, UNSPECIFIED IRON DEFICIENCY ANEMIA TYPE: Primary | ICD-10-CM

## 2020-06-08 DIAGNOSIS — D50.9 IRON DEFICIENCY ANEMIA, UNSPECIFIED IRON DEFICIENCY ANEMIA TYPE: ICD-10-CM

## 2020-06-08 LAB
BASOPHILS # BLD AUTO: 1 % (ref 0–1.8)
BASOPHILS # BLD: 0.09 K/UL (ref 0–0.12)
EOSINOPHIL # BLD AUTO: 0.3 K/UL (ref 0–0.51)
EOSINOPHIL NFR BLD: 3.3 % (ref 0–6.9)
ERYTHROCYTE [DISTWIDTH] IN BLOOD BY AUTOMATED COUNT: 49 FL (ref 35.9–50)
HCT VFR BLD AUTO: 44 % (ref 37–47)
HGB BLD-MCNC: 13.6 G/DL (ref 12–16)
IMM GRANULOCYTES # BLD AUTO: 0.06 K/UL (ref 0–0.11)
IMM GRANULOCYTES NFR BLD AUTO: 0.7 % (ref 0–0.9)
LYMPHOCYTES # BLD AUTO: 2.06 K/UL (ref 1–4.8)
LYMPHOCYTES NFR BLD: 22.6 % (ref 22–41)
MCH RBC QN AUTO: 26.6 PG (ref 27–33)
MCHC RBC AUTO-ENTMCNC: 30.9 G/DL (ref 33.6–35)
MCV RBC AUTO: 86.1 FL (ref 81.4–97.8)
MONOCYTES # BLD AUTO: 0.63 K/UL (ref 0–0.85)
MONOCYTES NFR BLD AUTO: 6.9 % (ref 0–13.4)
NEUTROPHILS # BLD AUTO: 5.97 K/UL (ref 2–7.15)
NEUTROPHILS NFR BLD: 65.5 % (ref 44–72)
NRBC # BLD AUTO: 0 K/UL
NRBC BLD-RTO: 0 /100 WBC
PLATELET # BLD AUTO: 259 K/UL (ref 164–446)
PMV BLD AUTO: 12 FL (ref 9–12.9)
RBC # BLD AUTO: 5.11 M/UL (ref 4.2–5.4)
WBC # BLD AUTO: 9.1 K/UL (ref 4.8–10.8)

## 2020-06-08 PROCEDURE — 85025 COMPLETE CBC W/AUTO DIFF WBC: CPT

## 2020-06-08 PROCEDURE — 36415 COLL VENOUS BLD VENIPUNCTURE: CPT

## 2020-06-08 NOTE — TELEPHONE ENCOUNTER
Pt called and states she fell around 10 days ago after getting lightheaded while walking. Pt states she did not hit her head. Pt stating she finished her Venofer infusions on 6/6 and has had a headache, nausea, and increased confusion since Friday 6/5. Pt also with c/o numbness from her elbows down. Pt states she feels like these symptoms are related to her iron levels. Pt aware I will call her back after speaking to a provider.     Return call to pt after speaking with Shavon BELL. Per RAY pt is to increase her hydration, check her BP and have labs drawn. Pt aware to call the office if symptoms worsen and if they do not improve in the next week she needs to call and make an appointment. Pt states she will have her labs drawn either today or tomorrow. Pt verbalizes understanding and states no further questions at this time.

## 2020-06-09 NOTE — NON-PROVIDER
Reviewed lab work with Shavon BELL regarding pt current symptoms.  Per RAY & Dr. Be, pt's labs look good & are not likely r/t iron deficiency nor is pt anemic.  Informed pt that iron studies are typically rechecked about 2-3 months after iron infusions.  Per RAY, instructed pt to discuss current symptoms with PCP.  Pt verbalized understanding.

## 2020-06-29 ENCOUNTER — TELEPHONE (OUTPATIENT)
Dept: HEMATOLOGY ONCOLOGY | Facility: MEDICAL CENTER | Age: 55
End: 2020-06-29

## 2020-06-29 NOTE — TELEPHONE ENCOUNTER
After chart notes from ED visit were rec'vd & reviewed by Shavon BELL, called pt to let her know that the ED physician wrote in the notes for pt to f/u with PCP as well as Urologist, Dr. Valenzuela.  Pt apologized for her confusion in thinking she needed to f/u with Hematologist & stated she had a fever that day & was not thinking clearly.  Pt verbalized she would contact her PCP & Urologist as her earliest convenience & stated no further questions.

## 2020-06-29 NOTE — TELEPHONE ENCOUNTER
Called [facility] & spoke with Alvina in Medical Records.  Requested chart notes from pt's recent visit to ED to be sent ASAP to our office.  Alvina found our office's request for records in her fax queue & stated she would work on getting them to us now.

## 2020-06-29 NOTE — TELEPHONE ENCOUNTER
"Rec'vd call from pt stating she was recently seen for a \"kidney infection\" at the ED at [facility].  Pt stated she was treated with IV antibx & was sent home on oral antibx.  Pt stated that her levels were \"low\" & that she rec'vd some K+ & reports continued fatigue.  Explained to pt that she may be feeling fatigued d/t the infection her body is fighting and/or the antibiotic use.  Pt also stated that she was told to f/u with her hematologist after d/c'd.      Kirk SANTAMARIA sent over a STAT request of medical records for pt's ED visit.  Once rec'vd, decision will be made whether pt needs to be seen by hematology or f/u with PCP for infection.    Pt made aware that office will contact her if appt needs to be made with Med Onc/Hem office.  Pt verbalized understanding & agreeable with plan at this time.  "

## 2020-06-30 ENCOUNTER — APPOINTMENT (OUTPATIENT)
Dept: HEMATOLOGY ONCOLOGY | Facility: MEDICAL CENTER | Age: 55
End: 2020-06-30
Payer: MEDICARE

## 2020-07-25 ENCOUNTER — HOSPITAL ENCOUNTER (OUTPATIENT)
Dept: LAB | Facility: MEDICAL CENTER | Age: 55
End: 2020-07-25
Attending: NURSE PRACTITIONER
Payer: MEDICARE

## 2020-07-25 DIAGNOSIS — D50.9 IRON DEFICIENCY ANEMIA, UNSPECIFIED IRON DEFICIENCY ANEMIA TYPE: ICD-10-CM

## 2020-07-25 LAB
BASOPHILS # BLD AUTO: 0.8 % (ref 0–1.8)
BASOPHILS # BLD: 0.06 K/UL (ref 0–0.12)
EOSINOPHIL # BLD AUTO: 0.2 K/UL (ref 0–0.51)
EOSINOPHIL NFR BLD: 2.6 % (ref 0–6.9)
ERYTHROCYTE [DISTWIDTH] IN BLOOD BY AUTOMATED COUNT: 55.1 FL (ref 35.9–50)
FERRITIN SERPL-MCNC: 267 NG/ML (ref 10–291)
HCT VFR BLD AUTO: 45.3 % (ref 37–47)
HGB BLD-MCNC: 14.2 G/DL (ref 12–16)
IMM GRANULOCYTES # BLD AUTO: 0.03 K/UL (ref 0–0.11)
IMM GRANULOCYTES NFR BLD AUTO: 0.4 % (ref 0–0.9)
IRON SATN MFR SERPL: 27 % (ref 15–55)
IRON SERPL-MCNC: 75 UG/DL (ref 40–170)
LYMPHOCYTES # BLD AUTO: 1.59 K/UL (ref 1–4.8)
LYMPHOCYTES NFR BLD: 20.5 % (ref 22–41)
MCH RBC QN AUTO: 27.9 PG (ref 27–33)
MCHC RBC AUTO-ENTMCNC: 31.3 G/DL (ref 33.6–35)
MCV RBC AUTO: 89 FL (ref 81.4–97.8)
MONOCYTES # BLD AUTO: 0.43 K/UL (ref 0–0.85)
MONOCYTES NFR BLD AUTO: 5.5 % (ref 0–13.4)
NEUTROPHILS # BLD AUTO: 5.45 K/UL (ref 2–7.15)
NEUTROPHILS NFR BLD: 70.2 % (ref 44–72)
NRBC # BLD AUTO: 0 K/UL
NRBC BLD-RTO: 0 /100 WBC
PLATELET # BLD AUTO: 254 K/UL (ref 164–446)
PMV BLD AUTO: 11.2 FL (ref 9–12.9)
RBC # BLD AUTO: 5.09 M/UL (ref 4.2–5.4)
TIBC SERPL-MCNC: 274 UG/DL (ref 250–450)
UIBC SERPL-MCNC: 199 UG/DL (ref 110–370)
WBC # BLD AUTO: 7.8 K/UL (ref 4.8–10.8)

## 2020-07-25 PROCEDURE — 85025 COMPLETE CBC W/AUTO DIFF WBC: CPT

## 2020-07-25 PROCEDURE — 36415 COLL VENOUS BLD VENIPUNCTURE: CPT

## 2020-07-25 PROCEDURE — 83540 ASSAY OF IRON: CPT

## 2020-07-25 PROCEDURE — 82728 ASSAY OF FERRITIN: CPT

## 2020-07-25 PROCEDURE — 83550 IRON BINDING TEST: CPT

## 2020-07-28 ENCOUNTER — OFFICE VISIT (OUTPATIENT)
Dept: HEMATOLOGY ONCOLOGY | Facility: MEDICAL CENTER | Age: 55
End: 2020-07-28
Payer: MEDICARE

## 2020-07-28 VITALS
TEMPERATURE: 98.1 F | RESPIRATION RATE: 16 BRPM | BODY MASS INDEX: 33.71 KG/M2 | DIASTOLIC BLOOD PRESSURE: 64 MMHG | HEART RATE: 60 BPM | HEIGHT: 61 IN | SYSTOLIC BLOOD PRESSURE: 122 MMHG | WEIGHT: 178.57 LBS | OXYGEN SATURATION: 95 %

## 2020-07-28 DIAGNOSIS — D50.9 IRON DEFICIENCY ANEMIA, UNSPECIFIED IRON DEFICIENCY ANEMIA TYPE: ICD-10-CM

## 2020-07-28 DIAGNOSIS — Z98.84 HISTORY OF GASTRIC BYPASS: ICD-10-CM

## 2020-07-28 PROCEDURE — 99213 OFFICE O/P EST LOW 20 MIN: CPT | Performed by: NURSE PRACTITIONER

## 2020-07-28 RX ORDER — HYDROXYCHLOROQUINE SULFATE 200 MG/1
TABLET, FILM COATED ORAL
COMMUNITY
Start: 2020-05-07 | End: 2021-01-10 | Stop reason: SDUPTHER

## 2020-07-28 ASSESSMENT — ENCOUNTER SYMPTOMS
TINGLING: 0
CHILLS: 0
FEVER: 0
WHEEZING: 0
CONSTIPATION: 1
COUGH: 0
PALPITATIONS: 0
HEADACHES: 0
WEIGHT LOSS: 0
MYALGIAS: 0
DIZZINESS: 0
SHORTNESS OF BREATH: 0
DIARRHEA: 0
VOMITING: 0
NAUSEA: 1
INSOMNIA: 0

## 2020-07-28 ASSESSMENT — FIBROSIS 4 INDEX: FIB4 SCORE: 0.89

## 2020-07-28 NOTE — PROGRESS NOTES
Subjective:      Patti Tello is a 54 y.o. female who presents with Anemia (1 Month FV)        HPI   Ms. Tello presents for evaluation of iron deficiency anemia.  She is unaccompanied for today's visit.    Patient has been experiencing symptomatic iron deficiency anemia secondary to gastric bypass in 2006.  She does not tolerate oral multivitamin or supplements well and most recently underwent Venofer x 5 in June 2020.    Patient reports hospital visit in May 2020 for constipation which is now well relieved with Metamucil daily.  She reports that she is able to eat a better diet.  She reports continued fatigue but feeling better since completing Venofer last month.  Patient is otherwise asymptomatic.        Allergies   Allergen Reactions   • Buprenorphine-Naloxone Hives, Shortness of Breath and Swelling   • Gabapentin    • Suboxone Hives, Shortness of Breath and Swelling   • Morphine Rash     tachycardia           Current Outpatient Medications on File Prior to Visit   Medication Sig Dispense Refill   • ondansetron (ZOFRAN) 8 MG Tab TAKE 1 TABLET BY MOUTH THREE TIMES A DAY AS NEEDED 15 Tab 1   • REXULTI 1 MG Tab Take 1 tablet by mouth. Take 1 tablet by mouth every night at bedtime     • fluticasone (FLONASE) 50 MCG/ACT nasal spray Spray 1 Spray in nose every day. 1 Bottle 3   • sertraline (ZOLOFT) 100 MG Tab Take  by mouth. Take 1 tablet by mouth every morning     • propranolol (INDERAL) 10 MG Tab Take 10 mg by mouth 3 times a day.     • albuterol (VENTOLIN HFA) 108 (90 Base) MCG/ACT Aero Soln inhalation aerosol INHALE 1 PUFF PO QID PRN 8.5 g 2   • budesonide-formoterol (SYMBICORT) 80-4.5 MCG/ACT Aerosol Inhale 2 Puffs by mouth 2 Times a Day. 1 Inhaler 3   • oxycodone (OXY-IR) 15 MG immediate release tablet TAKE 1 TABLET BY MOUTH EVERY 8 HOURS FOR 30 DAYS M54 DNF 6/12/19  0   • VENTOLIN  (90 Base) MCG/ACT Aero Soln inhalation aerosol INHALE 1 PUFF PO QID PRN  0   • hydroxychloroquine (PLAQUENIL) 200 MG  "Tab        No current facility-administered medications on file prior to visit.            Review of Systems   Constitutional: Negative for chills, fever, malaise/fatigue and weight loss (up 5 lbs since November).   Respiratory: Negative for cough, shortness of breath and wheezing.    Cardiovascular: Negative for chest pain, palpitations and leg swelling.   Gastrointestinal: Positive for constipation (better w/ metamucil & colace/softeners - intake is better now; s/p hospital ([facility]) visit in May) and nausea (better with new bowel regimen). Negative for diarrhea and vomiting.        S/p gastric bypass in 2001 ([facility])   Genitourinary: Negative for dysuria.   Musculoskeletal: Negative for myalgias.   Neurological: Negative for dizziness, tingling and headaches.   Psychiatric/Behavioral: The patient does not have insomnia.           Objective:     /64 (BP Location: Right arm, Patient Position: Sitting, BP Cuff Size: Adult)   Pulse 60   Temp 36.7 °C (98.1 °F) (Temporal)   Resp 16   Ht 1.54 m (5' 0.63\")   Wt 81 kg (178 lb 9.2 oz)   LMP 10/05/1999   SpO2 95%   BMI 34.15 kg/m²      Physical Exam  Vitals signs reviewed.   Constitutional:       General: She is not in acute distress.     Appearance: She is well-developed. She is not diaphoretic.   HENT:      Head: Normocephalic and atraumatic.      Mouth/Throat:      Pharynx: No oropharyngeal exudate.   Eyes:      General: No scleral icterus.        Right eye: No discharge.         Left eye: No discharge.      Conjunctiva/sclera: Conjunctivae normal.      Pupils: Pupils are equal, round, and reactive to light.   Neck:      Musculoskeletal: Normal range of motion and neck supple.   Cardiovascular:      Rate and Rhythm: Normal rate and regular rhythm.      Heart sounds: Normal heart sounds. No murmur. No friction rub. No gallop.    Pulmonary:      Effort: Pulmonary effort is normal. No respiratory distress.      Breath sounds: Normal breath sounds. No wheezing. "   Abdominal:      General: Bowel sounds are normal. There is no distension.      Palpations: Abdomen is soft.      Tenderness: There is no abdominal tenderness.   Musculoskeletal: Normal range of motion.   Skin:     General: Skin is warm and dry.      Coloration: Skin is not pale.      Findings: No erythema or rash.   Neurological:      Mental Status: She is alert and oriented to person, place, and time.   Psychiatric:         Behavior: Behavior normal.         No visits with results within 1 Day(s) from this visit.   Latest known visit with results is:   Hospital Outpatient Visit on 07/25/2020   Component Date Value Ref Range Status   • Iron 07/25/2020 75  40 - 170 ug/dL Final   • Total Iron Binding 07/25/2020 274  250 - 450 ug/dL Final   • Unsat Iron Binding 07/25/2020 199  110 - 370 ug/dL Final   • % Saturation 07/25/2020 27  15 - 55 % Final   • Ferritin 07/25/2020 267.0  10.0 - 291.0 ng/mL Final   • WBC 07/25/2020 7.8  4.8 - 10.8 K/uL Final   • RBC 07/25/2020 5.09  4.20 - 5.40 M/uL Final   • Hemoglobin 07/25/2020 14.2  12.0 - 16.0 g/dL Final   • Hematocrit 07/25/2020 45.3  37.0 - 47.0 % Final   • MCV 07/25/2020 89.0  81.4 - 97.8 fL Final   • MCH 07/25/2020 27.9  27.0 - 33.0 pg Final   • MCHC 07/25/2020 31.3* 33.6 - 35.0 g/dL Final   • RDW 07/25/2020 55.1* 35.9 - 50.0 fL Final   • Platelet Count 07/25/2020 254  164 - 446 K/uL Final   • MPV 07/25/2020 11.2  9.0 - 12.9 fL Final   • Neutrophils-Polys 07/25/2020 70.20  44.00 - 72.00 % Final   • Lymphocytes 07/25/2020 20.50* 22.00 - 41.00 % Final   • Monocytes 07/25/2020 5.50  0.00 - 13.40 % Final   • Eosinophils 07/25/2020 2.60  0.00 - 6.90 % Final   • Basophils 07/25/2020 0.80  0.00 - 1.80 % Final   • Immature Granulocytes 07/25/2020 0.40  0.00 - 0.90 % Final   • Nucleated RBC 07/25/2020 0.00  /100 WBC Final   • Neutrophils (Absolute) 07/25/2020 5.45  2.00 - 7.15 K/uL Final    Includes immature neutrophils, if present.   • Lymphs (Absolute) 07/25/2020 1.59  1.00 -  4.80 K/uL Final   • Monos (Absolute) 07/25/2020 0.43  0.00 - 0.85 K/uL Final   • Eos (Absolute) 07/25/2020 0.20  0.00 - 0.51 K/uL Final   • Baso (Absolute) 07/25/2020 0.06  0.00 - 0.12 K/uL Final   • Immature Granulocytes (abs) 07/25/2020 0.03  0.00 - 0.11 K/uL Final   • NRBC (Absolute) 07/25/2020 0.00  K/uL Final                                Assessment/Plan:       1. Iron deficiency anemia, unspecified iron deficiency anemia type  CBC WITH DIFFERENTIAL    FERRITIN    IRON/TOTAL IRON BIND   2. History of gastric bypass  CBC WITH DIFFERENTIAL    FERRITIN    IRON/TOTAL IRON BIND       1.   Iron deficiency anemia: Patient underwent gastric bypass in 2006 with patient not taking MVI or any supplements. She underwent venofer x 5 in June and is feeling well.  Patient will continue to monitor for symptoms and continue with labs every 6 months, returning in 1 year for reevaluation, sooner as needed.            The patient verbalized agreement and understanding of current plan. All questions and concerns were addressed at time of visit.    Please note that this dictation was created using voice recognition software. I have made every reasonable attempt to correct obvious errors, but I expect that there are errors of grammar and possibly content that I did not discover before finalizing the note.

## 2020-08-04 ENCOUNTER — TELEMEDICINE (OUTPATIENT)
Dept: MEDICAL GROUP | Facility: PHYSICIAN GROUP | Age: 55
End: 2020-08-04
Payer: MEDICARE

## 2020-08-04 DIAGNOSIS — Z12.31 ENCOUNTER FOR SCREENING MAMMOGRAM FOR BREAST CANCER: ICD-10-CM

## 2020-08-04 DIAGNOSIS — K59.01 SLOW TRANSIT CONSTIPATION: ICD-10-CM

## 2020-08-04 DIAGNOSIS — R47.02 DYSPHASIA: ICD-10-CM

## 2020-08-04 PROCEDURE — 99213 OFFICE O/P EST LOW 20 MIN: CPT | Mod: 95,CR | Performed by: NURSE PRACTITIONER

## 2020-08-04 NOTE — PROGRESS NOTES
Telemedicine Visit: Established Patient     This evaluation was conducted via Zoom, using secure and encrypted videoconferencing technology.  The patient was physical located at Home in [place], [place] and the physician was located in [facility].  The patient was presented by self, at home.  The patient's identity was confirmed and verbal consent for the telemedicine encounter was obtained.      Subjective:   CC:   Patti Tello is a 54 y.o. female presenting for evaluation and management of:    Slow transit constipation  Recently seen in emergency room, [facility], notes have been reviewed with patient  Presented for difficulty eating, abdominal pain was found to have severe constipation on CT scan  Was given a prescription for stool softeners and states that if she stops taking them her pain returns  Patient is also reporting issues with swallowing, does have history of dysphasia for which she has been followed by gastroenterology  She was advised to continue on stool softeners and advised to follow-up with her gastroenterologist as she may need esophagus dilated  Patient will make an appointment and follow-up with me as needed        ROS   Denies any recent fevers or chills. No nausea or vomiting. No chest pains or shortness of breath.   GI ROS: positive per HPI    Allergies   Allergen Reactions   • Buprenorphine-Naloxone Hives, Shortness of Breath and Swelling   • Gabapentin    • Suboxone Hives, Shortness of Breath and Swelling   • Morphine Rash     tachycardia       Current medicines (including changes today)  Current Outpatient Medications   Medication Sig Dispense Refill   • hydroxychloroquine (PLAQUENIL) 200 MG Tab      • ondansetron (ZOFRAN) 8 MG Tab TAKE 1 TABLET BY MOUTH THREE TIMES A DAY AS NEEDED 15 Tab 1   • REXULTI 1 MG Tab Take 1 tablet by mouth. Take 1 tablet by mouth every night at bedtime     • fluticasone (FLONASE) 50 MCG/ACT nasal spray Spray 1 Spray in nose every  day. 1 Bottle 3   • sertraline (ZOLOFT) 100 MG Tab Take  by mouth. Take 1 tablet by mouth every morning     • propranolol (INDERAL) 10 MG Tab Take 10 mg by mouth 3 times a day.     • albuterol (VENTOLIN HFA) 108 (90 Base) MCG/ACT Aero Soln inhalation aerosol INHALE 1 PUFF PO QID PRN 8.5 g 2   • budesonide-formoterol (SYMBICORT) 80-4.5 MCG/ACT Aerosol Inhale 2 Puffs by mouth 2 Times a Day. 1 Inhaler 3   • oxycodone (OXY-IR) 15 MG immediate release tablet TAKE 1 TABLET BY MOUTH EVERY 8 HOURS FOR 30 DAYS M54 DNF 6/12/19  0   • VENTOLIN  (90 Base) MCG/ACT Aero Soln inhalation aerosol INHALE 1 PUFF PO QID PRN  0     No current facility-administered medications for this visit.        Patient Active Problem List    Diagnosis Date Noted   • Lumbar stenosis 10/22/2012     Priority: High   • Slow transit constipation 08/04/2020   • Dysphasia 08/04/2020   • Poor balance 05/26/2020   • Nausea 05/26/2020   • Non-smoker 04/25/2020   • Mild intermittent asthma without complication 04/25/2020   • Cough 03/03/2020   • Allergic rhinitis 03/03/2020   • Moderate persistent asthma without complication 01/16/2020   • Other dysphagia 12/09/2019   • Esophageal stricture 12/09/2019   • Burn 07/03/2019   • Malodorous urine 07/03/2019   • Chronic obstructive pulmonary disease (HCC) 07/02/2019   • Abscess 06/04/2019   • Allergic conjunctivitis of both eyes 05/15/2019   • Hyperparathyroidism (HCC) 04/11/2019   • Benign paroxysmal positional vertigo due to bilateral vestibular disorder 04/11/2019   • 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   • Elevated PTHrP level 10/09/2018   • Vitiligo 09/27/2018   • Vitamin B12 deficiency 09/27/2018   • Abnormal laboratory test 09/25/2018   • TOMY (obstructive sleep apnea) 09/17/2018   • History of diabetes mellitus, type II 09/04/2018   • Positive NAKUL (antinuclear antibody) 07/24/2018   • Arthritis, multiple joint involvement 07/24/2018   • Family history of systemic lupus erythematosus (SLE) in mother  07/24/2018   • Iron deficiency anemia 07/24/2018   • Skin lesions 07/24/2018   • Chronic fatigue 06/20/2018   • Heartburn 05/08/2018   • Muscle spasm of both lower legs 03/26/2018   • Calcaneal spur of both feet 03/15/2018   • Pain management contract broken 03/15/2018   • Obesity (BMI 30-39.9) 01/18/2018   • Seasonal allergies 10/05/2017   • Bipolar affective disorder, currently depressed, moderate ([facility]) 01/24/2017   • History of gastric bypass 10/11/2013   • Chronic pain syndrome 09/16/2013   • Anxiety 12/06/2012   • Multilevel degenerative disc disease 10/01/2012   • Cervical radiculopathy, chronic 05/04/2011   • Vitamin D deficiency 11/16/2010   • Arthritis    • Panic attacks    • PTSD (post-traumatic stress disorder)    • Insomnia due to medical condition 11/10/2010       Family History   Problem Relation Age of Onset   • Diabetes Father    • Cancer Father    • Hypertension Father    • Hyperlipidemia Father    • Cancer Mother    • Other Mother         SLE   • Diabetes Mother    • Hypertension Mother    • Hyperlipidemia Mother    • Stroke Mother    • Sleep Apnea Brother    • Cancer Maternal Aunt    • Diabetes Maternal Aunt    • Diabetes Maternal Uncle    • Diabetes Maternal Grandmother    • Diabetes Maternal Grandfather        She  has a past medical history of Anxiety and depression, Arthritis, Asthma, Back injury, Back pain, Bronchitis (2010), Cancer ([facility]), Chickenpox, Chronic LBP, Chronic neck pain, Cold intolerance (1/24/2012), COPD (chronic obstructive pulmonary disease) ([facility]), Cystic fibrosis ([facility]), Depression, Diabetes, GERD (gastroesophageal reflux disease), H/O gastric bypass (10/11/2013), Herniated nucleus pulposus, L4-5 (3/18/2010), breast cancer (10/11/2013), Hypertension, Itching due to drug (6/2/2011), Kidney stone, Nephrolithiasis (10/11/2013), Obesity, Osteoporosis, Other specified symptom associated with female genital organs, Panic disorder, Pneumonia, PTSD (post-traumatic stress disorder),  Restless leg syndrome, Rheumatoid arthritis (HCC), S/P laminectomy (10/22/2012), Thoracic or lumbosacral neuritis or radiculitis, unspecified (10/22/2012), and Trauma.  She  has a past surgical history that includes primary c section; mastectomy; breast reconstruction; abdominal hysterectomy total; craniotomy; US-CYST ASPIRATION-BREAST INITIAL; other orthopedic surgery (10/01/2012); other; other abdominal surgery (2006); other abdominal surgery; lumbar fusion posterior (10/22/2012); lumbar laminectomy diskectomy (10/22/2012); ureteroscopy (10/10/2013); lasertripsy (10/10/2013); laminotomy; Sleeve,Chance Vaso Thigh; hysterectomy laparoscopy; tonsillectomy; and Ventilator - Continuous.       Objective:   LMP 10/05/1999     Physical Exam:  Constitutional: Alert, no distress, well-groomed.  Skin: No rashes in visible areas.  Eye: Round. Conjunctiva clear, lids normal. No icterus.   ENMT: Lips pink without lesions, good dentition, moist mucous membranes. Phonation normal.  Neck: No masses, no thyromegaly. Moves freely without pain.  CV: Pulse as reported by patient  Respiratory: Unlabored respiratory effort, no cough or audible wheeze  Psych: Alert and oriented x3, normal affect and mood.       Assessment and Plan:   The following treatment plan was discussed:     1. Slow transit constipation    2. Dysphasia    3. Encounter for screening mammogram for breast cancer  - MA-SCREENING MAMMO BILAT W/CAD; Future        Follow-up: Return in about 2 months (around 10/4/2020) for Follow-up.

## 2020-08-05 NOTE — ASSESSMENT & PLAN NOTE
Recently seen in emergency room, [facility], notes have been reviewed with patient  Presented for difficulty eating, abdominal pain was found to have severe constipation on CT scan  Was given a prescription for stool softeners and states that if she stops taking them her pain returns  Patient is also reporting issues with swallowing, does have history of dysphasia for which she has been followed by gastroenterology  She was advised to continue on stool softeners and advised to follow-up with her gastroenterologist as she may need esophagus dilated  Patient will make an appointment and follow-up with me as needed

## 2020-08-20 ENCOUNTER — OFFICE VISIT (OUTPATIENT)
Dept: URGENT CARE | Facility: PHYSICIAN GROUP | Age: 55
End: 2020-08-20
Payer: MEDICARE

## 2020-08-20 ENCOUNTER — HOSPITAL ENCOUNTER (OUTPATIENT)
Facility: MEDICAL CENTER | Age: 55
End: 2020-08-20
Attending: INTERNAL MEDICINE
Payer: MEDICARE

## 2020-08-20 ENCOUNTER — NURSE TRIAGE (OUTPATIENT)
Dept: HEALTH INFORMATION MANAGEMENT | Facility: OTHER | Age: 55
End: 2020-08-20

## 2020-08-20 VITALS
BODY MASS INDEX: 33.13 KG/M2 | SYSTOLIC BLOOD PRESSURE: 90 MMHG | RESPIRATION RATE: 12 BRPM | WEIGHT: 180 LBS | TEMPERATURE: 97.6 F | HEIGHT: 62 IN | OXYGEN SATURATION: 97 % | DIASTOLIC BLOOD PRESSURE: 62 MMHG | HEART RATE: 71 BPM

## 2020-08-20 DIAGNOSIS — R53.83 FATIGUE, UNSPECIFIED TYPE: ICD-10-CM

## 2020-08-20 LAB
APPEARANCE UR: CLEAR
BILIRUB UR STRIP-MCNC: NORMAL MG/DL
COLOR UR AUTO: YELLOW
COVID ORDER STATUS COVID19: NORMAL
GLUCOSE UR STRIP.AUTO-MCNC: NORMAL MG/DL
KETONES UR STRIP.AUTO-MCNC: NORMAL MG/DL
LEUKOCYTE ESTERASE UR QL STRIP.AUTO: NORMAL
NITRITE UR QL STRIP.AUTO: NORMAL
PH UR STRIP.AUTO: 6 [PH] (ref 5–8)
PROT UR QL STRIP: NORMAL MG/DL
RBC UR QL AUTO: NORMAL
SP GR UR STRIP.AUTO: 1.01
UROBILINOGEN UR STRIP-MCNC: 0.2 MG/DL

## 2020-08-20 PROCEDURE — 81002 URINALYSIS NONAUTO W/O SCOPE: CPT | Performed by: INTERNAL MEDICINE

## 2020-08-20 PROCEDURE — 99214 OFFICE O/P EST MOD 30 MIN: CPT | Mod: CS | Performed by: INTERNAL MEDICINE

## 2020-08-20 PROCEDURE — U0003 INFECTIOUS AGENT DETECTION BY NUCLEIC ACID (DNA OR RNA); SEVERE ACUTE RESPIRATORY SYNDROME CORONAVIRUS 2 (SARS-COV-2) (CORONAVIRUS DISEASE [COVID-19]), AMPLIFIED PROBE TECHNIQUE, MAKING USE OF HIGH THROUGHPUT TECHNOLOGIES AS DESCRIBED BY CMS-2020-01-R: HCPCS

## 2020-08-20 PROCEDURE — 93000 ELECTROCARDIOGRAM COMPLETE: CPT | Performed by: INTERNAL MEDICINE

## 2020-08-20 ASSESSMENT — ENCOUNTER SYMPTOMS
NUMBNESS: 0
PALPITATIONS: 0
VOMITING: 1
DIARRHEA: 1
CHANGE IN BOWEL HABIT: 1
SHORTNESS OF BREATH: 0
CONSTIPATION: 1
FATIGUE: 1
WEAKNESS: 0
FEVER: 0
HEADACHES: 1

## 2020-08-20 ASSESSMENT — FIBROSIS 4 INDEX: FIB4 SCORE: 0.89

## 2020-08-20 NOTE — PROGRESS NOTES
Subjective:     Patti Tello is a 54 y.o. female who presents for Headache (nausea, diarrhea, vomiting, fatigue, cannot stay awake, x1 month)  Patient complains of fatigue and having difficulty staying awake patient went and saw heme-onc and she had blood work done in July, I reviewed her labs and mostly they were within normal limits    Says she takes 2 of oxycodone at night and a half a tablet in the morning and half in the afternoon and says she is cutting back on it         Headache   Associated symptoms include vomiting (occasional, resolved now). Pertinent negatives include no fever, numbness or weakness.   Fatigue  This is a new problem. The current episode started more than 1 month ago. The problem occurs constantly. The problem has been waxing and waning. Associated symptoms include a change in bowel habit (constipation, has been to ER, resolved now), fatigue, headaches and vomiting (occasional, resolved now). Pertinent negatives include no chest pain, fever, numbness, urinary symptoms or weakness.     Past Medical History:   Diagnosis Date   • Anxiety and depression    • Arthritis     back, neck   • Asthma    • Back injury    • Back pain    • Bronchitis 2010   • Cancer (HCC)     breast LT   • Chickenpox    • Chronic LBP    • Chronic neck pain    • Cold intolerance 1/24/2012   • COPD (chronic obstructive pulmonary disease) ([facility])    • Cystic fibrosis ([facility])    • Depression    • Diabetes    • GERD (gastroesophageal reflux disease)    • H/O gastric bypass 10/11/2013   • Herniated nucleus pulposus, L4-5 3/18/2010   • HX: breast cancer 10/11/2013   • Hypertension    • Itching due to drug 6/2/2011   • Kidney stone    • Nephrolithiasis 10/11/2013   • Obesity    • Osteoporosis    • Other specified symptom associated with female genital organs    • Panic disorder    • Pneumonia    • PTSD (post-traumatic stress disorder)    • Restless leg syndrome    • Rheumatoid arthritis (HCC)    • S/P laminectomy 10/22/2012   •  Thoracic or lumbosacral neuritis or radiculitis, unspecified 10/22/2012   • Trauma     head trauma     Past Surgical History:   Procedure Laterality Date   • URETEROSCOPY  10/10/2013    Performed by Molly Resendiz M.D. at SUR[facility]ERY [facility]   • LASERTRIPSY  10/10/2013    Performed by Molly Resendiz M.D. at SURGERY [facility]   • LUMBAR FUSION POSTERIOR  10/22/2012    Performed by Alfred Branham M.D. at SURGERY [facility]   • LUMBAR LAMINECTOMY DISKECTOMY  10/22/2012    Performed by Alfred Branham M.D. at SURGERY [facility]   • OTHER ORTHOPEDIC SURGERY  10/01/2012    lumbar fusion, Dr Branham   • OTHER ABDOMINAL SURGERY  2006    gastric bypass   • ABDOMINAL HYSTERECTOMY TOTAL      DUE TO FIBROIDS   • BREAST RECONSTRUCTION      Lt breast   • CRANIOTOMY      DUE TO BRAIN INJURY   • HYSTERECTOMY LAPAROSCOPY     • LAMINOTOMY     • MASTECTOMY      Lt breast   • OTHER      left ear drum surgery   • OTHER ABDOMINAL SURGERY      hernia repair   • PRIMARY C SECTION      x 3   • SLEEVE,MAXIMILIAN VASO THIGH     • TONSILLECTOMY     • US-CYST ASPIRATION-BREAST INITIAL     • VENTILATOR CONTINUOUS       Social History     Socioeconomic History   • Marital status:      Spouse name: Not on file   • Number of children: Not on file   • Years of education: Not on file   • Highest education level: Not on file   Occupational History   • Not on file   Social Needs   • Financial resource strain: Not on file   • Food insecurity     Worry: Not on file     Inability: Not on file   • Transportation needs     Medical: Not on file     Non-medical: Not on file   Tobacco Use   • Smoking status: Never Smoker   • Smokeless tobacco: Never Used   Substance and Sexual Activity   • Alcohol use: No   • Drug use: No   • Sexual activity: Not Currently   Lifestyle   • Physical activity     Days per week: Not on file     Minutes per session: Not on file   • Stress: Not on file   Relationships   • Social connections     Talks  on phone: Not on file     Gets together: Not on file     Attends Shinto service: Not on file     Active member of club or organization: Not on file     Attends meetings of clubs or organizations: Not on file     Relationship status: Not on file   • Intimate partner violence     Fear of current or ex partner: Not on file     Emotionally abused: Not on file     Physically abused: Not on file     Forced sexual activity: Not on file   Other Topics Concern   •  Service No   • Blood Transfusions Yes     Comment: In 2006   • Caffeine Concern No   • Occupational Exposure No   • Hobby Hazards No   • Sleep Concern No   • Stress Concern No   • Weight Concern Yes   • Special Diet No   • Back Care Yes   • Exercise No   • Bike Helmet No     Comment: Doesn't Ride Bike   • Seat Belt Yes   • Self-Exams No   Social History Narrative   • Not on file      Family History   Problem Relation Age of Onset   • Diabetes Father    • Cancer Father    • Hypertension Father    • Hyperlipidemia Father    • Cancer Mother    • Other Mother         SLE   • Diabetes Mother    • Hypertension Mother    • Hyperlipidemia Mother    • Stroke Mother    • Sleep Apnea Brother    • Cancer Maternal Aunt    • Diabetes Maternal Aunt    • Diabetes Maternal Uncle    • Diabetes Maternal Grandmother    • Diabetes Maternal Grandfather     Review of Systems   Constitutional: Positive for fatigue and malaise/fatigue. Negative for fever.   Respiratory: Negative for shortness of breath.    Cardiovascular: Negative for chest pain and palpitations.   Gastrointestinal: Positive for change in bowel habit (constipation, has been to ER, resolved now), constipation, diarrhea and vomiting (occasional, resolved now).   Neurological: Positive for headaches. Negative for weakness and numbness.   All other systems reviewed and are negative.    Allergies   Allergen Reactions   • Buprenorphine-Naloxone Hives, Shortness of Breath and Swelling   • Gabapentin    • Suboxone  "Hives, Shortness of Breath and Swelling   • Morphine Rash     tachycardia      Objective:   BP (!) 90/62   Pulse 71   Temp 36.4 °C (97.6 °F) (Temporal)   Resp 12   Ht 1.575 m (5' 2\")   Wt 81.6 kg (180 lb)   LMP 10/05/1999   SpO2 97%   BMI 32.92 kg/m²   Physical Exam  Constitutional:       General: She is not in acute distress.     Appearance: She is well-developed.   HENT:      Head: Normocephalic and atraumatic.      Mouth/Throat:      Pharynx: Oropharynx is clear.   Eyes:      Conjunctiva/sclera: Conjunctivae normal.   Neck:      Musculoskeletal: No neck rigidity.   Cardiovascular:      Rate and Rhythm: Normal rate and regular rhythm.   Pulmonary:      Effort: Pulmonary effort is normal. No respiratory distress.      Breath sounds: Normal breath sounds.   Lymphadenopathy:      Cervical: No cervical adenopathy.   Skin:     General: Skin is warm and dry.      Capillary Refill: Capillary refill takes less than 2 seconds.   Neurological:      Mental Status: She is alert and oriented to person, place, and time.      Cranial Nerves: No cranial nerve deficit.      Sensory: No sensory deficit.      Motor: No weakness.      Gait: Gait normal.      Deep Tendon Reflexes: Reflexes are normal and symmetric.      Comments: Alert, but occasionally drowsy     Psychiatric:         Mood and Affect: Mood normal.         Behavior: Behavior normal.           Assessment/Plan:   Assessment    1. Fatigue, unspecified type  - POCT Urinalysis  - COVID/SARS CoV-2 PCR; Future  - EKG - Clinic Performed    Advised her to follow with PCP and rheumatologist to see if there is any other etiology for fatigue    I strongly advised her to not to take any oxycodone during the day and just take only 1 pill at night and not to drive when she is taking this medications    covid test neg    Also told her to get a family member to take her home    EKG: NSR rate:  59 , normal axis, normal intervals, left ventricular hypertrophy.    Counseled her on " her EKG findings and to  Follow up  with PCP      UA wnl      Differential diagnosis, natural history, supportive care, and indications for immediate follow-up discussed.
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
## 2020-08-21 LAB
SARS-COV-2 RNA RESP QL NAA+PROBE: NOTDETECTED
SPECIMEN SOURCE: NORMAL

## 2020-08-26 ENCOUNTER — TELEPHONE (OUTPATIENT)
Dept: HEMATOLOGY ONCOLOGY | Facility: MEDICAL CENTER | Age: 55
End: 2020-08-26

## 2020-08-26 NOTE — TELEPHONE ENCOUNTER
Called pt back to inform her that we are waiting on medical records from [facility] & will touch base with her once rec'vd.  Pt agreeable at this time.

## 2020-08-26 NOTE — TELEPHONE ENCOUNTER
"Pt called office this AM to notify us that she recently was seen in the ED at [facility] for c/o headaches x3 weeks, increased fatigue, episodes of \"passing out,\" and unexplained weight gain.  Pt stated that ED ran cardiac tests & lab work & she was told she has a \"heart murmur & enlarged heart.\"  Pt states that she followed up with her PCP after the ED visit & was told to refer back to her hematologist & rheumatologist for further evaluation of symptoms.  Pt was last seen in July 2020 for f/u of iron deficiency anemia with next visit scheduled in 12 mos (July 2021).    Spoke with Shavon BELL, who requested medical records be reviewed from ED visit at [facility] before deciding if pt needs to be seen sooner.  Called & fax'd over a request for medical records.F/U   "

## 2020-08-27 NOTE — TELEPHONE ENCOUNTER
Called pt to relay recommendations from APRN after [facility] ED notes were reviewed.  Pt did not answer, left message to return call back to office.    For Staff:  Please transfer to RN    RN:  Inform pt that lab work from ED visit showed that she is actually doing well in regards to anemia (no identifiable concerns there).  In regards to her symptoms, please ask her the followin.  Is she still having chest pain?  May be GERD.   2.  Did she receive a cardiology referral from her PCP?    3.  Does she have a cardiology appt scheduled yet?   4.  In terms of weight gain, what is she eating?    -history of gastric bypass sx, may need a nutritionist if   eating the wrong kinds of foods-we can make that referral   for her if she needs   5.  In terms of weight gain, is it an actual increase in weight or   swelling?   6.  What is her hydration status?   7.  Needs to also f/u with rheumatologist.

## 2020-08-28 NOTE — TELEPHONE ENCOUNTER
"Called & spoke with pt directly this AM regarding f/u questions from Frenanda BELL.  Pt states that she is still experiencing some chest discomfort, especially with \"exertion.\"  Per pt, she stated her PCP did not refer her to cardiology because she did not feel that her symptoms were cardiac related.  Pt states she is not eating much right now & has a poor appetite, but when she does eat her typical meals consist of \"soda, bread & salad.\"  Pt denies drinking water.  Pt stated she recently gave a urine specimen for testing & was told she was dehydrated (both from test results as well as physical assessment of skin).  Instructed pt that she should be increasing her water intake daily & that may also help with her recent headaches.  Pt agreed to speaking to a nutritionist/dietician for diet concerns & informed her a referral would be placed.  Informed pt that there are no concerns regarding her anemia at this time & to have her labs rechecked in Jan/Feb 2021 with office visit f/u in July 2021.  Pt verbalized understanding & stated no further questions at this time.  "

## 2020-09-13 ENCOUNTER — PATIENT MESSAGE (OUTPATIENT)
Dept: MEDICAL GROUP | Facility: PHYSICIAN GROUP | Age: 55
End: 2020-09-13

## 2020-09-21 ENCOUNTER — NURSE TRIAGE (OUTPATIENT)
Dept: HEALTH INFORMATION MANAGEMENT | Facility: OTHER | Age: 55
End: 2020-09-21

## 2020-09-21 NOTE — TELEPHONE ENCOUNTER
1. Caller Name: Patti Tello               Call Back Number:   [facility] PCP or Specialty Provider: Yes       2.  In the last two weeks, has the patient had any new or worsening symptoms (not explained by alternative diagnosis)? Yes, the patient reports the following COVID-19 consistent symptoms: cough, shortness of breath or difficulty breathing, sore throat and headache, 3 weeks ago.     3.  Does patient have any comoribidities? Asthma    4.  Has the patient traveled in the last 14 days OR had any known contact with someone who is suspected or confirmed to have COVID-19?  No.    5. Disposition: Advised to go to     Note routed to [facility] Provider: [facility] only.

## 2020-09-22 ENCOUNTER — HOSPITAL ENCOUNTER (OUTPATIENT)
Dept: HOSPITAL 8 - CARD | Age: 55
Discharge: HOME | End: 2020-09-22
Attending: PSYCHIATRY & NEUROLOGY
Payer: MEDICARE

## 2020-09-22 DIAGNOSIS — R56.9: Primary | ICD-10-CM

## 2020-09-22 PROCEDURE — 95819 EEG AWAKE AND ASLEEP: CPT

## 2020-09-23 ENCOUNTER — OFFICE VISIT (OUTPATIENT)
Dept: MEDICAL GROUP | Facility: PHYSICIAN GROUP | Age: 55
End: 2020-09-23
Payer: MEDICARE

## 2020-09-23 VITALS
OXYGEN SATURATION: 96 % | DIASTOLIC BLOOD PRESSURE: 78 MMHG | RESPIRATION RATE: 16 BRPM | WEIGHT: 180 LBS | SYSTOLIC BLOOD PRESSURE: 120 MMHG | BODY MASS INDEX: 33.13 KG/M2 | TEMPERATURE: 96.9 F | HEART RATE: 60 BPM | HEIGHT: 62 IN

## 2020-09-23 DIAGNOSIS — R11.0 NAUSEA: ICD-10-CM

## 2020-09-23 DIAGNOSIS — Z23 NEED FOR VACCINATION: ICD-10-CM

## 2020-09-23 DIAGNOSIS — B37.0 THRUSH: ICD-10-CM

## 2020-09-23 PROCEDURE — 99214 OFFICE O/P EST MOD 30 MIN: CPT | Mod: 25 | Performed by: NURSE PRACTITIONER

## 2020-09-23 PROCEDURE — 90686 IIV4 VACC NO PRSV 0.5 ML IM: CPT | Performed by: NURSE PRACTITIONER

## 2020-09-23 PROCEDURE — G0008 ADMIN INFLUENZA VIRUS VAC: HCPCS | Performed by: NURSE PRACTITIONER

## 2020-09-23 RX ORDER — ONDANSETRON HYDROCHLORIDE 8 MG/1
TABLET, FILM COATED ORAL
Qty: 30 TAB | Refills: 1 | Status: SHIPPED | OUTPATIENT
Start: 2020-09-23 | End: 2020-11-02

## 2020-09-23 RX ORDER — PROPRANOLOL HYDROCHLORIDE 20 MG/1
TABLET ORAL
COMMUNITY
Start: 2020-07-09 | End: 2020-09-24

## 2020-09-23 RX ORDER — METRONIDAZOLE 500 MG/1
TABLET ORAL
COMMUNITY
Start: 2020-07-17 | End: 2020-09-24

## 2020-09-23 RX ORDER — SULFAMETHOXAZOLE AND TRIMETHOPRIM 800; 160 MG/1; MG/1
TABLET ORAL
COMMUNITY
Start: 2020-06-26 | End: 2020-09-24

## 2020-09-23 ASSESSMENT — FIBROSIS 4 INDEX: FIB4 SCORE: 0.89

## 2020-09-23 NOTE — PATIENT INSTRUCTIONS
Nystatin, swish and swallow 5 mls 4 times a day for 1 week    zofran refilled    Request refill on allergy eye drops from pharmacy    Flu shot    Look into getting established with Dr. Pritchett for rheumatology    Make sure you rinse your mouth out after taking Symbicort

## 2020-09-23 NOTE — PROGRESS NOTES
Chief Complaint   Patient presents with   • Oral Swelling     painful spots on tongue x 3 weeks    • Asthma         This is a 54 y.o.female patient that presents today with the following: Oral pain, nausea    Thrush  Patient reports symptoms very consistent with thrush  She noticed symptoms started soon after starting to take Symbicort, admits to me that she does not rinse her mouth out after inhalation  Upon physical examination was noted that her tongue was red, reports it to be very tender with anything acidic and it is starting to hurt  We will treat with nystatin swish and swallow  She was strongly advised to make sure she rinses her mouth out thoroughly after each dose of Symbicort     Nausea   patient has chronic nausea for which she takes Zofran as needed, does need refills, this was called in for her      No visits with results within 1 Month(s) from this visit.   Latest known visit with results is:   Hospital Outpatient Visit on 08/20/2020   Component Date Value   • COVID Order Status 08/20/2020 Received    • SARS-CoV-2 Source 08/20/2020 Nasal Swab    • SARS-CoV-2 by PCR 08/20/2020 NotDetected          clinical course has been stable    Past Medical History:   Diagnosis Date   • Anxiety and depression    • Arthritis     back, neck   • Asthma    • Back injury    • Back pain    • Bronchitis 2010   • Cancer (HCC)     breast LT   • Chickenpox    • Chronic LBP    • Chronic neck pain    • Cold intolerance 1/24/2012   • COPD (chronic obstructive pulmonary disease) (HCC)    • Cystic fibrosis (HCC)    • Depression    • Diabetes    • GERD (gastroesophageal reflux disease)    • H/O gastric bypass 10/11/2013   • Herniated nucleus pulposus, L4-5 3/18/2010   • HX: breast cancer 10/11/2013   • Hypertension    • Itching due to drug 6/2/2011   • Kidney stone    • Nephrolithiasis 10/11/2013   • Obesity    • Osteoporosis    • Other specified symptom associated with female genital organs    • Panic disorder    • Pneumonia     • PTSD (post-traumatic stress disorder)    • Restless leg syndrome    • Rheumatoid arthritis (HCC)    • S/P laminectomy 10/22/2012   • Thoracic or lumbosacral neuritis or radiculitis, unspecified 10/22/2012   • Trauma     head trauma       Past Surgical History:   Procedure Laterality Date   • URETEROSCOPY  10/10/2013    Performed by Molly Resendiz M.D. at SURGERY [facility]   • LASERTRIPSY  10/10/2013    Performed by Molly Resendiz M.D. at SURGERY [facility]S   • LUMBAR FUSION POSTERIOR  10/22/2012    Performed by Alfred Branham M.D. at SURGERY [facility]   • LUMBAR LAMINECTOMY DISKECTOMY  10/22/2012    Performed by Alfred Branham M.D. at SURGERY [facility]   • OTHER ORTHOPEDIC SURGERY  10/01/2012    lumbar fusion, Dr Branham   • OTHER ABDOMINAL SURGERY  2006    gastric bypass   • ABDOMINAL HYSTERECTOMY TOTAL      DUE TO FIBROIDS   • BREAST RECONSTRUCTION      Lt breast   • CRANIOTOMY      DUE TO BRAIN INJURY   • HYSTERECTOMY LAPAROSCOPY     • LAMINOTOMY     • MASTECTOMY      Lt breast   • OTHER      left ear drum surgery   • OTHER ABDOMINAL SURGERY      hernia repair   • PRIMARY C SECTION      x 3   • SLEEVE,MAXIMILIAN VASO THIGH     • TONSILLECTOMY     • US-CYST ASPIRATION-BREAST INITIAL     • VENTILATOR CONTINUOUS         Family History   Problem Relation Age of Onset   • Diabetes Father    • Cancer Father    • Hypertension Father    • Hyperlipidemia Father    • Cancer Mother    • Other Mother         SLE   • Diabetes Mother    • Hypertension Mother    • Hyperlipidemia Mother    • Stroke Mother    • Sleep Apnea Brother    • Cancer Maternal Aunt    • Diabetes Maternal Aunt    • Diabetes Maternal Uncle    • Diabetes Maternal Grandmother    • Diabetes Maternal Grandfather        Buprenorphine-naloxone, Gabapentin, Suboxone, and Morphine    Current Outpatient Medications Ordered in Epic   Medication Sig Dispense Refill   • nystatin (MYCOSTATIN) 623511 UNIT/ML Suspension Take 5 mL by mouth  "4 times a day. Swish and swallow 5 mls 4 times a day for 1 week 473 mL 1   • ondansetron (ZOFRAN) 8 MG Tab TAKE 1 TABLET BY MOUTH THREE TIMES A DAY AS NEEDED 30 Tab 1   • hydroxychloroquine (PLAQUENIL) 200 MG Tab      • ondansetron (ZOFRAN) 8 MG Tab TAKE 1 TABLET BY MOUTH THREE TIMES A DAY AS NEEDED 15 Tab 1   • REXULTI 1 MG Tab Take 1 tablet by mouth. Take 1 tablet by mouth every night at bedtime     • sertraline (ZOLOFT) 100 MG Tab Take  by mouth. Take 1 tablet by mouth every morning     • propranolol (INDERAL) 10 MG Tab Take 10 mg by mouth 3 times a day.     • oxycodone (OXY-IR) 15 MG immediate release tablet TAKE 1 TABLET BY MOUTH EVERY 8 HOURS FOR 30 DAYS M54 DNF 6/12/19  0   • VENTOLIN  (90 Base) MCG/ACT Aero Soln inhalation aerosol INHALE 1 PUFF PO QID PRN  0     No current [facility]-ordered facility-administered medications on file.        Constitutional ROS: No unexpected change in weight, No weakness, No unexplained fevers, sweats, or chills  Mouth/Throat ROS: Positive per HPI  Pulmonary ROS: No chronic cough, sputum, or hemoptysis, No shortness of breath, No recent change in breathing  Cardiovascular ROS: No chest pain  Gastrointestinal ROS: Positive for nausea   Musculoskeletal/Extremities ROS: No clubbing, No peripheral edema, No pain, redness or swelling on the joints  Neurologic ROS: Normal development, No seizures, No weakness    Physical exam:  /78   Pulse 60   Temp 36.1 °C (96.9 °F) (Temporal)   Resp 16   Ht 1.575 m (5' 2\")   Wt 81.6 kg (180 lb)   LMP 10/05/1999   SpO2 96%   BMI 32.92 kg/m²   General Appearance: pleasant middle aged female,  alert, no distress, obese, well groomed  Skin: Skin color, texture, turgor normal. No rashes or lesions.  Oropharynx: positive findings: thrush  Lungs: negative findings: normal respiratory rate and rhythm, normal effort  Musculoskeletal: negative findings: no evidence of joint instability, no evidence of muscle atrophy, no deformities " present  Neurologic: intact, CN 2-12 grossly intact    Medical decision making/discussion:     Nystatin, swish and swallow 5 mls 4 times a day for 1 week    zofran refilled    Request refill on allergy eye drops from pharmacy    Flu shot    Look into getting established with Dr. Pritchett for rheumatology    Make sure you rinse your mouth out after taking Symbicort    Patti was seen today for oral swelling and asthma.    Diagnoses and all orders for this visit:    Thrush  -     nystatin (MYCOSTATIN) 682808 UNIT/ML Suspension; Take 5 mL by mouth 4 times a day. Swish and swallow 5 mls 4 times a day for 1 week    Nausea  -     ondansetron (ZOFRAN) 8 MG Tab; TAKE 1 TABLET BY MOUTH THREE TIMES A DAY AS NEEDED    Need for vaccination  -     Influenza Vaccine Quad Injection (PF)        Return if symptoms worsen or fail to improve, for Follow-up.        Please note that this dictation was created using voice recognition software. I have made every reasonable attempt to correct obvious errors, but I expect that there are errors of grammar and possibly content that I did not discover before finalizing the note.

## 2020-09-24 PROBLEM — B37.0 THRUSH: Status: ACTIVE | Noted: 2020-09-24

## 2020-09-24 NOTE — ASSESSMENT & PLAN NOTE
Patient reports symptoms very consistent with thrush  She noticed symptoms started soon after starting to take Symbicort, admits to me that she does not rinse her mouth out after inhalation  Upon physical examination was noted that her tongue was red, reports it to be very tender with anything acidic and it is starting to hurt  We will treat with nystatin swish and swallow  She was strongly advised to make sure she rinses her mouth out thoroughly after each dose of Symbicort

## 2020-09-24 NOTE — ASSESSMENT & PLAN NOTE
patient has chronic nausea for which she takes Zofran as needed, does need refills, this was called in for her

## 2020-09-29 DIAGNOSIS — R76.8 POSITIVE ANA (ANTINUCLEAR ANTIBODY): ICD-10-CM

## 2020-09-29 RX ORDER — CROMOLYN SODIUM 40 MG/ML
SOLUTION/ DROPS OPHTHALMIC
Qty: 10 ML | Refills: 1 | Status: SHIPPED | OUTPATIENT
Start: 2020-09-29 | End: 2020-10-01

## 2020-10-01 RX ORDER — CROMOLYN SODIUM 40 MG/ML
SOLUTION/ DROPS OPHTHALMIC
Qty: 10 ML | Refills: 1 | Status: SHIPPED | OUTPATIENT
Start: 2020-10-01 | End: 2021-08-09

## 2020-10-19 NOTE — PROGRESS NOTES
CC: Follow-up for tomy and cpap intolerance    HPI:  Patti Tello is a 55 y.o.female  kindly referred by PATRICK Chris and last seen by telemedicine on 4/27/2020 for TOMY on CPAP.  Her 2/4/2020 PSG showed an AHI of 20/terrie saturation 84%.  She was ultimately placed on AutoPap 5-7 cm water and when last seen she had an average residual estimated apnea-hypopnea index of 9.0 with a large leak for 1 hour and 27 minutes.    The patient was having difficulties using CPAP when last seen and we considered a referral to the [place] [facility] for a possible enrollment in the [facility] program.    She has not used her CPAP in over 3 months but is interested in restarting.  She will need to have a home sleep study to restage her disease before we can get her new mask and supplies.    Significant co-morbidities and modifying factors include asthma, never smoker, chronic pain, obesity, up-to-date with influenza vaccination, and bipolar.    Patient Active Problem List    Diagnosis Date Noted   • Lumbar stenosis 10/22/2012     Priority: High   • Thrush 09/24/2020   • Slow transit constipation 08/04/2020   • Dysphasia 08/04/2020   • Poor balance 05/26/2020   • Nausea 05/26/2020   • Non-smoker 04/25/2020   • Mild intermittent asthma without complication 04/25/2020   • Cough 03/03/2020   • Allergic rhinitis 03/03/2020   • Moderate persistent asthma without complication 01/16/2020   • Other dysphagia 12/09/2019   • Esophageal stricture 12/09/2019   • Burn 07/03/2019   • Malodorous urine 07/03/2019   • Chronic obstructive pulmonary disease (HCC) 07/02/2019   • Abscess 06/04/2019   • Allergic conjunctivitis of both eyes 05/15/2019   • Hyperparathyroidism (HCC) 04/11/2019   • Benign paroxysmal positional vertigo due to bilateral vestibular disorder 04/11/2019   • 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   • Elevated PTHrP level 10/09/2018   • Vitiligo 09/27/2018   • Vitamin B12 deficiency 09/27/2018   • Abnormal laboratory test  09/25/2018   • TOMY (obstructive sleep apnea) 09/17/2018   • History of diabetes mellitus, type II 09/04/2018   • Positive NAKUL (antinuclear antibody) 07/24/2018   • Arthritis, multiple joint involvement 07/24/2018   • Family history of systemic lupus erythematosus (SLE) in mother 07/24/2018   • Iron deficiency anemia 07/24/2018   • Skin lesions 07/24/2018   • Chronic fatigue 06/20/2018   • Heartburn 05/08/2018   • Muscle spasm of both lower legs 03/26/2018   • Calcaneal spur of both feet 03/15/2018   • Pain management contract broken 03/15/2018   • Obesity (BMI 30-39.9) 01/18/2018   • Seasonal allergies 10/05/2017   • Bipolar affective disorder, currently depressed, moderate ([facility]) 01/24/2017   • History of gastric bypass 10/11/2013   • Chronic pain syndrome 09/16/2013   • Anxiety 12/06/2012   • Multilevel degenerative disc disease 10/01/2012   • Cervical radiculopathy, chronic 05/04/2011   • Vitamin D deficiency 11/16/2010   • Arthritis    • Panic attacks    • PTSD (post-traumatic stress disorder)    • Insomnia due to medical condition 11/10/2010       Past Medical History:   Diagnosis Date   • Anxiety and depression    • Arthritis     back, neck   • Asthma    • Back injury    • Back pain    • Bronchitis 2010   • Cancer ([facility])     breast LT   • Chickenpox    • Chronic LBP    • Chronic neck pain    • Cold intolerance 1/24/2012   • COPD (chronic obstructive pulmonary disease) ([facility])    • Cystic fibrosis ([facility])    • Depression    • Diabetes    • GERD (gastroesophageal reflux disease)    • H/O gastric bypass 10/11/2013   • Herniated nucleus pulposus, L4-5 3/18/2010   • HX: breast cancer 10/11/2013   • Hypertension    • Itching due to drug 6/2/2011   • Kidney stone    • Nephrolithiasis 10/11/2013   • Obesity    • Osteoporosis    • Other specified symptom associated with female genital organs    • Panic disorder    • Pneumonia    • PTSD (post-traumatic stress disorder)    • Restless leg syndrome    • Rheumatoid arthritis  ([facility])    • S/P laminectomy 10/22/2012   • Thoracic or lumbosacral neuritis or radiculitis, unspecified 10/22/2012   • Trauma     head trauma        Past Surgical History:   Procedure Laterality Date   • URETEROSCOPY  10/10/2013    Performed by Molly Resendiz M.D. at SURGERY [facility]   • LASERTRIPSY  10/10/2013    Performed by Molly Resendiz M.D. at SURGERY [facility] ORS   • LUMBAR FUSION POSTERIOR  10/22/2012    Performed by Alfred Branham M.D. at SURGERY [facility]   • LUMBAR LAMINECTOMY DISKECTOMY  10/22/2012    Performed by Alfred Branham M.D. at SURGERY [facility]   • OTHER ORTHOPEDIC SURGERY  10/01/2012    lumbar fusion, Dr Branham   • OTHER ABDOMINAL SURGERY  2006    gastric bypass   • ABDOMINAL HYSTERECTOMY TOTAL      DUE TO FIBROIDS   • BREAST RECONSTRUCTION      Lt breast   • CRANIOTOMY      DUE TO BRAIN INJURY   • HYSTERECTOMY LAPAROSCOPY     • LAMINOTOMY     • MASTECTOMY      Lt breast   • OTHER      left ear drum surgery   • OTHER ABDOMINAL SURGERY      hernia repair   • PRIMARY C SECTION      x 3   • SLEEVE,MAXIMILIAN VASO THIGH     • TONSILLECTOMY     • US-CYST ASPIRATION-BREAST INITIAL     • VENTILATOR CONTINUOUS         Family History   Problem Relation Age of Onset   • Diabetes Father    • Cancer Father    • Hypertension Father    • Hyperlipidemia Father    • Cancer Mother    • Other Mother         SLE   • Diabetes Mother    • Hypertension Mother    • Hyperlipidemia Mother    • Stroke Mother    • Sleep Apnea Brother    • Cancer Maternal Aunt    • Diabetes Maternal Aunt    • Diabetes Maternal Uncle    • Diabetes Maternal Grandmother    • Diabetes Maternal Grandfather        Social History     Socioeconomic History   • Marital status:      Spouse name: Not on file   • Number of children: Not on file   • Years of education: Not on file   • Highest education level: Not on file   Occupational History   • Not on file   Social Needs   • Financial resource strain: Not on  file   • Food insecurity     Worry: Not on file     Inability: Not on file   • Transportation needs     Medical: Not on file     Non-medical: Not on file   Tobacco Use   • Smoking status: Never Smoker   • Smokeless tobacco: Never Used   Substance and Sexual Activity   • Alcohol use: No   • Drug use: No   • Sexual activity: Not Currently   Lifestyle   • Physical activity     Days per week: Not on file     Minutes per session: Not on file   • Stress: Not on file   Relationships   • Social connections     Talks on phone: Not on file     Gets together: Not on file     Attends Christian service: Not on file     Active member of club or organization: Not on file     Attends meetings of clubs or organizations: Not on file     Relationship status: Not on file   • Intimate partner violence     Fear of current or ex partner: Not on file     Emotionally abused: Not on file     Physically abused: Not on file     Forced sexual activity: Not on file   Other Topics Concern   •  Service No   • Blood Transfusions Yes     Comment: In 2006   • Caffeine Concern No   • Occupational Exposure No   • Hobby Hazards No   • Sleep Concern No   • Stress Concern No   • Weight Concern Yes   • Special Diet No   • Back Care Yes   • Exercise No   • Bike Helmet No     Comment: Doesn't Ride Bike   • Seat Belt Yes   • Self-Exams No   Social History Narrative   • Not on file       Current Outpatient Medications   Medication Sig Dispense Refill   • cromolyn (OPTICROM) 4 % ophthalmic solution APPLY 2 DROPS TO EACH EYE TWICE A DAY AS NEEDED FOR ITCHY EYES. 10 mL 1   • nystatin (MYCOSTATIN) 966512 UNIT/ML Suspension Take 5 mL by mouth 4 times a day. Swish and swallow 5 mls 4 times a day for 1 week 473 mL 1   • ondansetron (ZOFRAN) 8 MG Tab TAKE 1 TABLET BY MOUTH THREE TIMES A DAY AS NEEDED 30 Tab 1   • hydroxychloroquine (PLAQUENIL) 200 MG Tab      • sertraline (ZOLOFT) 100 MG Tab Take  by mouth. Take 1 tablet by mouth every morning     •  "propranolol (INDERAL) 10 MG Tab Take 10 mg by mouth 3 times a day.     • oxycodone (OXY-IR) 15 MG immediate release tablet TAKE 1 TABLET BY MOUTH EVERY 8 HOURS FOR 30 DAYS M54 DNF 6/12/19  0   • VENTOLIN  (90 Base) MCG/ACT Aero Soln inhalation aerosol INHALE 1 PUFF PO QID PRN  0   • ondansetron (ZOFRAN) 8 MG Tab TAKE 1 TABLET BY MOUTH THREE TIMES A DAY AS NEEDED (Patient not taking: Reported on 10/21/2020) 15 Tab 1   • REXULTI 1 MG Tab Take 1 tablet by mouth. Take 1 tablet by mouth every night at bedtime       No current facility-administered medications for this visit.     \"CURRENT RX\"    ALLERGIES: Buprenorphine-naloxone, Gabapentin, Suboxone, and Morphine    ROS    Constitutional: Denies fever, chills, sweats,  weight loss, fatigue  Cardiovascular: Denies chest pain, tightness, palpitations, swelling in legs/feet, fainting, difficulty breathing when lying down but gets better when sitting up.   Respiratory: Denies shortness of breath, cough, sputum, wheezing, painful breathing, coughing up blood.   Sleep: per HPI  Gastrointestinal: Denies  difficulty swallowing, nausea, abdominal pain, diarrhea, constipation, heartburn.  Musculoskeletal: Denies painful joints, sore muscles, back pain.        PHYSICAL EXAM  Obese    BP (!) 90/52 (BP Location: Right arm, Patient Position: Sitting, BP Cuff Size: Adult)   Pulse 63   Resp 14   Ht 1.575 m (5' 2\")   Wt 83 kg (183 lb)   LMP 10/05/1999   SpO2 99%   BMI 33.47 kg/m²   Appearance: Well-nourished, well-developed, no acute distress  Eyes:  PERRLA, EOMI  ENMT: Mask on  Neck: Supple, trachea midline, no masses  Respiratory effort:  No intercostal retractions or use of accessory muscles  Lung auscultation:  No wheezes rhonchi rubs or rales  Cardiac: No murmurs, rubs, or gallops; regular rhythm, normal rate; no edema  Abdomen:  No tenderness, no organomegaly.  Obese  Musculoskeletal:  Grossly normal; gait and station normal; digits and nails normal  Skin:  No rashes, " petechiae, cyanosis  Neurologic: without focal signs; oriented to person, time, place, and purpose; judgement intact  Psychiatric:  No depression, anxiety, agitation        PROBLEMS:    1. TOMY (obstructive sleep apnea)  - Overnight Home Sleep Study; Future    2. Obesity (BMI 30-39.9)    3. Non-smoker    4. Moderate persistent asthma without complication    5. History of diabetes mellitus, type II    6. History of gastric bypass    7. Arthritis, multiple joint involvement    8. Positive NAKUL (antinuclear antibody)    9. PTSD (post-traumatic stress disorder)    7. Up to date with influenza vaccination    PLAN:     Our Medicare specialist Rosalee has advised that she will need a retitration before we can get her new equipment.    Have advised the patient to follow up with the appropriate healthcare practitioners for all other medical problems and issues.    Return for after sleep study.

## 2020-10-21 ENCOUNTER — SLEEP CENTER VISIT (OUTPATIENT)
Dept: SLEEP MEDICINE | Facility: MEDICAL CENTER | Age: 55
End: 2020-10-21
Payer: MEDICARE

## 2020-10-21 VITALS
DIASTOLIC BLOOD PRESSURE: 52 MMHG | RESPIRATION RATE: 14 BRPM | SYSTOLIC BLOOD PRESSURE: 90 MMHG | HEIGHT: 62 IN | BODY MASS INDEX: 33.68 KG/M2 | WEIGHT: 183 LBS | HEART RATE: 63 BPM | OXYGEN SATURATION: 99 %

## 2020-10-21 DIAGNOSIS — E66.9 OBESITY (BMI 30-39.9): ICD-10-CM

## 2020-10-21 DIAGNOSIS — M12.9 ARTHRITIS, MULTIPLE JOINT INVOLVEMENT: ICD-10-CM

## 2020-10-21 DIAGNOSIS — J45.40 MODERATE PERSISTENT ASTHMA WITHOUT COMPLICATION: ICD-10-CM

## 2020-10-21 DIAGNOSIS — Z78.9 NON-SMOKER: ICD-10-CM

## 2020-10-21 DIAGNOSIS — Z86.39 HISTORY OF DIABETES MELLITUS, TYPE II: ICD-10-CM

## 2020-10-21 DIAGNOSIS — Z98.84 HISTORY OF GASTRIC BYPASS: ICD-10-CM

## 2020-10-21 DIAGNOSIS — R76.8 POSITIVE ANA (ANTINUCLEAR ANTIBODY): ICD-10-CM

## 2020-10-21 DIAGNOSIS — G47.33 OSA (OBSTRUCTIVE SLEEP APNEA): ICD-10-CM

## 2020-10-21 DIAGNOSIS — F43.10 POSTTRAUMATIC STRESS DISORDER: ICD-10-CM

## 2020-10-21 PROCEDURE — 99214 OFFICE O/P EST MOD 30 MIN: CPT | Performed by: INTERNAL MEDICINE

## 2020-10-21 RX ORDER — ZOLPIDEM TARTRATE 5 MG/1
5 TABLET ORAL NIGHTLY PRN
Qty: 2 TAB | Refills: 0 | Status: SHIPPED | OUTPATIENT
Start: 2020-10-21 | End: 2020-11-02

## 2020-10-21 ASSESSMENT — FIBROSIS 4 INDEX: FIB4 SCORE: 0.91

## 2020-11-02 ENCOUNTER — PRE-ADMISSION TESTING (OUTPATIENT)
Dept: ADMISSIONS | Facility: MEDICAL CENTER | Age: 55
End: 2020-11-02
Attending: OBSTETRICS & GYNECOLOGY
Payer: MEDICARE

## 2020-11-02 DIAGNOSIS — Z01.810 PRE-OPERATIVE CARDIOVASCULAR EXAMINATION: ICD-10-CM

## 2020-11-02 DIAGNOSIS — Z01.812 PRE-OPERATIVE LABORATORY EXAMINATION: ICD-10-CM

## 2020-11-02 LAB
ANION GAP SERPL CALC-SCNC: 9 MMOL/L (ref 7–16)
BUN SERPL-MCNC: 9 MG/DL (ref 8–22)
CALCIUM SERPL-MCNC: 9.5 MG/DL (ref 8.5–10.5)
CHLORIDE SERPL-SCNC: 107 MMOL/L (ref 96–112)
CO2 SERPL-SCNC: 26 MMOL/L (ref 20–33)
COVID ORDER STATUS COVID19: NORMAL
CREAT SERPL-MCNC: 0.76 MG/DL (ref 0.5–1.4)
EKG IMPRESSION: NORMAL
GLUCOSE SERPL-MCNC: 81 MG/DL (ref 65–99)
POTASSIUM SERPL-SCNC: 4.2 MMOL/L (ref 3.6–5.5)
SARS-COV-2 RNA RESP QL NAA+PROBE: NOTDETECTED
SODIUM SERPL-SCNC: 142 MMOL/L (ref 135–145)
SPECIMEN SOURCE: NORMAL

## 2020-11-02 PROCEDURE — 93005 ELECTROCARDIOGRAM TRACING: CPT

## 2020-11-02 PROCEDURE — 93010 ELECTROCARDIOGRAM REPORT: CPT | Performed by: INTERNAL MEDICINE

## 2020-11-02 PROCEDURE — C9803 HOPD COVID-19 SPEC COLLECT: HCPCS

## 2020-11-02 PROCEDURE — 36415 COLL VENOUS BLD VENIPUNCTURE: CPT

## 2020-11-02 PROCEDURE — 80048 BASIC METABOLIC PNL TOTAL CA: CPT

## 2020-11-02 PROCEDURE — U0003 INFECTIOUS AGENT DETECTION BY NUCLEIC ACID (DNA OR RNA); SEVERE ACUTE RESPIRATORY SYNDROME CORONAVIRUS 2 (SARS-COV-2) (CORONAVIRUS DISEASE [COVID-19]), AMPLIFIED PROBE TECHNIQUE, MAKING USE OF HIGH THROUGHPUT TECHNOLOGIES AS DESCRIBED BY CMS-2020-01-R: HCPCS

## 2020-11-02 RX ORDER — DIPHENHYDRAMINE HCL 25 MG
25 TABLET ORAL
COMMUNITY
End: 2022-04-08

## 2020-11-02 ASSESSMENT — FIBROSIS 4 INDEX: FIB4 SCORE: 0.91

## 2020-11-04 ENCOUNTER — HOSPITAL ENCOUNTER (OUTPATIENT)
Facility: MEDICAL CENTER | Age: 55
End: 2020-11-04
Attending: OBSTETRICS & GYNECOLOGY | Admitting: OBSTETRICS & GYNECOLOGY
Payer: MEDICARE

## 2020-11-04 ENCOUNTER — ANESTHESIA EVENT (OUTPATIENT)
Dept: SURGERY | Facility: MEDICAL CENTER | Age: 55
End: 2020-11-04
Payer: MEDICARE

## 2020-11-04 ENCOUNTER — ANESTHESIA (OUTPATIENT)
Dept: SURGERY | Facility: MEDICAL CENTER | Age: 55
End: 2020-11-04
Payer: MEDICARE

## 2020-11-04 VITALS
WEIGHT: 181 LBS | TEMPERATURE: 97.3 F | SYSTOLIC BLOOD PRESSURE: 108 MMHG | BODY MASS INDEX: 35.53 KG/M2 | HEART RATE: 61 BPM | RESPIRATION RATE: 14 BRPM | HEIGHT: 60 IN | OXYGEN SATURATION: 97 % | DIASTOLIC BLOOD PRESSURE: 63 MMHG

## 2020-11-04 LAB — GLUCOSE BLD-MCNC: 82 MG/DL (ref 65–99)

## 2020-11-04 PROCEDURE — 160036 HCHG PACU - EA ADDL 30 MINS PHASE I: Performed by: OBSTETRICS & GYNECOLOGY

## 2020-11-04 PROCEDURE — 82962 GLUCOSE BLOOD TEST: CPT

## 2020-11-04 PROCEDURE — 500892 HCHG PACK, PERI-GYN: Performed by: OBSTETRICS & GYNECOLOGY

## 2020-11-04 PROCEDURE — 160025 RECOVERY II MINUTES (STATS): Performed by: OBSTETRICS & GYNECOLOGY

## 2020-11-04 PROCEDURE — C1771 REP DEV, URINARY, W/SLING: HCPCS | Performed by: OBSTETRICS & GYNECOLOGY

## 2020-11-04 PROCEDURE — 110454 HCHG SHELL REV 250: Performed by: OBSTETRICS & GYNECOLOGY

## 2020-11-04 PROCEDURE — 700111 HCHG RX REV CODE 636 W/ 250 OVERRIDE (IP): Performed by: ANESTHESIOLOGY

## 2020-11-04 PROCEDURE — 160046 HCHG PACU - 1ST 60 MINS PHASE II: Performed by: OBSTETRICS & GYNECOLOGY

## 2020-11-04 PROCEDURE — 700101 HCHG RX REV CODE 250: Performed by: OBSTETRICS & GYNECOLOGY

## 2020-11-04 PROCEDURE — 160047 HCHG PACU  - EA ADDL 30 MINS PHASE II: Performed by: OBSTETRICS & GYNECOLOGY

## 2020-11-04 PROCEDURE — 700105 HCHG RX REV CODE 258: Performed by: OBSTETRICS & GYNECOLOGY

## 2020-11-04 PROCEDURE — 700111 HCHG RX REV CODE 636 W/ 250 OVERRIDE (IP): Performed by: OBSTETRICS & GYNECOLOGY

## 2020-11-04 PROCEDURE — 160029 HCHG SURGERY MINUTES - 1ST 30 MINS LEVEL 4: Performed by: OBSTETRICS & GYNECOLOGY

## 2020-11-04 PROCEDURE — 501330 HCHG SET, CYSTO IRRIG TUBING: Performed by: OBSTETRICS & GYNECOLOGY

## 2020-11-04 PROCEDURE — 501838 HCHG SUTURE GENERAL: Performed by: OBSTETRICS & GYNECOLOGY

## 2020-11-04 PROCEDURE — 160035 HCHG PACU - 1ST 60 MINS PHASE I: Performed by: OBSTETRICS & GYNECOLOGY

## 2020-11-04 PROCEDURE — 160009 HCHG ANES TIME/MIN: Performed by: OBSTETRICS & GYNECOLOGY

## 2020-11-04 PROCEDURE — 160002 HCHG RECOVERY MINUTES (STAT): Performed by: OBSTETRICS & GYNECOLOGY

## 2020-11-04 PROCEDURE — 160048 HCHG OR STATISTICAL LEVEL 1-5: Performed by: OBSTETRICS & GYNECOLOGY

## 2020-11-04 PROCEDURE — A9270 NON-COVERED ITEM OR SERVICE: HCPCS | Performed by: ANESTHESIOLOGY

## 2020-11-04 PROCEDURE — 700101 HCHG RX REV CODE 250: Performed by: ANESTHESIOLOGY

## 2020-11-04 PROCEDURE — 160041 HCHG SURGERY MINUTES - EA ADDL 1 MIN LEVEL 4: Performed by: OBSTETRICS & GYNECOLOGY

## 2020-11-04 PROCEDURE — 700102 HCHG RX REV CODE 250 W/ 637 OVERRIDE(OP): Performed by: ANESTHESIOLOGY

## 2020-11-04 DEVICE — SYSTEM SUPRAPUBIC MID-URETHRAL SLING LYNX: Type: IMPLANTABLE DEVICE | Site: ABDOMEN | Status: FUNCTIONAL

## 2020-11-04 RX ORDER — HALOPERIDOL 5 MG/ML
1 INJECTION INTRAMUSCULAR
Status: DISCONTINUED | OUTPATIENT
Start: 2020-11-04 | End: 2020-11-04 | Stop reason: HOSPADM

## 2020-11-04 RX ORDER — SODIUM CHLORIDE, SODIUM LACTATE, POTASSIUM CHLORIDE, CALCIUM CHLORIDE 600; 310; 30; 20 MG/100ML; MG/100ML; MG/100ML; MG/100ML
INJECTION, SOLUTION INTRAVENOUS CONTINUOUS
Status: DISCONTINUED | OUTPATIENT
Start: 2020-11-04 | End: 2020-11-04 | Stop reason: HOSPADM

## 2020-11-04 RX ORDER — ACETAMINOPHEN 500 MG
1000 TABLET ORAL ONCE
Status: COMPLETED | OUTPATIENT
Start: 2020-11-04 | End: 2020-11-04

## 2020-11-04 RX ORDER — SIMETHICONE 80 MG
80 TABLET,CHEWABLE ORAL EVERY 8 HOURS PRN
Status: DISCONTINUED | OUTPATIENT
Start: 2020-11-04 | End: 2020-11-04 | Stop reason: HOSPADM

## 2020-11-04 RX ORDER — BUPIVACAINE HYDROCHLORIDE AND EPINEPHRINE 2.5; 5 MG/ML; UG/ML
INJECTION, SOLUTION EPIDURAL; INFILTRATION; INTRACAUDAL; PERINEURAL
Status: DISCONTINUED | OUTPATIENT
Start: 2020-11-04 | End: 2020-11-04 | Stop reason: HOSPADM

## 2020-11-04 RX ORDER — LIDOCAINE HYDROCHLORIDE 20 MG/ML
INJECTION, SOLUTION EPIDURAL; INFILTRATION; INTRACAUDAL; PERINEURAL PRN
Status: DISCONTINUED | OUTPATIENT
Start: 2020-11-04 | End: 2020-11-04 | Stop reason: SURG

## 2020-11-04 RX ORDER — ONDANSETRON 2 MG/ML
4 INJECTION INTRAMUSCULAR; INTRAVENOUS
Status: DISCONTINUED | OUTPATIENT
Start: 2020-11-04 | End: 2020-11-04 | Stop reason: HOSPADM

## 2020-11-04 RX ORDER — PROMETHAZINE HYDROCHLORIDE 25 MG/1
25 SUPPOSITORY RECTAL EVERY 4 HOURS PRN
Status: DISCONTINUED | OUTPATIENT
Start: 2020-11-04 | End: 2020-11-04 | Stop reason: HOSPADM

## 2020-11-04 RX ORDER — ONDANSETRON 2 MG/ML
INJECTION INTRAMUSCULAR; INTRAVENOUS PRN
Status: DISCONTINUED | OUTPATIENT
Start: 2020-11-04 | End: 2020-11-04 | Stop reason: SURG

## 2020-11-04 RX ORDER — MEPERIDINE HYDROCHLORIDE 25 MG/ML
6.25 INJECTION INTRAMUSCULAR; INTRAVENOUS; SUBCUTANEOUS
Status: DISCONTINUED | OUTPATIENT
Start: 2020-11-04 | End: 2020-11-04 | Stop reason: HOSPADM

## 2020-11-04 RX ORDER — VANCOMYCIN HYDROCHLORIDE 500 MG/10ML
INJECTION, POWDER, LYOPHILIZED, FOR SOLUTION INTRAVENOUS
Status: COMPLETED | OUTPATIENT
Start: 2020-11-04 | End: 2020-11-04

## 2020-11-04 RX ORDER — CEFOTETAN DISODIUM 2 G/20ML
INJECTION, POWDER, FOR SOLUTION INTRAMUSCULAR; INTRAVENOUS PRN
Status: DISCONTINUED | OUTPATIENT
Start: 2020-11-04 | End: 2020-11-04 | Stop reason: SURG

## 2020-11-04 RX ORDER — GENTAMICIN SULFATE 40 MG/ML
INJECTION, SOLUTION INTRAMUSCULAR; INTRAVENOUS
Status: DISCONTINUED | OUTPATIENT
Start: 2020-11-04 | End: 2020-11-04 | Stop reason: HOSPADM

## 2020-11-04 RX ORDER — DIPHENHYDRAMINE HYDROCHLORIDE 50 MG/ML
12.5 INJECTION INTRAMUSCULAR; INTRAVENOUS
Status: DISCONTINUED | OUTPATIENT
Start: 2020-11-04 | End: 2020-11-04 | Stop reason: HOSPADM

## 2020-11-04 RX ORDER — HYDROMORPHONE HYDROCHLORIDE 1 MG/ML
0.4 INJECTION, SOLUTION INTRAMUSCULAR; INTRAVENOUS; SUBCUTANEOUS
Status: DISCONTINUED | OUTPATIENT
Start: 2020-11-04 | End: 2020-11-04 | Stop reason: HOSPADM

## 2020-11-04 RX ORDER — HYDROMORPHONE HYDROCHLORIDE 1 MG/ML
0.2 INJECTION, SOLUTION INTRAMUSCULAR; INTRAVENOUS; SUBCUTANEOUS
Status: DISCONTINUED | OUTPATIENT
Start: 2020-11-04 | End: 2020-11-04 | Stop reason: HOSPADM

## 2020-11-04 RX ORDER — HYDRALAZINE HYDROCHLORIDE 20 MG/ML
5 INJECTION INTRAMUSCULAR; INTRAVENOUS
Status: DISCONTINUED | OUTPATIENT
Start: 2020-11-04 | End: 2020-11-04 | Stop reason: HOSPADM

## 2020-11-04 RX ORDER — OXYCODONE HCL 5 MG/5 ML
5 SOLUTION, ORAL ORAL
Status: COMPLETED | OUTPATIENT
Start: 2020-11-04 | End: 2020-11-04

## 2020-11-04 RX ORDER — OXYCODONE HCL 5 MG/5 ML
10 SOLUTION, ORAL ORAL
Status: COMPLETED | OUTPATIENT
Start: 2020-11-04 | End: 2020-11-04

## 2020-11-04 RX ORDER — HYDROMORPHONE HYDROCHLORIDE 1 MG/ML
0.1 INJECTION, SOLUTION INTRAMUSCULAR; INTRAVENOUS; SUBCUTANEOUS
Status: DISCONTINUED | OUTPATIENT
Start: 2020-11-04 | End: 2020-11-04 | Stop reason: HOSPADM

## 2020-11-04 RX ORDER — METOPROLOL TARTRATE 1 MG/ML
1 INJECTION, SOLUTION INTRAVENOUS
Status: DISCONTINUED | OUTPATIENT
Start: 2020-11-04 | End: 2020-11-04 | Stop reason: HOSPADM

## 2020-11-04 RX ADMIN — FENTANYL CITRATE 50 MCG: 50 INJECTION, SOLUTION INTRAMUSCULAR; INTRAVENOUS at 14:08

## 2020-11-04 RX ADMIN — FENTANYL CITRATE 100 MCG: 50 INJECTION, SOLUTION INTRAMUSCULAR; INTRAVENOUS at 14:12

## 2020-11-04 RX ADMIN — LIDOCAINE HYDROCHLORIDE 0.5 ML: 10 INJECTION, SOLUTION EPIDURAL; INFILTRATION; INTRACAUDAL; PERINEURAL at 13:45

## 2020-11-04 RX ADMIN — FENTANYL CITRATE 50 MCG: 50 INJECTION, SOLUTION INTRAMUSCULAR; INTRAVENOUS at 15:42

## 2020-11-04 RX ADMIN — CEFOTETAN DISODIUM 2 G: 2 INJECTION, POWDER, FOR SOLUTION INTRAMUSCULAR; INTRAVENOUS at 14:21

## 2020-11-04 RX ADMIN — FENTANYL CITRATE 50 MCG: 50 INJECTION, SOLUTION INTRAMUSCULAR; INTRAVENOUS at 15:56

## 2020-11-04 RX ADMIN — ACETAMINOPHEN 1000 MG: 500 TABLET ORAL at 13:42

## 2020-11-04 RX ADMIN — SODIUM CHLORIDE, POTASSIUM CHLORIDE, SODIUM LACTATE AND CALCIUM CHLORIDE: 600; 310; 30; 20 INJECTION, SOLUTION INTRAVENOUS at 13:45

## 2020-11-04 RX ADMIN — FENTANYL CITRATE 50 MCG: 50 INJECTION, SOLUTION INTRAMUSCULAR; INTRAVENOUS at 16:07

## 2020-11-04 RX ADMIN — PROPOFOL 40 MG: 10 INJECTION, EMULSION INTRAVENOUS at 14:13

## 2020-11-04 RX ADMIN — ONDANSETRON 4 MG: 2 INJECTION INTRAMUSCULAR; INTRAVENOUS at 14:54

## 2020-11-04 RX ADMIN — LIDOCAINE HYDROCHLORIDE 20 MG: 20 INJECTION, SOLUTION EPIDURAL; INFILTRATION; INTRACAUDAL at 14:12

## 2020-11-04 RX ADMIN — PROPOFOL 200 MG: 10 INJECTION, EMULSION INTRAVENOUS at 14:12

## 2020-11-04 RX ADMIN — OXYCODONE HYDROCHLORIDE 10 MG: 5 SOLUTION ORAL at 15:43

## 2020-11-04 RX ADMIN — MIDAZOLAM 2 MG: 1 INJECTION INTRAMUSCULAR; INTRAVENOUS at 14:08

## 2020-11-04 ASSESSMENT — PAIN DESCRIPTION - PAIN TYPE
TYPE: SURGICAL PAIN

## 2020-11-04 ASSESSMENT — FIBROSIS 4 INDEX: FIB4 SCORE: 0.91

## 2020-11-04 ASSESSMENT — PAIN SCALES - GENERAL: PAIN_LEVEL: 1

## 2020-11-04 NOTE — ANESTHESIA PROCEDURE NOTES
Airway  Performed by: Renay Gage M.D.  Authorized by: Renay Gage M.D.     Location:  OR  Urgency:  Elective  Indications for Airway Management:  Anesthesia      Spontaneous Ventilation: absent    Sedation Level:  Deep  Preoxygenated: Yes    MILS Maintained Throughout: Yes    Mask Difficulty Assessment:  1 - vent by mask  Final Airway Type:  Supraglottic airway  Final Supraglottic Airway:  Standard LMA    SGA Size:  4  Number of Attempts at Approach:  1  Ventilation Between Attempts:  BVM  Number of Other Approaches Attempted:  0

## 2020-11-04 NOTE — ANESTHESIA PREPROCEDURE EVALUATION
55 yr old female with mu;ltiple medical problems  As noted pt also on chronic pain meds for back and neck pain     Relevant Problems   ANESTHESIA   (+) TOMY (obstructive sleep apnea)      PULMONARY   (+) Chronic obstructive pulmonary disease (HCC)   (+) Mild intermittent asthma without complication   (+) Moderate persistent asthma without complication      NEURO   (+) History of diabetes mellitus, type II      Other   (+) Arthritis       Physical Exam    Airway   Mallampati: II  TM distance: >3 FB  Neck ROM: full       Cardiovascular - normal exam  Rhythm: regular  Rate: normal  (-) murmur  Comments: EKG NSR   Dental - normal exam  (+) upper dentures, lower dentures           Pulmonary - normal exam  Breath sounds clear to auscultation     Abdominal   (+) obese     Neurological - normal exam                 Anesthesia Plan    ASA 3       Plan - general       Airway plan will be ETT      Plan Factors:   Patient was previously instructed to abstain from smoking on day of procedure.  Patient did not smoke on day of procedure.      Induction: intravenous      Pertinent diagnostic labs and testing reviewed    Informed Consent:    Anesthetic plan and risks discussed with patient.    Use of blood products discussed with: patient whom consented to blood products.

## 2020-11-04 NOTE — OR SURGEON
Immediate Post OP Note    PreOp Diagnosis: severe urinary incontinence thought to be due to ISD with challenging emptying at times. Urodynamics shows low urethral closure pressure.     PostOp Diagnosis: same     Procedure(s):  BLADDER SLING, FEMALE- RETRO PUBIC SLING LYNX - Wound Class: Clean Contaminated  CYSTOSCOPY - Wound Class: Clean Contaminated    Surgeon(s):  DONALD Fuentes M.D.    Anesthesiologist/Type of Anesthesia:  Anesthesiologist: Renay Gage M.D./General    Surgical Staff:  Circulator: Renay Gusman R.N.  Relief Circulator: Nereida Anders R.N.  Scrub Person: Parvin Frank    Specimens removed if any:  * No specimens in log *    Estimated Blood Loss: 20cc    Findings: Exam under anesthesia - well supported vaginal non-mobile urethra, Large mons making it difficult to find her pubic symphysis.      Complications: none        11/4/2020 3:07 PM Kat Carrion M.D.

## 2020-11-04 NOTE — H&P
DATE OF OPERATION:  2020    CHIEF COMPLAINT:  Severe urinary incontinence.  The patient states that she   loses urine when she coughs, sneezes, laughs, lifts and she has for 5 years.    The patient has to wear a diaper.  She does not go out of her house.  She   cannot make it to the bathroom again for the last 5 years.  She did have back   surgery 5 years ago.  She, in order to have a bowel movement, uses stool   softeners and laxatives every day.  Before she started these, she was leaking   stool.  She does not feel like she empties.  She gets up 2-3 times a night and   has for 10 years.  The patient states that when she was in labor, it lasted   for 4 days and then she had a  and she did have a bladder repair by   Dr. Mohr in the past.  No improvement in her bladder symptoms.  The patient   had a urodynamic test with Gissell and was found to have stress urinary   incontinence, but no real hypermobility of her urethra.  Her maximum urethral   closure pressure was 57 cm of water consistent with intrinsic sphincter   deficiency.  The patient has a small bladder capacity and vaginal atrophy  She   is interested in proceeding with surgical correction of her incontinence.     Because of her low urethral pressure and a non-hypermobile urethra, I think it   is in the patient's best interest to consider a retropubic sling.  This is   what we were planning on before.  Risks, benefits, alternatives and procedure   were discussed with the patient in detail.    PAST MEDICAL HISTORY:  Anemia, kidney stones, arthritis, breast cancer, back   trouble, diabetes, reflux, osteoporosis, depression, and thyroid disease.    PAST SURGICAL HISTORY:  Cholecystectomy in , spine and back surgery in   , tonsils as a child, breast surgery in , ovary in , uterus and   tubes,  sections.    MENSTRUAL HISTORY:  The patient underwent menarche at age 11 and menopause at   age 49.    OBSTETRICAL HISTORY:  She has  "had 3 pregnancies, 2 babies born alive, all by    section and 1 ectopic pregnancy.  Last child was born in .    FAMILY HISTORY:  Father  at 69 from diabetes.  Mother  at 72 from   lupus complications, cancer  history of mom with kidney cancer and patient   with breast cancer.    SOCIAL HISTORY:  The patient does not smoke, drink or do drugs.    ALLERGIES:  TO SUBOXONE.    REVIEW OF SYSTEMS:  Noncontributory.    PHYSICAL EXAMINATION:  VITAL SIGNS:  The patient's blood pressure is 126/78, her weight is 176,   height is 5 feet 2 inches.  HEENT:  Within normal limits.  NECK:  Supple, without lymphadenopathy or thyromegaly.  CARDIOVASCULAR:  Regular rate and rhythm.  LUNGS:  Clear to auscultation.  BACK:  No CVA tenderness.  ABDOMEN:  Soft and nontender, nondistended.  No masses are palpable.  PELVIC:  EGBUS appears normal.  The vagina seems to be well supported and   seems almost as if it was \"pulled up.\"  There is a large urethral opening.    The uterus is absent.  The adnexa is without masses.  The rectum is with a   normal sphincter tone.  EXTREMITIES:  Benign.    ASSESSMENT:  1.  A 55-year-old , status post 3  sections and a laparoscopic   supracervical hysterectomy.  2.  Urinary incontinence, severe, believe this is intrinsic sphincter   deficiency with poor emptying at times.  3.  Labored a long time prior to her  section.  4.  Had constipation, but this is better with stool softeners.  5.  Atrophy.  6.  Urodynamic shows non-hypermobile urethra with stress urinary incontinence   and low urethral pressure consistent with intrinsic sphincter deficiency.    PLAN:  The patient is scheduled for a retropubic sling and cystoscopy.  Risks,   benefits, alternatives and procedure were discussed with the patient in   detail and she agrees to proceed.  Preoperative labs will be obtained.    Preoperative antibiotics will be administered.  If she has any problems or   questions, she can " contact my office.       ____________________________________     MD VILMA Solis / TRACEY    DD:  11/03/2020 21:32:56  DT:  11/03/2020 23:09:33    D#:  [id number]  Job#:  [id number]

## 2020-11-04 NOTE — ANESTHESIA TIME REPORT
Anesthesia Start and Stop Event Times     Date Time Event    11/4/2020 01:54 PM Ready for Procedure    11/4/2020 02:08 PM Anesthesia Start        Responsible Staff  11/04/20    Name Role Begin End    Renay Gage M.D. Anesthesiologist 11/04/20 02:08 PM Not recorded        Preop Diagnosis (Free Text):  Pre-op Diagnosis     STRESS URINARY INCONTINENCE        Preop Diagnosis (Codes):    Post op Diagnosis  Urinary, incontinence, stress female      Premium Reason  A. 3PM - 7AM    Comments:

## 2020-11-05 NOTE — OP REPORT
DATE OF SERVICE:  2020    PREOPERATIVE DIAGNOSES:  Severe urinary incontinence, thought to be secondary   to intrinsic sphincter deficiency with poor emptying at times.  History of 3    sections with prolonged labor and pushing on one.  Urodynamic shows   non-hypermobile urethra with intrinsic sphincter deficiency and low urethral   closure pressure.    POSTOPERATIVE DIAGNOSES:  Severe urinary incontinence, thought to be secondary   to intrinsic sphincter deficiency with poor emptying at times.  History of 3    sections with prolonged labor and pushing on one.  Urodynamic shows   non-hypermobile urethra with intrinsic sphincter deficiency and low urethral   closure pressure with non-hypermobile urethra and well elevated vagina.    PROCEDURE:  Retropubic sling using Lynx product and cystoscopy.    SURGEON:  Kat Carrion MD    ASSISTANT:  Alayna Brower MD    ANESTHESIA:  General LMA.    ESTIMATED BLOOD LOSS:  20 mL    FINDINGS AT THE TIME OF SURGERY:  Exam under anesthesia reveals a   well-supported vagina, non-hypermobile urethra.  The patient has had a prior   abdominoplasty and has a large mons.  It is difficult to palpate her pubic   symphysis.  Cystoscopic findings post-procedure revealed a normal bladder, and   no interruption in the bladder mucosa.  The sling was placed underlying the   mid urethra in a tension freeway.    TECHNIQUE:  The patient was taken to the operating room where general   anesthesia was placed.  She was then placed in the Dario stirrups with   excellent position, prepped and draped in the normal sterile fashion.  A Gray   catheter was then placed and the Allis clamp was used to grasp the vaginal   mucosa underlying the mid urethra and 0.25% Marcaine with epinephrine was then   injected and underlying the mid urethra and out laterally on each side.  A 1   cm incision was made with a scalpel.  Metzenbaum scissors were then used to   dissect out laterally on  each side, allowing entry of my pinky finger to just   behind the pubic ramus on each side.  The midline suprapubic area was marked   and a alfred was made approximately 1.5-2 cm to each side of that.  A 0.25%   Marcaine with epinephrine was then injected into the area and a stab incision   was made with the scalpel on each side.  The Lynx needle was then used through   the suprapubic skin behind the pubic ramus and touching my pinky finger   periurethrally and brought through on each side.  Cystoscopic examination was   performed after both needles were in and there was no interruption of the   bladder mucosa.  The Gray catheter was replaced and the bladder completely   emptied the Lynx graft was then attached to these Gaff needles and brought up   through the suprapubic incisions and an 8 mm dilator was then placed above the   graft and below the urethra to ensure tension free positioning.  The plastic   sheath was then removed and the graft was noted to be underlying the mid   urethra in a tension freeway.  Once this was accomplished, the excess graft   was excised and no graft was palpable at the incisions.  I then used    irrigation and Surgiflo then reapproximated the vaginal mucosa in a running   fashion.  Excellent reapproximation was achieved.  An additional cystoscopic   examination was performed, which was normal.  The cystoscope was removed.    Gray catheter replaced.  Packing was placed inside the vagina to help   decrease any bleeding into the spaces.  The patient tolerated the procedure   well and was brought to recovery room in stable condition.       ____________________________________     MD VILMA Solis / TRACEY    DD:  11/04/2020 15:11:54  DT:  11/04/2020 16:43:24    D#:  [id number]  Job#:  [id number]

## 2020-11-05 NOTE — ANESTHESIA POSTPROCEDURE EVALUATION
Patient: Patti Tello    Procedure Summary     Date: 11/04/20 Room / Location: [place] [address] / SURGERY [facility]    Anesthesia Start: 1408 Anesthesia Stop: 1515    Procedures:       BLADDER SLING, FEMALE- RETRO PUBIC SLING (N/A Abdomen)      CYSTOSCOPY (N/A Bladder) Diagnosis: (STRESS URINARY INCONTINENCE)    Surgeons: Kat Carrion M.D. Responsible Provider: Renay Gage M.D.    Anesthesia Type: general ASA Status: 3          Final Anesthesia Type: general  Last vitals  BP   Blood Pressure: 108/63    Temp   36.3 °C (97.3 °F)    Pulse   Pulse: 61   Resp   14    SpO2   97 %      Anesthesia Post Evaluation    Patient location during evaluation: PACU  Patient participation: complete - patient participated  Level of consciousness: awake and alert  Pain score: 1    Airway patency: patent  Anesthetic complications: no  Cardiovascular status: hemodynamically stable  Respiratory status: acceptable  Hydration status: euvolemic    PONV: none           Nurse Pain Score: 1 (NPRS)

## 2020-11-05 NOTE — DISCHARGE INSTRUCTIONS
ACTIVITY: Rest and take it easy for the first 24 hours.  A responsible adult is recommended to remain with you during that time.  It is normal to feel sleepy.  We encourage you to not do anything that requires balance, judgment or coordination.    MILD FLU-LIKE SYMPTOMS ARE NORMAL. YOU MAY EXPERIENCE GENERALIZED MUSCLE ACHES, THROAT IRRITATION, HEADACHE AND/OR SOME NAUSEA.    FOR 24 HOURS DO NOT:  Drive, operate machinery or run household appliances.  Drink beer or alcoholic beverages.   Make important decisions or sign legal documents.    SPECIAL INSTRUCTIONS: Follow Doctors orders.      DIET: To avoid nausea, slowly advance diet as tolerated, avoiding spicy or greasy foods for the first day.  Add more substantial food to your diet according to your physician's instructions.  Babies can be fed formula or breast milk as soon as they are hungry.  INCREASE FLUIDS AND FIBER TO AVOID CONSTIPATION.    SURGICAL DRESSING/BATHING: May shower tomorrow.  No bathtubs/tubs or submersion until cleared by Doctor.      FOLLOW-UP APPOINTMENT:  A follow-up appointment should be arranged with your doctor in 1-2 weeks; call to schedule.    You should CALL YOUR PHYSICIAN if you develop:  Fever greater than 101 degrees F.  Pain not relieved by medication, or persistent nausea or vomiting.  Excessive bleeding (blood soaking through dressing) or unexpected drainage from the wound.  Extreme redness or swelling around the incision site, drainage of pus or foul smelling drainage.  Inability to urinate or empty your bladder within 8 hours.  Problems with breathing or chest pain.    You should call 911 if you develop problems with breathing or chest pain.  If you are unable to contact your doctor or surgical center, you should go to the nearest emergency room or urgent care center.  Physician's telephone #: [phone]    If any questions arise, call your doctor.  If your doctor is not available, please feel free to call the Surgical Center  at [phone]. The Contact Center is open Monday through Friday 7AM to 5PM and may speak to a nurse at [phone], or toll free at [phone].     A registered nurse may call you a few days after your surgery to see how you are doing after your procedure.    MEDICATIONS: Resume taking daily medication.  Take prescribed pain medication with food.  If no medication is prescribed, you may take non-aspirin pain medication if needed.  PAIN MEDICATION CAN BE VERY CONSTIPATING.  Take a stool softener or laxative such as senokot, pericolace, or milk of magnesia if needed.      Last pain medication given at 3:43 PM Oxycodone 10 mg    If your physician has prescribed pain medication that includes Acetaminophen (Tylenol), do not take additional Acetaminophen (Tylenol) while taking the prescribed medication.    Depression / Suicide Risk    As you are discharged from this [facility] facility, it is important to learn how to keep safe from harming yourself.    Recognize the warning signs:  · Abrupt changes in personality, positive or negative- including increase in energy   · Giving away possessions  · Change in eating patterns- significant weight changes-  positive or negative  · Change in sleeping patterns- unable to sleep or sleeping all the time   · Unwillingness or inability to communicate  · Depression  · Unusual sadness, discouragement and loneliness  · Talk of wanting to die  · Neglect of personal appearance   · Rebelliousness- reckless behavior  · Withdrawal from people/activities they love  · Confusion- inability to concentrate     If you or a loved one observes any of these behaviors or has concerns about self-harm, here's what you can do:  · Talk about it- your feelings and reasons for harming yourself  · Remove any means that you might use to hurt yourself (examples: pills, rope, extension cords, firearm)  · Get professional help from the community (Mental Health, Substance Abuse, psychological  counseling)  · Do not be alone:Call your Safe Contact- someone whom you trust who will be there for you.  · Call your local CRISIS HOTLINE 983-6507 or 021-111-1533  · Call your local Children's Mobile Crisis Response Team [place] (953) 279-1577 or www.[employer]  · Call the toll free National Suicide Prevention Hotlines   · National Suicide Prevention Lifeline 653-575-KYHR (4214)  · National Hope Line Network 800-SUICIDE (063-8627)

## 2020-11-05 NOTE — OR NURSING
Pt's VSS; denies N/V; states pain is at tolerable level. Dressing CDI.  D/c orders received. PIV removed. Pt changed into clothing with assistance. Pt up and ambulated to BR, steady gait, voided adequately. Discharge instructions given as well as pain management handout; pt and family verbalized understanding and questions answered. Patient states ready to d/c home. No prescriptions given. Pt discharged home to .

## 2020-11-05 NOTE — OR NURSING
The pt is awake and oriented. Respirations are regular and easy. Pain is controlled, the pt is comfortable.FC patent draining clear yellow.

## 2020-11-05 NOTE — OR NURSING
Arrived to Phase II after report from Mae PHILLIP.     VSS, denies nausea, reports pain is 1/10 and tolerable. Ambulated from gurney to chair with standby assist.    Surgical site CDI.  Pt has vaginal packing in and Gray catheter draining clear yellow urine.     Alarms on and set to appropriate parameters. Call light within reach, rounding in place.

## 2020-11-05 NOTE — OR NURSING
Vaginal packing removed.  Gray catheter backfilled with 300 mL NS.  Pt voided.  Post void residual 53 mL or less via US.

## 2020-11-10 ENCOUNTER — SLEEP STUDY (OUTPATIENT)
Dept: SLEEP MEDICINE | Facility: MEDICAL CENTER | Age: 55
End: 2020-11-10
Attending: INTERNAL MEDICINE
Payer: MEDICARE

## 2020-11-10 DIAGNOSIS — G47.33 OSA (OBSTRUCTIVE SLEEP APNEA): ICD-10-CM

## 2020-11-10 PROCEDURE — 95811 POLYSOM 6/>YRS CPAP 4/> PARM: CPT | Mod: 52 | Performed by: FAMILY MEDICINE

## 2020-11-11 NOTE — PROCEDURES
Technical summary: The patient underwent a CPAP titration.  This was a 16 channel montage study to include a 6 channel EEG, a 2 channel EOG, and chin EMG, left and right leg EMG, a snore channel, and a CFLOW pressure transducer.   Respiratory effort was assessed with the use of a thoracic and abdominal monitor and overnight oximetry was obtained. Audio and video recordings were reviewed. This was a fully attended study and sleep stage scoring was performed. The test was technically adequate.    Interpretation:  Study start time was 09:27:30 PM. Diagnostic recording time was 2h 37.0m with a total sleep time of 1h 21.5m resulting in a sleep efficiency of 51.91%. Sleep latency from the start of the study was 00 minutes and the latency from sleep to REM was 00 minutes.In total,107 arousals were scored for an arousal index of 78.8. She was unable to maintain sleep due to the anxiety and requested to terminate the study after 2hr and 37 min.    Respiratory:  There were a total of 7 apneas consisting of 4 obstructive apneas, 0 mixed apneas, and 3 central apneas. A total of 39 hypopneas were scored.The apnea index was 5.15 per hour and the hypopnea index was 28.71 per hour resulting in an overall AHI of 33.87.AHI during rem was 0.0 and AHI while supine was 0.00.    Oximetry:  There was a mean oxygen saturation of 90.0% with a minimum oxygen saturation of 77.0%. Time spent with oxygen saturations below 89% was 45.3 minutes.    Cardiac:  The highest heart rate seen while awake was 78 BPM while the highest heart rate during sleep was 75 BPM with an average sleeping heart rate of 62 BPM.    Limb Movements:  There were a total of 226 PLMs during sleep, of which 67 were PLMS arousals. This resulted in a PLMS index of 166.4 and a PLMS arousal index of 49.3.    CPAP was tried from 5 to 8cm H2O.    CPAP Titration:  The PAP titration was initiated with CPAP 5 cm of water and the pressure which was slowly titrated up in an attempt to  eliminate sleep disordered breathing and snoring. The final pressure tested during the study was CPAP 8 cm water and at this final pressure the patient was not observed in the supine or REM sleep stage. The apnea hypopnea index was 35.6 per hour and O2 terrie 82%. The average O2 stauration was 90%. She spent 60 % of sleep time below 90% O2 saturation. The patient utilized small vitera mask with heated humidification.    Impression:  1.  Obstructive sleep apnea   2.  PLMS   3.  Sleep related hypoxia      Recommendations:  No definitive pressure can be extrapolated from the titration, I recommend repeat CPAP/BiPAP titration with a sleep aid. In some cases alternative treatment options may prove effective in resolving sleep apnea and these options include upper airway surgery, the use of a dental orthotic or weight loss and positional therapy. Clinical correlation is required. In general patients with sleep apnea are advised to avoid alcohol and sedatives and to not operate a motor vehicle while drowsy and are at a greater risk for cardiovascular disease.

## 2020-11-16 ENCOUNTER — HOSPITAL ENCOUNTER (EMERGENCY)
Facility: MEDICAL CENTER | Age: 55
End: 2020-11-16
Payer: MEDICARE

## 2020-11-16 VITALS
BODY MASS INDEX: 35.53 KG/M2 | RESPIRATION RATE: 16 BRPM | WEIGHT: 181 LBS | OXYGEN SATURATION: 98 % | HEIGHT: 60 IN | DIASTOLIC BLOOD PRESSURE: 69 MMHG | SYSTOLIC BLOOD PRESSURE: 115 MMHG | TEMPERATURE: 97.2 F | HEART RATE: 76 BPM

## 2020-11-16 LAB
APPEARANCE UR: CLEAR
BACTERIA #/AREA URNS HPF: NEGATIVE /HPF
BILIRUB UR QL STRIP.AUTO: NEGATIVE
CAOX CRY #/AREA URNS HPF: ABNORMAL /HPF
COLOR UR: YELLOW
EPI CELLS #/AREA URNS HPF: ABNORMAL /HPF
GLUCOSE UR STRIP.AUTO-MCNC: NEGATIVE MG/DL
HYALINE CASTS #/AREA URNS LPF: ABNORMAL /LPF
KETONES UR STRIP.AUTO-MCNC: NEGATIVE MG/DL
LEUKOCYTE ESTERASE UR QL STRIP.AUTO: ABNORMAL
MICRO URNS: ABNORMAL
NITRITE UR QL STRIP.AUTO: NEGATIVE
PH UR STRIP.AUTO: 5 [PH] (ref 5–8)
PROT UR QL STRIP: NEGATIVE MG/DL
RBC # URNS HPF: ABNORMAL /HPF
RBC UR QL AUTO: NEGATIVE
SP GR UR STRIP.AUTO: 1.02
UROBILINOGEN UR STRIP.AUTO-MCNC: 0.2 MG/DL
WBC #/AREA URNS HPF: ABNORMAL /HPF

## 2020-11-16 PROCEDURE — 302449 STATCHG TRIAGE ONLY (STATISTIC)

## 2020-11-16 PROCEDURE — 81001 URINALYSIS AUTO W/SCOPE: CPT

## 2020-11-16 PROCEDURE — 87086 URINE CULTURE/COLONY COUNT: CPT

## 2020-11-16 ASSESSMENT — FIBROSIS 4 INDEX: FIB4 SCORE: 0.91

## 2020-11-16 NOTE — ED TRIAGE NOTES
Chief Complaint   Patient presents with   • Back Pain   • Fever   • Urinary Frequency   • Post-Op Complications     bladder sling 11/4/20     Symptoms began shortly after bladder sling.  Pt states she informed her physician of symptoms and was prescribed ABX.  Pt finished ABX and is still having symptoms.  Triage process explained to patient.  Pt instructed to inform RN if any changes or questions arise.  Pt back to waiting room.

## 2020-11-18 LAB
BACTERIA UR CULT: NORMAL
SIGNIFICANT IND 70042: NORMAL
SITE SITE: NORMAL
SOURCE SOURCE: NORMAL

## 2020-12-14 DIAGNOSIS — M48.061 SPINAL STENOSIS OF LUMBAR REGION, UNSPECIFIED WHETHER NEUROGENIC CLAUDICATION PRESENT: ICD-10-CM

## 2020-12-30 RX ORDER — HYDROXYCHLOROQUINE SULFATE 200 MG/1
TABLET, FILM COATED ORAL
Qty: 30 TAB | Status: CANCELLED | OUTPATIENT
Start: 2020-12-30

## 2020-12-30 RX ORDER — ONDANSETRON 4 MG/1
4 TABLET, ORALLY DISINTEGRATING ORAL EVERY 6 HOURS PRN
COMMUNITY
End: 2021-02-24 | Stop reason: SDUPTHER

## 2020-12-30 NOTE — TELEPHONE ENCOUNTER
Patient called and LVM stating she is unable to get in with her new Lupus MD until 02/2021 and she needs refills of her Plaquenil and Zofran.     Please advise and sed to [facility] in [place]

## 2021-01-07 ENCOUNTER — PATIENT MESSAGE (OUTPATIENT)
Dept: MEDICAL GROUP | Facility: PHYSICIAN GROUP | Age: 56
End: 2021-01-07

## 2021-01-08 NOTE — TELEPHONE ENCOUNTER
Patient Dex messaged regarding the refills. I sent a message back asking how takes the Plaquenil.

## 2021-01-10 RX ORDER — HYDROXYCHLOROQUINE SULFATE 200 MG/1
200 TABLET, FILM COATED ORAL DAILY
Qty: 90 TAB | Refills: 0 | Status: SHIPPED | OUTPATIENT
Start: 2021-01-10 | End: 2021-05-13

## 2021-01-24 ENCOUNTER — HOSPITAL ENCOUNTER (OUTPATIENT)
Facility: MEDICAL CENTER | Age: 56
End: 2021-01-24
Attending: NURSE PRACTITIONER
Payer: MEDICARE

## 2021-01-24 ENCOUNTER — OFFICE VISIT (OUTPATIENT)
Dept: URGENT CARE | Facility: PHYSICIAN GROUP | Age: 56
End: 2021-01-24
Payer: MEDICARE

## 2021-01-24 VITALS
OXYGEN SATURATION: 97 % | BODY MASS INDEX: 36.71 KG/M2 | TEMPERATURE: 97.2 F | HEIGHT: 60 IN | DIASTOLIC BLOOD PRESSURE: 74 MMHG | HEART RATE: 64 BPM | RESPIRATION RATE: 14 BRPM | SYSTOLIC BLOOD PRESSURE: 106 MMHG | WEIGHT: 187 LBS

## 2021-01-24 DIAGNOSIS — R30.0 DYSURIA: ICD-10-CM

## 2021-01-24 DIAGNOSIS — R82.90 MALODOROUS URINE: ICD-10-CM

## 2021-01-24 LAB
APPEARANCE UR: CLEAR
BILIRUB UR STRIP-MCNC: NEGATIVE MG/DL
COLOR UR AUTO: YELLOW
GLUCOSE UR STRIP.AUTO-MCNC: NEGATIVE MG/DL
KETONES UR STRIP.AUTO-MCNC: NEGATIVE MG/DL
LEUKOCYTE ESTERASE UR QL STRIP.AUTO: NEGATIVE
NITRITE UR QL STRIP.AUTO: NEGATIVE
PH UR STRIP.AUTO: 6 [PH] (ref 5–8)
PROT UR QL STRIP: NEGATIVE MG/DL
RBC UR QL AUTO: NEGATIVE
SP GR UR STRIP.AUTO: 1.03
UROBILINOGEN UR STRIP-MCNC: 0.2 MG/DL

## 2021-01-24 PROCEDURE — 87480 CANDIDA DNA DIR PROBE: CPT

## 2021-01-24 PROCEDURE — 81002 URINALYSIS NONAUTO W/O SCOPE: CPT | Performed by: NURSE PRACTITIONER

## 2021-01-24 PROCEDURE — 87660 TRICHOMONAS VAGIN DIR PROBE: CPT

## 2021-01-24 PROCEDURE — 99214 OFFICE O/P EST MOD 30 MIN: CPT | Performed by: NURSE PRACTITIONER

## 2021-01-24 PROCEDURE — 87510 GARDNER VAG DNA DIR PROBE: CPT

## 2021-01-24 ASSESSMENT — ENCOUNTER SYMPTOMS
FLANK PAIN: 0
SWEATS: 0
CHILLS: 0
VOMITING: 0
NAUSEA: 0

## 2021-01-24 ASSESSMENT — FIBROSIS 4 INDEX: FIB4 SCORE: 0.91

## 2021-01-25 ENCOUNTER — PATIENT MESSAGE (OUTPATIENT)
Dept: MEDICAL GROUP | Facility: PHYSICIAN GROUP | Age: 56
End: 2021-01-25

## 2021-01-25 DIAGNOSIS — R82.90 MALODOROUS URINE: ICD-10-CM

## 2021-01-25 LAB
CANDIDA DNA VAG QL PROBE+SIG AMP: NEGATIVE
G VAGINALIS DNA VAG QL PROBE+SIG AMP: NEGATIVE
T VAGINALIS DNA VAG QL PROBE+SIG AMP: NEGATIVE

## 2021-01-25 NOTE — TELEPHONE ENCOUNTER
From: Patti Tello  To: PATRICK Chris  Sent: 1/25/2021 1:45 PM PST  Subject: Non-Urgent Medical Question    Mishel Sims,     Yesterday I was seen at [facility] urgent care I thought I had an urine infection. Today I still don't feel well. I'm having trouble breathing and my head hurts. I've been cold chills too. I don't feel like I have a fever im just cold.    Thanks

## 2021-01-25 NOTE — PROGRESS NOTES
Patti Tello is a 55 y.o. female who presents for UTI (back pain, odor x10 days)      Dysuria   This is a new problem. Episode onset: 10 days  The problem occurs every urination. The problem has been gradually worsening. The quality of the pain is described as aching. The pain is mild. There has been no fever. There is no history of pyelonephritis. Pertinent negatives include no chills, discharge, flank pain, frequency, hematuria, hesitancy, nausea, sweats, urgency or vomiting. Associated symptoms comments: Bad odor when she urinates  . She has tried increased fluids for the symptoms. The treatment provided mild relief. Her past medical history is significant for kidney stones. There is no history of catheterization, recurrent UTIs, urinary stasis or a urological procedure.       Review of Systems   Constitutional: Negative for chills.   Gastrointestinal: Negative for nausea and vomiting.   Genitourinary: Positive for dysuria. Negative for flank pain, frequency, hematuria, hesitancy and urgency.       Allergies   Allergen Reactions   • Buprenorphine-Naloxone Hives, Shortness of Breath and Swelling   • Gabapentin      seizures   • Suboxone Hives, Shortness of Breath and Swelling   • Morphine Rash     tachycardia   • Shrimp (Diagnostic) Itching   • Contrast Media With Iodine [Iodine] Itching     Ok with benadryl     Past Medical History:   Diagnosis Date   • Anesthesia     pt states hard time waking up   • Anxiety and depression    • Arthritis     back, neck hands feet   • Asthma     prn inhaler   • Back injury    • Back pain    • Bronchitis 2010   • Cancer ([facility]) 1995    breast LT/ chemo   • Chickenpox    • Chronic LBP    • Chronic neck pain    • Cold intolerance 1/24/2012   • COPD (chronic obstructive pulmonary disease) ([facility])    • Cystic fibrosis ([facility])    • Dental disorder     upper and lower dentures   • Depression    • Diabetes    • GERD (gastroesophageal reflux disease)    • H/O gastric bypass 10/11/2013   •  Herniated nucleus pulposus, L4-5 3/18/2010   • HX: breast cancer 10/11/2013   • Hypertension     pt states controlled on meds   • Itching due to drug 6/2/2011   • Kidney stone    • Nephrolithiasis 10/11/2013   • Obesity    • Osteoporosis    • Other specified symptom associated with female genital organs    • Panic disorder    • Pneumonia    • PONV (postoperative nausea and vomiting)    • PTSD (post-traumatic stress disorder)    • Restless leg syndrome    • Rheumatoid arthritis (HCC)    • S/P laminectomy 10/22/2012   • Seizure (HCC) 09/2020    gabapentin related   • Thoracic or lumbosacral neuritis or radiculitis, unspecified 10/22/2012   • Trauma     head trauma     Past Surgical History:   Procedure Laterality Date   • BLADDER SLING FEMALE N/A 11/4/2020    Procedure: BLADDER SLING, FEMALE- RETRO PUBIC SLING;  Surgeon: Kat Carrion M.D.;  Location: SURGERY [facility];  Service: Gynecology   • CYSTOSCOPY N/A 11/4/2020    Procedure: CYSTOSCOPY;  Surgeon: Kat Carrion M.D.;  Location: [facility];  Service: Gynecology   • URETEROSCOPY  10/10/2013    Performed by Molly Resendiz M.D. at [facility]   • LASERTRIPSY  10/10/2013    Performed by Molly Resendiz M.D. at [facility] ORS   • LUMBAR FUSION POSTERIOR  10/22/2012    Performed by Alfred Branham M.D. at [facility]   • LUMBAR LAMINECTOMY DISKECTOMY  10/22/2012    Performed by Alfred Branham M.D. at [facility] ORS   • OTHER ORTHOPEDIC SURGERY  10/01/2012    lumbar fusion, Dr Branham   • OTHER ABDOMINAL SURGERY  2006    gastric bypass   • ABDOMINAL HYSTERECTOMY TOTAL      DUE TO FIBROIDS   • BREAST RECONSTRUCTION      Lt breast   • CRANIOTOMY      DUE TO BRAIN INJURY   • HYSTERECTOMY LAPAROSCOPY     • LAMINOTOMY     • MASTECTOMY      Lt breast   • OTHER      left ear drum surgery   • OTHER ABDOMINAL SURGERY      hernia repair   • PRIMARY C SECTION      x 3   • SLEEVE,MAXIMILIAN VASO THIGH     •  TONSILLECTOMY     • US-CYST ASPIRATION-BREAST INITIAL     • VENTILATOR CONTINUOUS       Family History   Problem Relation Age of Onset   • Diabetes Father    • Cancer Father    • Hypertension Father    • Hyperlipidemia Father    • Cancer Mother    • Other Mother         SLE   • Diabetes Mother    • Hypertension Mother    • Hyperlipidemia Mother    • Stroke Mother    • Sleep Apnea Brother    • Cancer Maternal Aunt    • Diabetes Maternal Aunt    • Diabetes Maternal Uncle    • Diabetes Maternal Grandmother    • Diabetes Maternal Grandfather      Social History     Tobacco Use   • Smoking status: Never Smoker   • Smokeless tobacco: Never Used   Substance Use Topics   • Alcohol use: No     Patient Active Problem List   Diagnosis   • Insomnia due to medical condition   • Arthritis   • Vitamin D deficiency   • Panic attacks   • PTSD (post-traumatic stress disorder)   • Cervical radiculopathy, chronic   • Multilevel degenerative disc disease   • Lumbar stenosis   • Anxiety   • Chronic pain syndrome   • History of gastric bypass   • Bipolar affective disorder, currently depressed, moderate ([facility])   • Seasonal allergies   • Obesity (BMI 30-39.9)   • Calcaneal spur of both feet   • Pain management contract broken   • Muscle spasm of both lower legs   • Heartburn   • Chronic fatigue   • Positive NAKUL (antinuclear antibody)   • Arthritis, multiple joint involvement   • Family history of systemic lupus erythematosus (SLE) in mother   • Iron deficiency anemia   • Skin lesions   • History of diabetes mellitus, type II   • TOMY (obstructive sleep apnea)   • Abnormal laboratory test   • Vitiligo   • Vitamin B12 deficiency   • Elevated PTHrP level   • Hyperparathyroidism ([facility])   • Benign paroxysmal positional vertigo due to bilateral vestibular disorder   • Supraspinatus syndrome   • Allergic conjunctivitis of both eyes   • Abscess   • Chronic obstructive pulmonary disease (HCC)   • Burn   • Malodorous urine   • Other dysphagia   •  Esophageal stricture   • Moderate persistent asthma without complication   • Cough   • Allergic rhinitis   • Non-smoker   • Mild intermittent asthma without complication   • Poor balance   • Nausea   • Slow transit constipation   • Dysphasia   • Thrush     Current Outpatient Medications on File Prior to Visit   Medication Sig Dispense Refill   • hydroxychloroquine (PLAQUENIL) 200 MG Tab Take 1 Tab by mouth every day. 90 Tab 0   • ondansetron (ZOFRAN ODT) 4 MG TABLET DISPERSIBLE Take 4 mg by mouth every 6 hours as needed.     • Docusate Sodium (COLACE PO) Take 1 Tab by mouth every day.     • diphenhydrAMINE (BENADRYL) 25 MG Tab Take 25 mg by mouth every bedtime. Takes 2 tabs at hs     • nystatin (MYCOSTATIN) 446826 UNIT/ML Suspension Take 5 mL by mouth 4 times a day. Swish and swallow 5 mls 4 times a day for 1 week 473 mL 1   • sertraline (ZOLOFT) 100 MG Tab Take  by mouth. Take 1 tablet by mouth every morning     • propranolol (INDERAL) 10 MG Tab Take 10 mg by mouth 3 times a day.     • oxycodone (OXY-IR) 15 MG immediate release tablet TAKE 1 TABLET BY MOUTH EVERY 8 HOURS FOR 30 DAYS M54 DNF 6/12/19  0   • Budesonide-Formoterol Fumarate (SYMBICORT INH) Inhale  by mouth as needed.       No current facility-administered medications on file prior to visit.           Objective:     /74   Pulse 64   Temp 36.2 °C (97.2 °F)   Resp 14   Ht 1.524 m (5')   Wt 84.8 kg (187 lb)   LMP 10/05/1999   SpO2 97%   BMI 36.52 kg/m²     Physical Exam  Vitals signs and nursing note reviewed.   Constitutional:       General: She is not in acute distress.     Appearance: Normal appearance. She is well-developed. She is not ill-appearing, toxic-appearing or diaphoretic.   HENT:      Head: Normocephalic.      Mouth/Throat:      Mouth: Mucous membranes are moist.   Cardiovascular:      Rate and Rhythm: Normal rate and regular rhythm.      Pulses: Normal pulses.      Heart sounds: Normal heart sounds.   Pulmonary:      Effort:  Pulmonary effort is normal.   Abdominal:      General: There is no distension.      Palpations: Abdomen is soft. Abdomen is not rigid.      Tenderness: There is no abdominal tenderness. There is no right CVA tenderness, left CVA tenderness or guarding.   Musculoskeletal:      Lumbar back: Normal.   Skin:     General: Skin is warm and dry.      Capillary Refill: Capillary refill takes less than 2 seconds.   Neurological:      Mental Status: She is alert and oriented to person, place, and time.   Psychiatric:         Mood and Affect: Mood normal.         Behavior: Behavior normal. Behavior is cooperative.         Thought Content: Thought content normal.         Judgment: Judgment normal.       Results for orders placed or performed in visit on 01/24/21   POCT Urinalysis   Result Value Ref Range    POC Color Yellow Negative    POC Appearance Clear Negative    POC Leukocyte Esterase Negative Negative    POC Nitrites Negative Negative    POC Urobiligen 0.2 Negative (0.2) mg/dL    POC Protein Negative Negative mg/dL    POC Urine PH 6.0 5.0 - 8.0    POC Blood Negative Negative    POC Specific Gravity 1.030 <1.005 - >1.030    POC Ketones Negative Negative mg/dL    POC Bilirubin Negative Negative mg/dL    POC Glucose Negative Negative mg/dL         Assessment /Associated Orders:      1. Dysuria  POCT Urinalysis   2. Malodorous urine  VAGINAL PATHOGENS DNA PANEL       Medical Decision Making:      VSS at today's acute urgent care visit.   UA: negative for UTI today . No evidence of renal lithiasis on exam or by UA.   Vag Path- pending  - will communicate results by MyChart and treat as indicated by vag path.  Good hygiene recommended  OTC vaginal/ feminine wipes/ wash of choice prn odor.   Keep well hydrated   Declines STD testing       I personally reviewed prior external notes and test results pertinent to today's visit.  I have independently reviewed and interpreted all diagnostics ordered during this urgent care acute  visit.   Discussed management options (risks,benefits, and alternatives to treatment). Pt expresses understanding and the treatment plan was agreed upon. Questions were encouraged and answered to pt's satisfaction. Time spent evaluating this patient was at least 30 minutes and includes preparing for visit, counseling/education, exam and evaluation, obtaining history, independent interpretation and ordering lab/test/procedures.  Red flags discussed and indications to immediately call 911 or present to the Emergency Department.     Advised the patient to follow-up with the primary care physician for recheck, reevaluation, and consideration of further management if necessary.   Return to urgent care prn if new or worsening sx or if there is no improvement in condition prn    Please note that this dictation was created using voice recognition software. I have made a reasonable attempt to correct obvious errors, but I expect that there are errors of grammar and possibly content that I did not discover before finalizing the note.    This note was electronically signed by Tessie BELL, Urgent Care

## 2021-01-26 ENCOUNTER — HOSPITAL ENCOUNTER (OUTPATIENT)
Facility: MEDICAL CENTER | Age: 56
End: 2021-01-26
Attending: PHYSICIAN ASSISTANT
Payer: MEDICARE

## 2021-01-26 ENCOUNTER — OFFICE VISIT (OUTPATIENT)
Dept: URGENT CARE | Facility: CLINIC | Age: 56
End: 2021-01-26
Payer: MEDICARE

## 2021-01-26 VITALS
WEIGHT: 188 LBS | OXYGEN SATURATION: 96 % | HEART RATE: 66 BPM | SYSTOLIC BLOOD PRESSURE: 122 MMHG | HEIGHT: 60 IN | DIASTOLIC BLOOD PRESSURE: 78 MMHG | BODY MASS INDEX: 36.91 KG/M2 | RESPIRATION RATE: 14 BRPM | TEMPERATURE: 96.5 F

## 2021-01-26 DIAGNOSIS — Z20.822 SUSPECTED COVID-19 VIRUS INFECTION: ICD-10-CM

## 2021-01-26 DIAGNOSIS — H66.002 NON-RECURRENT ACUTE SUPPURATIVE OTITIS MEDIA OF LEFT EAR WITHOUT SPONTANEOUS RUPTURE OF TYMPANIC MEMBRANE: ICD-10-CM

## 2021-01-26 PROCEDURE — 99214 OFFICE O/P EST MOD 30 MIN: CPT | Mod: CS | Performed by: PHYSICIAN ASSISTANT

## 2021-01-26 PROCEDURE — U0005 INFEC AGEN DETEC AMPLI PROBE: HCPCS

## 2021-01-26 PROCEDURE — U0003 INFECTIOUS AGENT DETECTION BY NUCLEIC ACID (DNA OR RNA); SEVERE ACUTE RESPIRATORY SYNDROME CORONAVIRUS 2 (SARS-COV-2) (CORONAVIRUS DISEASE [COVID-19]), AMPLIFIED PROBE TECHNIQUE, MAKING USE OF HIGH THROUGHPUT TECHNOLOGIES AS DESCRIBED BY CMS-2020-01-R: HCPCS

## 2021-01-26 RX ORDER — AMOXICILLIN 875 MG/1
875 TABLET, COATED ORAL 2 TIMES DAILY
Qty: 20 TAB | Refills: 0 | Status: SHIPPED | OUTPATIENT
Start: 2021-01-26 | End: 2021-02-01 | Stop reason: SDUPTHER

## 2021-01-26 ASSESSMENT — ENCOUNTER SYMPTOMS
RHINORRHEA: 0
WHEEZING: 0
FEVER: 0
NAUSEA: 0
SHORTNESS OF BREATH: 1
CHILLS: 0
MYALGIAS: 0
COUGH: 1
ABDOMINAL PAIN: 0
DIARRHEA: 1
HEADACHES: 1
VOMITING: 0
SINUS PAIN: 0
DIZZINESS: 0
SORE THROAT: 0

## 2021-01-26 ASSESSMENT — FIBROSIS 4 INDEX: FIB4 SCORE: 0.91

## 2021-01-27 ENCOUNTER — APPOINTMENT (OUTPATIENT)
Dept: ADMISSIONS | Facility: MEDICAL CENTER | Age: 56
End: 2021-01-27
Attending: PAIN MEDICINE
Payer: MEDICARE

## 2021-01-27 DIAGNOSIS — Z20.822 SUSPECTED COVID-19 VIRUS INFECTION: ICD-10-CM

## 2021-01-27 LAB
COVID ORDER STATUS COVID19: NORMAL
SARS-COV-2 RNA RESP QL NAA+PROBE: NOTDETECTED
SPECIMEN SOURCE: NORMAL

## 2021-01-27 NOTE — PROGRESS NOTES
Subjective:      Patti Tello is a 55 y.o. female who presents with Coronavirus Screening (headache, no smell, some SOB, hx of lupus)            Cough, congestion, headache.  Loss of taste and smell.  Exposure to Covid.  History of lupus.  Denies fever, vomiting, diarrhea, chest pain, shortness of breath.    Cough  This is a new problem. The current episode started in the past 7 days. The problem has been unchanged. The problem occurs every few minutes. The cough is non-productive. Associated symptoms include headaches and shortness of breath. Pertinent negatives include no chest pain, chills, ear pain, fever, myalgias, nasal congestion, postnasal drip, rhinorrhea, sore throat or wheezing. She has tried nothing for the symptoms. The treatment provided no relief. There is no history of asthma.       PMH:  has a past medical history of Anesthesia, Anxiety and depression, Arthritis, Asthma, Back injury, Back pain, Bronchitis (2010), Cancer ([facility]) (1995), Chickenpox, Chronic LBP, Chronic neck pain, Cold intolerance (1/24/2012), COPD (chronic obstructive pulmonary disease) ([facility]), Cystic fibrosis ([facility]), Dental disorder, Depression, Diabetes, GERD (gastroesophageal reflux disease), H/O gastric bypass (10/11/2013), Herniated nucleus pulposus, L4-5 (3/18/2010), breast cancer (10/11/2013), Hypertension, Itching due to drug (6/2/2011), Kidney stone, Nephrolithiasis (10/11/2013), Obesity, Osteoporosis, Other specified symptom associated with female genital organs, Panic disorder, Pneumonia, PONV (postoperative nausea and vomiting), PTSD (post-traumatic stress disorder), Restless leg syndrome, Rheumatoid arthritis ([facility]), S/P laminectomy (10/22/2012), Seizure ([facility]) (09/2020), Thoracic or lumbosacral neuritis or radiculitis, unspecified (10/22/2012), and Trauma.  MEDS:   Current Outpatient Medications:   •  hydroxychloroquine (PLAQUENIL) 200 MG Tab, Take 1 Tab by mouth every day., Disp: 90 Tab, Rfl: 0  •  Docusate Sodium (COLACE  PO), Take 1 Tab by mouth every day., Disp: , Rfl:   •  sertraline (ZOLOFT) 100 MG Tab, Take  by mouth. Take 1 tablet by mouth every morning, Disp: , Rfl:   •  propranolol (INDERAL) 10 MG Tab, Take 10 mg by mouth 3 times a day., Disp: , Rfl:   •  oxycodone (OXY-IR) 15 MG immediate release tablet, TAKE 1 TABLET BY MOUTH EVERY 8 HOURS FOR 30 DAYS M54 DNF 6/12/19, Disp: , Rfl: 0  •  ondansetron (ZOFRAN ODT) 4 MG TABLET DISPERSIBLE, Take 4 mg by mouth every 6 hours as needed., Disp: , Rfl:   •  Budesonide-Formoterol Fumarate (SYMBICORT INH), Inhale  by mouth as needed., Disp: , Rfl:   •  diphenhydrAMINE (BENADRYL) 25 MG Tab, Take 25 mg by mouth every bedtime. Takes 2 tabs at hs, Disp: , Rfl:   •  nystatin (MYCOSTATIN) 015517 UNIT/ML Suspension, Take 5 mL by mouth 4 times a day. Swish and swallow 5 mls 4 times a day for 1 week, Disp: 473 mL, Rfl: 1  ALLERGIES:   Allergies   Allergen Reactions   • Buprenorphine-Naloxone Hives, Shortness of Breath and Swelling   • Gabapentin      seizures   • Suboxone Hives, Shortness of Breath and Swelling   • Morphine Rash     tachycardia   • Shrimp (Diagnostic) Itching   • Contrast Media With Iodine [Iodine] Itching     Ok with benadryl     SURGHX:   Past Surgical History:   Procedure Laterality Date   • BLADDER SLING FEMALE N/A 11/4/2020    Procedure: BLADDER SLING, FEMALE- RETRO PUBIC SLING;  Surgeon: Kat Carrion M.D.;  Location: [facility];  Service: Gynecology   • CYSTOSCOPY N/A 11/4/2020    Procedure: CYSTOSCOPY;  Surgeon: Kat Carrion M.D.;  Location: [facility];  Service: Gynecology   • URETEROSCOPY  10/10/2013    Performed by Molly Resendiz M.D. at [facility] ORS   • LASERTRIPSY  10/10/2013    Performed by Molly Resendiz M.D. at [facility] ORS   • LUMBAR FUSION POSTERIOR  10/22/2012    Performed by Alfred Branham M.D. at SURGERY [facility]   • LUMBAR LAMINECTOMY DISKECTOMY  10/22/2012    Performed by Alfred CRUZ  CATY Branham. at SURGERY [facility]   • OTHER ORTHOPEDIC SURGERY  10/01/2012    lumbar fusion, Dr Branham   • OTHER ABDOMINAL SURGERY  2006    gastric bypass   • ABDOMINAL HYSTERECTOMY TOTAL      DUE TO FIBROIDS   • BREAST RECONSTRUCTION      Lt breast   • CRANIOTOMY      DUE TO BRAIN INJURY   • HYSTERECTOMY LAPAROSCOPY     • LAMINOTOMY     • MASTECTOMY      Lt breast   • OTHER      left ear drum surgery   • OTHER ABDOMINAL SURGERY      hernia repair   • PRIMARY C SECTION      x 3   • SLEEVE,MAXIMILIAN VASO THIGH     • TONSILLECTOMY     • US-CYST ASPIRATION-BREAST INITIAL     • VENTILATOR CONTINUOUS       SOCHX:  reports that she has never smoked. She has never used smokeless tobacco. She reports that she does not drink alcohol or use drugs.  FH: family history includes Cancer in her father, maternal aunt, and mother; Diabetes in her father, maternal aunt, maternal grandfather, maternal grandmother, maternal uncle, and mother; Hyperlipidemia in her father and mother; Hypertension in her father and mother; Other in her mother; Sleep Apnea in her brother; Stroke in her mother.    Review of Systems   Constitutional: Positive for malaise/fatigue. Negative for chills and fever.   HENT: Negative for ear pain, postnasal drip, rhinorrhea, sinus pain and sore throat.    Respiratory: Positive for cough and shortness of breath. Negative for wheezing.    Cardiovascular: Negative for chest pain.   Gastrointestinal: Positive for diarrhea. Negative for abdominal pain, nausea and vomiting.   Musculoskeletal: Negative for myalgias.   Neurological: Positive for headaches. Negative for dizziness.       Medications, Allergies, and current problem list reviewed today in Epic     Objective:     /78   Pulse 66   Temp 35.8 °C (96.5 °F) (Temporal)   Resp 14   Ht 1.524 m (5')   Wt 85.3 kg (188 lb)   LMP 10/05/1999   SpO2 96%   BMI 36.72 kg/m²      Physical Exam  Vitals signs and nursing note reviewed.   Constitutional:       General:  She is not in acute distress.     Appearance: She is well-developed. She is not ill-appearing, toxic-appearing or diaphoretic.   HENT:      Head: Normocephalic and atraumatic.      Right Ear: Tympanic membrane, ear canal and external ear normal.      Left Ear: Ear canal and external ear normal. Tympanic membrane is erythematous and bulging.      Nose: Nose normal. No congestion or rhinorrhea.      Mouth/Throat:      Mouth: Mucous membranes are moist.      Pharynx: No oropharyngeal exudate or posterior oropharyngeal erythema.   Eyes:      General:         Right eye: No discharge.         Left eye: No discharge.      Conjunctiva/sclera: Conjunctivae normal.   Neck:      Musculoskeletal: Normal range of motion and neck supple.   Cardiovascular:      Rate and Rhythm: Normal rate and regular rhythm.      Pulses: Normal pulses.      Heart sounds: Normal heart sounds.   Pulmonary:      Effort: Pulmonary effort is normal. No respiratory distress.      Breath sounds: Normal breath sounds. No wheezing, rhonchi or rales.   Musculoskeletal: Normal range of motion.         General: No swelling or tenderness.      Right lower leg: No edema.      Left lower leg: No edema.   Lymphadenopathy:      Cervical: No cervical adenopathy.   Skin:     General: Skin is warm and dry.   Neurological:      Mental Status: She is alert and oriented to person, place, and time.   Psychiatric:         Mood and Affect: Mood normal.         Behavior: Behavior normal.         Thought Content: Thought content normal.         Judgment: Judgment normal.                 Assessment/Plan:         1. Suspected COVID-19 virus infection  SARS-CoV-2, PCR (In-House): Collect NP OR nasal swab in VTM   2. Non-recurrent acute suppurative otitis media of left ear without spontaneous rupture of tympanic membrane  amoxicillin (AMOXIL) 875 MG tablet     Covid symptoms with positive exposure to Covid.  Congestion, loss of taste and smell, headache, cough, left ear pain.   Denies fever, significant shortness of breath, chest pain, leg swelling.  Eating and drinking without vomiting or diarrhea.  PO2 96% vital signs normal.  Exam does show left TM erythema and bulging.  Respiratory exam unremarkable.  Covid testing initiated.  Quarantine per CDC guidelines.    OTC meds and conservative measures as discussed    Return to clinic or go to ED if symptoms worsen or persist. Indications for ED discussed at length. Patient/Parent/Guardian voices understanding. Follow-up with your primary care provider in 3-5 days. Red flag symptoms discussed. All side effects of medication discussed including allergic response, GI upset, tendon injury, rash, sedation etc.    Please note that this dictation was created using voice recognition software. I have made every reasonable attempt to correct obvious errors, but I expect that there are errors of grammar and possibly content that I did not discover before finalizing the note.

## 2021-02-01 DIAGNOSIS — H66.002 NON-RECURRENT ACUTE SUPPURATIVE OTITIS MEDIA OF LEFT EAR WITHOUT SPONTANEOUS RUPTURE OF TYMPANIC MEMBRANE: ICD-10-CM

## 2021-02-01 RX ORDER — AMOXICILLIN 875 MG/1
875 TABLET, COATED ORAL 2 TIMES DAILY
Qty: 20 TAB | Refills: 0 | Status: SHIPPED | OUTPATIENT
Start: 2021-02-01 | End: 2021-05-13

## 2021-02-05 ENCOUNTER — NURSE TRIAGE (OUTPATIENT)
Dept: HEALTH INFORMATION MANAGEMENT | Facility: OTHER | Age: 56
End: 2021-02-05

## 2021-02-05 ENCOUNTER — HOSPITAL ENCOUNTER (EMERGENCY)
Facility: MEDICAL CENTER | Age: 56
End: 2021-02-05
Attending: EMERGENCY MEDICINE
Payer: MEDICARE

## 2021-02-05 VITALS
OXYGEN SATURATION: 96 % | WEIGHT: 185 LBS | SYSTOLIC BLOOD PRESSURE: 121 MMHG | RESPIRATION RATE: 18 BRPM | DIASTOLIC BLOOD PRESSURE: 79 MMHG | HEART RATE: 79 BPM | TEMPERATURE: 97.9 F | BODY MASS INDEX: 36.32 KG/M2 | HEIGHT: 60 IN

## 2021-02-05 DIAGNOSIS — Z20.822 SUSPECTED 2019 NOVEL CORONAVIRUS INFECTION: ICD-10-CM

## 2021-02-05 DIAGNOSIS — J45.41 MODERATE PERSISTENT ASTHMA WITH ACUTE EXACERBATION: ICD-10-CM

## 2021-02-05 LAB
SARS-COV-2 RNA RESP QL NAA+PROBE: NOTDETECTED
SPECIMEN SOURCE: NORMAL

## 2021-02-05 PROCEDURE — U0005 INFEC AGEN DETEC AMPLI PROBE: HCPCS

## 2021-02-05 PROCEDURE — U0003 INFECTIOUS AGENT DETECTION BY NUCLEIC ACID (DNA OR RNA); SEVERE ACUTE RESPIRATORY SYNDROME CORONAVIRUS 2 (SARS-COV-2) (CORONAVIRUS DISEASE [COVID-19]), AMPLIFIED PROBE TECHNIQUE, MAKING USE OF HIGH THROUGHPUT TECHNOLOGIES AS DESCRIBED BY CMS-2020-01-R: HCPCS

## 2021-02-05 PROCEDURE — 96374 THER/PROPH/DIAG INJ IV PUSH: CPT

## 2021-02-05 PROCEDURE — 99284 EMERGENCY DEPT VISIT MOD MDM: CPT

## 2021-02-05 PROCEDURE — A9270 NON-COVERED ITEM OR SERVICE: HCPCS | Performed by: EMERGENCY MEDICINE

## 2021-02-05 PROCEDURE — 700102 HCHG RX REV CODE 250 W/ 637 OVERRIDE(OP): Performed by: EMERGENCY MEDICINE

## 2021-02-05 PROCEDURE — 700111 HCHG RX REV CODE 636 W/ 250 OVERRIDE (IP): Performed by: EMERGENCY MEDICINE

## 2021-02-05 RX ORDER — ALBUTEROL SULFATE 90 UG/1
2 AEROSOL, METERED RESPIRATORY (INHALATION) EVERY 6 HOURS PRN
Qty: 8.5 G | Refills: 0 | Status: SHIPPED | OUTPATIENT
Start: 2021-02-05 | End: 2021-08-09 | Stop reason: SDUPTHER

## 2021-02-05 RX ORDER — BENZONATATE 100 MG/1
100 CAPSULE ORAL 3 TIMES DAILY PRN
Qty: 30 CAP | Refills: 0 | Status: SHIPPED | OUTPATIENT
Start: 2021-02-05 | End: 2021-02-24 | Stop reason: SDUPTHER

## 2021-02-05 RX ORDER — DEXAMETHASONE SODIUM PHOSPHATE 4 MG/ML
6 INJECTION, SOLUTION INTRA-ARTICULAR; INTRALESIONAL; INTRAMUSCULAR; INTRAVENOUS; SOFT TISSUE ONCE
Status: COMPLETED | OUTPATIENT
Start: 2021-02-05 | End: 2021-02-05

## 2021-02-05 RX ORDER — ALBUTEROL SULFATE 90 UG/1
2 AEROSOL, METERED RESPIRATORY (INHALATION) ONCE
Status: COMPLETED | OUTPATIENT
Start: 2021-02-05 | End: 2021-02-05

## 2021-02-05 RX ORDER — PREDNISONE 20 MG/1
40 TABLET ORAL DAILY
Qty: 12 TAB | Refills: 0 | Status: SHIPPED | OUTPATIENT
Start: 2021-02-05 | End: 2021-02-11

## 2021-02-05 RX ADMIN — DEXAMETHASONE SODIUM PHOSPHATE 6 MG: 4 INJECTION, SOLUTION INTRA-ARTICULAR; INTRALESIONAL; INTRAMUSCULAR; INTRAVENOUS; SOFT TISSUE at 10:00

## 2021-02-05 RX ADMIN — ALBUTEROL SULFATE 2 PUFF: 90 AEROSOL, METERED RESPIRATORY (INHALATION) at 10:00

## 2021-02-05 ASSESSMENT — FIBROSIS 4 INDEX: FIB4 SCORE: 0.91

## 2021-02-05 NOTE — ED TRIAGE NOTES
Pt BIB [facility], pt called for SOB, began around 1 am. Pt has a hx of asthma and COPD, but does not have her inhalers. Pt also had a Covid test done last week which was negative, was potentially exposed to Covid while getting her nails done 2 weeks ago. C.o ear pain and lt arm pain

## 2021-02-05 NOTE — DISCHARGE INSTRUCTIONS
Please use medication as directed and return if in functional decline and please self isolate because you may have Covid

## 2021-02-05 NOTE — ED PROVIDER NOTES
ED Provider Note    Scribed for Leander Walton M.D. by Toni Puentes. 2/5/2021  9:17 AM    Primary care provider: PATRICK Chris  Means of arrival: EMS  History obtained from: Patient  History limited by: None    CHIEF COMPLAINT  Chief Complaint   Patient presents with   • Flu Like Symptoms   • Shortness of Breath       HPI  Patti Tello is a 55 y.o. female with a history of COPD and asthma who presents to the Emergency Department complaining of shortness of breath and flu-like symptoms onset one week ago. She is concerned about exposure to COVID-19 while getting her nails done two weeks ago. She endorses symptoms of headaches, dizziness, diarrhea (4 days), cough, and subjective fever, but denies abdominal pain. She does not have her inhaler with her. She also notes her PCP was concerned for an ear infection in her left ear. She additionally notes some left arm pain and swelling.    PPE Note: I personally donned full PPE for all patient encounters during this visit, including being clean-shaven with an N95 respirator mask, gloves, and goggles.     Scribe remained outside the patient's room and did not have any contact with the patient for the duration of patient encounter.      REVIEW OF SYSTEMS  Pertinent negatives include no abdominal pain. As above, all other systems reviewed and are negative.   See HPI for further details.     PAST MEDICAL HISTORY   has a past medical history of Anesthesia, Anxiety and depression, Arthritis, Asthma, Back injury, Back pain, Bronchitis (2010), Cancer ([facility]) (1995), Chickenpox, Chronic LBP, Chronic neck pain, Cold intolerance (1/24/2012), COPD (chronic obstructive pulmonary disease) ([facility]), Cystic fibrosis ([facility]), Dental disorder, Depression, Diabetes, GERD (gastroesophageal reflux disease), H/O gastric bypass (10/11/2013), Herniated nucleus pulposus, L4-5 (3/18/2010), breast cancer (10/11/2013), Hypertension, Itching due to drug (6/2/2011), Kidney stone, Nephrolithiasis  (10/11/2013), Obesity, Osteoporosis, Other specified symptom associated with female genital organs, Panic disorder, Pneumonia, PONV (postoperative nausea and vomiting), PTSD (post-traumatic stress disorder), Restless leg syndrome, Rheumatoid arthritis ([facility]), S/P laminectomy (10/22/2012), Seizure ([facility]) (09/2020), Thoracic or lumbosacral neuritis or radiculitis, unspecified (10/22/2012), and Trauma.    SURGICAL HISTORY   has a past surgical history that includes mastectomy; breast reconstruction; US-CYST ASPIRATION-BREAST INITIAL; lumbar fusion posterior (10/22/2012); lumbar laminectomy diskectomy (10/22/2012); ureteroscopy (10/10/2013); lasertripsy (10/10/2013); laminotomy; Sleeve,Chance Vaso Thigh; tonsillectomy; Ventilator - Continuous; primary c section; abdominal hysterectomy total; hysterectomy laparoscopy; other abdominal surgery (2006); other abdominal surgery; craniotomy; other orthopedic surgery (10/01/2012); other; bladder sling female (N/A, 11/4/2020); and cystoscopy (N/A, 11/4/2020).    SOCIAL HISTORY  Social History     Tobacco Use   • Smoking status: Never Smoker   • Smokeless tobacco: Never Used   Substance Use Topics   • Alcohol use: No   • Drug use: No      Social History     Substance and Sexual Activity   Drug Use No       FAMILY HISTORY  Family History   Problem Relation Age of Onset   • Diabetes Father    • Cancer Father    • Hypertension Father    • Hyperlipidemia Father    • Cancer Mother    • Other Mother         SLE   • Diabetes Mother    • Hypertension Mother    • Hyperlipidemia Mother    • Stroke Mother    • Sleep Apnea Brother    • Cancer Maternal Aunt    • Diabetes Maternal Aunt    • Diabetes Maternal Uncle    • Diabetes Maternal Grandmother    • Diabetes Maternal Grandfather        CURRENT MEDICATIONS  Current Outpatient Medications   Medication Instructions   • amoxicillin (AMOXIL) 875 mg, Oral, 2 TIMES DAILY   • Budesonide-Formoterol Fumarate (SYMBICORT INH) Inhalation, PRN   •  diphenhydrAMINE (BENADRYL) 25 mg, Oral, EVERY BEDTIME, Takes 2 tabs at hs   • Docusate Sodium (COLACE PO) 1 Tab, Oral, DAILY   • hydroxychloroquine (PLAQUENIL) 200 mg, Oral, DAILY   • nystatin (MYCOSTATIN) 500,000 Units, Oral, 4 TIMES DAILY, Swish and swallow 5 mls 4 times a day for 1 week   • ondansetron (ZOFRAN ODT) 4 mg, Oral, EVERY 6 HOURS PRN   • oxycodone (OXY-IR) 15 MG immediate release tablet TAKE 1 TABLET BY MOUTH EVERY 8 HOURS FOR 30 DAYS M54 DNF 6/12/19   • propranolol (INDERAL) 10 mg, Oral, 3 TIMES DAILY   • sertraline (ZOLOFT) 100 MG Tab Oral, Take 1 tablet by mouth every morning          ALLERGIES  Allergies   Allergen Reactions   • Buprenorphine-Naloxone Hives, Shortness of Breath and Swelling   • Gabapentin      seizures   • Suboxone Hives, Shortness of Breath and Swelling   • Morphine Rash     tachycardia   • Shrimp (Diagnostic) Itching   • Contrast Media With Iodine [Iodine] Itching     Ok with benadryl       PHYSICAL EXAM  VITAL SIGNS: /86   Pulse 85   Temp 36.6 °C (97.8 °F) (Temporal)   Resp 20   Ht 1.524 m (5')   Wt 83.9 kg (185 lb)   LMP 10/05/1999   SpO2 95%   BMI 36.13 kg/m²   Constitutional: Well developed, Well nourished, No acute distress, Non-toxic appearance.   HENT: Normocephalic, Atraumatic, Bilateral external ears normal, patient wearing mask. Some bulging of TM in left ear but no erythema or perforation.  Eyes: Pupils are equal round and reactive, EOMI, Conjunctiva normal, No discharge.   Neck: Normal range of motion, No tenderness, Supple, No stridor. No meningismus.  Lymphatic: No lymphadenopathy noted.   Cardiovascular: Regular rate and rhythm without murmur rub or gallop.  Thorax & Lungs: Bilateral rhonchi and some wheezes, no rales. There is no chest wall tenderness.   Abdomen: Soft non-tender non-distended. There is no rebound or guarding. No organomegaly is appreciated. Bowel sounds are normal.  Skin: Normal without rash.   Back: No CVA or spinal tenderness.    Extremities: Intact distal pulses, No edema, No tenderness, No cyanosis, No clubbing. Capillary refill is less than 2 seconds.  Musculoskeletal: Good range of motion in all major joints. No tenderness to palpation or major deformities noted.   Neurologic: Alert & oriented x 3, Normal motor function, Normal sensory function, No focal deficits noted. Reflexes are normal.  Psychiatric: Affect normal, Judgment normal, Mood normal. There is no suicidal ideation or patient reported hallucinations.     DIAGNOSTIC STUDIES / PROCEDURES    LABS  Labs Reviewed   SARS-COV-2, PCR (IN-HOUSE)      All labs reviewed by me.    COURSE & MEDICAL DECISION MAKING  Nursing notes, VS, PMSFHx reviewed in chart.    9:17 AM Patient seen and examined at bedside. I informed her that her symptoms are consistent with COVID-19 infection. Ordered for SARS-CoV-2 PCR to evaluate. Patient was treated with albuterol inhaler 2 puffs and Decadron 6 mg injection for her symptoms.    The patient had a Covid swab that was negative back on January 26 that she is now concerned that she may have Covid once again and we have swabbed her    Explained to the patient their symptoms could be due to Covid-19.  I have sent for a covid swab. These typically take 24 hours to return and you will be notified by phone of positive results otherwise check the online portal. At this time the treatment plan is the same as any other virus, which includes fluids and fever control. Please self isolate at home and practice social distancing and hand washing protocols. Please return to the ER as necessary for worsening symptoms, otherwise follow up with your primary care in 7-10 days.        Patient has had high blood pressure while in the emergency department, felt likely secondary to medical condition. Counseled patient to monitor blood pressure at home and follow up with primary care physician.    The patient will return for new or worsening symptoms and is stable at the  time of discharge.  She seems to certainly have an asthma exacerbation and we will treat with steroids and return if any significant change in symptoms or and functional decline    DISPOSITION:  Patient will be discharged home in stable condition.    FINAL IMPRESSION  1. Suspected 2019 novel coronavirus infection    2. Moderate persistent asthma with acute exacerbation          Toni PATEL (Frances), am scribing for, and in the presence of, Leander Walton M.D..    Electronically signed by: Toni Puentes (Frances), 2/5/2021    Leander PATEL M.D. personally performed the services described in this documentation, as scribed by Toni Puentes in my presence, and it is both accurate and complete.     C.    The note accurately reflects work and decisions made by me.  Leander Walton M.D.  2/5/2021  9:37 AM

## 2021-02-05 NOTE — OR NURSING
Called patient to screen prior to PAT appt Friday and pt was seen in the ER with cough, SOB and R/O covid.  PAT appt cx, pt tried to call Dr. Ash's office but office closed.  I attempted to reach Dr. Ash's office too and office is closed.  Notified Margaret in scheduling that pt is ill and surgery will need to be rescheduled and she said she will make a note of it.

## 2021-02-05 NOTE — ED NOTES
Pt medicated as ordered, Pt discharged with instructions and script x 3.  Pt verbalizes the understanding of instructions. Pt ambulated out of ER without any difficulty. Pt provided Covid instructions/ precautions/ isolation information

## 2021-02-05 NOTE — TELEPHONE ENCOUNTER
"Pt has a lot of trouble breathing and body aches. Pt was sent by [employer], who stated 'I am very concerned about this patient.  She has an autoimmune disease, is having trouble breathing, body aches, and wants to be seen.  Pt doesn't want to call 911.'    Pt sounds very labored.  Pt was asked if she was in a lupus flare to which she stated, 'no, this is something else'.  I asked pt why she didn't want to go to the ER, because I could hear that she was in distress. Pt stated she could just drive herself.   I asked pt if she was having dizziness, she stated yes.  I told pt that I didn't think it would be safe for her to drive herself to the ER and that saying just a couple of words at rest were very labored. As opposed to continuing to ask questions and increase fatigue with talking, I advised pt to call 911 right now. Pt agreed.    Reason for Disposition  • Severe difficulty breathing (e.g., struggling for each breath, speaks in single words)    Additional Information  • Difficulty breathing, severe    Answer Assessment - Initial Assessment Questions  1. RESPIRATORY STATUS: \"Describe your breathing?\" (e.g., wheezing, shortness of breath, unable to speak, severe coughing)       Unable to speak in full sentences, wheezing, sob  2. ONSET: \"When did this breathing problem begin?\"       Not sure  3. PATTERN \"Does the difficult breathing come and go, or has it been constant since it started?\"       Not sure  4. SEVERITY: \"How bad is your breathing?\" (e.g., mild, moderate, severe)     - MILD: No SOB at rest, mild SOB with walking, speaks normally in sentences, can lay down, no retractions, pulse < 100.     - MODERATE: SOB at rest, SOB with minimal exertion and prefers to sit, cannot lie down flat, speaks in phrases, mild retractions, audible wheezing, pulse 100-120.     - SEVERE: Very SOB at rest, speaks in single words, struggling to breathe, sitting hunched forward, retractions, pulse > 120       severe  5. " "RECURRENT SYMPTOM: \"Have you had difficulty breathing before?\" If so, ask: \"When was the last time?\" and \"What happened that time?\"       Not sure  6. CARDIAC HISTORY: \"Do you have any history of heart disease?\" (e.g., heart attack, angina, bypass surgery, angioplasty)       Not sure  7. LUNG HISTORY: \"Do you have any history of lung disease?\"  (e.g., pulmonary embolus, asthma, emphysema)     Not sure  8. CAUSE: \"What do you think is causing the breathing problem?\"      Not the lupus  9. OTHER SYMPTOMS: \"Do you have any other symptoms? (e.g., dizziness, runny nose, cough, chest pain, fever)      dizziness  10. PREGNANCY: \"Is there any chance you are pregnant?\" \"When was your last menstrual period?\"        no  11. TRAVEL: \"Have you traveled out of the country in the last month?\" (e.g., travel history, exposures)       no    Protocols used: BREATHING DIFFICULTY-A-AH, RESPIRATORY MULTIPLE SYMPTOMS - GUIDELINE [id number]      "

## 2021-02-06 ENCOUNTER — APPOINTMENT (OUTPATIENT)
Dept: ADMISSIONS | Facility: MEDICAL CENTER | Age: 56
End: 2021-02-06
Payer: MEDICARE

## 2021-02-08 ENCOUNTER — PRE-ADMISSION TESTING (OUTPATIENT)
Dept: ADMISSIONS | Facility: MEDICAL CENTER | Age: 56
End: 2021-02-08
Payer: MEDICARE

## 2021-02-24 ENCOUNTER — OFFICE VISIT (OUTPATIENT)
Dept: MEDICAL GROUP | Facility: PHYSICIAN GROUP | Age: 56
End: 2021-02-24
Payer: MEDICARE

## 2021-02-24 ENCOUNTER — HOSPITAL ENCOUNTER (OUTPATIENT)
Facility: MEDICAL CENTER | Age: 56
End: 2021-02-24
Attending: NURSE PRACTITIONER
Payer: MEDICARE

## 2021-02-24 VITALS
WEIGHT: 189 LBS | BODY MASS INDEX: 37.11 KG/M2 | DIASTOLIC BLOOD PRESSURE: 80 MMHG | SYSTOLIC BLOOD PRESSURE: 122 MMHG | TEMPERATURE: 98.4 F | RESPIRATION RATE: 20 BRPM | HEART RATE: 91 BPM | HEIGHT: 60 IN | OXYGEN SATURATION: 97 %

## 2021-02-24 DIAGNOSIS — R30.0 DYSURIA: ICD-10-CM

## 2021-02-24 DIAGNOSIS — J30.9 ALLERGIC RHINITIS, UNSPECIFIED SEASONALITY, UNSPECIFIED TRIGGER: ICD-10-CM

## 2021-02-24 DIAGNOSIS — R11.0 NAUSEA: ICD-10-CM

## 2021-02-24 LAB
APPEARANCE UR: CLEAR
BILIRUB UR STRIP-MCNC: NORMAL MG/DL
COLOR UR AUTO: YELLOW
GLUCOSE UR STRIP.AUTO-MCNC: NORMAL MG/DL
HBA1C MFR BLD: 5.6 % (ref 0–5.6)
INT CON NEG: NORMAL
INT CON POS: NORMAL
KETONES UR STRIP.AUTO-MCNC: NORMAL MG/DL
LEUKOCYTE ESTERASE UR QL STRIP.AUTO: NORMAL
NITRITE UR QL STRIP.AUTO: NORMAL
PH UR STRIP.AUTO: 6 [PH] (ref 5–8)
PROT UR QL STRIP: NORMAL MG/DL
RBC UR QL AUTO: NORMAL
SP GR UR STRIP.AUTO: >=1.03
UROBILINOGEN UR STRIP-MCNC: 0.2 MG/DL

## 2021-02-24 PROCEDURE — 81002 URINALYSIS NONAUTO W/O SCOPE: CPT | Performed by: NURSE PRACTITIONER

## 2021-02-24 PROCEDURE — 87510 GARDNER VAG DNA DIR PROBE: CPT

## 2021-02-24 PROCEDURE — 83036 HEMOGLOBIN GLYCOSYLATED A1C: CPT | Performed by: NURSE PRACTITIONER

## 2021-02-24 PROCEDURE — 87480 CANDIDA DNA DIR PROBE: CPT

## 2021-02-24 PROCEDURE — 99214 OFFICE O/P EST MOD 30 MIN: CPT | Performed by: NURSE PRACTITIONER

## 2021-02-24 PROCEDURE — 87660 TRICHOMONAS VAGIN DIR PROBE: CPT

## 2021-02-24 RX ORDER — ONDANSETRON 4 MG/1
4 TABLET, ORALLY DISINTEGRATING ORAL EVERY 6 HOURS PRN
Qty: 20 TABLET | Refills: 1 | Status: SHIPPED | OUTPATIENT
Start: 2021-02-24 | End: 2021-03-12 | Stop reason: SDUPTHER

## 2021-02-24 RX ORDER — BENZONATATE 100 MG/1
100 CAPSULE ORAL 3 TIMES DAILY PRN
Qty: 30 CAPSULE | Refills: 0 | Status: SHIPPED | OUTPATIENT
Start: 2021-02-24 | End: 2021-04-21 | Stop reason: SDUPTHER

## 2021-02-24 RX ORDER — NITROFURANTOIN 25; 75 MG/1; MG/1
100 CAPSULE ORAL 2 TIMES DAILY
Qty: 10 CAPSULE | Refills: 0 | Status: SHIPPED | OUTPATIENT
Start: 2021-02-24 | End: 2021-05-13

## 2021-02-24 RX ORDER — FLUTICASONE PROPIONATE 50 MCG
1 SPRAY, SUSPENSION (ML) NASAL 2 TIMES DAILY
Qty: 18 G | Refills: 3 | Status: SHIPPED | OUTPATIENT
Start: 2021-02-24 | End: 2021-04-21 | Stop reason: SDUPTHER

## 2021-02-24 ASSESSMENT — FIBROSIS 4 INDEX: FIB4 SCORE: 0.91

## 2021-02-24 NOTE — PROGRESS NOTES
Chief Complaint   Patient presents with   • Hospital Follow-up     [facility]        HISTORY OF PRESENT ILLNESS: Patient is a 55 y.o. female established patient who presents today to discuss dysuria, medication refills    Allergic rhinitis  Patient has chronic allergic rhinitis/seasonal allergies  She has noticed discomfort in her ears, was seen in urgent care and was told she had fluid behind her eardrums  Upon physical examination this was confirmed  She does use Flonase but not consistently  Advised to take this consistently, 1 spray to each nostril twice daily along with second-generation antihistamine such as Allegra, Zyrtec, Xyzal or Claritin    Dysuria  Patient has symptoms of dysuria, concerning for UTI  This has been going on for the past couple of months  Was seen in urgent care a couple of months ago, submitted specimen for UTI and this was negative that she was not treated  She continues to have urinary frequency, urgency, dysuria  In office urinalysis is positive for trace amounts of blood, thus will send for culture and go ahead and treat empirically for UTI  We will also test for vaginal pathogen, she will be notified of results and further actions if needed    Nausea  Has chronic nausea for which she takes Zofran as needed  She does need refills, this was called in for her      Patient Active Problem List    Diagnosis Date Noted   • Lumbar stenosis 10/22/2012   • Thrush 09/24/2020   • Slow transit constipation 08/04/2020   • Dysphasia 08/04/2020   • Poor balance 05/26/2020   • Nausea 05/26/2020   • Non-smoker 04/25/2020   • Mild intermittent asthma without complication 04/25/2020   • Cough 03/03/2020   • Allergic rhinitis 03/03/2020   • Moderate persistent asthma without complication 01/16/2020   • Other dysphagia 12/09/2019   • Esophageal stricture 12/09/2019   • Burn 07/03/2019   • Malodorous urine 07/03/2019   • Chronic obstructive pulmonary disease (HCC) 07/02/2019   • Abscess 06/04/2019   • Allergic  conjunctivitis of both eyes 05/15/2019   • Hyperparathyroidism (HCC) 04/11/2019   • Benign paroxysmal positional vertigo due to bilateral vestibular disorder 04/11/2019   • 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   • Elevated PTHrP level 10/09/2018   • Vitiligo 09/27/2018   • Vitamin B12 deficiency 09/27/2018   • Abnormal laboratory test 09/25/2018   • TOMY (obstructive sleep apnea) 09/17/2018   • History of diabetes mellitus, type II 09/04/2018   • Positive NAKUL (antinuclear antibody) 07/24/2018   • Arthritis, multiple joint involvement 07/24/2018   • Family history of systemic lupus erythematosus (SLE) in mother 07/24/2018   • Iron deficiency anemia 07/24/2018   • Skin lesions 07/24/2018   • Chronic fatigue 06/20/2018   • Dysuria 06/07/2018   • Heartburn 05/08/2018   • Muscle spasm of both lower legs 03/26/2018   • Calcaneal spur of both feet 03/15/2018   • Pain management contract broken 03/15/2018   • Obesity (BMI 30-39.9) 01/18/2018   • Seasonal allergies 10/05/2017   • Bipolar affective disorder, currently depressed, moderate (HCC) 01/24/2017   • History of gastric bypass 10/11/2013   • Chronic pain syndrome 09/16/2013   • Anxiety 12/06/2012   • Multilevel degenerative disc disease 10/01/2012   • Cervical radiculopathy, chronic 05/04/2011   • Vitamin D deficiency 11/16/2010   • Arthritis    • Panic attacks    • PTSD (post-traumatic stress disorder)    • Insomnia due to medical condition 11/10/2010       Allergies:Buprenorphine-naloxone, Gabapentin, Suboxone, Morphine, Shrimp (diagnostic), and Contrast media with iodine [iodine]    Current Outpatient Medications   Medication Sig Dispense Refill   • nitrofurantoin (MACROBID) 100 MG Cap Take 1 capsule by mouth 2 times a day. 10 capsule 0   • benzonatate (TESSALON) 100 MG Cap Take 1 capsule by mouth 3 times a day as needed. 30 capsule 0   • ondansetron (ZOFRAN ODT) 4 MG TABLET DISPERSIBLE Take 1 tablet by mouth every 6 hours as needed. 20 tablet 1   • fluticasone  (FLONASE) 50 MCG/ACT nasal spray Administer 1 Spray into affected nostril(S) 2 times a day. 18 g 3   • metroNIDAZOLE (METROGEL-VAGINAL) 0.75 % Gel Insert 1 Applicator into the vagina every bedtime. For 5 nights 1 Each 0   • albuterol 108 (90 Base) MCG/ACT Aero Soln inhalation aerosol Inhale 2 Puffs every 6 hours as needed for Shortness of Breath. 8.5 g 0   • amoxicillin (AMOXIL) 875 MG tablet Take 1 Tab by mouth 2 times a day. 20 Tab 0   • nystatin (MYCOSTATIN) 499427 UNIT/ML Suspension Take 5 mL by mouth 4 times a day. Swish and swallow 5 mls 4 times a day for 1 week 473 mL 1   • hydroxychloroquine (PLAQUENIL) 200 MG Tab Take 1 Tab by mouth every day. 90 Tab 0   • Docusate Sodium (COLACE PO) Take 1 Tab by mouth every day.     • Budesonide-Formoterol Fumarate (SYMBICORT INH) Inhale  by mouth as needed.     • diphenhydrAMINE (BENADRYL) 25 MG Tab Take 25 mg by mouth every bedtime. Takes 2 tabs at hs     • sertraline (ZOLOFT) 100 MG Tab Take  by mouth. Take 1 tablet by mouth every morning     • propranolol (INDERAL) 10 MG Tab Take 10 mg by mouth 3 times a day.     • oxycodone (OXY-IR) 15 MG immediate release tablet TAKE 1 TABLET BY MOUTH EVERY 8 HOURS FOR 30 DAYS M54 DNF 19  0     No current facility-administered medications for this visit.       Social History     Tobacco Use   • Smoking status: Never Smoker   • Smokeless tobacco: Never Used   Substance Use Topics   • Alcohol use: No   • Drug use: No       Family Status   Relation Name Status   • Fa   at age 64   • Mo   at age 75   • Sis 2 Alive   • Bro  Alive   • MAunt  (Not Specified)   • MUnc  (Not Specified)   • MGMo  (Not Specified)   • MGFa  (Not Specified)     Family History   Problem Relation Age of Onset   • Diabetes Father    • Cancer Father    • Hypertension Father    • Hyperlipidemia Father    • Cancer Mother    • Other Mother         SLE   • Diabetes Mother    • Hypertension Mother    • Hyperlipidemia Mother    • Stroke Mother    •  Sleep Apnea Brother    • Cancer Maternal Aunt    • Diabetes Maternal Aunt    • Diabetes Maternal Uncle    • Diabetes Maternal Grandmother    • Diabetes Maternal Grandfather        Review of Systems:   Constitutional: Negative for fever, chills, weight loss and malaise/fatigue.   HENT: Negative for ear pain, nosebleeds, congestion, sore throat and neck pain.    Eyes: Negative for blurred vision.   Respiratory: Negative for cough, sputum production, shortness of breath and wheezing.    Cardiovascular: Negative for chest pain, palpitations, orthopnea and leg swelling.   Gastrointestinal: Negative for heartburn, nausea, vomiting and abdominal pain.   Genitourinary/Renal: positive per HPI  Musculoskeletal: Negative for myalgias, back pain and joint pain.   Skin: Negative for rash and itching.   Neurological: Negative for dizziness, tingling, tremors, sensory change, focal weakness and headaches.       Exam:  /80   Pulse 91   Temp 36.9 °C (98.4 °F) (Temporal)   Resp 20   Ht 1.524 m (5')   Wt 85.7 kg (189 lb)   SpO2 97%   General:  Well nourished, well developed female in NAD  Skin: warm, dry, intact, no evidence of rash or concerning lesions  Head: is grossly normal.  HEENT: eyes clear, conjunctiva normal, PERRA  Pulmonary: Clear to ausculation. Normal effort. No rales, ronchi, or wheezing.  Cardiovascular: Regular rate and rhythm without murmur. Carotid and radial pulses are intact and equal bilaterally.  Extremities: no clubbing, cyanosis, or edema.  Psych/mental: no depression, anxiety, hallucinations  Neuro: alert, intact, CN 2-12 grossly intact    Medical decision-making and discussion:    As mentioned above, will treat empirically treat for UTI, but will send urine off for culture. Will also get  Vag path panel. Other meds refilled per request        Assessment/Plan:  Patti was seen today for hospital follow-up.    Diagnoses and all orders for this visit:    Dysuria  -     nitrofurantoin (MACROBID) 100  MG Cap; Take 1 capsule by mouth 2 times a day.  -     POCT Urinalysis  -     VAGINAL PATHOGENS DNA PANEL; Future    Allergic rhinitis, unspecified seasonality, unspecified trigger  -     benzonatate (TESSALON) 100 MG Cap; Take 1 capsule by mouth 3 times a day as needed.  -     fluticasone (FLONASE) 50 MCG/ACT nasal spray; Administer 1 Spray into affected nostril(S) 2 times a day.    Nausea  -     ondansetron (ZOFRAN ODT) 4 MG TABLET DISPERSIBLE; Take 1 tablet by mouth every 6 hours as needed.  -     POCT Hemoglobin A1C             Return if symptoms worsen or fail to improve, for Follow-up.    Please note that this dictation was created using voice recognition software. I have made every reasonable attempt to correct obvious errors, but I expect that there are errors of grammar and possibly content that I did not discover before finalizing the note.

## 2021-02-25 DIAGNOSIS — N76.0 BV (BACTERIAL VAGINOSIS): ICD-10-CM

## 2021-02-25 DIAGNOSIS — B96.89 BV (BACTERIAL VAGINOSIS): ICD-10-CM

## 2021-02-25 LAB
CANDIDA DNA VAG QL PROBE+SIG AMP: NEGATIVE
G VAGINALIS DNA VAG QL PROBE+SIG AMP: POSITIVE
T VAGINALIS DNA VAG QL PROBE+SIG AMP: NEGATIVE

## 2021-02-25 RX ORDER — METRONIDAZOLE 7.5 MG/G
1 GEL VAGINAL
Qty: 1 EACH | Refills: 0 | Status: SHIPPED | OUTPATIENT
Start: 2021-02-25 | End: 2021-05-13

## 2021-02-25 NOTE — ASSESSMENT & PLAN NOTE
Patient has symptoms of dysuria, concerning for UTI  This has been going on for the past couple of months  Was seen in urgent care a couple of months ago, submitted specimen for UTI and this was negative that she was not treated  She continues to have urinary frequency, urgency, dysuria  In office urinalysis is positive for trace amounts of blood, thus will send for culture and go ahead and treat empirically for UTI  We will also test for vaginal pathogen, she will be notified of results and further actions if needed

## 2021-02-25 NOTE — ASSESSMENT & PLAN NOTE
Patient has chronic allergic rhinitis/seasonal allergies  She has noticed discomfort in her ears, was seen in urgent care and was told she had fluid behind her eardrums  Upon physical examination this was confirmed  She does use Flonase but not consistently  Advised to take this consistently, 1 spray to each nostril twice daily along with second-generation antihistamine such as Allegra, Zyrtec, Xyzal or Claritin

## 2021-02-25 NOTE — ASSESSMENT & PLAN NOTE
Has chronic nausea for which she takes Zofran as needed  She does need refills, this was called in for her

## 2021-03-04 ENCOUNTER — HOSPITAL ENCOUNTER (OUTPATIENT)
Dept: LAB | Facility: MEDICAL CENTER | Age: 56
End: 2021-03-04
Attending: INTERNAL MEDICINE
Payer: MEDICARE

## 2021-03-04 ENCOUNTER — PRE-ADMISSION TESTING (OUTPATIENT)
Dept: ADMISSIONS | Facility: MEDICAL CENTER | Age: 56
End: 2021-03-04
Attending: PAIN MEDICINE
Payer: MEDICARE

## 2021-03-04 DIAGNOSIS — Z01.812 PRE-OPERATIVE LABORATORY EXAMINATION: ICD-10-CM

## 2021-03-04 LAB
ALBUMIN SERPL BCP-MCNC: 4.2 G/DL (ref 3.2–4.9)
ALT SERPL-CCNC: 12 U/L (ref 2–50)
AST SERPL-CCNC: 15 U/L (ref 12–45)
BASOPHILS # BLD AUTO: 0.4 % (ref 0–1.8)
BASOPHILS # BLD: 0.05 K/UL (ref 0–0.12)
C3 SERPL-MCNC: 131.6 MG/DL (ref 87–200)
C4 SERPL-MCNC: 30 MG/DL (ref 19–52)
CREAT SERPL-MCNC: 0.68 MG/DL (ref 0.5–1.4)
EOSINOPHIL # BLD AUTO: 0.21 K/UL (ref 0–0.51)
EOSINOPHIL NFR BLD: 1.8 % (ref 0–6.9)
ERYTHROCYTE [DISTWIDTH] IN BLOOD BY AUTOMATED COUNT: 45.2 FL (ref 35.9–50)
ERYTHROCYTE [DISTWIDTH] IN BLOOD BY AUTOMATED COUNT: 45.9 FL (ref 35.9–50)
HCT VFR BLD AUTO: 42.5 % (ref 37–47)
HCT VFR BLD AUTO: 42.6 % (ref 37–47)
HGB BLD-MCNC: 13.5 G/DL (ref 12–16)
HGB BLD-MCNC: 13.6 G/DL (ref 12–16)
IMM GRANULOCYTES # BLD AUTO: 0.03 K/UL (ref 0–0.11)
IMM GRANULOCYTES NFR BLD AUTO: 0.3 % (ref 0–0.9)
LYMPHOCYTES # BLD AUTO: 2.68 K/UL (ref 1–4.8)
LYMPHOCYTES NFR BLD: 23 % (ref 22–41)
MCH RBC QN AUTO: 28.1 PG (ref 27–33)
MCH RBC QN AUTO: 28.1 PG (ref 27–33)
MCHC RBC AUTO-ENTMCNC: 31.8 G/DL (ref 33.6–35)
MCHC RBC AUTO-ENTMCNC: 31.9 G/DL (ref 33.6–35)
MCV RBC AUTO: 88 FL (ref 81.4–97.8)
MCV RBC AUTO: 88.4 FL (ref 81.4–97.8)
MONOCYTES # BLD AUTO: 0.8 K/UL (ref 0–0.85)
MONOCYTES NFR BLD AUTO: 6.9 % (ref 0–13.4)
NEUTROPHILS # BLD AUTO: 7.86 K/UL (ref 2–7.15)
NEUTROPHILS NFR BLD: 67.6 % (ref 44–72)
NRBC # BLD AUTO: 0 K/UL
NRBC BLD-RTO: 0 /100 WBC
PLATELET # BLD AUTO: 271 K/UL (ref 164–446)
PLATELET # BLD AUTO: 280 K/UL (ref 164–446)
PMV BLD AUTO: 11 FL (ref 9–12.9)
PMV BLD AUTO: 11.6 FL (ref 9–12.9)
RBC # BLD AUTO: 4.81 M/UL (ref 4.2–5.4)
RBC # BLD AUTO: 4.84 M/UL (ref 4.2–5.4)
WBC # BLD AUTO: 11.6 K/UL (ref 4.8–10.8)
WBC # BLD AUTO: 11.9 K/UL (ref 4.8–10.8)

## 2021-03-04 PROCEDURE — 85027 COMPLETE CBC AUTOMATED: CPT

## 2021-03-04 PROCEDURE — U0005 INFEC AGEN DETEC AMPLI PROBE: HCPCS

## 2021-03-04 PROCEDURE — 85025 COMPLETE CBC W/AUTO DIFF WBC: CPT

## 2021-03-04 PROCEDURE — U0003 INFECTIOUS AGENT DETECTION BY NUCLEIC ACID (DNA OR RNA); SEVERE ACUTE RESPIRATORY SYNDROME CORONAVIRUS 2 (SARS-COV-2) (CORONAVIRUS DISEASE [COVID-19]), AMPLIFIED PROBE TECHNIQUE, MAKING USE OF HIGH THROUGHPUT TECHNOLOGIES AS DESCRIBED BY CMS-2020-01-R: HCPCS

## 2021-03-04 PROCEDURE — 82040 ASSAY OF SERUM ALBUMIN: CPT

## 2021-03-04 PROCEDURE — 84460 ALANINE AMINO (ALT) (SGPT): CPT

## 2021-03-04 PROCEDURE — 86235 NUCLEAR ANTIGEN ANTIBODY: CPT

## 2021-03-04 PROCEDURE — 82565 ASSAY OF CREATININE: CPT

## 2021-03-04 PROCEDURE — 86160 COMPLEMENT ANTIGEN: CPT

## 2021-03-04 PROCEDURE — C9803 HOPD COVID-19 SPEC COLLECT: HCPCS

## 2021-03-04 PROCEDURE — 86225 DNA ANTIBODY NATIVE: CPT

## 2021-03-04 PROCEDURE — 36415 COLL VENOUS BLD VENIPUNCTURE: CPT

## 2021-03-04 PROCEDURE — 84450 TRANSFERASE (AST) (SGOT): CPT

## 2021-03-04 ASSESSMENT — FIBROSIS 4 INDEX: FIB4 SCORE: 0.91

## 2021-03-04 NOTE — PREPROCEDURE INSTRUCTIONS
"Pre-admit appointment completed. \"Preparing for your Procedure\" sheet given to Pt with verbal and written instructions. Pt states all instructions given are understood and to call pre-admit or Dr's office for additional questions or any symptoms of illness/covid develop prior to DOS. Self isolation instructions for after the Covid test given to patient. Medications the patient will take the morning of surgery per anesthesia protocol: Oxycodone, Zoloft, and instructed to take other prescribed medications through the day before surgery      Pt states that she does not use a CPAP for her sleep apnea  "

## 2021-03-05 LAB
SARS-COV-2 RNA RESP QL NAA+PROBE: NOTDETECTED
SPECIMEN SOURCE: NORMAL

## 2021-03-06 LAB — DSDNA AB TITR SER CLIF: NORMAL {TITER}

## 2021-03-08 LAB
ENA SM IGG SER-ACNC: 4 AU/ML (ref 0–40)
ENA SS-B IGG SER IA-ACNC: 3 AU/ML (ref 0–40)
SSA52 R0ENA AB IGG Q0420: 2 AU/ML (ref 0–40)
SSA60 R0ENA AB IGG Q0419: 2 AU/ML (ref 0–40)
U1 SNRNP IGG SER QL: NORMAL

## 2021-03-11 ENCOUNTER — HOSPITAL ENCOUNTER (OUTPATIENT)
Facility: MEDICAL CENTER | Age: 56
End: 2021-03-11
Attending: PAIN MEDICINE | Admitting: PAIN MEDICINE
Payer: MEDICARE

## 2021-03-11 ENCOUNTER — ANESTHESIA (OUTPATIENT)
Dept: SURGERY | Facility: MEDICAL CENTER | Age: 56
End: 2021-03-11
Payer: MEDICARE

## 2021-03-11 ENCOUNTER — APPOINTMENT (OUTPATIENT)
Dept: RADIOLOGY | Facility: MEDICAL CENTER | Age: 56
End: 2021-03-11
Attending: PAIN MEDICINE
Payer: MEDICARE

## 2021-03-11 ENCOUNTER — ANESTHESIA EVENT (OUTPATIENT)
Dept: SURGERY | Facility: MEDICAL CENTER | Age: 56
End: 2021-03-11
Payer: MEDICARE

## 2021-03-11 VITALS
BODY MASS INDEX: 36.7 KG/M2 | OXYGEN SATURATION: 93 % | HEART RATE: 71 BPM | TEMPERATURE: 97.5 F | WEIGHT: 186.95 LBS | SYSTOLIC BLOOD PRESSURE: 115 MMHG | DIASTOLIC BLOOD PRESSURE: 68 MMHG | HEIGHT: 60 IN | RESPIRATION RATE: 16 BRPM

## 2021-03-11 PROCEDURE — 700111 HCHG RX REV CODE 636 W/ 250 OVERRIDE (IP): Performed by: ANESTHESIOLOGY

## 2021-03-11 PROCEDURE — C1787 PATIENT PROGR, NEUROSTIM: HCPCS | Performed by: PAIN MEDICINE

## 2021-03-11 PROCEDURE — 160036 HCHG PACU - EA ADDL 30 MINS PHASE I: Performed by: PAIN MEDICINE

## 2021-03-11 PROCEDURE — C1778 LEAD, NEUROSTIMULATOR: HCPCS | Performed by: PAIN MEDICINE

## 2021-03-11 PROCEDURE — 95869 NDL EMG THRC PARASPINAL MUSC: CPT | Mod: XU | Performed by: PAIN MEDICINE

## 2021-03-11 PROCEDURE — 95937 NEUROMUSCULAR JUNCTION TEST: CPT | Mod: XU | Performed by: PAIN MEDICINE

## 2021-03-11 PROCEDURE — A9270 NON-COVERED ITEM OR SERVICE: HCPCS | Performed by: ANESTHESIOLOGY

## 2021-03-11 PROCEDURE — 72070 X-RAY EXAM THORAC SPINE 2VWS: CPT

## 2021-03-11 PROCEDURE — 160035 HCHG PACU - 1ST 60 MINS PHASE I: Performed by: PAIN MEDICINE

## 2021-03-11 PROCEDURE — 95925 SOMATOSENSORY TESTING: CPT | Mod: XU | Performed by: PAIN MEDICINE

## 2021-03-11 PROCEDURE — 160029 HCHG SURGERY MINUTES - 1ST 30 MINS LEVEL 4: Performed by: PAIN MEDICINE

## 2021-03-11 PROCEDURE — 700111 HCHG RX REV CODE 636 W/ 250 OVERRIDE (IP): Performed by: PAIN MEDICINE

## 2021-03-11 PROCEDURE — 502240 HCHG MISC OR SUPPLY RC 0272: Performed by: PAIN MEDICINE

## 2021-03-11 PROCEDURE — 160009 HCHG ANES TIME/MIN: Performed by: PAIN MEDICINE

## 2021-03-11 PROCEDURE — 700101 HCHG RX REV CODE 250: Performed by: PAIN MEDICINE

## 2021-03-11 PROCEDURE — 95861 NEEDLE EMG 2 EXTREMITIES: CPT | Mod: XU | Performed by: PAIN MEDICINE

## 2021-03-11 PROCEDURE — 94760 N-INVAS EAR/PLS OXIMETRY 1: CPT

## 2021-03-11 PROCEDURE — 160046 HCHG PACU - 1ST 60 MINS PHASE II: Performed by: PAIN MEDICINE

## 2021-03-11 PROCEDURE — 94660 CPAP INITIATION&MGMT: CPT

## 2021-03-11 PROCEDURE — 160041 HCHG SURGERY MINUTES - EA ADDL 1 MIN LEVEL 4: Performed by: PAIN MEDICINE

## 2021-03-11 PROCEDURE — 700102 HCHG RX REV CODE 250 W/ 637 OVERRIDE(OP): Performed by: ANESTHESIOLOGY

## 2021-03-11 PROCEDURE — 160025 RECOVERY II MINUTES (STATS): Performed by: PAIN MEDICINE

## 2021-03-11 PROCEDURE — 502000 HCHG MISC OR IMPLANTS RC 0278: Performed by: PAIN MEDICINE

## 2021-03-11 PROCEDURE — 160048 HCHG OR STATISTICAL LEVEL 1-5: Performed by: PAIN MEDICINE

## 2021-03-11 PROCEDURE — 502575 HCHG PACK, BREAST: Performed by: PAIN MEDICINE

## 2021-03-11 PROCEDURE — 160002 HCHG RECOVERY MINUTES (STAT): Performed by: PAIN MEDICINE

## 2021-03-11 PROCEDURE — 700101 HCHG RX REV CODE 250: Performed by: ANESTHESIOLOGY

## 2021-03-11 PROCEDURE — C1822 GEN, NEURO, HF, RECHG BAT: HCPCS | Performed by: PAIN MEDICINE

## 2021-03-11 PROCEDURE — 95940 IONM IN OPERATNG ROOM 15 MIN: CPT | Mod: XU | Performed by: PAIN MEDICINE

## 2021-03-11 PROCEDURE — 700105 HCHG RX REV CODE 258: Performed by: PAIN MEDICINE

## 2021-03-11 DEVICE — IMPLANTABLE DEVICE: Type: IMPLANTABLE DEVICE | Site: BACK | Status: FUNCTIONAL

## 2021-03-11 RX ORDER — OXYCODONE HYDROCHLORIDE AND ACETAMINOPHEN 5; 325 MG/1; MG/1
1 TABLET ORAL
Status: COMPLETED | OUTPATIENT
Start: 2021-03-11 | End: 2021-03-11

## 2021-03-11 RX ORDER — LIDOCAINE HYDROCHLORIDE 20 MG/ML
INJECTION, SOLUTION EPIDURAL; INFILTRATION; INTRACAUDAL; PERINEURAL PRN
Status: DISCONTINUED | OUTPATIENT
Start: 2021-03-11 | End: 2021-03-11 | Stop reason: SURG

## 2021-03-11 RX ORDER — SODIUM CHLORIDE, SODIUM LACTATE, POTASSIUM CHLORIDE, CALCIUM CHLORIDE 600; 310; 30; 20 MG/100ML; MG/100ML; MG/100ML; MG/100ML
INJECTION, SOLUTION INTRAVENOUS CONTINUOUS
Status: ACTIVE | OUTPATIENT
Start: 2021-03-11 | End: 2021-03-11

## 2021-03-11 RX ORDER — MIDAZOLAM HYDROCHLORIDE 1 MG/ML
1 INJECTION INTRAMUSCULAR; INTRAVENOUS
Status: DISCONTINUED | OUTPATIENT
Start: 2021-03-11 | End: 2021-03-11 | Stop reason: HOSPADM

## 2021-03-11 RX ORDER — DIPHENHYDRAMINE HYDROCHLORIDE 50 MG/ML
12.5 INJECTION INTRAMUSCULAR; INTRAVENOUS
Status: DISCONTINUED | OUTPATIENT
Start: 2021-03-11 | End: 2021-03-11 | Stop reason: HOSPADM

## 2021-03-11 RX ORDER — CEFAZOLIN SODIUM 1 G/3ML
INJECTION, POWDER, FOR SOLUTION INTRAMUSCULAR; INTRAVENOUS PRN
Status: DISCONTINUED | OUTPATIENT
Start: 2021-03-11 | End: 2021-03-11 | Stop reason: SURG

## 2021-03-11 RX ORDER — DEXAMETHASONE SODIUM PHOSPHATE 4 MG/ML
INJECTION, SOLUTION INTRA-ARTICULAR; INTRALESIONAL; INTRAMUSCULAR; INTRAVENOUS; SOFT TISSUE PRN
Status: DISCONTINUED | OUTPATIENT
Start: 2021-03-11 | End: 2021-03-11 | Stop reason: SURG

## 2021-03-11 RX ORDER — SODIUM CHLORIDE, SODIUM LACTATE, POTASSIUM CHLORIDE, CALCIUM CHLORIDE 600; 310; 30; 20 MG/100ML; MG/100ML; MG/100ML; MG/100ML
INJECTION, SOLUTION INTRAVENOUS CONTINUOUS
Status: DISCONTINUED | OUTPATIENT
Start: 2021-03-11 | End: 2021-03-11 | Stop reason: HOSPADM

## 2021-03-11 RX ORDER — ROCURONIUM BROMIDE 10 MG/ML
INJECTION, SOLUTION INTRAVENOUS PRN
Status: DISCONTINUED | OUTPATIENT
Start: 2021-03-11 | End: 2021-03-11 | Stop reason: SURG

## 2021-03-11 RX ORDER — OXYCODONE HYDROCHLORIDE AND ACETAMINOPHEN 5; 325 MG/1; MG/1
2 TABLET ORAL
Status: COMPLETED | OUTPATIENT
Start: 2021-03-11 | End: 2021-03-11

## 2021-03-11 RX ORDER — ONDANSETRON 2 MG/ML
INJECTION INTRAMUSCULAR; INTRAVENOUS PRN
Status: DISCONTINUED | OUTPATIENT
Start: 2021-03-11 | End: 2021-03-11 | Stop reason: SURG

## 2021-03-11 RX ORDER — BUPIVACAINE HYDROCHLORIDE 2.5 MG/ML
INJECTION, SOLUTION EPIDURAL; INFILTRATION; INTRACAUDAL
Status: DISCONTINUED | OUTPATIENT
Start: 2021-03-11 | End: 2021-03-11 | Stop reason: HOSPADM

## 2021-03-11 RX ORDER — MIDAZOLAM HYDROCHLORIDE 1 MG/ML
INJECTION INTRAMUSCULAR; INTRAVENOUS PRN
Status: DISCONTINUED | OUTPATIENT
Start: 2021-03-11 | End: 2021-03-11 | Stop reason: SURG

## 2021-03-11 RX ADMIN — PROPOFOL 120 MG: 10 INJECTION, EMULSION INTRAVENOUS at 08:12

## 2021-03-11 RX ADMIN — OXYCODONE HYDROCHLORIDE AND ACETAMINOPHEN 1 TABLET: 5; 325 TABLET ORAL at 09:53

## 2021-03-11 RX ADMIN — SODIUM CHLORIDE, POTASSIUM CHLORIDE, SODIUM LACTATE AND CALCIUM CHLORIDE: 600; 310; 30; 20 INJECTION, SOLUTION INTRAVENOUS at 06:43

## 2021-03-11 RX ADMIN — SUGAMMADEX 200 MG: 100 INJECTION, SOLUTION INTRAVENOUS at 09:29

## 2021-03-11 RX ADMIN — ONDANSETRON 4 MG: 2 INJECTION INTRAMUSCULAR; INTRAVENOUS at 08:12

## 2021-03-11 RX ADMIN — LIDOCAINE HYDROCHLORIDE 100 MG: 20 INJECTION, SOLUTION EPIDURAL; INFILTRATION; INTRACAUDAL; PERINEURAL at 08:12

## 2021-03-11 RX ADMIN — DEXAMETHASONE SODIUM PHOSPHATE 4 MG: 4 INJECTION, SOLUTION INTRAMUSCULAR; INTRAVENOUS at 08:12

## 2021-03-11 RX ADMIN — ROCURONIUM BROMIDE 30 MG: 10 INJECTION, SOLUTION INTRAVENOUS at 08:12

## 2021-03-11 RX ADMIN — FENTANYL CITRATE 100 MCG: 50 INJECTION, SOLUTION INTRAMUSCULAR; INTRAVENOUS at 08:12

## 2021-03-11 RX ADMIN — CEFAZOLIN 2 G: 1 INJECTION, POWDER, FOR SOLUTION INTRAVENOUS at 08:12

## 2021-03-11 RX ADMIN — FENTANYL CITRATE 25 MCG: 50 INJECTION, SOLUTION INTRAMUSCULAR; INTRAVENOUS at 09:51

## 2021-03-11 RX ADMIN — MIDAZOLAM HYDROCHLORIDE 2 MG: 1 INJECTION, SOLUTION INTRAMUSCULAR; INTRAVENOUS at 08:12

## 2021-03-11 RX ADMIN — PROPOFOL 10 MG: 10 INJECTION, EMULSION INTRAVENOUS at 09:24

## 2021-03-11 ASSESSMENT — FIBROSIS 4 INDEX: FIB4 SCORE: 0.87

## 2021-03-11 ASSESSMENT — PAIN DESCRIPTION - PAIN TYPE: TYPE: ACUTE PAIN

## 2021-03-11 ASSESSMENT — PAIN SCALES - GENERAL: PAIN_LEVEL: 8

## 2021-03-11 NOTE — OP REPORT
Procedure  PSCS with HF10   Performed By  Aditi Ash MD  Indication  Failed Back Surgery Syndrome   Comments  surgeon: Aditi Ash MD    Assistants: None    Site: [facility]   Preoperative diagnosis: Chronic Pain syndrome, Failed back Surgery Syndrome    Postoperative diagnosis: Chronic Pain syndrome, Failed Back Surgery Syndrome     Type of Anesthesia: General Anesthesia with ETT  Procedure:  1. Insertion of a Permanent Midline Octrode Spinal Cord Stimulator Lead  2. Insertion of a Permanent Left Octrode Spinal Cord Stimulator Lead  3. Intraoperative Neurostimulation Trial  4. Creation of right Implantable Power Generator (IPG) Pocket  5. Implantation of the IPG Nevro  6. Intraop neuromonitoring     Method of Surgery: After discussing risks, benefits, and alternatives to the procedure, the patient expressed understanding and wished to proceed. An IV was started in the pre-op area and IV fluid administration was continued throughout the procedure. The patient was brought into the fluoroscopy suite and placed in the prone position. Procedural pause was conducted to verify: correct patient identity, procedure to be performed and as applicable, correct side and site, correct patient position, and availability of implants, special equipment or special instruments. Intravenous sedation appropriate to the procedure was administered by the physician/nurse and is accurately reflected in the nurse's notes. Blood pressure, heart rate, pulse oximetry, and cardiac monitoring were monitored throughout the procedure. The patient's vital signs remained stable throughout the procedure. EKG monitoring revealed normal sinus rhythm. A dose of Ancef was administered intravenously prior to starting the procedure. Intraop neuromonitoring was performed during the case to increase the safety of the procedure and to decrease the chance of any possible injury to the spinal cord and nerve roots    The skin was sterilely prepped  and draped in the usual fashion using chlorhexidine times three in the region that the procedure was to be performed.    Insertion of a Permanent Midline Octrode Spinal Cord Stimulator Lead    The right L1 - L2 interlaminar space was identified fluoroscopically. The skin and subcutaneous tissues overlying the right L2 lamina were anesthetized with 1% Lidocaine without epinephrine using a 25G 1.5 inch needle and a 25G 3.5 inch spinal needle for deeper tissues. Then the 25 gauge 3.5 inch spinal needle was used to inject 0.5% bupivacaine with epinephrine for further local anesthetic control and hemostasis. A 10-blade scalpel was used to make a 5 cm midline incision overlying the L1 spinous process to the L4 spinous process. Dissection was carried out with bovie to expose the bilateral interlaminar ligaments. After blunt dissection was completed, the incision was copiously irrigated with bacitracin solution. A 14-gauge 4.5 inch Tuohy epidural needle was inserted through the incision to the right L2 lamina and was then “walked off” the superior aspect of this lamina into the L1-L2 epidural space. The epidural space was localized using a loss of resistance technique and intermittent fluoroscopic    guidance. An octrode spinal cord stimulator lead was then advanced up the midline of the posterior epidural space to the top of T8 vertebra. The 14-gauge 4.5 inch Tuohy epidural needle was then removed using a push-pull technique under direct fluoroscopic visualization to make sure the lead did not move. The proximal portion of the lead was anchored to the right interlaminar ligament using standard technique with interrupted 2-0 ethiobond sutures and a torpedo anchor.  Insertion of a Permanent Left Octrode Spinal Cord Stimulator Lead    The Right L1-L2 interlaminar space was identified fluoroscopically. The skin and subcutaneous tissues overlying the Right L2 lamina were anesthetized with 1% Lidocaine without epinephrine using  a 25G 1.5 inch needle and a 25G 3.5 inch spinal needle for deeper tissues. A 14-gauge 4.5 inch Tuohy epidural needle was inserted through the skin to the Right L1 lamina and was then “walked off” the superior aspect of this lamina into the L1-L2 epidural space. The epidural space was localized using a loss of resistance technique and intermittent fluoroscopic guidance. An octrode spinal cord stimulator lead was then advanced up the left side of the posterior epidural space to the top of T9 vertebra. The 14-gauge 4.5 inch Tuohy epidural needle was then removed using a push-pull technique under direct fluoroscopic visualization to make sure the lead did not move and the end of the lead was pulled back into the incision side through the skin. The proximal portion of the lead was anchored to the left interlaminar ligament using standard technique with interrupted 2-0 ethibond sutures and a torpedo anchor.    Intraoperative Neurostimulation Trial    At this point, the distal ends of the leads were inserted and locked into IPG. Impedance was checked and multiple electrode combinations were utilized to provide coverage of the patient's area of pain.    Creation of a Right lower back IPG Pocket     The skin and subcutaneous tissues overlying the right  lower back were anesthetized with 1% Lidocaine without epinephrine using a 25G 1.5 inch needle. Dissection was made with blunt technique, guerita and bovie. Hemostasis was achieved. A generous subcutaneous pocket was created using blunt dissection that was approximately 2 cm deep to the skin's surface. After it was completed, the pocket was copiously irrigated with bacitracin solution and packed with sterile 4x4's.     Tunneling tool was uses and the the leads were tunneled from mid-line incision to the right lower back incisor,    The left spinal cord stimulator lead was connected to the inferior connector of the IPG. The right spinal cord stimulator lead was connected to the  superior connector of the IPG. After connection to the IPG, the system was activated confirming that the system was operational.     Implantation of the IPG     After the distal ends of the spinal cord stimulator leads were placed into the IPG, the sterile 4x4's in the IPG pocket were removed. The remaining length of the spinal cord stimulator leads were coiled underneath the IPG and the IPG was placed into the pocket. A sponge count was performed and all sponges were accounted for. 2-0 Vicryl was used to close the subcutaneous tissues of the pocket and midline incision with interrupted sutures. The skin of both incisions were closed using a staples An AP spot film was obtained to record the final lead positions. one gram of vancomycin was applied inside the pocket.     The patient was instructed in the proper use of the  and understood its use prior to discharge. The patient tolerated the procedure well and there were no apparent complications. After an appropriate amount of observation, the patient was dismissed from the clinic in good condition under their own power.     Complications: None   Disposition:  1. The patient was discharged to the recovery area in good condition.  2. Discharge instructions were provided and explained.  3. Patient was instructed to call with any questions or problems.  4. Apply ice to the procedure site and keep clean and dry for 24 hours.  5. Medications were reviewed for efficacy and appropriateness.  6. Continue current medications.  7. Return in 1 to 2 weeks for follow up.  Aditi Ash MD 11 Mar 2021 09:41 AM

## 2021-03-11 NOTE — DISCHARGE INSTR - OTHER INFO
1. The patient was discharged to the recovery area in good condition.  2. Discharge instructions were provided and explained.  3. Patient was instructed to call with any questions or problems.  4. Apply ice to the procedure site and keep clean and dry for 24 hours.  5. Medications were reviewed for efficacy and appropriateness.  6. Continue current medications.  7. Return in 1 to 2 weeks for follow up.  8. NO Shower for 3 days  9. Remove Dressing in three days

## 2021-03-11 NOTE — ANESTHESIA POSTPROCEDURE EVALUATION
Patient: Patti Tello    Procedure Summary     Date: 03/11/21 Room / Location:  OR 02 / SURGERY [facility]    Anesthesia Start: 0808 Anesthesia Stop: 0938    Procedure: INSERTION, NEUROSTIMULATOR, PERMANENT, SPINAL CORD (Back) Diagnosis: (POST LAMINECTOMY SYNDROME)    Surgeons: Aditi Ash M.D. Responsible Provider: Anthony Thompson M.D.    Anesthesia Type: general ASA Status: 3          Final Anesthesia Type: general  Last vitals  BP   Blood Pressure: (!) 97/53    Temp   36.1 °C (97 °F)    Pulse   67   Resp   20    SpO2   93 %      Anesthesia Post Evaluation    Patient location during evaluation: PACU  Patient participation: complete - patient participated  Level of consciousness: awake and alert  Pain score: 8  Chronic pain patient  Airway patency: patent  Anesthetic complications: no  Cardiovascular status: hemodynamically stable  Respiratory status: acceptable  Hydration status: euvolemic    PONV: none          No complications documented.     Nurse Pain Score: 8 (NPRS)

## 2021-03-11 NOTE — ANESTHESIA PROCEDURE NOTES
Airway    Date/Time: 3/11/2021 8:15 AM  Performed by: Anthony Thompson M.D.  Authorized by: Anthony Thompson M.D.     Location:  OR  Urgency:  Elective  Indications for Airway Management:  Anesthesia      Spontaneous Ventilation: absent    Sedation Level:  Deep  Preoxygenated: Yes    Patient Position:  Sniffing  Mask Difficulty Assessment:  0 - not attempted  Final Airway Type:  Endotracheal airway  Final Endotracheal Airway:  ETT  Cuffed: Yes    Technique Used for Successful ETT Placement:  Direct laryngoscopy    Insertion Site:  Oral  Blade Type:  Bueno  Laryngoscope Blade/Videolaryngoscope Blade Size:  2  ETT Size (mm):  7.0  Measured from:  Gums  Placement Verified by: auscultation and capnometry    Cormack-Lehane Classification:  Grade I - full view of glottis  Number of Attempts at Approach:  1

## 2021-03-11 NOTE — ANESTHESIA PREPROCEDURE EVALUATION
Relevant Problems   ANESTHESIA   (+) TOMY (obstructive sleep apnea)      PULMONARY   (+) Chronic obstructive pulmonary disease (HCC)   (+) Mild intermittent asthma without complication   (+) Moderate persistent asthma without complication      NEURO   (+) History of diabetes mellitus, type II      Other   (+) Arthritis   (+) Cervical radiculopathy, chronic   (+) Family history of systemic lupus erythematosus (SLE) in mother   (+) Lumbar stenosis   (+) Nausea   (+) Obesity (BMI 30-39.9)   (+) PTSD (post-traumatic stress disorder)   (+) Panic attacks   (+) Positive NAKUL (antinuclear antibody)       Physical Exam    Airway   Mallampati: II  TM distance: >3 FB  Neck ROM: full       Cardiovascular - normal exam  Rhythm: regular  Rate: normal  (-) murmur     Dental - normal exam  (+) upper dentures, lower dentures           Pulmonary - normal exam  Breath sounds clear to auscultation     Abdominal    Neurological - normal exam               Anesthesia Plan    ASA 3   ASA physical status 3 criteria: alcohol and/or substance dependence or abuse    Plan - general       Airway plan will be ETT          Induction: intravenous      Pertinent diagnostic labs and testing reviewed    Informed Consent:    Anesthetic plan and risks discussed with patient.

## 2021-03-11 NOTE — OR NURSING
0549: Brought patient back to pre-op and assumed care.  0646: Patient allergies and NPO status verified, home medication reconciliation completed and belongings secured. Patient verbalizes understanding of pain scale, expected course of stay and plan of care. Surgical site verified with patient. IV access established. Sequentials placed on legs.

## 2021-03-11 NOTE — DISCHARGE INSTRUCTIONS
ACTIVITY: Rest and take it easy for the first 24 hours.  A responsible adult is recommended to remain with you during that time.  It is normal to feel sleepy.  We encourage you to not do anything that requires balance, judgment or coordination.    MILD FLU-LIKE SYMPTOMS ARE NORMAL. YOU MAY EXPERIENCE GENERALIZED MUSCLE ACHES, THROAT IRRITATION, HEADACHE AND/OR SOME NAUSEA.    FOR 24 HOURS DO NOT:  Drive, operate machinery or run household appliances.  Drink beer or alcoholic beverages.   Make important decisions or sign legal documents.    SPECIAL INSTRUCTIONS: ACTIVITY AS TOLERATED.  NO HEAVY LIFTING.     DIET: To avoid nausea, slowly advance diet as tolerated, avoiding spicy or greasy foods for the first day.  Add more substantial food to your diet according to your physician's instructions.  Babies can be fed formula or breast milk as soon as they are hungry.  INCREASE FLUIDS AND FIBER TO AVOID CONSTIPATION.    SURGICAL DRESSING/BATHING: KEEP DRESSINGS CLEAN AND DRY.      FOLLOW-UP APPOINTMENT:  A follow-up appointment should be arranged with your doctor; call to schedule.    You should CALL YOUR PHYSICIAN if you develop:  Fever greater than 101 degrees F.  Pain not relieved by medication, or persistent nausea or vomiting.  Excessive bleeding (blood soaking through dressing) or unexpected drainage from the wound.  Extreme redness or swelling around the incision site, drainage of pus or foul smelling drainage.  Inability to urinate or empty your bladder within 8 hours.  Problems with breathing or chest pain.    You should call 911 if you develop problems with breathing or chest pain.  If you are unable to contact your doctor or surgical center, you should go to the nearest emergency room or urgent care center.      Physician's telephone #: DR. VILLA [phone]    If any questions arise, call your doctor.  If your doctor is not available, please feel free to call the Surgical Center at [phone]. The  Contact Center is open Monday through Friday 7AM to 5PM and may speak to a nurse at [phone], or toll free at [phone].     A registered nurse may call you a few days after your surgery to see how you are doing after your procedure.    MEDICATIONS: Resume taking daily medication.  Take prescribed pain medication with food.  If no medication is prescribed, you may take non-aspirin pain medication if needed.  PAIN MEDICATION CAN BE VERY CONSTIPATING.  Take a stool softener or laxative such as senokot, pericolace, or milk of magnesia if needed.    TAKE HOME PERSCRIPTION.      Last pain medication given at 9:53AM. (1 PERCOCET 5MG/325MG)    If your physician has prescribed pain medication that includes Acetaminophen (Tylenol), do not take additional Acetaminophen (Tylenol) while taking the prescribed medication.    Depression / Suicide Risk    As you are discharged from this [facility] facility, it is important to learn how to keep safe from harming yourself.    Recognize the warning signs:  · Abrupt changes in personality, positive or negative- including increase in energy   · Giving away possessions  · Change in eating patterns- significant weight changes-  positive or negative  · Change in sleeping patterns- unable to sleep or sleeping all the time   · Unwillingness or inability to communicate  · Depression  · Unusual sadness, discouragement and loneliness  · Talk of wanting to die  · Neglect of personal appearance   · Rebelliousness- reckless behavior  · Withdrawal from people/activities they love  · Confusion- inability to concentrate     If you or a loved one observes any of these behaviors or has concerns about self-harm, here's what you can do:  · Talk about it- your feelings and reasons for harming yourself  · Remove any means that you might use to hurt yourself (examples: pills, rope, extension cords, firearm)  · Get professional help from the community (Mental Health, Substance Abuse,  psychological counseling)  · Do not be alone:Call your Safe Contact- someone whom you trust who will be there for you.  · Call your local CRISIS HOTLINE 784-2628 or 497-180-8258  · Call your local Children's Mobile Crisis Response Team [place] (780) 918-2347 or www.[employer]  · Call the toll free National Suicide Prevention Hotlines   · National Suicide Prevention Lifeline 785-886-GHWQ (0778)  · National Hope Line Network 800-SUICIDE (290-1387)

## 2021-03-11 NOTE — OR NURSING
"Report received from Venus PHILLIP. Pt to Stage 2 from PACU via [place]. Assisted to get dressed by CNA. VSS. Pt states the pain is tolerable, declines interventions, and \"wants to go home and rest\". DC instructions reviewed with pt and spouse.  PIV DC'd by RN. Pt discharged via wheelchair by CNA. All belongings with pt.  "

## 2021-03-11 NOTE — ANESTHESIA TIME REPORT
Anesthesia Start and Stop Event Times     Date Time Event    3/11/2021 0755 Ready for Procedure     0808 Anesthesia Start     0938 Anesthesia Stop        Responsible Staff  03/11/21    Name Role Begin End    Anthony Thompson M.D. Anesth 0808 0938        Preop Diagnosis (Free Text):  Pre-op Diagnosis     POST LAMINECTOMY SYNDROME        Preop Diagnosis (Codes):    Post op Diagnosis  Post laminectomy syndrome      Premium Reason  Non-Premium    Comments:

## 2021-03-11 NOTE — OR NURSING
0935 PT RECEIVED IN PACU, REPORT RECEIVED.  VSS, RESP SPONT, EVEN, NON LABORED.  0951 PT REPORTS INCISIONAL PAIN 5/10 MEDICATE FOR PAIN. SEE MAR.  1106 PT REPORT PAIN 8/10 AND WANTS TO GO HOME TO REST. VSS. 1117 PT MEETS CRITERIA FOR TRANSFER TO STAGE 2.

## 2021-03-29 NOTE — PROGRESS NOTES
Chief Complaint   Patient presents with   • Dysphasia     difficulty eating x 6 weeks          This is a 54 y.o.female patient that presents today with the following: Dysphagia    Chronic obstructive pulmonary disease (HCC)  This is a chronic and stable condition, needs refills on Symbicort and albuterol, this is been called in for her.  She was reminded the Symbicort is a daily maintenance medication that she should take on a daily basis and albuterol is to be used as needed.  Has not had any recent exacerbations.    Esophageal stricture  Patient has history of esophageal stricture for which she has undergone esophageal dilation, this was done last year by gastroenterology.  A couple weeks ago she sent phone message that she needed a new referral, this was placed for her.  Today she presents requesting a referral, reminded her the referral is Tre placed that she needs to call the referral department to have them continue processing the referral.      No visits with results within 1 Month(s) from this visit.   Latest known visit with results is:   Hospital Outpatient Visit on 11/01/2019   Component Date Value   • Sodium 11/01/2019 142    • Potassium 11/01/2019 3.6    • Chloride 11/01/2019 108    • Co2 11/01/2019 25    • Anion Gap 11/01/2019 9.0    • Glucose 11/01/2019 85    • Bun 11/01/2019 12    • Creatinine 11/01/2019 0.75    • Calcium 11/01/2019 9.4    • AST(SGOT) 11/01/2019 15    • ALT(SGPT) 11/01/2019 14    • Alkaline Phosphatase 11/01/2019 119*   • Total Bilirubin 11/01/2019 0.4    • Albumin 11/01/2019 4.2    • Total Protein 11/01/2019 7.0    • Globulin 11/01/2019 2.8    • A-G Ratio 11/01/2019 1.5    • Cholesterol,Tot 11/01/2019 173    • Triglycerides 11/01/2019 119    • HDL 11/01/2019 56    • LDL 11/01/2019 93    • 25-Hydroxy   Vitamin D 25 11/01/2019 15*   • GFR If  11/01/2019 >60    • GFR If Non  Ameri* 11/01/2019 >60          clinical course has been stable    Past Medical History:    Diagnosis Date   • Anxiety and depression    • Arthritis     back, neck   • Asthma    • Back injury    • Back pain    • Bronchitis 2010   • Cancer (HCC)     breast LT   • Chickenpox    • Chronic LBP    • Chronic neck pain    • Cold intolerance 1/24/2012   • COPD (chronic obstructive pulmonary disease) ([facility])    • Cystic fibrosis ([facility])    • Depression    • Diabetes    • GERD (gastroesophageal reflux disease)    • H/O gastric bypass 10/11/2013   • Herniated nucleus pulposus, L4-5 3/18/2010   • HX: breast cancer 10/11/2013   • Hypertension    • Itching due to drug 6/2/2011   • Kidney stone    • Nephrolithiasis 10/11/2013   • Obesity    • Osteoporosis    • Other specified symptom associated with female genital organs    • Panic disorder    • Pneumonia    • PTSD (post-traumatic stress disorder)    • Restless leg syndrome    • Rheumatoid arthritis ([facility])    • S/P laminectomy 10/22/2012   • Thoracic or lumbosacral neuritis or radiculitis, unspecified 10/22/2012   • Trauma     head trauma       Past Surgical History:   Procedure Laterality Date   • URETEROSCOPY  10/10/2013    Performed by Molly Resendiz M.D. at SURGERY [facility]   • LASERTRIPSY  10/10/2013    Performed by Molly Resendiz M.D. at SURGERY [facility]   • LUMBAR FUSION POSTERIOR  10/22/2012    Performed by Alfred Branham M.D. at SURGERY [facility]   • LUMBAR LAMINECTOMY DISKECTOMY  10/22/2012    Performed by Alfred Branham M.D. at SURGERY [facility]   • OTHER ORTHOPEDIC SURGERY  10/01/2012    lumbar fusion, Dr Branham   • OTHER ABDOMINAL SURGERY  2006    gastric bypass   • ABDOMINAL HYSTERECTOMY TOTAL      DUE TO FIBROIDS   • BREAST RECONSTRUCTION      Lt breast   • CRANIOTOMY      DUE TO BRAIN INJURY   • HYSTERECTOMY LAPAROSCOPY     • LAMINOTOMY     • MASTECTOMY      Lt breast   • OTHER      left ear drum surgery   • OTHER ABDOMINAL SURGERY      hernia repair   • PRIMARY C SECTION      x 3   • SLEEVE,MAXIMILIAN VASO THIGH     •  TONSILLECTOMY     • US-CYST ASPIRATION-BREAST INITIAL     • VENTILATOR CONTINUOUS         Family History   Problem Relation Age of Onset   • Diabetes Father    • Cancer Father    • Hypertension Father    • Hyperlipidemia Father    • Cancer Mother    • Other Mother         SLE   • Diabetes Mother    • Hypertension Mother    • Hyperlipidemia Mother    • Stroke Mother    • Sleep Apnea Brother    • Cancer Maternal Aunt    • Diabetes Maternal Aunt    • Diabetes Maternal Uncle    • Diabetes Maternal Grandmother    • Diabetes Maternal Grandfather        Buprenorphine-naloxone; Suboxone; and Morphine    Current Outpatient Medications Ordered in Epic   Medication Sig Dispense Refill   • propranolol (INDERAL) 10 MG Tab Take 10 mg by mouth 3 times a day.     • albuterol (VENTOLIN HFA) 108 (90 Base) MCG/ACT Aero Soln inhalation aerosol INHALE 1 PUFF PO QID PRN 8.5 g 2   • budesonide-formoterol (SYMBICORT) 80-4.5 MCG/ACT Aerosol Inhale 2 Puffs by mouth 2 Times a Day. 1 Inhaler 3   • ondansetron (ZOFRAN) 8 MG Tab TAKE 1 TABLET BY MOUTH THREE TIMES A DAY AS NEEDED 15 Tab 0   • ibuprofen (MOTRIN) 800 MG Tab TAKE 1 TABLET BY MOUTH EVERY 8 HOURS AS NEEDED 90 Tab 0   • oxycodone (OXY-IR) 15 MG immediate release tablet TAKE 1 TABLET BY MOUTH EVERY 8 HOURS FOR 30 DAYS M54 DNF 6/12/19  0   • vitamin D, Ergocalciferol, (DRISDOL) 02570 units Cap capsule Take 1 Cap by mouth 2X A WEEK. 26 Cap 0   • VENTOLIN  (90 Base) MCG/ACT Aero Soln inhalation aerosol INHALE 1 PUFF PO QID PRN  0     No current [facility]-ordered facility-administered medications on file.        Constitutional ROS: No unexpected change in weight, No weakness, No unexplained fevers, sweats, or chills  Pulmonary ROS: Positive per HPI  Cardiovascular ROS: No chest pain  Gastrointestinal ROS: Positive per HPI  Musculoskeletal/Extremities ROS: Positive for chronic joint pain  Neurologic ROS: Normal development, No seizures, No weakness  Psychiatric ROS: Positive for  "anxiety    Physical exam:  /74   Pulse 71   Temp 36.6 °C (97.9 °F) (Temporal)   Resp 14   Ht 1.6 m (5' 3\")   Wt 80.3 kg (177 lb)   LMP 10/05/1999   SpO2 95%   BMI 31.35 kg/m²   General Appearance: Pleasant middle-aged female, alert, no distress, obese, well-groomed  Skin: Skin color, texture, turgor normal. No rashes or lesions.  Lungs: negative findings: normal respiratory rate and rhythm, normal effort  Musculoskeletal: negative findings: no evidence of joint instability, no evidence of muscle atrophy, no deformities present  Neurologic: intact, CN II through XII grossly intact    Medical decision making/discussion: Medications have been refilled to include inhalers, Symbicort and albuterol.  She was reminded that referral was already placed to gastroenterology about 2 weeks ago, she was advised to call the referral department to have them go ahead and continue processing referral.  She is to otherwise follow-up with me in 6 months, sooner if needed.    Patti was seen today for dysphasia.    Diagnoses and all orders for this visit:    Other dysphagia    Esophageal stricture    Chronic obstructive pulmonary disease, unspecified COPD type ([facility])  -     albuterol (VENTOLIN HFA) 108 (90 Base) MCG/ACT Aero Soln inhalation aerosol; INHALE 1 PUFF PO QID PRN  -     budesonide-formoterol (SYMBICORT) 80-4.5 MCG/ACT Aerosol; Inhale 2 Puffs by mouth 2 Times a Day.        Return if symptoms worsen or fail to improve, for Follow-up.        Please note that this dictation was created using voice recognition software. I have made every reasonable attempt to correct obvious errors, but I expect that there are errors of grammar and possibly content that I did not discover before finalizing the note.        " Additional Notes: Patient consent was obtained to proceed with the visit and recommended plan of care after discussion of all risks and benefits, including the risks of COVID-19 exposure. Detail Level: Simple Render Risk Assessment In Note?: no Additional Notes: pt to follow up in 2 weeks for surgery

## 2021-04-06 DIAGNOSIS — R50.9 FEVER, UNSPECIFIED FEVER CAUSE: ICD-10-CM

## 2021-04-06 DIAGNOSIS — R30.0 DYSURIA: ICD-10-CM

## 2021-04-09 ENCOUNTER — HOSPITAL ENCOUNTER (OUTPATIENT)
Dept: LAB | Facility: MEDICAL CENTER | Age: 56
End: 2021-04-09
Attending: NURSE PRACTITIONER
Payer: MEDICARE

## 2021-04-09 DIAGNOSIS — R30.0 DYSURIA: ICD-10-CM

## 2021-04-09 DIAGNOSIS — R50.9 FEVER, UNSPECIFIED FEVER CAUSE: ICD-10-CM

## 2021-04-09 LAB
ALBUMIN SERPL BCP-MCNC: 4.4 G/DL (ref 3.2–4.9)
ALBUMIN/GLOB SERPL: 1.4 G/DL
ALP SERPL-CCNC: 138 U/L (ref 30–99)
ALT SERPL-CCNC: 16 U/L (ref 2–50)
ANION GAP SERPL CALC-SCNC: 9 MMOL/L (ref 7–16)
APPEARANCE UR: CLEAR
AST SERPL-CCNC: 17 U/L (ref 12–45)
BACTERIA #/AREA URNS HPF: NEGATIVE /HPF
BASOPHILS # BLD AUTO: 0.7 % (ref 0–1.8)
BASOPHILS # BLD: 0.06 K/UL (ref 0–0.12)
BILIRUB SERPL-MCNC: 0.3 MG/DL (ref 0.1–1.5)
BILIRUB UR QL STRIP.AUTO: NEGATIVE
BUN SERPL-MCNC: 15 MG/DL (ref 8–22)
CALCIUM SERPL-MCNC: 9.6 MG/DL (ref 8.5–10.5)
CHLORIDE SERPL-SCNC: 104 MMOL/L (ref 96–112)
CO2 SERPL-SCNC: 25 MMOL/L (ref 20–33)
COLOR UR: YELLOW
CREAT SERPL-MCNC: 0.78 MG/DL (ref 0.5–1.4)
EOSINOPHIL # BLD AUTO: 0.23 K/UL (ref 0–0.51)
EOSINOPHIL NFR BLD: 2.7 % (ref 0–6.9)
EPI CELLS #/AREA URNS HPF: NORMAL /HPF
ERYTHROCYTE [DISTWIDTH] IN BLOOD BY AUTOMATED COUNT: 48 FL (ref 35.9–50)
GLOBULIN SER CALC-MCNC: 3.2 G/DL (ref 1.9–3.5)
GLUCOSE SERPL-MCNC: 86 MG/DL (ref 65–99)
GLUCOSE UR STRIP.AUTO-MCNC: NEGATIVE MG/DL
HCT VFR BLD AUTO: 47 % (ref 37–47)
HGB BLD-MCNC: 14.8 G/DL (ref 12–16)
IMM GRANULOCYTES # BLD AUTO: 0.03 K/UL (ref 0–0.11)
IMM GRANULOCYTES NFR BLD AUTO: 0.4 % (ref 0–0.9)
KETONES UR STRIP.AUTO-MCNC: NEGATIVE MG/DL
LEUKOCYTE ESTERASE UR QL STRIP.AUTO: ABNORMAL
LYMPHOCYTES # BLD AUTO: 1.76 K/UL (ref 1–4.8)
LYMPHOCYTES NFR BLD: 20.9 % (ref 22–41)
MCH RBC QN AUTO: 28.4 PG (ref 27–33)
MCHC RBC AUTO-ENTMCNC: 31.5 G/DL (ref 33.6–35)
MCV RBC AUTO: 90 FL (ref 81.4–97.8)
MICRO URNS: ABNORMAL
MONOCYTES # BLD AUTO: 0.49 K/UL (ref 0–0.85)
MONOCYTES NFR BLD AUTO: 5.8 % (ref 0–13.4)
NEUTROPHILS # BLD AUTO: 5.87 K/UL (ref 2–7.15)
NEUTROPHILS NFR BLD: 69.5 % (ref 44–72)
NITRITE UR QL STRIP.AUTO: NEGATIVE
NRBC # BLD AUTO: 0 K/UL
NRBC BLD-RTO: 0 /100 WBC
PH UR STRIP.AUTO: 6 [PH] (ref 5–8)
PLATELET # BLD AUTO: 290 K/UL (ref 164–446)
PMV BLD AUTO: 11.4 FL (ref 9–12.9)
POTASSIUM SERPL-SCNC: 4.3 MMOL/L (ref 3.6–5.5)
PROT SERPL-MCNC: 7.6 G/DL (ref 6–8.2)
PROT UR QL STRIP: NEGATIVE MG/DL
RBC # BLD AUTO: 5.22 M/UL (ref 4.2–5.4)
RBC # URNS HPF: NORMAL /HPF
RBC UR QL AUTO: NEGATIVE
SODIUM SERPL-SCNC: 138 MMOL/L (ref 135–145)
SP GR UR STRIP.AUTO: 1.02
UROBILINOGEN UR STRIP.AUTO-MCNC: 0.2 MG/DL
WBC # BLD AUTO: 8.4 K/UL (ref 4.8–10.8)
WBC #/AREA URNS HPF: NORMAL /HPF

## 2021-04-09 PROCEDURE — 36415 COLL VENOUS BLD VENIPUNCTURE: CPT

## 2021-04-09 PROCEDURE — 81001 URINALYSIS AUTO W/SCOPE: CPT

## 2021-04-09 PROCEDURE — 80053 COMPREHEN METABOLIC PANEL: CPT

## 2021-04-09 PROCEDURE — 85025 COMPLETE CBC W/AUTO DIFF WBC: CPT

## 2021-04-19 RX ORDER — HYDROXYCHLOROQUINE SULFATE 200 MG/1
200 TABLET, FILM COATED ORAL DAILY
Qty: 90 TABLET | Refills: 0 | OUTPATIENT
Start: 2021-04-19

## 2021-04-19 NOTE — TELEPHONE ENCOUNTER
Received request via: Pharmacy    Was the patient seen in the last year in this department? Yes    Does the patient have an active prescription (recently filled or refills available) for medication(s) requested? No     LAST  [id number]

## 2021-04-21 ENCOUNTER — IMMUNIZATION (OUTPATIENT)
Dept: FAMILY PLANNING/WOMEN'S HEALTH CLINIC | Facility: IMMUNIZATION CENTER | Age: 56
End: 2021-04-21
Payer: MEDICARE

## 2021-04-21 DIAGNOSIS — R11.0 NAUSEA: ICD-10-CM

## 2021-04-21 DIAGNOSIS — Z23 ENCOUNTER FOR VACCINATION: Primary | ICD-10-CM

## 2021-04-21 DIAGNOSIS — J30.9 ALLERGIC RHINITIS, UNSPECIFIED SEASONALITY, UNSPECIFIED TRIGGER: ICD-10-CM

## 2021-04-21 PROCEDURE — 0001A PFIZER SARS-COV-2 VACCINE: CPT

## 2021-04-21 PROCEDURE — 91300 PFIZER SARS-COV-2 VACCINE: CPT

## 2021-04-23 DIAGNOSIS — J30.9 ALLERGIC RHINITIS, UNSPECIFIED SEASONALITY, UNSPECIFIED TRIGGER: ICD-10-CM

## 2021-04-23 RX ORDER — BENZONATATE 100 MG/1
100 CAPSULE ORAL 3 TIMES DAILY PRN
Qty: 30 CAPSULE | Refills: 0 | Status: SHIPPED | OUTPATIENT
Start: 2021-04-23 | End: 2021-05-13

## 2021-04-23 RX ORDER — FLUTICASONE PROPIONATE 50 MCG
1 SPRAY, SUSPENSION (ML) NASAL 2 TIMES DAILY
Qty: 18 G | Refills: 3 | Status: SHIPPED | OUTPATIENT
Start: 2021-04-23 | End: 2021-04-26

## 2021-04-23 RX ORDER — ONDANSETRON 4 MG/1
4 TABLET, ORALLY DISINTEGRATING ORAL EVERY 6 HOURS PRN
Qty: 20 TABLET | Refills: 0 | Status: SHIPPED | OUTPATIENT
Start: 2021-04-23 | End: 2021-05-13

## 2021-04-26 RX ORDER — FLUTICASONE PROPIONATE 50 MCG
SPRAY, SUSPENSION (ML) NASAL
Qty: 48 G | Refills: 3 | Status: SHIPPED | OUTPATIENT
Start: 2021-04-26 | End: 2022-04-08

## 2021-05-03 DIAGNOSIS — G89.29 CHRONIC ELBOW PAIN, LEFT: ICD-10-CM

## 2021-05-03 DIAGNOSIS — M25.522 CHRONIC ELBOW PAIN, LEFT: ICD-10-CM

## 2021-05-07 ENCOUNTER — OFFICE VISIT (OUTPATIENT)
Dept: MEDICAL GROUP | Facility: CLINIC | Age: 56
End: 2021-05-07
Payer: MEDICARE

## 2021-05-07 VITALS
HEIGHT: 60 IN | TEMPERATURE: 98.5 F | RESPIRATION RATE: 18 BRPM | SYSTOLIC BLOOD PRESSURE: 122 MMHG | WEIGHT: 186.95 LBS | OXYGEN SATURATION: 95 % | BODY MASS INDEX: 36.7 KG/M2 | DIASTOLIC BLOOD PRESSURE: 78 MMHG | HEART RATE: 88 BPM

## 2021-05-07 DIAGNOSIS — M54.12 LEFT CERVICAL RADICULOPATHY: ICD-10-CM

## 2021-05-07 DIAGNOSIS — M77.12 LATERAL EPICONDYLITIS, LEFT ELBOW: ICD-10-CM

## 2021-05-07 DIAGNOSIS — M96.1 FAILED BACK SYNDROME OF CERVICAL SPINE: ICD-10-CM

## 2021-05-07 PROCEDURE — 99214 OFFICE O/P EST MOD 30 MIN: CPT | Performed by: FAMILY MEDICINE

## 2021-05-07 ASSESSMENT — ENCOUNTER SYMPTOMS
VOMITING: 0
SHORTNESS OF BREATH: 0
CHILLS: 0
DIZZINESS: 0
NAUSEA: 1
FEVER: 0

## 2021-05-07 ASSESSMENT — FIBROSIS 4 INDEX: FIB4 SCORE: 0.82

## 2021-05-07 NOTE — PROGRESS NOTES
Subjective:      Patti Tello is a 55 y.o. female who presents with Elbow Pain (EP/ L elbow pain )     Referred by PATRICK Chris  for evaluation of LEFT elbow pain    HPI   History of LEFT arm and hand numbness and LEFT elbow pain  Symptoms for the past months (since about October 2020)  Insidious onset without injury  Now getting worse affecting her LEFT wrist as well  Underwent cervical spine surgery in January 2020 (by Dr. Luna) with cervical   Pain is constantly burning and associated with pressure  Affecting predominantly the LATERAL arm/elbow and into the LEFT hand both on the radial and ulnar sides  Improved with oxycodone only helps minimally  Worse with sleeping, POSITIVE night symptoms and she wakes in excruciating pain  spine fusion performed at [facility] with preoperative and postoperative MRI studies performed at [facility] as well  Denies c-spine pain    Patient denies having undergone formal physical therapy after her cervical spine surgery due to the coronavirus pandemic    She did get a corticosteroid injection in her LEFT lateral epicondyle approximately 6 weeks ago (around mid April 2021) by Dr. Harvey in [place]  Unfortunately, the shot only lasted about 10 to 14 days and symptoms came back    Sees pain management in [place] for unrelated issue (Dr. Rodriguez) since fall 2018 which is for lumbar spine management/chronic pain    Review of Systems   Constitutional: Negative for chills and fever.   Respiratory: Negative for shortness of breath.    Cardiovascular: Negative for chest pain.   Gastrointestinal: Positive for nausea. Negative for vomiting.   Neurological: Negative for dizziness.     PMH:  has a past medical history of Anesthesia, Anxiety and depression, Arthritis, Asthma, Back injury, Back pain, Bronchitis (2010), Cancer ([facility]) (1995), Chickenpox, Chronic LBP, Chronic neck pain, Cold intolerance (1/24/2012), COPD (chronic  obstructive pulmonary disease) ([facility]), Cystic fibrosis ([facility]), Dental disorder, Depression, Diabetes, GERD (gastroesophageal reflux disease), H/O gastric bypass (10/11/2013), Herniated nucleus pulposus, L4-5 (3/18/2010), breast cancer (10/11/2013), Hypertension, Itching due to drug (6/2/2011), Kidney stone, Nephrolithiasis (10/11/2013), Obesity, Osteoporosis, Other specified symptom associated with female genital organs, Panic disorder, Pneumonia, PONV (postoperative nausea and vomiting), PTSD (post-traumatic stress disorder), Restless leg syndrome, Rheumatoid arthritis ([facility]), S/P laminectomy (10/22/2012), Seizure ([facility]) (09/2020), Thoracic or lumbosacral neuritis or radiculitis, unspecified (10/22/2012), and Trauma.  MEDS:   Current Outpatient Medications:   •  fluticasone (FLONASE) 50 MCG/ACT nasal spray, SPRAY 1 SPRAY INTO EACH NOSTRIL INTO AFFECTED NOSTRIL(S) TWICE DAILY, Disp: 48 g, Rfl: 3  •  benzonatate (TESSALON) 100 MG Cap, Take 1 capsule by mouth 3 times a day as needed., Disp: 30 capsule, Rfl: 0  •  ondansetron (ZOFRAN ODT) 4 MG TABLET DISPERSIBLE, Take 1 tablet by mouth every 6 hours as needed., Disp: 20 tablet, Rfl: 0  •  metroNIDAZOLE (METROGEL-VAGINAL) 0.75 % Gel, Insert 1 Applicator into the vagina every bedtime. For 5 nights, Disp: 1 Each, Rfl: 0  •  nitrofurantoin (MACROBID) 100 MG Cap, Take 1 capsule by mouth 2 times a day., Disp: 10 capsule, Rfl: 0  •  albuterol 108 (90 Base) MCG/ACT Aero Soln inhalation aerosol, Inhale 2 Puffs every 6 hours as needed for Shortness of Breath., Disp: 8.5 g, Rfl: 0  •  amoxicillin (AMOXIL) 875 MG tablet, Take 1 Tab by mouth 2 times a day., Disp: 20 Tab, Rfl: 0  •  nystatin (MYCOSTATIN) 552336 UNIT/ML Suspension, Take 5 mL by mouth 4 times a day. Swish and swallow 5 mls 4 times a day for 1 week, Disp: 473 mL, Rfl: 1  •  hydroxychloroquine (PLAQUENIL) 200 MG Tab, Take 1 Tab by mouth every day., Disp: 90 Tab, Rfl: 0  •  Docusate Sodium (COLACE PO), Take 1 Tab by mouth  every day., Disp: , Rfl:   •  Budesonide-Formoterol Fumarate (SYMBICORT INH), Inhale  by mouth as needed., Disp: , Rfl:   •  diphenhydrAMINE (BENADRYL) 25 MG Tab, Take 25 mg by mouth every bedtime. Takes 2 tabs at hs, Disp: , Rfl:   •  sertraline (ZOLOFT) 100 MG Tab, Take  by mouth. Take 1 tablet by mouth every morning, Disp: , Rfl:   •  propranolol (INDERAL) 10 MG Tab, Take 10 mg by mouth 3 times a day., Disp: , Rfl:   •  oxycodone (OXY-IR) 15 MG immediate release tablet, TAKE 1 TABLET BY MOUTH EVERY 8 HOURS FOR 30 DAYS M54 DNF 6/12/19, Disp: , Rfl: 0  ALLERGIES:   Allergies   Allergen Reactions   • Buprenorphine-Naloxone Hives, Shortness of Breath and Swelling   • Gabapentin      seizures   • Suboxone Hives, Shortness of Breath and Swelling   • Morphine Rash     tachycardia   • Shrimp (Diagnostic) Itching   • Contrast Media With Iodine [Iodine] Itching     Ok with benadryl     SURGHX:   Past Surgical History:   Procedure Laterality Date   • PB IMPLANT NEUROSTIM/  3/11/2021    Procedure: INSERTION, NEUROSTIMULATOR, PERMANENT, SPINAL CORD;  Surgeon: Aditi Ash M.D.;  Location: [facility];  Service: Pain Management   • BLADDER SLING FEMALE N/A 11/4/2020    Procedure: BLADDER SLING, FEMALE- RETRO PUBIC SLING;  Surgeon: Kat Carrion M.D.;  Location: [facility];  Service: Gynecology   • CYSTOSCOPY N/A 11/4/2020    Procedure: CYSTOSCOPY;  Surgeon: Kat Carrion M.D.;  Location: [facility];  Service: Gynecology   • URETEROSCOPY  10/10/2013    Performed by Molly Resendiz M.D. at [facility] ORS   • LASERTRIPSY  10/10/2013    Performed by Molly Resendiz M.D. at [facility] ORS   • LUMBAR FUSION POSTERIOR  10/22/2012    Performed by Alfred Branham M.D. at [facility] ORS   • LUMBAR LAMINECTOMY DISKECTOMY  10/22/2012    Performed by Alfred Branham M.D. at [facility] ORS   • OTHER ORTHOPEDIC SURGERY  10/01/2012    lumbar  liz, Dr Branham   • OTHER ABDOMINAL SURGERY  2006    gastric bypass   • ABDOMINAL HYSTERECTOMY TOTAL      DUE TO FIBROIDS   • BREAST RECONSTRUCTION      Lt breast   • CRANIOTOMY      DUE TO BRAIN INJURY   • HYSTERECTOMY LAPAROSCOPY     • LAMINOTOMY     • MASTECTOMY      Lt breast   • OTHER      left ear drum surgery   • OTHER ABDOMINAL SURGERY      hernia repair   • PRIMARY C SECTION      x 3   • SLEEVE,MAXIMILIAN VASO THIGH     • TONSILLECTOMY     • US-CYST ASPIRATION-BREAST INITIAL     • VENTILATOR CONTINUOUS       SOCHX:  reports that she has never smoked. She has never used smokeless tobacco. She reports that she does not drink alcohol and does not use drugs.  FH: Family history was reviewed, no pertinent findings to report     Objective:     /78 (BP Location: Right arm, Patient Position: Sitting, BP Cuff Size: Adult)   Pulse 88   Temp 36.9 °C (98.5 °F) (Temporal)   Resp 18   Ht 1.524 m (5')   Wt 84.8 kg (186 lb 15.2 oz)   LMP 10/05/1999   SpO2 95%   BMI 36.51 kg/m²      Physical Exam     No acute distress  Alert and oriented ×3    Cervical spine:  Range of motion LIMITED to approximately 60% normal motion in all planes  Spurling's testing was positive on the LEFT compared to the right   No cervical spine tenderness  Strength testing 4/5 bicep, tricep, wrist extension, wrist flexion and finger abduction on the LEFT compared to 5/5 on the right   DTRs: 2 out of 4 bilaterally for biceps, brachial radialis  Martin's testing NEGATIVE    BILATERAL Shoulder exam:  Range of motion limited with abduction, worse on the LEFT compared to the right  Strength testing NORMAL with empty can, internal rotation, external rotation and lift off testing  NO tenderness of the supraspinatus, infraspinatus or biceps tendon  Apprehension testing NORMAL    BILATERAL ELBOW exam  Range of motion intact  No swelling  POSITIVE tenderness of the lateral epicondyle without tenderness of the medial epicondyle on the LEFT compared to  right  Resisted finger extension test is POSITIVE on the LEFT compared to right     Assessment/Plan:        1. Lateral epicondylitis, left elbow  REFERRAL TO PHYSICAL THERAPY   2. Left cervical radiculopathy  REFERRAL TO PHYSICAL THERAPY   3. Failed back syndrome of cervical spine  REFERRAL TO PHYSICAL THERAPY     History of LEFT arm and hand numbness and LEFT elbow pain  Symptoms for the past months (since about October 2020)  Insidious onset without injury  Now getting worse affecting her LEFT wrist as well  Underwent cervical spine surgery in January 2020 with cervical   Pain is constantly burning and associated with pressure    Suspect that her current symptoms are related to her cervical spine.  Recommend she follow-up with her pain management physician and possibly with her spine surgeon.    YONG CONNOLLY (Pain Management)  [phone] (Work)  [address] [id number]  [place] [id number]  Physical Medicine & Rehabilitation       Thank you Bethany Saab, A.P.R.N. for allowing me to participate in care for your patient.

## 2021-05-11 ENCOUNTER — HOSPITAL ENCOUNTER (EMERGENCY)
Dept: HOSPITAL 8 - ED | Age: 56
Discharge: HOME | End: 2021-05-11
Payer: MEDICARE

## 2021-05-11 VITALS — HEIGHT: 60 IN | BODY MASS INDEX: 37.44 KG/M2 | WEIGHT: 190.7 LBS

## 2021-05-11 VITALS — DIASTOLIC BLOOD PRESSURE: 58 MMHG | SYSTOLIC BLOOD PRESSURE: 113 MMHG

## 2021-05-11 DIAGNOSIS — J44.9: ICD-10-CM

## 2021-05-11 DIAGNOSIS — Z90.710: ICD-10-CM

## 2021-05-11 DIAGNOSIS — M54.2: ICD-10-CM

## 2021-05-11 DIAGNOSIS — M25.522: ICD-10-CM

## 2021-05-11 DIAGNOSIS — M79.622: Primary | ICD-10-CM

## 2021-05-11 PROCEDURE — 99283 EMERGENCY DEPT VISIT LOW MDM: CPT

## 2021-05-11 PROCEDURE — 96372 THER/PROPH/DIAG INJ SC/IM: CPT

## 2021-05-11 PROCEDURE — 99406 BEHAV CHNG SMOKING 3-10 MIN: CPT

## 2021-05-13 ENCOUNTER — OFFICE VISIT (OUTPATIENT)
Dept: MEDICAL GROUP | Facility: PHYSICIAN GROUP | Age: 56
End: 2021-05-13
Payer: MEDICARE

## 2021-05-13 ENCOUNTER — IMMUNIZATION (OUTPATIENT)
Dept: FAMILY PLANNING/WOMEN'S HEALTH CLINIC | Facility: IMMUNIZATION CENTER | Age: 56
End: 2021-05-13

## 2021-05-13 VITALS
OXYGEN SATURATION: 96 % | BODY MASS INDEX: 37.11 KG/M2 | SYSTOLIC BLOOD PRESSURE: 138 MMHG | HEART RATE: 86 BPM | DIASTOLIC BLOOD PRESSURE: 84 MMHG | TEMPERATURE: 98 F | WEIGHT: 189 LBS | RESPIRATION RATE: 14 BRPM | HEIGHT: 60 IN

## 2021-05-13 DIAGNOSIS — R73.01 ELEVATED FASTING GLUCOSE: ICD-10-CM

## 2021-05-13 DIAGNOSIS — H66.002 ACUTE SUPPURATIVE OTITIS MEDIA OF LEFT EAR WITHOUT SPONTANEOUS RUPTURE OF TYMPANIC MEMBRANE, RECURRENCE NOT SPECIFIED: ICD-10-CM

## 2021-05-13 DIAGNOSIS — Z86.39 HISTORY OF DIABETES MELLITUS: ICD-10-CM

## 2021-05-13 DIAGNOSIS — Z23 ENCOUNTER FOR VACCINATION: Primary | ICD-10-CM

## 2021-05-13 PROCEDURE — 0002A PFIZER SARS-COV-2 VACCINE: CPT

## 2021-05-13 PROCEDURE — 99214 OFFICE O/P EST MOD 30 MIN: CPT | Performed by: NURSE PRACTITIONER

## 2021-05-13 PROCEDURE — 83036 HEMOGLOBIN GLYCOSYLATED A1C: CPT | Performed by: NURSE PRACTITIONER

## 2021-05-13 PROCEDURE — 91300 PFIZER SARS-COV-2 VACCINE: CPT

## 2021-05-13 RX ORDER — ARIPIPRAZOLE 10 MG/1
TABLET ORAL
COMMUNITY
End: 2021-05-13

## 2021-05-13 RX ORDER — SULFAMETHOXAZOLE AND TRIMETHOPRIM 800; 160 MG/1; MG/1
TABLET ORAL
COMMUNITY
Start: 2021-03-25 | End: 2021-05-13

## 2021-05-13 RX ORDER — OXYCODONE HYDROCHLORIDE 10 MG/1
TABLET ORAL
COMMUNITY
End: 2021-05-13

## 2021-05-13 RX ORDER — METHYLPREDNISOLONE 4 MG/1
TABLET ORAL
COMMUNITY
Start: 2021-05-05 | End: 2021-05-13

## 2021-05-13 RX ORDER — CEPHALEXIN 500 MG/1
CAPSULE ORAL
COMMUNITY
Start: 2021-03-22 | End: 2021-05-13

## 2021-05-13 RX ORDER — AMOXICILLIN 500 MG/1
500 CAPSULE ORAL 2 TIMES DAILY
Qty: 14 CAPSULE | Refills: 0 | Status: SHIPPED | OUTPATIENT
Start: 2021-05-13 | End: 2021-06-01

## 2021-05-13 RX ORDER — CONJUGATED ESTROGENS 0.62 MG/G
CREAM VAGINAL
COMMUNITY
Start: 2021-03-20 | End: 2021-05-13

## 2021-05-13 RX ORDER — TOPIRAMATE 25 MG/1
TABLET ORAL
COMMUNITY
Start: 2021-03-30 | End: 2021-05-13

## 2021-05-13 RX ORDER — CYCLOBENZAPRINE HCL 10 MG
TABLET ORAL
COMMUNITY
Start: 2021-04-19 | End: 2021-05-13

## 2021-05-13 ASSESSMENT — FIBROSIS 4 INDEX: FIB4 SCORE: 0.82

## 2021-05-13 NOTE — PROGRESS NOTES
Chief Complaint   Patient presents with   • Blood Sugar Problem       HISTORY OF PRESENT ILLNESS: Patient is a 55 y.o. female established patient who presents today to discuss concerns for diabetes    Acute suppurative otitis media of left ear without spontaneous rupture of tympanic membrane  Patient has had chronic pain in her left ear  She has been seen for this several times  In the past she has been told this is allergy related but today upon physical examination her TM was noted to be mildly bulging and erythematous will get in and treat for an acute otitis media with antibiotics  She does understand that she continues to have issues with her left ear we need to consider referral to ENT for further evaluation and treatment      History of diabetes mellitus  Patient does have past history of type 2 diabetes and she is concerned for recurrence of type 2 diabetes  A1c in February was 5.6% it is now up to 5.8  She believes she is having episodes of hypoglycemia where she becomes sweaty and irritable but at the time she has the symptoms she does not check her blood sugar  She was advised to make sure she has snacks on hand with well-balanced with carbohydrates and protein  And when she does become  symptomatic advised her to check blood sugar      Patient Active Problem List    Diagnosis Date Noted   • Acute suppurative otitis media of left ear without spontaneous rupture of tympanic membrane 05/17/2021   • Thrush 09/24/2020   • Slow transit constipation 08/04/2020   • Dysphasia 08/04/2020   • Poor balance 05/26/2020   • Nausea 05/26/2020   • Non-smoker 04/25/2020   • Mild intermittent asthma without complication 04/25/2020   • Cough 03/03/2020   • Allergic rhinitis 03/03/2020   • Moderate persistent asthma without complication 01/16/2020   • Other dysphagia 12/09/2019   • Esophageal stricture 12/09/2019   • Burn 07/03/2019   • Malodorous urine 07/03/2019   • Chronic obstructive pulmonary disease (HCC) 07/02/2019    • Abscess 06/04/2019   • Allergic conjunctivitis of both eyes 05/15/2019   • Hyperparathyroidism (HCC) 04/11/2019   • Benign paroxysmal positional vertigo due to bilateral vestibular disorder 04/11/2019   • 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   • Elevated PTHrP level 10/09/2018   • Vitiligo 09/27/2018   • Vitamin B12 deficiency 09/27/2018   • Abnormal laboratory test 09/25/2018   • TOMY (obstructive sleep apnea) 09/17/2018   • History of diabetes mellitus 09/04/2018   • Positive NAKUL (antinuclear antibody) 07/24/2018   • Arthritis, multiple joint involvement 07/24/2018   • Family history of systemic lupus erythematosus (SLE) in mother 07/24/2018   • Iron deficiency anemia 07/24/2018   • Skin lesions 07/24/2018   • Chronic fatigue 06/20/2018   • Dysuria 06/07/2018   • Heartburn 05/08/2018   • Muscle spasm of both lower legs 03/26/2018   • Calcaneal spur of both feet 03/15/2018   • Pain management contract broken 03/15/2018   • Obesity (BMI 30-39.9) 01/18/2018   • Seasonal allergies 10/05/2017   • Bipolar affective disorder, currently depressed, moderate (HCC) 01/24/2017   • History of gastric bypass 10/11/2013   • Chronic pain syndrome 09/16/2013   • Anxiety 12/06/2012   • Lumbar stenosis 10/22/2012   • Multilevel degenerative disc disease 10/01/2012   • Cervical radiculopathy, chronic 05/04/2011   • Vitamin D deficiency 11/16/2010   • Arthritis    • Panic attacks    • PTSD (post-traumatic stress disorder)    • Insomnia due to medical condition 11/10/2010       Allergies:Buprenorphine-naloxone, Gabapentin, Suboxone, Morphine, Shrimp (diagnostic), and Contrast media with iodine [iodine]    Current Outpatient Medications   Medication Sig Dispense Refill   • amoxicillin (AMOXIL) 500 MG Cap Take 1 capsule by mouth 2 times a day. 14 capsule 0   • fluticasone (FLONASE) 50 MCG/ACT nasal spray SPRAY 1 SPRAY INTO EACH NOSTRIL INTO AFFECTED NOSTRIL(S) TWICE DAILY 48 g 3   • albuterol 108 (90 Base) MCG/ACT Aero Soln inhalation  aerosol Inhale 2 Puffs every 6 hours as needed for Shortness of Breath. 8.5 g 0   • Docusate Sodium (COLACE PO) Take 1 Tab by mouth every day.     • Budesonide-Formoterol Fumarate (SYMBICORT INH) Inhale  by mouth as needed.     • diphenhydrAMINE (BENADRYL) 25 MG Tab Take 25 mg by mouth every bedtime. Takes 2 tabs at hs     • sertraline (ZOLOFT) 100 MG Tab Take  by mouth. Take 1 tablet by mouth every morning     • propranolol (INDERAL) 10 MG Tab Take 10 mg by mouth 3 times a day.     • oxycodone (OXY-IR) 15 MG immediate release tablet TAKE 1 TABLET BY MOUTH EVERY 8 HOURS FOR 30 DAYS M54 DNF 19  0     No current facility-administered medications for this visit.       Social History     Tobacco Use   • Smoking status: Never Smoker   • Smokeless tobacco: Never Used   Vaping Use   • Vaping Use: Never used   Substance Use Topics   • Alcohol use: No   • Drug use: No       Family Status   Relation Name Status   • Fa   at age 64   • Mo   at age 75   • Sis 2 Alive   • Bro  Alive   • MAunt  (Not Specified)   • MUnc  (Not Specified)   • MGMo  (Not Specified)   • MGFa  (Not Specified)     Family History   Problem Relation Age of Onset   • Diabetes Father    • Cancer Father    • Hypertension Father    • Hyperlipidemia Father    • Cancer Mother    • Other Mother         SLE   • Diabetes Mother    • Hypertension Mother    • Hyperlipidemia Mother    • Stroke Mother    • Sleep Apnea Brother    • Cancer Maternal Aunt    • Diabetes Maternal Aunt    • Diabetes Maternal Uncle    • Diabetes Maternal Grandmother    • Diabetes Maternal Grandfather        Review of Systems:   Constitutional: Negative for fever, chills, weight loss and malaise/fatigue.   HENT: Positive per HPI    Eyes: Negative for blurred vision.   Respiratory: Negative for cough, sputum production, shortness of breath and wheezing.    Cardiovascular: Negative for chest pain, palpitations, orthopnea and leg swelling.   Gastrointestinal: Negative for  heartburn, nausea, vomiting and abdominal pain.   Genitourinary/Renal: Negative for dysuria, urgency and frequency.   Musculoskeletal: Negative for myalgias, back pain and joint pain.   Skin: Negative for rash and itching.   Neurological: Negative for dizziness, tingling, tremors, sensory change, focal weakness and headaches.   Endo/Heme/Allergies: Does not bruise/bleed easily.       Exam:  /84   Pulse 86   Temp 36.7 °C (98 °F) (Temporal)   Resp 14   Ht 1.524 m (5')   Wt 85.7 kg (189 lb)   SpO2 96%   General:  Well nourished, well developed female in NAD  Skin: warm, dry, intact, no evidence of rash or concerning lesions  Head: is grossly normal.  HEENT: Positive for left TM mild bulging and erythema  Pulmonary: Clear to ausculation. Normal effort. No rales, ronchi, or wheezing.  Cardiovascular: Regular rate and rhythm without murmur. Carotid and radial pulses are intact and equal bilaterally.  Abdomen: soft, non-tender, positive bowel sounds  Musculoskeletal: no clubbing, cyanosis, or edema.  Psych/mental: no depression, anxiety, hallucinations  Neuro: alert, intact, CN 2-12 grossly intact    Medical decision-making and discussion:    Again as mentioned above, patient does understand that she continues to have ongoing pain in her ears we will need to consider referral to ENT.  We will go ahead and treat for AOM with amoxicillin.  She was advised to continue to keep it snacks on hand and check her blood sugar when she becomes symptomatic with symptoms suspicious for hypoglycemia        Assessment/Plan:  Patti was seen today for blood sugar problem.    Diagnoses and all orders for this visit:    Acute suppurative otitis media of left ear without spontaneous rupture of tympanic membrane, recurrence not specified  -     amoxicillin (AMOXIL) 500 MG Cap; Take 1 capsule by mouth 2 times a day.    History of diabetes mellitus    Elevated fasting glucose  -     Cancel: POCT Hemoglobin A1C  -     POCT Hemoglobin  A1C         Return if symptoms worsen or fail to improve, for Follow-up.    Please note that this dictation was created using voice recognition software. I have made every reasonable attempt to correct obvious errors, but I expect that there are errors of grammar and possibly content that I did not discover before finalizing the note.

## 2021-05-17 PROBLEM — H66.002 ACUTE SUPPURATIVE OTITIS MEDIA OF LEFT EAR WITHOUT SPONTANEOUS RUPTURE OF TYMPANIC MEMBRANE: Status: ACTIVE | Noted: 2021-05-17

## 2021-05-17 LAB
HBA1C MFR BLD: 5.8 % (ref 0–5.6)
INT CON NEG: ABNORMAL
INT CON POS: ABNORMAL

## 2021-05-17 NOTE — ASSESSMENT & PLAN NOTE
Patient does have past history of type 2 diabetes and she is concerned for recurrence of type 2 diabetes  A1c in February was 5.6% it is now up to 5.8  She believes she is having episodes of hypoglycemia where she becomes sweaty and irritable but at the time she has the symptoms she does not check her blood sugar  She was advised to make sure she has snacks on hand with well-balanced with carbohydrates and protein  And when she does become  symptomatic advised her to check blood sugar

## 2021-05-17 NOTE — ASSESSMENT & PLAN NOTE
Patient has had chronic pain in her left ear  She has been seen for this several times  In the past she has been told this is allergy related but today upon physical examination her TM was noted to be mildly bulging and erythematous will get in and treat for an acute otitis media with antibiotics  She does understand that she continues to have issues with her left ear we need to consider referral to ENT for further evaluation and treatment

## 2021-05-24 ENCOUNTER — PATIENT MESSAGE (OUTPATIENT)
Dept: MEDICAL GROUP | Facility: PHYSICIAN GROUP | Age: 56
End: 2021-05-24

## 2021-05-25 NOTE — TELEPHONE ENCOUNTER
From: Patti Tello  To: Nurse Practitioner Bethany Saab  Sent: 5/24/2021 9:07 PM PDT  Subject: Non-Urgent Medical Question    Because I can't remember what I'm there for. Sometimes I can't remember if I took my pain meds or not. I call people and can't remember why I call them. The sun really bothers me I can't be in the sun very long because I feel like I'm going to faint. I just don't know why my body twitches alot or jerks alot and why I'm always hurting causing me lots of tears. I try not to take my pain meds unless necessary. And I can't remember things.     Thanks   Patti Tello   [phone]

## 2021-06-01 ENCOUNTER — HOSPITAL ENCOUNTER (OUTPATIENT)
Dept: LAB | Facility: MEDICAL CENTER | Age: 56
End: 2021-06-01
Attending: NURSE PRACTITIONER
Payer: MEDICARE

## 2021-06-01 ENCOUNTER — OFFICE VISIT (OUTPATIENT)
Dept: MEDICAL GROUP | Facility: PHYSICIAN GROUP | Age: 56
End: 2021-06-01
Payer: MEDICARE

## 2021-06-01 VITALS
SYSTOLIC BLOOD PRESSURE: 140 MMHG | HEIGHT: 60 IN | DIASTOLIC BLOOD PRESSURE: 88 MMHG | WEIGHT: 181 LBS | OXYGEN SATURATION: 97 % | BODY MASS INDEX: 35.53 KG/M2 | TEMPERATURE: 98.2 F | HEART RATE: 89 BPM | RESPIRATION RATE: 16 BRPM

## 2021-06-01 DIAGNOSIS — R41.3 MEMORY CHANGE: ICD-10-CM

## 2021-06-01 DIAGNOSIS — R10.31 RLQ ABDOMINAL PAIN: ICD-10-CM

## 2021-06-01 DIAGNOSIS — Z86.39 HISTORY OF DIABETES MELLITUS: ICD-10-CM

## 2021-06-01 DIAGNOSIS — T14.8XXA MUSCULOSKELETAL STRAIN: ICD-10-CM

## 2021-06-01 PROCEDURE — 99214 OFFICE O/P EST MOD 30 MIN: CPT | Performed by: NURSE PRACTITIONER

## 2021-06-01 PROCEDURE — 82607 VITAMIN B-12: CPT

## 2021-06-01 PROCEDURE — 84443 ASSAY THYROID STIM HORMONE: CPT

## 2021-06-01 PROCEDURE — 80053 COMPREHEN METABOLIC PANEL: CPT

## 2021-06-01 PROCEDURE — 36415 COLL VENOUS BLD VENIPUNCTURE: CPT

## 2021-06-01 PROCEDURE — 82306 VITAMIN D 25 HYDROXY: CPT | Mod: GA

## 2021-06-01 PROCEDURE — 85025 COMPLETE CBC W/AUTO DIFF WBC: CPT

## 2021-06-01 PROCEDURE — 81001 URINALYSIS AUTO W/SCOPE: CPT

## 2021-06-01 RX ORDER — LANCETS 30 GAUGE
EACH MISCELLANEOUS
Qty: 100 EACH | Refills: 1 | Status: SHIPPED | OUTPATIENT
Start: 2021-06-01 | End: 2022-04-08

## 2021-06-01 RX ORDER — CYCLOBENZAPRINE HCL 10 MG
TABLET ORAL
COMMUNITY
Start: 2021-05-13 | End: 2021-06-01

## 2021-06-01 RX ORDER — TIZANIDINE HYDROCHLORIDE 2 MG/1
2 CAPSULE, GELATIN COATED ORAL 3 TIMES DAILY
Qty: 30 CAPSULE | Refills: 0 | Status: SHIPPED | OUTPATIENT
Start: 2021-06-01 | End: 2021-11-09

## 2021-06-01 ASSESSMENT — FIBROSIS 4 INDEX: FIB4 SCORE: 0.82

## 2021-06-01 NOTE — PROGRESS NOTES
Chief Complaint   Patient presents with   • Abdominal Pain     RLQ pain x 3 days        HISTORY OF PRESENT ILLNESS: Patient is a 55 y.o. female established patient who presents today to discuss musculoskeletal strain and memory changes    RLQ abdominal pain  Patient believes she may have strained an abdominal muscle last week when she was lifting a heavy object   After lifting heavy object she felt pain and a strain in her right lower quadrant, she does have tenderness with palpation to the affected area  Denies any other associated symptoms including nausea, vomiting, diarrhea, dysuria      Memory change  Patient concern for memory changes, she states she has had multiple issues ever since having spinal cord stimulator placed a couple months ago  She feels that her memory has changed and she is getting frequent infections including ear infections as well as UTIs  Patient is completely alert and oriented x4 does not describe any other symptoms concerning for dementia at this time  She does agree to additional lab work-up in the setting of her memory changes  She does understand that depending on results she may need referral to neurology  However, she states that she is going to discuss with her spine specialist possibly removing her spinal cord stimulator    History of diabetes mellitus  Patient was seen on May 17, 2021 for concerns of her history of type 2 diabetes and episodes of hypoglycemia  At her last appointment with me her A1c was 5.8%  She was advised to measure glucose when she is symptomatic but has not done so, states that she does not have testing supplies  Again reminded her to keep snacks on hand that are well-balanced with carbohydrates and protein testing supplies called in        Patient Active Problem List    Diagnosis Date Noted   • Memory change 06/02/2021   • RLQ abdominal pain 06/02/2021   • Musculoskeletal strain 06/02/2021   • Acute suppurative otitis media of left ear without spontaneous  rupture of tympanic membrane 05/17/2021   • Thrush 09/24/2020   • Slow transit constipation 08/04/2020   • Dysphasia 08/04/2020   • Poor balance 05/26/2020   • Nausea 05/26/2020   • Non-smoker 04/25/2020   • Mild intermittent asthma without complication 04/25/2020   • Cough 03/03/2020   • Allergic rhinitis 03/03/2020   • Moderate persistent asthma without complication 01/16/2020   • Other dysphagia 12/09/2019   • Esophageal stricture 12/09/2019   • Burn 07/03/2019   • Malodorous urine 07/03/2019   • Chronic obstructive pulmonary disease (HCC) 07/02/2019   • Abscess 06/04/2019   • Allergic conjunctivitis of both eyes 05/15/2019   • Hyperparathyroidism (HCC) 04/11/2019   • Benign paroxysmal positional vertigo due to bilateral vestibular disorder 04/11/2019   • 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   • Elevated PTHrP level 10/09/2018   • Vitiligo 09/27/2018   • Vitamin B12 deficiency 09/27/2018   • Abnormal laboratory test 09/25/2018   • TOMY (obstructive sleep apnea) 09/17/2018   • History of diabetes mellitus 09/04/2018   • Positive NAKUL (antinuclear antibody) 07/24/2018   • Arthritis, multiple joint involvement 07/24/2018   • Family history of systemic lupus erythematosus (SLE) in mother 07/24/2018   • Iron deficiency anemia 07/24/2018   • Skin lesions 07/24/2018   • Chronic fatigue 06/20/2018   • Dysuria 06/07/2018   • Heartburn 05/08/2018   • Muscle spasm of both lower legs 03/26/2018   • Calcaneal spur of both feet 03/15/2018   • Pain management contract broken 03/15/2018   • Obesity (BMI 30-39.9) 01/18/2018   • Seasonal allergies 10/05/2017   • Bipolar affective disorder, currently depressed, moderate ([facility]) 01/24/2017   • History of gastric bypass 10/11/2013   • Chronic pain syndrome 09/16/2013   • Anxiety 12/06/2012   • Lumbar stenosis 10/22/2012   • Multilevel degenerative disc disease 10/01/2012   • Cervical radiculopathy, chronic 05/04/2011   • Vitamin D deficiency 11/16/2010   • Arthritis    • Panic attacks    •  PTSD (post-traumatic stress disorder)    • Insomnia due to medical condition 11/10/2010       Allergies:Buprenorphine-naloxone, Gabapentin, Suboxone, Morphine, Shrimp (diagnostic), and Contrast media with iodine [iodine]    Current Outpatient Medications   Medication Sig Dispense Refill   • Misc. Devices Misc Glucometer covered by insurance. Patient has type 2 diabetes and checks glucose once per day 1 Each 0   • Blood Glucose Test Strips Test strips order: Test strips for covered meter. Sig: use daily and prn ssx high or low sugar #100 RF x 3 100 Strip 1   • Lancets Lancets order: Lancets for covered meter. Sig: use daily and prn ssx high or low sugar. #100 RF x 3 100 Each 1   • tizanidine (ZANAFLEX) 2 MG capsule Take 1 capsule by mouth 3 times a day. 30 capsule 0   • fluticasone (FLONASE) 50 MCG/ACT nasal spray SPRAY 1 SPRAY INTO EACH NOSTRIL INTO AFFECTED NOSTRIL(S) TWICE DAILY 48 g 3   • albuterol 108 (90 Base) MCG/ACT Aero Soln inhalation aerosol Inhale 2 Puffs every 6 hours as needed for Shortness of Breath. 8.5 g 0   • Docusate Sodium (COLACE PO) Take 1 Tab by mouth every day.     • Budesonide-Formoterol Fumarate (SYMBICORT INH) Inhale  by mouth as needed.     • diphenhydrAMINE (BENADRYL) 25 MG Tab Take 25 mg by mouth every bedtime. Takes 2 tabs at hs     • sertraline (ZOLOFT) 100 MG Tab Take  by mouth. Take 1 tablet by mouth every morning     • propranolol (INDERAL) 10 MG Tab Take 10 mg by mouth 3 times a day.     • oxycodone (OXY-IR) 15 MG immediate release tablet TAKE 1 TABLET BY MOUTH EVERY 8 HOURS FOR 30 DAYS M54 DNF 19  0     No current facility-administered medications for this visit.       Social History     Tobacco Use   • Smoking status: Never Smoker   • Smokeless tobacco: Never Used   Vaping Use   • Vaping Use: Never used   Substance Use Topics   • Alcohol use: No   • Drug use: No       Family Status   Relation Name Status   • Fa   at age 64   • Mo   at age 75   • Sis 2 Alive    • Bro  Alive   • MAunt  (Not Specified)   • MUnc  (Not Specified)   • MGMo  (Not Specified)   • MGFa  (Not Specified)     Family History   Problem Relation Age of Onset   • Diabetes Father    • Cancer Father    • Hypertension Father    • Hyperlipidemia Father    • Cancer Mother    • Other Mother         SLE   • Diabetes Mother    • Hypertension Mother    • Hyperlipidemia Mother    • Stroke Mother    • Sleep Apnea Brother    • Cancer Maternal Aunt    • Diabetes Maternal Aunt    • Diabetes Maternal Uncle    • Diabetes Maternal Grandmother    • Diabetes Maternal Grandfather        Review of Systems:   Constitutional: Negative for fever, chills, weight loss and malaise/fatigue.   HENT: Negative for ear pain, nosebleeds, congestion, sore throat and neck pain.    Eyes: Negative for blurred vision.   Respiratory: Negative for cough, sputum production, shortness of breath and wheezing.    Cardiovascular: Negative for chest pain, palpitations, orthopnea and leg swelling.   Gastrointestinal: Negative for heartburn, nausea, vomiting and abdominal pain.   Genitourinary/Renal: Negative for dysuria, urgency and frequency.   Musculoskeletal: Positive per HPI  Skin: Negative for rash and itching.   Neurological: Negative for dizziness, tingling, tremors, sensory change, focal weakness and headaches.  Positive for memory changes  Endo/Heme/Allergies: Does not bruise/bleed easily.       Exam:  /88   Pulse 89   Temp 36.8 °C (98.2 °F) (Temporal)   Resp 16   Ht 1.524 m (5')   Wt 82.1 kg (181 lb)   SpO2 97%   General:  Well nourished, well developed female in NAD  Skin: warm, dry, intact, no evidence of rash or concerning lesions  Head: is grossly normal.  HEENT: eyes clear, conjunctiva normal, PERRLA  Pulmonary: Clear to ausculation. Normal effort. No rales, ronchi, or wheezing.  Cardiovascular: Regular rate and rhythm without murmur. Carotid and radial pulses are intact and equal bilaterally.  Abdomen: soft,  positive  bowel sounds.  Positive for TTP to right lower quadrant  Musculoskeletal: no clubbing, cyanosis, or edema.  Psych/mental: no depression, anxiety, hallucinations  Neuro: alert, intact, CN 2-12 grossly intact    Medical decision-making and discussion:    As mentioned above, multiple labs ordered to further evaluate changes to her memory.  We will also call in testing supplies to check blood sugar when she is symptomatic as mentioned above.        Assessment/Plan:  Patti was seen today for abdominal pain.    Diagnoses and all orders for this visit:    Musculoskeletal strain  -     tizanidine (ZANAFLEX) 2 MG capsule; Take 1 capsule by mouth 3 times a day.    RLQ abdominal pain  -     CBC WITH DIFFERENTIAL; Future  -     URINALYSIS,CULTURE IF INDICATED; Future  -     Comp Metabolic Panel; Future    Memory change  -     CBC WITH DIFFERENTIAL; Future  -     URINALYSIS,CULTURE IF INDICATED; Future  -     Comp Metabolic Panel; Future  -     VITAMIN B12; Future  -     VITAMIN D,25 HYDROXY; Future  -     TSH WITH REFLEX TO FT4; Future    History of diabetes mellitus  -     Misc. Devices Misc; Glucometer covered by insurance. Patient has type 2 diabetes and checks glucose once per day  -     Blood Glucose Test Strips; Test strips order: Test strips for covered meter. Sig: use daily and prn ssx high or low sugar #100 RF x 3  -     Lancets; Lancets order: Lancets for covered meter. Sig: use daily and prn ssx high or low sugar. #100 RF x 3         Return if symptoms worsen or fail to improve, for Follow-up.    Please note that this dictation was created using voice recognition software. I have made every reasonable attempt to correct obvious errors, but I expect that there are errors of grammar and possibly content that I did not discover before finalizing the note.

## 2021-06-02 PROBLEM — T14.8XXA MUSCULOSKELETAL STRAIN: Status: ACTIVE | Noted: 2021-06-02

## 2021-06-02 PROBLEM — R10.31 RLQ ABDOMINAL PAIN: Status: ACTIVE | Noted: 2021-06-02

## 2021-06-02 PROBLEM — R41.3 MEMORY CHANGE: Status: ACTIVE | Noted: 2021-06-02

## 2021-06-02 LAB
ALBUMIN SERPL BCP-MCNC: 3.9 G/DL (ref 3.2–4.9)
ALBUMIN/GLOB SERPL: 1.3 G/DL
ALP SERPL-CCNC: 125 U/L (ref 30–99)
ALT SERPL-CCNC: 18 U/L (ref 2–50)
AMORPH CRY #/AREA URNS HPF: PRESENT /HPF
ANION GAP SERPL CALC-SCNC: 9 MMOL/L (ref 7–16)
APPEARANCE UR: ABNORMAL
AST SERPL-CCNC: 20 U/L (ref 12–45)
BACTERIA #/AREA URNS HPF: NEGATIVE /HPF
BASOPHILS # BLD AUTO: 0.9 % (ref 0–1.8)
BASOPHILS # BLD: 0.07 K/UL (ref 0–0.12)
BILIRUB SERPL-MCNC: 0.2 MG/DL (ref 0.1–1.5)
BILIRUB UR QL STRIP.AUTO: NEGATIVE
BUN SERPL-MCNC: 7 MG/DL (ref 8–22)
CALCIUM SERPL-MCNC: 9.2 MG/DL (ref 8.5–10.5)
CAOX CRY #/AREA URNS HPF: ABNORMAL /HPF
CHLORIDE SERPL-SCNC: 107 MMOL/L (ref 96–112)
CO2 SERPL-SCNC: 25 MMOL/L (ref 20–33)
COLOR UR: YELLOW
CREAT SERPL-MCNC: 0.68 MG/DL (ref 0.5–1.4)
EOSINOPHIL # BLD AUTO: 0.46 K/UL (ref 0–0.51)
EOSINOPHIL NFR BLD: 6 % (ref 0–6.9)
EPI CELLS #/AREA URNS HPF: ABNORMAL /HPF
ERYTHROCYTE [DISTWIDTH] IN BLOOD BY AUTOMATED COUNT: 46.7 FL (ref 35.9–50)
GLOBULIN SER CALC-MCNC: 3 G/DL (ref 1.9–3.5)
GLUCOSE SERPL-MCNC: 91 MG/DL (ref 65–99)
GLUCOSE UR STRIP.AUTO-MCNC: NEGATIVE MG/DL
HCT VFR BLD AUTO: 42.5 % (ref 37–47)
HGB BLD-MCNC: 13.6 G/DL (ref 12–16)
HYALINE CASTS #/AREA URNS LPF: ABNORMAL /LPF
IMM GRANULOCYTES # BLD AUTO: 0.04 K/UL (ref 0–0.11)
IMM GRANULOCYTES NFR BLD AUTO: 0.5 % (ref 0–0.9)
KETONES UR STRIP.AUTO-MCNC: NEGATIVE MG/DL
LEUKOCYTE ESTERASE UR QL STRIP.AUTO: ABNORMAL
LYMPHOCYTES # BLD AUTO: 1.8 K/UL (ref 1–4.8)
LYMPHOCYTES NFR BLD: 23.4 % (ref 22–41)
MCH RBC QN AUTO: 28.8 PG (ref 27–33)
MCHC RBC AUTO-ENTMCNC: 32 G/DL (ref 33.6–35)
MCV RBC AUTO: 90 FL (ref 81.4–97.8)
MICRO URNS: ABNORMAL
MONOCYTES # BLD AUTO: 0.48 K/UL (ref 0–0.85)
MONOCYTES NFR BLD AUTO: 6.2 % (ref 0–13.4)
NEUTROPHILS # BLD AUTO: 4.85 K/UL (ref 2–7.15)
NEUTROPHILS NFR BLD: 63 % (ref 44–72)
NITRITE UR QL STRIP.AUTO: NEGATIVE
NRBC # BLD AUTO: 0 K/UL
NRBC BLD-RTO: 0 /100 WBC
PH UR STRIP.AUTO: 6 [PH] (ref 5–8)
PLATELET # BLD AUTO: 269 K/UL (ref 164–446)
PMV BLD AUTO: 12 FL (ref 9–12.9)
POTASSIUM SERPL-SCNC: 3.8 MMOL/L (ref 3.6–5.5)
PROT SERPL-MCNC: 6.9 G/DL (ref 6–8.2)
PROT UR QL STRIP: NEGATIVE MG/DL
RBC # BLD AUTO: 4.72 M/UL (ref 4.2–5.4)
RBC # URNS HPF: ABNORMAL /HPF
RBC UR QL AUTO: NEGATIVE
SODIUM SERPL-SCNC: 141 MMOL/L (ref 135–145)
SP GR UR STRIP.AUTO: 1.02
TSH SERPL DL<=0.005 MIU/L-ACNC: 1.57 UIU/ML (ref 0.38–5.33)
UROBILINOGEN UR STRIP.AUTO-MCNC: 0.2 MG/DL
VIT B12 SERPL-MCNC: 355 PG/ML (ref 211–911)
WBC # BLD AUTO: 7.7 K/UL (ref 4.8–10.8)
WBC #/AREA URNS HPF: ABNORMAL /HPF

## 2021-06-02 NOTE — ASSESSMENT & PLAN NOTE
Patient believes she may have strained an abdominal muscle last week when she was lifting a heavy object   After lifting heavy object she felt pain and a strain in her right lower quadrant, she does have tenderness with palpation to the affected area  Denies any other associated symptoms including nausea, vomiting, diarrhea, dysuria

## 2021-06-02 NOTE — ASSESSMENT & PLAN NOTE
Patient concern for memory changes, she states she has had multiple issues ever since having spinal cord stimulator placed a couple months ago  She feels that her memory has changed and she is getting frequent infections including ear infections as well as UTIs  Patient is completely alert and oriented x4 does not describe any other symptoms concerning for dementia at this time  She does agree to additional lab work-up in the setting of her memory changes  She does understand that depending on results she may need referral to neurology  However, she states that she is going to discuss with her spine specialist possibly removing her spinal cord stimulator

## 2021-06-02 NOTE — ASSESSMENT & PLAN NOTE
Patient was seen on May 17, 2021 for concerns of her history of type 2 diabetes and episodes of hypoglycemia  At her last appointment with me her A1c was 5.8%  She was advised to measure glucose when she is symptomatic but has not done so, states that she does not have testing supplies  Again reminded her to keep snacks on hand that are well-balanced with carbohydrates and protein testing supplies called in

## 2021-06-03 DIAGNOSIS — E55.9 VITAMIN D DEFICIENCY: ICD-10-CM

## 2021-06-03 LAB — 25(OH)D3 SERPL-MCNC: 9 NG/ML (ref 30–80)

## 2021-06-03 RX ORDER — ERGOCALCIFEROL 1.25 MG/1
50000 CAPSULE ORAL
Qty: 12 CAPSULE | Refills: 1 | Status: SHIPPED | OUTPATIENT
Start: 2021-06-03 | End: 2021-11-10

## 2021-06-24 ENCOUNTER — OFFICE VISIT (OUTPATIENT)
Dept: URGENT CARE | Facility: PHYSICIAN GROUP | Age: 56
End: 2021-06-24
Payer: MEDICARE

## 2021-06-24 VITALS
HEART RATE: 81 BPM | DIASTOLIC BLOOD PRESSURE: 78 MMHG | HEIGHT: 60 IN | TEMPERATURE: 97 F | SYSTOLIC BLOOD PRESSURE: 122 MMHG | BODY MASS INDEX: 37.3 KG/M2 | WEIGHT: 190 LBS | OXYGEN SATURATION: 97 % | RESPIRATION RATE: 18 BRPM

## 2021-06-24 DIAGNOSIS — R53.83 OTHER FATIGUE: ICD-10-CM

## 2021-06-24 DIAGNOSIS — R41.3 MEMORY CHANGES: ICD-10-CM

## 2021-06-24 DIAGNOSIS — H66.005 RECURRENT ACUTE SUPPURATIVE OTITIS MEDIA WITHOUT SPONTANEOUS RUPTURE OF LEFT TYMPANIC MEMBRANE: ICD-10-CM

## 2021-06-24 LAB
APPEARANCE UR: CLEAR
BILIRUB UR STRIP-MCNC: NEGATIVE MG/DL
COLOR UR AUTO: YELLOW
GLUCOSE UR STRIP.AUTO-MCNC: NEGATIVE MG/DL
KETONES UR STRIP.AUTO-MCNC: NEGATIVE MG/DL
LEUKOCYTE ESTERASE UR QL STRIP.AUTO: NEGATIVE
NITRITE UR QL STRIP.AUTO: NEGATIVE
PH UR STRIP.AUTO: 6 [PH] (ref 5–8)
PROT UR QL STRIP: NEGATIVE MG/DL
RBC UR QL AUTO: NEGATIVE
SP GR UR STRIP.AUTO: <=1.005
UROBILINOGEN UR STRIP-MCNC: 0.2 MG/DL

## 2021-06-24 PROCEDURE — 99214 OFFICE O/P EST MOD 30 MIN: CPT | Performed by: NURSE PRACTITIONER

## 2021-06-24 PROCEDURE — 81002 URINALYSIS NONAUTO W/O SCOPE: CPT | Performed by: NURSE PRACTITIONER

## 2021-06-24 RX ORDER — AMOXICILLIN AND CLAVULANATE POTASSIUM 875; 125 MG/1; MG/1
1 TABLET, FILM COATED ORAL 2 TIMES DAILY
Qty: 14 TABLET | Refills: 0 | Status: SHIPPED | OUTPATIENT
Start: 2021-06-24 | End: 2021-07-01

## 2021-06-24 ASSESSMENT — ENCOUNTER SYMPTOMS
DEPRESSION: 0
NERVOUS/ANXIOUS: 1
MEMORY LOSS: 1
SHORTNESS OF BREATH: 1

## 2021-06-24 ASSESSMENT — FIBROSIS 4 INDEX: FIB4 SCORE: 0.98

## 2021-06-24 NOTE — PATIENT INSTRUCTIONS
Dizziness  Dizziness is a common problem. It is a feeling of unsteadiness or light-headedness. You may feel like you are about to faint. Dizziness can lead to injury if you stumble or fall. Anyone can become dizzy, but dizziness is more common in older adults. This condition can be caused by a number of things, including medicines, dehydration, or illness.  Follow these instructions at home:  Eating and drinking  · Drink enough fluid to keep your urine clear or pale yellow. This helps to keep you from becoming dehydrated. Try to drink more clear fluids, such as water.  · Do not drink alcohol.  · Limit your caffeine intake if told to do so by your health care provider. Check ingredients and nutrition facts to see if a food or beverage contains caffeine.  · Limit your salt (sodium) intake if told to do so by your health care provider. Check ingredients and nutrition facts to see if a food or beverage contains sodium.  Activity  · Avoid making quick movements.  ? Rise slowly from chairs and steady yourself until you feel okay.  ? In the morning, first sit up on the side of the bed. When you feel okay, stand slowly while you hold onto something until you know that your balance is fine.  · If you need to  one place for a long time, move your legs often. Tighten and relax the muscles in your legs while you are standing.  · Do not drive or use heavy machinery if you feel dizzy.  · Avoid bending down if you feel dizzy. Place items in your home so that they are easy for you to reach without leaning over.  Lifestyle  · Do not use any products that contain nicotine or tobacco, such as cigarettes and e-cigarettes. If you need help quitting, ask your health care provider.  · Try to reduce your stress level by using methods such as yoga or meditation. Talk with your health care provider if you need help to manage your stress.  General instructions  · Watch your dizziness for any changes.  · Take over-the-counter and  prescription medicines only as told by your health care provider. Talk with your health care provider if you think that your dizziness is caused by a medicine that you are taking.  · Tell a friend or a family member that you are feeling dizzy. If he or she notices any changes in your behavior, have this person call your health care provider.  · Keep all follow-up visits as told by your health care provider. This is important.  Contact a health care provider if:  · Your dizziness does not go away.  · Your dizziness or light-headedness gets worse.  · You feel nauseous.  · You have reduced hearing.  · You have new symptoms.  · You are unsteady on your feet or you feel like the room is spinning.  Get help right away if:  · You vomit or have diarrhea and are unable to eat or drink anything.  · You have problems talking, walking, swallowing, or using your arms, hands, or legs.  · You feel generally weak.  · You are not thinking clearly or you have trouble forming sentences. It may take a friend or family member to notice this.  · You have chest pain, abdominal pain, shortness of breath, or sweating.  · Your vision changes.  · You have any bleeding.  · You have a severe headache.  · You have neck pain or a stiff neck.  · You have a fever.  These symptoms may represent a serious problem that is an emergency. Do not wait to see if the symptoms will go away. Get medical help right away. Call your local emergency services (911 in the [place] Do not drive yourself to the hospital.  Summary  · Dizziness is a feeling of unsteadiness or light-headedness. This condition can be caused by a number of things, including medicines, dehydration, or illness.  · Anyone can become dizzy, but dizziness is more common in older adults.  · Drink enough fluid to keep your urine clear or pale yellow. Do not drink alcohol.  · Avoid making quick movements if you feel dizzy. Monitor your dizziness for any changes.  This information is not intended to  replace advice given to you by your health care provider. Make sure you discuss any questions you have with your health care provider.  Document Released: 06/13/2002 Document Revised: 12/21/2018 Document Reviewed: 01/20/2018  Elsevier Patient Education © 2020 [employer]      Otitis Media, Adult    Otitis media occurs when there is inflammation and fluid in the middle ear. Your middle ear is a part of the ear that contains bones for hearing as well as air that helps send sounds to your brain.  What are the causes?  This condition is caused by a blockage in the eustachian tube. This tube drains fluid from the ear to the back of the nose (nasopharynx). A blockage in this tube can be caused by an object or by swelling (edema) in the tube. Problems that can cause a blockage include:  · A cold or other upper respiratory infection.  · Allergies.  · An irritant, such as tobacco smoke.  · Enlarged adenoids. The adenoids are areas of soft tissue located high in the back of the throat, behind the nose and the roof of the mouth.  · A mass in the nasopharynx.  · Damage to the ear caused by pressure changes (barotrauma).  What are the signs or symptoms?  Symptoms of this condition include:  · Ear pain.  · A fever.  · Decreased hearing.  · A headache.  · Tiredness (lethargy).  · Fluid leaking from the ear.  · Ringing in the ear.  How is this diagnosed?  This condition is diagnosed with a physical exam. During the exam your health care provider will use an instrument called an otoscope to look into your ear and check for redness, swelling, and fluid. He or she will also ask about your symptoms.  Your health care provider may also order tests, such as:  · A test to check the movement of the eardrum (pneumatic otoscopy). This test is done by squeezing a small amount of air into the ear.  · A test that changes air pressure in the middle ear to check how well the eardrum moves and whether the eustachian tube is working  (tympanogram).  How is this treated?  This condition usually goes away on its own within 3-5 days. But if the condition is caused by a bacteria infection and does not go away own its own, or keeps coming back, your health care provider may:  · Prescribe antibiotic medicines to treat the infection.  · Prescribe or recommend medicines to control pain.  Follow these instructions at home:  · Take over-the-counter and prescription medicines only as told by your health care provider.  · If you were prescribed an antibiotic medicine, take it as told by your health care provider. Do not stop taking the antibiotic even if you start to feel better.  · Keep all follow-up visits as told by your health care provider. This is important.  Contact a health care provider if:  · You have bleeding from your nose.  · There is a lump on your neck.  · You are not getting better in 5 days.  · You feel worse instead of better.  Get help right away if:  · You have severe pain that is not controlled with medicine.  · You have swelling, redness, or pain around your ear.  · You have stiffness in your neck.  · A part of your face is paralyzed.  · The bone behind your ear (mastoid) is tender when you touch it.  · You develop a severe headache.  Summary  · Otitis media is redness, soreness, and swelling of the middle ear.  · This condition usually goes away on its own within 3-5 days.  · If the problem does not go away in 3-5 days, your health care provider may prescribe or recommend medicines to treat your symptoms.  · If you were prescribed an antibiotic medicine, take it as told by your health care provider.  This information is not intended to replace advice given to you by your health care provider. Make sure you discuss any questions you have with your health care provider.  Document Released: 09/22/2005 Document Revised: 11/30/2018 Document Reviewed: 12/08/2017  Elsevier Patient Education © 2020 [employer]

## 2021-06-29 ASSESSMENT — ENCOUNTER SYMPTOMS
DIZZINESS: 1
HEADACHES: 1

## 2021-07-12 DIAGNOSIS — R11.0 NAUSEA: ICD-10-CM

## 2021-07-12 DIAGNOSIS — H92.03 CHRONIC PAIN OF BOTH EARS: ICD-10-CM

## 2021-07-12 DIAGNOSIS — M48.061 SPINAL STENOSIS OF LUMBAR REGION, UNSPECIFIED WHETHER NEUROGENIC CLAUDICATION PRESENT: ICD-10-CM

## 2021-07-12 DIAGNOSIS — G89.29 CHRONIC PAIN OF BOTH EARS: ICD-10-CM

## 2021-07-12 RX ORDER — ONDANSETRON HYDROCHLORIDE 8 MG/1
TABLET, FILM COATED ORAL
Qty: 30 TABLET | Refills: 1 | Status: SHIPPED | OUTPATIENT
Start: 2021-07-12 | End: 2021-11-10

## 2021-07-29 ENCOUNTER — HOSPITAL ENCOUNTER (OUTPATIENT)
Facility: MEDICAL CENTER | Age: 56
End: 2021-07-29
Attending: NURSE PRACTITIONER
Payer: MEDICARE

## 2021-07-29 ENCOUNTER — OFFICE VISIT (OUTPATIENT)
Dept: HEMATOLOGY ONCOLOGY | Facility: MEDICAL CENTER | Age: 56
End: 2021-07-29
Payer: MEDICARE

## 2021-07-29 VITALS
RESPIRATION RATE: 16 BRPM | SYSTOLIC BLOOD PRESSURE: 122 MMHG | DIASTOLIC BLOOD PRESSURE: 70 MMHG | HEART RATE: 77 BPM | WEIGHT: 190.59 LBS | OXYGEN SATURATION: 93 % | HEIGHT: 62 IN | TEMPERATURE: 97.4 F | BODY MASS INDEX: 35.07 KG/M2

## 2021-07-29 DIAGNOSIS — Z09 ENCOUNTER FOR HEMATOLOGY FOLLOW-UP: ICD-10-CM

## 2021-07-29 DIAGNOSIS — D50.9 IRON DEFICIENCY ANEMIA, UNSPECIFIED IRON DEFICIENCY ANEMIA TYPE: ICD-10-CM

## 2021-07-29 DIAGNOSIS — F41.9 ANXIETY: ICD-10-CM

## 2021-07-29 LAB
BASOPHILS # BLD AUTO: 0.7 % (ref 0–1.8)
BASOPHILS # BLD: 0.06 K/UL (ref 0–0.12)
EOSINOPHIL # BLD AUTO: 0.57 K/UL (ref 0–0.51)
EOSINOPHIL NFR BLD: 7 % (ref 0–6.9)
ERYTHROCYTE [DISTWIDTH] IN BLOOD BY AUTOMATED COUNT: 45.5 FL (ref 35.9–50)
FERRITIN SERPL-MCNC: 196 NG/ML (ref 10–291)
HCT VFR BLD AUTO: 44.1 % (ref 37–47)
HGB BLD-MCNC: 13.9 G/DL (ref 12–16)
IMM GRANULOCYTES # BLD AUTO: 0.05 K/UL (ref 0–0.11)
IMM GRANULOCYTES NFR BLD AUTO: 0.6 % (ref 0–0.9)
IRON SATN MFR SERPL: 24 % (ref 15–55)
IRON SERPL-MCNC: 62 UG/DL (ref 40–170)
LYMPHOCYTES # BLD AUTO: 2.02 K/UL (ref 1–4.8)
LYMPHOCYTES NFR BLD: 24.8 % (ref 22–41)
MCH RBC QN AUTO: 28.1 PG (ref 27–33)
MCHC RBC AUTO-ENTMCNC: 31.5 G/DL (ref 33.6–35)
MCV RBC AUTO: 89.1 FL (ref 81.4–97.8)
MONOCYTES # BLD AUTO: 0.54 K/UL (ref 0–0.85)
MONOCYTES NFR BLD AUTO: 6.6 % (ref 0–13.4)
NEUTROPHILS # BLD AUTO: 4.91 K/UL (ref 2–7.15)
NEUTROPHILS NFR BLD: 60.3 % (ref 44–72)
NRBC # BLD AUTO: 0 K/UL
NRBC BLD-RTO: 0 /100 WBC
PLATELET # BLD AUTO: 258 K/UL (ref 164–446)
PMV BLD AUTO: 11.5 FL (ref 9–12.9)
RBC # BLD AUTO: 4.95 M/UL (ref 4.2–5.4)
TIBC SERPL-MCNC: 259 UG/DL (ref 250–450)
UIBC SERPL-MCNC: 197 UG/DL (ref 110–370)
WBC # BLD AUTO: 8.2 K/UL (ref 4.8–10.8)

## 2021-07-29 PROCEDURE — 82728 ASSAY OF FERRITIN: CPT

## 2021-07-29 PROCEDURE — 85025 COMPLETE CBC W/AUTO DIFF WBC: CPT

## 2021-07-29 PROCEDURE — 83540 ASSAY OF IRON: CPT

## 2021-07-29 PROCEDURE — 83550 IRON BINDING TEST: CPT

## 2021-07-29 PROCEDURE — 99214 OFFICE O/P EST MOD 30 MIN: CPT | Performed by: NURSE PRACTITIONER

## 2021-07-29 RX ORDER — CYCLOBENZAPRINE HCL 10 MG
10 TABLET ORAL PRN
COMMUNITY
Start: 2021-06-14 | End: 2022-04-08

## 2021-07-29 ASSESSMENT — ENCOUNTER SYMPTOMS
FEVER: 1
MYALGIAS: 0
VOMITING: 0
SHORTNESS OF BREATH: 0
DIAPHORESIS: 1
WEIGHT LOSS: 0
TINGLING: 0
NAUSEA: 1
DIARRHEA: 0
HEADACHES: 0
INSOMNIA: 0
COUGH: 0
WHEEZING: 0
CONSTIPATION: 0
DIZZINESS: 1
CHILLS: 0
SENSORY CHANGE: 1
FALLS: 1
PALPITATIONS: 0
NERVOUS/ANXIOUS: 1
MEMORY LOSS: 1

## 2021-07-29 ASSESSMENT — FIBROSIS 4 INDEX: FIB4 SCORE: 0.98

## 2021-07-29 NOTE — PROGRESS NOTES
"Subjective:      Ptati Tello is a 55 y.o. female who presents with Anemia (1 year FV)            HPI   Ms. Tello presents for evaluation of iron deficiency anemia. She is unaccompanied for today's visit.     Patient with history of gastric bypass in 2006 and has historically tolerated PO supplements poorly. She was referred per PCP for further evaluation and established care with our office in 9/2018 (Dr. Larsen). She has received support in the form of periodic Venofer (x 5 doses), most recently in June 2020, with improved counts. She continues with close surveillance.    Patient w/ remote h/o breast cancer, 1997 ([place]); s/p left mastectomy w/ reconstruction, adjuvant abdulkadir/tamox, no radiation or AI.    She is teary and anxious today after speaking with her sister, who she states was \"interogating\" her about her visit today and telling her \"nothing is wrong with you\". Patient with recurrent diaphoresis and infections since spinal stimulator placed 3/2021. She states that recurrent infections seems routine for her in setting of any implanted device and is following up for removal of stimulator. She reports significant fatigue and forgetfulness that is concerning to her. She is scheduled for neurology follow up in the fall. She is otherwise at her baseline.         Review of Systems   Constitutional: Positive for diaphoresis (variable day and night - intermittent - moreso over the past week), fever (\"feels like a low grade fever\") and malaise/fatigue. Negative for chills and weight loss.   HENT: Negative for nosebleeds.         Recurrent oral and ear infections (last abx about 6 weeks ago); oral discomfort \"white stuff\" - h/o thrush from inhaler, using Nystain in evening only   Respiratory: Negative for cough, shortness of breath and wheezing.    Cardiovascular: Negative for chest pain, palpitations and leg swelling.        Tachycardia - precedes diaphoretic episodes over past week "   Gastrointestinal: Positive for nausea (r/t air quality - will request zofran refill from pcp). Negative for constipation, diarrhea and vomiting.   Genitourinary: Negative for dysuria.   Musculoskeletal: Positive for falls (more often, has been referred to neurology per PCP). Negative for joint pain and myalgias.        3/11/21 stimulator placed at spine, desires removal as she is having recurrent infections and more tired, forgetful, week since surgery - estbalished with new Neurosurgery (Dr. Medrano)   Neurological: Positive for dizziness (when up too quickly) and sensory change (hypersensitive to touch - since prior to surgery but a bit worse since surgery in March). Negative for tingling and headaches.   Psychiatric/Behavioral: Positive for memory loss (forgetful and sleepy; already referred to Neurology - needs f/v). The patient is nervous/anxious (Teary during visit; a bit overwhelmed with life right now). The patient does not have insomnia.         Followed by psychologist for anxiety and depression         Allergies   Allergen Reactions   • Buprenorphine-Naloxone Hives, Shortness of Breath and Swelling   • Gabapentin      seizures   • Suboxone Hives, Shortness of Breath and Swelling   • Morphine Rash     tachycardia   • Shrimp (Diagnostic) Itching   • Contrast Media With Iodine [Iodine] Itching     Ok with benadryl         Current Outpatient Medications on File Prior to Visit   Medication Sig Dispense Refill   • cyclobenzaprine (FLEXERIL) 10 mg Tab 10 mg as needed.     • ondansetron (ZOFRAN) 8 MG Tab TAKE 1 TABLET BY MOUTH THREE TIMES A DAY AS NEEDED 30 tablet 1   • Misc. Devices Misc Glucometer covered by insurance. Patient has type 2 diabetes and checks glucose once per day 1 Each 0   • Blood Glucose Test Strips Test strips order: Test strips for covered meter. Sig: use daily and prn ssx high or low sugar #100 RF x 3 100 Strip 1   • Lancets Lancets order: Lancets for covered meter. Sig: use daily and prn  "ssx high or low sugar. #100 RF x 3 100 Each 1   • fluticasone (FLONASE) 50 MCG/ACT nasal spray SPRAY 1 SPRAY INTO EACH NOSTRIL INTO AFFECTED NOSTRIL(S) TWICE DAILY 48 g 3   • albuterol 108 (90 Base) MCG/ACT Aero Soln inhalation aerosol Inhale 2 Puffs every 6 hours as needed for Shortness of Breath. 8.5 g 0   • Docusate Sodium (COLACE PO) Take 1 Tab by mouth every day.     • Budesonide-Formoterol Fumarate (SYMBICORT INH) Inhale  by mouth as needed.     • diphenhydrAMINE (BENADRYL) 25 MG Tab Take 25 mg by mouth every bedtime. Takes 2 tabs at hs     • sertraline (ZOLOFT) 100 MG Tab Take  by mouth. Take 1 tablet by mouth every morning     • propranolol (INDERAL) 10 MG Tab Take 10 mg by mouth 3 times a day.     • oxycodone (OXY-IR) 15 MG immediate release tablet TAKE 1 TABLET BY MOUTH EVERY 8 HOURS FOR 30 DAYS M54 DNF 6/12/19  0   • ergocalciferol (DRISDOL) 22192 UNIT capsule Take 1 capsule by mouth every 7 days. (Patient not taking: Reported on 7/29/2021) 12 capsule 1   • tizanidine (ZANAFLEX) 2 MG capsule Take 1 capsule by mouth 3 times a day. (Patient not taking: Reported on 7/29/2021) 30 capsule 0     No current facility-administered medications on file prior to visit.          Objective:     /70 (BP Location: Right arm, Patient Position: Sitting, BP Cuff Size: Adult long)   Pulse 77   Temp 36.3 °C (97.4 °F) (Temporal)   Resp 16   Ht 1.58 m (5' 2.21\")   Wt 86.4 kg (190 lb 9.4 oz)   LMP 10/05/1999   SpO2 93%   BMI 34.63 kg/m²        Physical Exam  Vitals reviewed.   Constitutional:       General: She is not in acute distress.     Appearance: She is well-developed. She is not diaphoretic.   HENT:      Head: Normocephalic and atraumatic.      Mouth/Throat:      Pharynx: No oropharyngeal exudate.   Eyes:      General: No scleral icterus.        Right eye: No discharge.         Left eye: No discharge.      Conjunctiva/sclera: Conjunctivae normal.      Pupils: Pupils are equal, round, and reactive to light. "   Cardiovascular:      Rate and Rhythm: Normal rate and regular rhythm.      Heart sounds: Normal heart sounds. No murmur heard.   No friction rub. No gallop.    Pulmonary:      Effort: Pulmonary effort is normal. No respiratory distress.      Breath sounds: Normal breath sounds. No wheezing.   Abdominal:      General: Bowel sounds are normal. There is no distension.      Palpations: Abdomen is soft.      Tenderness: There is no abdominal tenderness.   Musculoskeletal:         General: Normal range of motion.      Cervical back: Normal range of motion and neck supple.   Skin:     General: Skin is warm and dry.      Coloration: Skin is not pale.      Findings: No erythema or rash.   Neurological:      Mental Status: She is alert and oriented to person, place, and time.   Psychiatric:         Behavior: Behavior normal.      Comments: Teary, anxious - better with reassurance and redirection           Hospital Outpatient Visit on 07/29/2021   Component Date Value Ref Range Status   • Ferritin 07/29/2021 196.0  10.0 - 291.0 ng/mL Final   • Iron 07/29/2021 62  40 - 170 ug/dL Final   • Total Iron Binding 07/29/2021 259  250 - 450 ug/dL Final   • Unsat Iron Binding 07/29/2021 197  110 - 370 ug/dL Final   • % Saturation 07/29/2021 24  15 - 55 % Final   • WBC 07/29/2021 8.2  4.8 - 10.8 K/uL Final   • RBC 07/29/2021 4.95  4.20 - 5.40 M/uL Final   • Hemoglobin 07/29/2021 13.9  12.0 - 16.0 g/dL Final   • Hematocrit 07/29/2021 44.1  37.0 - 47.0 % Final   • MCV 07/29/2021 89.1  81.4 - 97.8 fL Final   • MCH 07/29/2021 28.1  27.0 - 33.0 pg Final   • MCHC 07/29/2021 31.5* 33.6 - 35.0 g/dL Final   • RDW 07/29/2021 45.5  35.9 - 50.0 fL Final   • Platelet Count 07/29/2021 258  164 - 446 K/uL Final   • MPV 07/29/2021 11.5  9.0 - 12.9 fL Final   • Neutrophils-Polys 07/29/2021 60.30  44.00 - 72.00 % Final   • Lymphocytes 07/29/2021 24.80  22.00 - 41.00 % Final   • Monocytes 07/29/2021 6.60  0.00 - 13.40 % Final   • Eosinophils 07/29/2021  7.00* 0.00 - 6.90 % Final   • Basophils 07/29/2021 0.70  0.00 - 1.80 % Final   • Immature Granulocytes 07/29/2021 0.60  0.00 - 0.90 % Final   • Nucleated RBC 07/29/2021 0.00  /100 WBC Final   • Neutrophils (Absolute) 07/29/2021 4.91  2.00 - 7.15 K/uL Final    Includes immature neutrophils, if present.   • Lymphs (Absolute) 07/29/2021 2.02  1.00 - 4.80 K/uL Final   • Monos (Absolute) 07/29/2021 0.54  0.00 - 0.85 K/uL Final   • Eos (Absolute) 07/29/2021 0.57* 0.00 - 0.51 K/uL Final   • Baso (Absolute) 07/29/2021 0.06  0.00 - 0.12 K/uL Final   • Immature Granulocytes (abs) 07/29/2021 0.05  0.00 - 0.11 K/uL Final   • NRBC (Absolute) 07/29/2021 0.00  K/uL Final                Assessment/Plan:        1. Iron deficiency anemia, unspecified iron deficiency anemia type  CBC WITH DIFFERENTIAL    IRON/TOTAL IRON BIND    FERRITIN    CBC WITH DIFFERENTIAL    FERRITIN    IRON/TOTAL IRON BIND   2. Encounter for hematology follow-up     3. Anxiety         1.  Anxiety: Patient is teary during visit, confirms being overwhelmed. She is concerned about forgetfulness - she is already scheduled with neurology for follow up but is encouraged to follow up with counselor/psych as well for more support. Patient amenable to suggestion.    2.  Iron deficiency anemia: Patient s/p gastric bypass in 2006 w/ no MVI or supplements tolerated. Lei Ford has helped, last received 6/2020. Counts are stable at this time, She will contiue with labs every 6 months and return for re-evaluation in 1 year, sooner as needed.        My total time spent caring for the patient on the day of the encounter was 35 minutes.   This does not include time spent on separately billable procedures/tests.    The patient verbalized agreement and understanding of current plan. All questions and concerns were addressed at time of visit.    Please note that this dictation was created using voice recognition software. I have made every reasonable attempt to correct  obvious errors, but I expect that there are errors of grammar and possibly content that I did not discover before finalizing the note.

## 2021-08-09 ENCOUNTER — PATIENT MESSAGE (OUTPATIENT)
Dept: MEDICAL GROUP | Facility: PHYSICIAN GROUP | Age: 56
End: 2021-08-09

## 2021-08-09 DIAGNOSIS — B37.0 THRUSH: ICD-10-CM

## 2021-08-10 RX ORDER — BUDESONIDE AND FORMOTEROL FUMARATE DIHYDRATE 160; 4.5 UG/1; UG/1
2 AEROSOL RESPIRATORY (INHALATION) 2 TIMES DAILY PRN
Qty: 10.2 G | Refills: 0 | Status: SHIPPED | OUTPATIENT
Start: 2021-08-10 | End: 2022-01-05 | Stop reason: SDUPTHER

## 2021-08-10 RX ORDER — CROMOLYN SODIUM 40 MG/ML
2 SOLUTION/ DROPS OPHTHALMIC 2 TIMES DAILY
Qty: 10 ML | Refills: 1 | Status: SHIPPED | OUTPATIENT
Start: 2021-08-10 | End: 2022-04-08

## 2021-08-10 RX ORDER — ALBUTEROL SULFATE 90 UG/1
2 AEROSOL, METERED RESPIRATORY (INHALATION) EVERY 6 HOURS PRN
Qty: 8.5 G | Refills: 0 | Status: SHIPPED | OUTPATIENT
Start: 2021-08-10 | End: 2022-01-05 | Stop reason: SDUPTHER

## 2021-08-26 NOTE — PROGRESS NOTES
From the [facility] Pediatric Neurology Team:  Thank you for allowing us to take care of your child!     PLAN  Start Cyproheptadine    • Medication Instructions: Cyproheptadine - Take 1 tablet by mouth nightly x 7 days then take 2 tablets by mouth nightly and continue on this dose.    • Follow Up Appointments:  Please schedule a follow up with Dr. Nils Kohli in 2 months.    To make, cancel, or reschedule an appointment please call [phone].      Medication Refill Requests  • Please look over your medications before coming to your appointment, to see if you need any refills.  • Pharmacy (medication) refills   ? Call your pharmacy for a refill   ? If a new prescription is needed, the pharmacy will contact us for an updated prescription  ? It can take up to 3 business days to send the updated prescription, so call the pharmacy before running out of your medication  ? Call your pharmacy after 2-3 days to see if your prescription is ready for      Testing & Labs  • Please allow 7-10 days after any testing is performed for results. We will notify you of results.    Please follow up with your child's primary care provider for any questions related to common childhood illnesses such as runny nose, fever, sore throat, cough, diarrhea, constipation or sleep problems.      Unable to leave msg patients phone would not except incoming calls

## 2021-09-14 DIAGNOSIS — Z20.6 HIV EXPOSURE: ICD-10-CM

## 2021-10-19 ENCOUNTER — HOSPITAL ENCOUNTER (OUTPATIENT)
Dept: LAB | Facility: MEDICAL CENTER | Age: 56
End: 2021-10-19
Attending: NURSE PRACTITIONER
Payer: MEDICARE

## 2021-10-19 DIAGNOSIS — Z20.6 HIV EXPOSURE: ICD-10-CM

## 2021-10-19 LAB — HIV 1+2 AB+HIV1 P24 AG SERPL QL IA: NORMAL

## 2021-10-19 PROCEDURE — 87389 HIV-1 AG W/HIV-1&-2 AB AG IA: CPT | Mod: GA

## 2021-10-19 PROCEDURE — 36415 COLL VENOUS BLD VENIPUNCTURE: CPT | Mod: GA

## 2021-11-09 ENCOUNTER — TELEMEDICINE (OUTPATIENT)
Dept: MEDICAL GROUP | Facility: PHYSICIAN GROUP | Age: 56
End: 2021-11-09
Payer: MEDICARE

## 2021-11-09 VITALS — HEIGHT: 60 IN | WEIGHT: 190 LBS | BODY MASS INDEX: 37.3 KG/M2 | RESPIRATION RATE: 16 BRPM

## 2021-11-09 DIAGNOSIS — Z12.31 VISIT FOR SCREENING MAMMOGRAM: ICD-10-CM

## 2021-11-09 DIAGNOSIS — J30.9 ALLERGIC RHINITIS, UNSPECIFIED SEASONALITY, UNSPECIFIED TRIGGER: ICD-10-CM

## 2021-11-09 DIAGNOSIS — R30.0 DYSURIA: ICD-10-CM

## 2021-11-09 DIAGNOSIS — R11.0 NAUSEA: ICD-10-CM

## 2021-11-09 PROCEDURE — 99213 OFFICE O/P EST LOW 20 MIN: CPT | Mod: 95,CR | Performed by: NURSE PRACTITIONER

## 2021-11-09 RX ORDER — OMEPRAZOLE 40 MG/1
CAPSULE, DELAYED RELEASE ORAL
COMMUNITY
Start: 2021-08-19 | End: 2022-04-08

## 2021-11-09 RX ORDER — NITROFURANTOIN 25; 75 MG/1; MG/1
100 CAPSULE ORAL 2 TIMES DAILY
Qty: 10 CAPSULE | Refills: 0 | Status: SHIPPED | OUTPATIENT
Start: 2021-11-09 | End: 2021-11-17

## 2021-11-09 ASSESSMENT — FIBROSIS 4 INDEX: FIB4 SCORE: 1.04

## 2021-11-09 NOTE — PROGRESS NOTES
Virtual Visit: Established Patient   This visit was conducted via Zoom using secure and encrypted videoconferencing technology.   The patient was in a private location in the state of [place].    The patient's identity was confirmed and verbal consent was obtained for this virtual visit.    Subjective:   CC:   Chief Complaint   Patient presents with   • Hypertension     htn, sinus, headaches       Patti Tello is a 56 y.o. female presenting for evaluation and management of:    Dysuria  Pleasant 56-year-old female patient presents today via virtual visit platform for symptoms very consistent of UTI  She recently was in the ER for kidney stones but continues to have dysuria, urinary frequency  She is requesting antibiotic  This is reasonable, in the setting of UTI treatment guidelines, we'll go ahead and call in antibiotic  Advised to follow-up in clinic if her symptoms do not improve after 5 days of Macrobid twice a day  Discussed the importance of UTI prevention    Allergic rhinitis  Patient has chronic allergic rhinitis, seasonal allergies  She is followed by ENT who suggested that she is having a flare of her seasonal allergies was advised to continue taking her Flonase  She also has a nonproductive cough, clear nasal drainage  Denies fever, body aches, nausea, vomiting or diarrhea  Of note she has been vaccinated for COVID-19  Discussed the importance of ongoing supportive care and regimen for seasonal allergies to include her antihistamine, Flonase and generous use of nasal saline  Advised her to follow-up if symptoms do not improve over the next 1 to 2 weeks          ROS   Per HPI    Current medicines (including changes today)  Current Outpatient Medications   Medication Sig Dispense Refill   • nitrofurantoin (MACROBID) 100 MG Cap Take 1 Capsule by mouth 2 times a day. 10 Capsule 0   • albuterol 108 (90 Base) MCG/ACT Aero Soln inhalation aerosol Inhale 2 Puffs every 6 hours as needed for Shortness of Breath.  8.5 g 0   • budesonide-formoterol (SYMBICORT) 160-4.5 MCG/ACT Aerosol Inhale 2 Puffs 2 times a day as needed (SOB). 10.2 g 0   • nystatin (MYCOSTATIN) 180879 UNIT/ML Suspension Take 5 mL by mouth 4 times a day. Swish and swallow 5 mls 4 times a day for 1 week 473 mL 0   • cromolyn (OPTICROM) 4 % ophthalmic solution Administer 2 Drops into both eyes 2 times a day. 10 mL 1   • cyclobenzaprine (FLEXERIL) 10 mg Tab 10 mg as needed.     • ondansetron (ZOFRAN) 8 MG Tab TAKE 1 TABLET BY MOUTH THREE TIMES A DAY AS NEEDED 30 tablet 1   • Misc. Devices Misc Glucometer covered by insurance. Patient has type 2 diabetes and checks glucose once per day 1 Each 0   • Blood Glucose Test Strips Test strips order: Test strips for covered meter. Sig: use daily and prn ssx high or low sugar #100 RF x 3 100 Strip 1   • Lancets Lancets order: Lancets for covered meter. Sig: use daily and prn ssx high or low sugar. #100 RF x 3 100 Each 1   • fluticasone (FLONASE) 50 MCG/ACT nasal spray SPRAY 1 SPRAY INTO EACH NOSTRIL INTO AFFECTED NOSTRIL(S) TWICE DAILY 48 g 3   • Docusate Sodium (COLACE PO) Take 1 Tab by mouth every day.     • diphenhydrAMINE (BENADRYL) 25 MG Tab Take 25 mg by mouth every bedtime. Takes 2 tabs at hs     • sertraline (ZOLOFT) 100 MG Tab Take  by mouth. Take 1 tablet by mouth every morning     • propranolol (INDERAL) 10 MG Tab Take 10 mg by mouth 3 times a day.     • oxycodone (OXY-IR) 15 MG immediate release tablet TAKE 1 TABLET BY MOUTH EVERY 8 HOURS FOR 30 DAYS M54 DNF 6/12/19  0   • omeprazole (PRILOSEC) 40 MG delayed-release capsule        No current facility-administered medications for this visit.       Patient Active Problem List    Diagnosis Date Noted   • Memory change 06/02/2021   • RLQ abdominal pain 06/02/2021   • Musculoskeletal strain 06/02/2021   • Acute suppurative otitis media of left ear without spontaneous rupture of tympanic membrane 05/17/2021   • Thrush 09/24/2020   • Slow transit constipation  08/04/2020   • Dysphasia 08/04/2020   • Poor balance 05/26/2020   • Nausea 05/26/2020   • Non-smoker 04/25/2020   • Mild intermittent asthma without complication 04/25/2020   • Cough 03/03/2020   • Allergic rhinitis 03/03/2020   • Moderate persistent asthma without complication 01/16/2020   • Other dysphagia 12/09/2019   • Esophageal stricture 12/09/2019   • Burn 07/03/2019   • Malodorous urine 07/03/2019   • Chronic obstructive pulmonary disease (HCC) 07/02/2019   • Abscess 06/04/2019   • Allergic conjunctivitis of both eyes 05/15/2019   • Hyperparathyroidism (HCC) 04/11/2019   • Benign paroxysmal positional vertigo due to bilateral vestibular disorder 04/11/2019   • 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   • Elevated PTHrP level 10/09/2018   • Vitiligo 09/27/2018   • Vitamin B12 deficiency 09/27/2018   • Abnormal laboratory test 09/25/2018   • TOMY (obstructive sleep apnea) 09/17/2018   • History of diabetes mellitus 09/04/2018   • Positive NAKUL (antinuclear antibody) 07/24/2018   • Arthritis, multiple joint involvement 07/24/2018   • Family history of systemic lupus erythematosus (SLE) in mother 07/24/2018   • Iron deficiency anemia 07/24/2018   • Skin lesions 07/24/2018   • Chronic fatigue 06/20/2018   • Dysuria 06/07/2018   • Heartburn 05/08/2018   • Muscle spasm of both lower legs 03/26/2018   • Calcaneal spur of both feet 03/15/2018   • Pain management contract broken 03/15/2018   • Obesity (BMI 30-39.9) 01/18/2018   • Seasonal allergies 10/05/2017   • Bipolar affective disorder, currently depressed, moderate ([facility]) 01/24/2017   • History of gastric bypass 10/11/2013   • Chronic pain syndrome 09/16/2013   • Anxiety 12/06/2012   • Lumbar stenosis 10/22/2012   • Multilevel degenerative disc disease 10/01/2012   • Cervical radiculopathy, chronic 05/04/2011   • Vitamin D deficiency 11/16/2010   • Arthritis    • Panic attacks    • PTSD (post-traumatic stress disorder)    • Insomnia due to medical condition 11/10/2010         Objective:   Resp 16   Ht 1.524 m (5')   Wt 86.2 kg (190 lb)   LMP 10/05/1999   BMI 37.11 kg/m²     Physical Exam:  Constitutional: Alert, no distress, well-groomed.  Skin: No rashes in visible areas.  Eye: Round. Conjunctiva clear, lids normal. No icterus.   ENMT: Lips pink without lesions, good dentition, moist mucous membranes. Phonation normal.  Neck: No masses, no thyromegaly. Moves freely without pain.  Respiratory: Unlabored respiratory effort, no cough or audible wheeze  Psych: Alert and oriented x3, normal affect and mood.     Assessment and Plan:   The following treatment plan was discussed:     1. Dysuria  - nitrofurantoin (MACROBID) 100 MG Cap; Take 1 Capsule by mouth 2 times a day.  Dispense: 10 Capsule; Refill: 0    2. Allergic rhinitis, unspecified seasonality, unspecified trigger    3. Visit for screening mammogram  - MA-SCREENING MAMMO BILAT W/TOMOSYNTHESIS W/CAD; Future    Other orders  - omeprazole (PRILOSEC) 40 MG delayed-release capsule      Follow-up: No follow-ups on file.

## 2021-11-10 RX ORDER — ONDANSETRON HYDROCHLORIDE 8 MG/1
TABLET, FILM COATED ORAL
Qty: 30 TABLET | Refills: 0 | Status: SHIPPED | OUTPATIENT
Start: 2021-11-10 | End: 2022-04-08

## 2021-11-17 ENCOUNTER — HOSPITAL ENCOUNTER (OUTPATIENT)
Facility: MEDICAL CENTER | Age: 56
End: 2021-11-17
Attending: NURSE PRACTITIONER
Payer: MEDICARE

## 2021-11-17 ENCOUNTER — OFFICE VISIT (OUTPATIENT)
Dept: MEDICAL GROUP | Facility: PHYSICIAN GROUP | Age: 56
End: 2021-11-17
Payer: MEDICARE

## 2021-11-17 VITALS
BODY MASS INDEX: 37.42 KG/M2 | HEIGHT: 60 IN | HEART RATE: 63 BPM | WEIGHT: 190.6 LBS | SYSTOLIC BLOOD PRESSURE: 114 MMHG | RESPIRATION RATE: 16 BRPM | TEMPERATURE: 97 F | OXYGEN SATURATION: 100 % | DIASTOLIC BLOOD PRESSURE: 76 MMHG

## 2021-11-17 DIAGNOSIS — R30.0 DYSURIA: ICD-10-CM

## 2021-11-17 DIAGNOSIS — N89.8 VAGINAL ODOR: ICD-10-CM

## 2021-11-17 PROCEDURE — 87480 CANDIDA DNA DIR PROBE: CPT

## 2021-11-17 PROCEDURE — 81002 URINALYSIS NONAUTO W/O SCOPE: CPT | Performed by: NURSE PRACTITIONER

## 2021-11-17 PROCEDURE — 87510 GARDNER VAG DNA DIR PROBE: CPT

## 2021-11-17 PROCEDURE — 99213 OFFICE O/P EST LOW 20 MIN: CPT | Performed by: NURSE PRACTITIONER

## 2021-11-17 PROCEDURE — 87660 TRICHOMONAS VAGIN DIR PROBE: CPT

## 2021-11-17 ASSESSMENT — FIBROSIS 4 INDEX: FIB4 SCORE: 1.04

## 2021-11-17 NOTE — PROGRESS NOTES
Chief Complaint   Patient presents with   • Urinary Frequency     burning x 3 months / chills / fever / pain / possible uti        HISTORY OF PRESENT ILLNESS: Patient is a 56 y.o. female established patient who presents today for s/sx of UTI    Dysuria  Patient seen approximately 8 days ago via virtual visit for symptoms consistent with UTI, has had many in the past and symptoms are similar  She was empirically treated with Macrobid 100 mg twice daily for 5 days  She continued to have dysuria during full course of antibiotics as well as yesterday but today woke up feeling much better, symptoms have resolved  In office urinalysis negative  However she does continue to have vaginal discomfort and odor  She defers exam  Agrees to vaginal DNA pathogen panel, specimen obtained, she will be notified of results and further actions if needed        ROS: per HPI    Exam:  /76 (BP Location: Right arm, Patient Position: Sitting, BP Cuff Size: Adult long)   Pulse 63   Temp 36.1 °C (97 °F) (Temporal)   Resp 16   Ht 1.524 m (5')   Wt 86.5 kg (190 lb 9.6 oz)   SpO2 100%   General:  Well nourished, well developed female in NAD  Skin: warm, dry, intact, no evidence of rash or concerning lesions  Head: is grossly normal.  HEENT: eyes clear, conjunctiva normal, PERRLA  Pulmonary: normal rate, rhythm and effort  Musculoskeletal: no clubbing, cyanosis, or edema.  Psych/mental: no depression, anxiety, hallucinations  Neuro: alert, intact, CN 2-12 grossly intact  GYN: exam deferred per pt's request      Medical decision-making and discussion:    Assessment/Plan:    1. Dysuria  In office urinalysis negative, thus we will proceed with vaginal DNA pathogen panel to check for other causes of her vaginal pain, odor and dysuria  - VAGINAL PATHOGENS DNA PANEL; Future  - POCT Urinalysis    2. Vaginal odor    - VAGINAL PATHOGENS DNA PANEL; Future           Return for as needed, pending vaginal dna pathogen panel results.        Please  note that this dictation was created using voice recognition software. I have made every reasonable attempt to correct obvious errors, but I expect that there are errors of grammar and possibly content that I did not discover before finalizing the note.

## 2021-11-17 NOTE — ASSESSMENT & PLAN NOTE
Patient seen approximately 8 days ago via virtual visit for symptoms consistent with UTI, has had many in the past and symptoms are similar  She was empirically treated with Macrobid 100 mg twice daily for 5 days  She continued to have dysuria during full course of antibiotics as well as yesterday but today woke up feeling much better, symptoms have resolved  In office urinalysis negative  However she does continue to have vaginal discomfort and odor  She defers exam  Agrees to vaginal DNA pathogen panel, specimen obtained, she will be notified of results and further actions if needed

## 2021-12-11 ENCOUNTER — OFFICE VISIT (OUTPATIENT)
Dept: URGENT CARE | Facility: PHYSICIAN GROUP | Age: 56
End: 2021-12-11
Payer: MEDICARE

## 2021-12-11 ENCOUNTER — HOSPITAL ENCOUNTER (OUTPATIENT)
Dept: RADIOLOGY | Facility: MEDICAL CENTER | Age: 56
End: 2021-12-11
Attending: NURSE PRACTITIONER
Payer: MEDICARE

## 2021-12-11 VITALS
DIASTOLIC BLOOD PRESSURE: 76 MMHG | BODY MASS INDEX: 36.71 KG/M2 | HEIGHT: 60 IN | SYSTOLIC BLOOD PRESSURE: 118 MMHG | TEMPERATURE: 96.9 F | WEIGHT: 187 LBS | HEART RATE: 78 BPM | OXYGEN SATURATION: 96 %

## 2021-12-11 DIAGNOSIS — R06.02 SHORTNESS OF BREATH: ICD-10-CM

## 2021-12-11 DIAGNOSIS — Z98.84 PERSONAL HISTORY OF GASTRIC BYPASS: ICD-10-CM

## 2021-12-11 DIAGNOSIS — R10.9 ABDOMINAL PAIN, UNSPECIFIED ABDOMINAL LOCATION: ICD-10-CM

## 2021-12-11 DIAGNOSIS — J44.9 CHRONIC OBSTRUCTIVE PULMONARY DISEASE, UNSPECIFIED COPD TYPE (HCC): ICD-10-CM

## 2021-12-11 LAB
APPEARANCE UR: CLEAR
BILIRUB UR STRIP-MCNC: NEGATIVE MG/DL
COLOR UR AUTO: YELLOW
GLUCOSE UR STRIP.AUTO-MCNC: NEGATIVE MG/DL
KETONES UR STRIP.AUTO-MCNC: NEGATIVE MG/DL
LEUKOCYTE ESTERASE UR QL STRIP.AUTO: NEGATIVE
NITRITE UR QL STRIP.AUTO: NEGATIVE
PH UR STRIP.AUTO: 5.5 [PH] (ref 5–8)
PROT UR QL STRIP: NEGATIVE MG/DL
RBC UR QL AUTO: NORMAL
SP GR UR STRIP.AUTO: 1.03
UROBILINOGEN UR STRIP-MCNC: 0.2 MG/DL

## 2021-12-11 PROCEDURE — 99214 OFFICE O/P EST MOD 30 MIN: CPT | Performed by: NURSE PRACTITIONER

## 2021-12-11 PROCEDURE — 71046 X-RAY EXAM CHEST 2 VIEWS: CPT

## 2021-12-11 PROCEDURE — 81002 URINALYSIS NONAUTO W/O SCOPE: CPT | Performed by: NURSE PRACTITIONER

## 2021-12-11 ASSESSMENT — ENCOUNTER SYMPTOMS
CHILLS: 0
WHEEZING: 1
ABDOMINAL PAIN: 1
HEMOPTYSIS: 0
FEVER: 0
SHORTNESS OF BREATH: 1
COUGH: 0
ROS GI COMMENTS: +ANOREXIA
MYALGIAS: 0
SORE THROAT: 0
SPUTUM PRODUCTION: 0

## 2021-12-11 ASSESSMENT — FIBROSIS 4 INDEX: FIB4 SCORE: 1.04

## 2021-12-11 NOTE — PROGRESS NOTES
Subjective     Patti Tello is a 56 y.o. female who presents with Shortness of Breath (x4 month ) and Abdominal Pain (right side )            HPI   New problem.  Patient is a 56-year-old female who presents with a 4 months history of shortness of breath as well as right upper quadrant pain for several weeks.  She denies cough, congestion, fever, chills.  She does have a history of COPD and is supposed to take her Symbicort daily which she states that she does not.  She states that she was told it is a as needed medication.  I have told her that in order for Symbicort to work it needs to be taken every day.  She has been taking her albuterol for her shortness of breath but she states this is not helping.  Her other problem is right upper quadrant pain that she states she has had for some time.  She cannot recall if she has had her gallbladder taken out.  She states she has had a decrease in appetite where she is only eating peaches and cottage cheese.  She is not offered up any of these problems to her primary care.    Buprenorphine-naloxone, Gabapentin, Suboxone, Morphine, Shrimp (diagnostic), and Contrast media with iodine [iodine]  Current Outpatient Medications on File Prior to Visit   Medication Sig Dispense Refill   • ondansetron (ZOFRAN) 8 MG Tab TAKE 1 TABLET BY MOUTH THREE TIMES DAILY AS NEEDED 30 Tablet 0   • omeprazole (PRILOSEC) 40 MG delayed-release capsule      • albuterol 108 (90 Base) MCG/ACT Aero Soln inhalation aerosol Inhale 2 Puffs every 6 hours as needed for Shortness of Breath. 8.5 g 0   • budesonide-formoterol (SYMBICORT) 160-4.5 MCG/ACT Aerosol Inhale 2 Puffs 2 times a day as needed (SOB). 10.2 g 0   • cyclobenzaprine (FLEXERIL) 10 mg Tab 10 mg as needed.     • Blood Glucose Test Strips Test strips order: Test strips for covered meter. Sig: use daily and prn ssx high or low sugar #100 RF x 3 100 Strip 1   • Lancets Lancets order: Lancets for covered meter. Sig: use daily and prn ssx high or  low sugar. #100 RF x 3 100 Each 1   • fluticasone (FLONASE) 50 MCG/ACT nasal spray SPRAY 1 SPRAY INTO EACH NOSTRIL INTO AFFECTED NOSTRIL(S) TWICE DAILY 48 g 3   • diphenhydrAMINE (BENADRYL) 25 MG Tab Take 25 mg by mouth every bedtime. Takes 2 tabs at hs     • sertraline (ZOLOFT) 100 MG Tab Take  by mouth. Take 1 tablet by mouth every morning     • propranolol (INDERAL) 10 MG Tab Take 10 mg by mouth 3 times a day.     • oxycodone (OXY-IR) 15 MG immediate release tablet TAKE 1 TABLET BY MOUTH EVERY 8 HOURS FOR 30 DAYS M54 DNF 6/12/19  0   • cromolyn (OPTICROM) 4 % ophthalmic solution Administer 2 Drops into both eyes 2 times a day. 10 mL 1   • Misc. Devices Misc Glucometer covered by insurance. Patient has type 2 diabetes and checks glucose once per day 1 Each 0   • Docusate Sodium (COLACE PO) Take 1 Tab by mouth every day.       No current facility-administered medications on file prior to visit.     Social History     Socioeconomic History   • Marital status:      Spouse name: Not on file   • Number of children: Not on file   • Years of education: Not on file   • Highest education level: Not on file   Occupational History   • Not on file   Tobacco Use   • Smoking status: Never Smoker   • Smokeless tobacco: Never Used   Vaping Use   • Vaping Use: Never used   Substance and Sexual Activity   • Alcohol use: No   • Drug use: Yes     Types: Marijuana     Comment: Gummys for pain   • Sexual activity: Not Currently   Other Topics Concern   •  Service No   • Blood Transfusions Yes     Comment: In 2006   • Caffeine Concern No   • Occupational Exposure No   • Hobby Hazards No   • Sleep Concern No   • Stress Concern No   • Weight Concern Yes   • Special Diet No   • Back Care Yes   • Exercise No   • Bike Helmet No     Comment: Doesn't Ride Bike   • Seat Belt Yes   • Self-Exams No   Social History Narrative   • Not on file     Social Determinants of Health     Financial Resource Strain:    • Difficulty of Paying  Living Expenses: Not on file   Food Insecurity:    • Worried About Running Out of Food in the Last Year: Not on file   • Ran Out of Food in the Last Year: Not on file   Transportation Needs:    • Lack of Transportation (Medical): Not on file   • Lack of Transportation (Non-Medical): Not on file   Physical Activity:    • Days of Exercise per Week: Not on file   • Minutes of Exercise per Session: Not on file   Stress:    • Feeling of Stress : Not on file   Social Connections:    • Frequency of Communication with Friends and Family: Not on file   • Frequency of Social Gatherings with Friends and Family: Not on file   • Attends [employer] Services: Not on file   • Active Member of Clubs or Organizations: Not on file   • Attends Club or Organization Meetings: Not on file   • Marital Status: Not on file   Intimate Partner Violence:    • Fear of Current or Ex-Partner: Not on file   • Emotionally Abused: Not on file   • Physically Abused: Not on file   • Sexually Abused: Not on file   Housing Stability:    • Unable to Pay for Housing in the Last Year: Not on file   • Number of Places Lived in the Last Year: Not on file   • Unstable Housing in the Last Year: Not on file     Breast Cancer-related family history is not on file.      Review of Systems   Constitutional: Negative for chills and fever.   HENT: Negative for congestion and sore throat.    Respiratory: Positive for shortness of breath and wheezing. Negative for cough, hemoptysis and sputum production.    Gastrointestinal: Positive for abdominal pain.        +anorexia   Genitourinary: Positive for dysuria.   Musculoskeletal: Negative for myalgias.              Objective     /76   Pulse 78   Temp 36.1 °C (96.9 °F)   Ht 1.524 m (5')   Wt 84.8 kg (187 lb)   LMP 10/05/1999   SpO2 96%   BMI 36.52 kg/m²      Physical Exam  Constitutional:       General: She is not in acute distress.     Appearance: She is well-developed.   HENT:      Head: Normocephalic.       "Right Ear: External ear normal.      Left Ear: External ear normal.      Nose: Mucosal edema present.   Eyes:      General:         Right eye: No discharge.         Left eye: No discharge.      Conjunctiva/sclera: Conjunctivae normal.   Cardiovascular:      Rate and Rhythm: Normal rate and regular rhythm.      Heart sounds: Normal heart sounds.   Pulmonary:      Breath sounds: No wheezing or rales.      Comments: Mild shortness of breath here in clinic.  Abdominal:      General: Bowel sounds are normal.      Palpations: Abdomen is soft.      Tenderness: There is generalized abdominal tenderness. There is no guarding or rebound.      Comments: Generalized tenderness.   Musculoskeletal:         General: Normal range of motion.      Cervical back: Normal range of motion and neck supple.   Lymphadenopathy:      Cervical: No cervical adenopathy.   Skin:     General: Skin is warm and dry.   Neurological:      Mental Status: She is alert and oriented to person, place, and time.   Psychiatric:         Behavior: Behavior normal.         Thought Content: Thought content normal.                             Assessment & Plan         1. Shortness of breath  DX-CHEST-2 VIEWS   2. Chronic obstructive pulmonary disease, unspecified COPD type (HCC)     3. Abdominal pain, unspecified abdominal location     4. Personal history of gastric bypass       Negative imaging for lungs,  Recommend resumption of symbicort daily instead of \"prn' as this is not a prn medication.  Urine clear.  Advised follow up with PCP for any additional concerns             "

## 2022-01-05 ENCOUNTER — TELEPHONE (OUTPATIENT)
Dept: MEDICAL GROUP | Facility: PHYSICIAN GROUP | Age: 57
End: 2022-01-05

## 2022-01-05 RX ORDER — ALBUTEROL SULFATE 90 UG/1
2 AEROSOL, METERED RESPIRATORY (INHALATION) EVERY 6 HOURS PRN
Qty: 8.5 G | Refills: 0 | Status: SHIPPED | OUTPATIENT
Start: 2022-01-05 | End: 2022-04-20 | Stop reason: SDUPTHER

## 2022-01-05 RX ORDER — BUDESONIDE AND FORMOTEROL FUMARATE DIHYDRATE 160; 4.5 UG/1; UG/1
2 AEROSOL RESPIRATORY (INHALATION) 2 TIMES DAILY PRN
Qty: 30.6 G | Refills: 0 | Status: SHIPPED | OUTPATIENT
Start: 2022-01-05 | End: 2023-06-05

## 2022-01-05 RX ORDER — BUDESONIDE AND FORMOTEROL FUMARATE DIHYDRATE 160; 4.5 UG/1; UG/1
2 AEROSOL RESPIRATORY (INHALATION) 2 TIMES DAILY PRN
Qty: 10.2 G | Refills: 0 | Status: SHIPPED | OUTPATIENT
Start: 2022-01-05 | End: 2022-01-05 | Stop reason: SDUPTHER

## 2022-01-05 NOTE — TELEPHONE ENCOUNTER
Received request via: Pharmacy    Was the patient seen in the last year in this department? Yes    Does the patient have an active prescription (recently filled or refills available) for medication(s) requested? No     Last Office Visit:11/24/2021  Last labs:12/11/2021

## 2022-02-01 DIAGNOSIS — J30.9 ALLERGIC RHINITIS, UNSPECIFIED SEASONALITY, UNSPECIFIED TRIGGER: ICD-10-CM

## 2022-02-02 ENCOUNTER — TELEPHONE (OUTPATIENT)
Dept: HEMATOLOGY ONCOLOGY | Facility: MEDICAL CENTER | Age: 57
End: 2022-02-02

## 2022-02-02 RX ORDER — BENZONATATE 100 MG/1
100 CAPSULE ORAL 3 TIMES DAILY PRN
Qty: 30 CAPSULE | Refills: 0 | Status: SHIPPED | OUTPATIENT
Start: 2022-02-02 | End: 2022-04-08

## 2022-02-02 NOTE — TELEPHONE ENCOUNTER
Called patient to let her know that she has opted into a [employer] Medicare Advantage plan ([facility] is not in network) .  Patient was offered the option of signing the OON form, and keeping the appointment.  She is choosing to cancel the appointment for tomorrow.    Patient will be calling [facility] to see if they accept her new insurance.

## 2022-02-02 NOTE — TELEPHONE ENCOUNTER
Received request via: Pharmacy    Was the patient seen in the last year in this department? Yes    Does the patient have an active prescription (recently filled or refills available) for medication(s) requested? No     Last office visit: 11/17/2021

## 2022-02-03 ENCOUNTER — APPOINTMENT (OUTPATIENT)
Dept: HEMATOLOGY ONCOLOGY | Facility: MEDICAL CENTER | Age: 57
End: 2022-02-03
Payer: MEDICARE

## 2022-02-07 DIAGNOSIS — D50.9 IRON DEFICIENCY ANEMIA, UNSPECIFIED IRON DEFICIENCY ANEMIA TYPE: ICD-10-CM

## 2022-02-07 NOTE — PROGRESS NOTES
Referral to [facility] to see Dr. More placed at patient's request as her insurance is no longer taking in our organization.

## 2022-02-07 NOTE — TELEPHONE ENCOUNTER
Called patient to advise her that the referral to Dr. More at [facility] was sent today.  Patient aware it may take 1 to 2 weeks for [facility] to contact her with an appointment.  Patient acknowledged/understood.

## 2022-03-25 ENCOUNTER — TELEPHONE (OUTPATIENT)
Dept: MEDICAL GROUP | Facility: PHYSICIAN GROUP | Age: 57
End: 2022-03-25
Payer: MEDICARE

## 2022-03-25 NOTE — TELEPHONE ENCOUNTER
Spoke with Alvina at [facility] and the paperwork does not match to the patient.      I let her we can not verify the pt to the paperwork   No    Addresses do not match.   Name and email however do match  Not enough information to identify the patient.      Paperwork was scanned in to this chart.

## 2022-04-08 ENCOUNTER — APPOINTMENT (OUTPATIENT)
Dept: RADIOLOGY | Facility: MEDICAL CENTER | Age: 57
End: 2022-04-08
Attending: EMERGENCY MEDICINE
Payer: MEDICARE

## 2022-04-08 ENCOUNTER — ANESTHESIA (OUTPATIENT)
Dept: SURGERY | Facility: MEDICAL CENTER | Age: 57
End: 2022-04-08
Payer: MEDICARE

## 2022-04-08 ENCOUNTER — HOSPITAL ENCOUNTER (OUTPATIENT)
Facility: MEDICAL CENTER | Age: 57
End: 2022-04-10
Attending: EMERGENCY MEDICINE | Admitting: GENERAL PRACTICE
Payer: MEDICARE

## 2022-04-08 ENCOUNTER — ANESTHESIA EVENT (OUTPATIENT)
Dept: SURGERY | Facility: MEDICAL CENTER | Age: 57
End: 2022-04-08
Payer: MEDICARE

## 2022-04-08 DIAGNOSIS — K43.9 HERNIA OF ANTERIOR ABDOMINAL WALL: ICD-10-CM

## 2022-04-08 DIAGNOSIS — R10.31 RLQ ABDOMINAL PAIN: ICD-10-CM

## 2022-04-08 LAB
ALBUMIN SERPL BCP-MCNC: 4.4 G/DL (ref 3.2–4.9)
ALBUMIN/GLOB SERPL: 1.5 G/DL
ALP SERPL-CCNC: 144 U/L (ref 30–99)
ALT SERPL-CCNC: 20 U/L (ref 2–50)
ANION GAP SERPL CALC-SCNC: 11 MMOL/L (ref 7–16)
APPEARANCE UR: CLEAR
AST SERPL-CCNC: 23 U/L (ref 12–45)
BASOPHILS # BLD AUTO: 0.9 % (ref 0–1.8)
BASOPHILS # BLD: 0.08 K/UL (ref 0–0.12)
BILIRUB SERPL-MCNC: 0.2 MG/DL (ref 0.1–1.5)
BILIRUB UR QL STRIP.AUTO: NEGATIVE
BUN SERPL-MCNC: 13 MG/DL (ref 8–22)
CALCIUM SERPL-MCNC: 9.5 MG/DL (ref 8.5–10.5)
CHLORIDE SERPL-SCNC: 105 MMOL/L (ref 96–112)
CO2 SERPL-SCNC: 23 MMOL/L (ref 20–33)
COLOR UR: YELLOW
CREAT SERPL-MCNC: 0.67 MG/DL (ref 0.5–1.4)
EOSINOPHIL # BLD AUTO: 0.72 K/UL (ref 0–0.51)
EOSINOPHIL NFR BLD: 7.8 % (ref 0–6.9)
ERYTHROCYTE [DISTWIDTH] IN BLOOD BY AUTOMATED COUNT: 46.9 FL (ref 35.9–50)
GFR SERPLBLD CREATININE-BSD FMLA CKD-EPI: 102 ML/MIN/1.73 M 2
GLOBULIN SER CALC-MCNC: 3 G/DL (ref 1.9–3.5)
GLUCOSE SERPL-MCNC: 93 MG/DL (ref 65–99)
GLUCOSE UR STRIP.AUTO-MCNC: NEGATIVE MG/DL
HCT VFR BLD AUTO: 43 % (ref 37–47)
HGB BLD-MCNC: 13.6 G/DL (ref 12–16)
IMM GRANULOCYTES # BLD AUTO: 0.06 K/UL (ref 0–0.11)
IMM GRANULOCYTES NFR BLD AUTO: 0.6 % (ref 0–0.9)
KETONES UR STRIP.AUTO-MCNC: NEGATIVE MG/DL
LEUKOCYTE ESTERASE UR QL STRIP.AUTO: NEGATIVE
LIPASE SERPL-CCNC: 21 U/L (ref 11–82)
LYMPHOCYTES # BLD AUTO: 2.77 K/UL (ref 1–4.8)
LYMPHOCYTES NFR BLD: 29.9 % (ref 22–41)
MCH RBC QN AUTO: 27.8 PG (ref 27–33)
MCHC RBC AUTO-ENTMCNC: 31.6 G/DL (ref 33.6–35)
MCV RBC AUTO: 87.8 FL (ref 81.4–97.8)
MICRO URNS: NORMAL
MONOCYTES # BLD AUTO: 0.66 K/UL (ref 0–0.85)
MONOCYTES NFR BLD AUTO: 7.1 % (ref 0–13.4)
NEUTROPHILS # BLD AUTO: 4.97 K/UL (ref 2–7.15)
NEUTROPHILS NFR BLD: 53.7 % (ref 44–72)
NITRITE UR QL STRIP.AUTO: NEGATIVE
NRBC # BLD AUTO: 0 K/UL
NRBC BLD-RTO: 0 /100 WBC
PH UR STRIP.AUTO: 5.5 [PH] (ref 5–8)
PLATELET # BLD AUTO: 246 K/UL (ref 164–446)
PMV BLD AUTO: 10.9 FL (ref 9–12.9)
POTASSIUM SERPL-SCNC: 4 MMOL/L (ref 3.6–5.5)
PROT SERPL-MCNC: 7.4 G/DL (ref 6–8.2)
PROT UR QL STRIP: NEGATIVE MG/DL
RBC # BLD AUTO: 4.9 M/UL (ref 4.2–5.4)
RBC UR QL AUTO: NEGATIVE
SARS-COV+SARS-COV-2 AG RESP QL IA.RAPID: NOTDETECTED
SODIUM SERPL-SCNC: 139 MMOL/L (ref 135–145)
SP GR UR STRIP.AUTO: 1.02
SPECIMEN SOURCE: NORMAL
UROBILINOGEN UR STRIP.AUTO-MCNC: 0.2 MG/DL
WBC # BLD AUTO: 9.3 K/UL (ref 4.8–10.8)

## 2022-04-08 PROCEDURE — 80053 COMPREHEN METABOLIC PANEL: CPT

## 2022-04-08 PROCEDURE — 700102 HCHG RX REV CODE 250 W/ 637 OVERRIDE(OP): Performed by: GENERAL PRACTICE

## 2022-04-08 PROCEDURE — 501838 HCHG SUTURE GENERAL: Performed by: SURGERY

## 2022-04-08 PROCEDURE — 500002 HCHG ADHESIVE, DERMABOND: Performed by: SURGERY

## 2022-04-08 PROCEDURE — 700111 HCHG RX REV CODE 636 W/ 250 OVERRIDE (IP): Performed by: EMERGENCY MEDICINE

## 2022-04-08 PROCEDURE — A9270 NON-COVERED ITEM OR SERVICE: HCPCS | Performed by: STUDENT IN AN ORGANIZED HEALTH CARE EDUCATION/TRAINING PROGRAM

## 2022-04-08 PROCEDURE — 700105 HCHG RX REV CODE 258: Performed by: STUDENT IN AN ORGANIZED HEALTH CARE EDUCATION/TRAINING PROGRAM

## 2022-04-08 PROCEDURE — 49652 PR LAP, VENTRAL HERNIA REPAIR,REDUCIBLE: CPT | Performed by: SURGERY

## 2022-04-08 PROCEDURE — 99291 CRITICAL CARE FIRST HOUR: CPT

## 2022-04-08 PROCEDURE — 160031 HCHG SURGERY MINUTES - 1ST 30 MINS LEVEL 5: Performed by: SURGERY

## 2022-04-08 PROCEDURE — C1781 MESH (IMPLANTABLE): HCPCS | Performed by: SURGERY

## 2022-04-08 PROCEDURE — 87426 SARSCOV CORONAVIRUS AG IA: CPT

## 2022-04-08 PROCEDURE — 94760 N-INVAS EAR/PLS OXIMETRY 1: CPT

## 2022-04-08 PROCEDURE — G0378 HOSPITAL OBSERVATION PER HR: HCPCS

## 2022-04-08 PROCEDURE — 81003 URINALYSIS AUTO W/O SCOPE: CPT

## 2022-04-08 PROCEDURE — 99220 PR INITIAL OBSERVATION CARE,LEVL III: CPT | Performed by: GENERAL PRACTICE

## 2022-04-08 PROCEDURE — 36415 COLL VENOUS BLD VENIPUNCTURE: CPT

## 2022-04-08 PROCEDURE — 74176 CT ABD & PELVIS W/O CONTRAST: CPT | Mod: ME

## 2022-04-08 PROCEDURE — 500868 HCHG NEEDLE, SURGI(VARES): Performed by: SURGERY

## 2022-04-08 PROCEDURE — 160048 HCHG OR STATISTICAL LEVEL 1-5: Performed by: SURGERY

## 2022-04-08 PROCEDURE — 700105 HCHG RX REV CODE 258: Performed by: EMERGENCY MEDICINE

## 2022-04-08 PROCEDURE — 160002 HCHG RECOVERY MINUTES (STAT): Performed by: SURGERY

## 2022-04-08 PROCEDURE — 700111 HCHG RX REV CODE 636 W/ 250 OVERRIDE (IP): Performed by: STUDENT IN AN ORGANIZED HEALTH CARE EDUCATION/TRAINING PROGRAM

## 2022-04-08 PROCEDURE — 700102 HCHG RX REV CODE 250 W/ 637 OVERRIDE(OP): Performed by: STUDENT IN AN ORGANIZED HEALTH CARE EDUCATION/TRAINING PROGRAM

## 2022-04-08 PROCEDURE — 160042 HCHG SURGERY MINUTES - EA ADDL 1 MIN LEVEL 5: Performed by: SURGERY

## 2022-04-08 PROCEDURE — 96374 THER/PROPH/DIAG INJ IV PUSH: CPT

## 2022-04-08 PROCEDURE — 700101 HCHG RX REV CODE 250: Performed by: STUDENT IN AN ORGANIZED HEALTH CARE EDUCATION/TRAINING PROGRAM

## 2022-04-08 PROCEDURE — A9270 NON-COVERED ITEM OR SERVICE: HCPCS | Performed by: GENERAL PRACTICE

## 2022-04-08 PROCEDURE — 700105 HCHG RX REV CODE 258: Performed by: GENERAL PRACTICE

## 2022-04-08 PROCEDURE — 00756 ANES HRNA RPR DIPHRG HRNA: CPT | Performed by: STUDENT IN AN ORGANIZED HEALTH CARE EDUCATION/TRAINING PROGRAM

## 2022-04-08 PROCEDURE — 85025 COMPLETE CBC W/AUTO DIFF WBC: CPT

## 2022-04-08 PROCEDURE — 96375 TX/PRO/DX INJ NEW DRUG ADDON: CPT | Mod: XU

## 2022-04-08 PROCEDURE — 502714 HCHG ROBOTIC SURGERY SERVICES: Performed by: SURGERY

## 2022-04-08 PROCEDURE — 160009 HCHG ANES TIME/MIN: Performed by: SURGERY

## 2022-04-08 PROCEDURE — 700101 HCHG RX REV CODE 250: Performed by: SURGERY

## 2022-04-08 PROCEDURE — 99219 PR INITIAL OBSERVATION CARE,LEVL II: CPT | Mod: 57 | Performed by: SURGERY

## 2022-04-08 PROCEDURE — 96375 TX/PRO/DX INJ NEW DRUG ADDON: CPT

## 2022-04-08 PROCEDURE — 160035 HCHG PACU - 1ST 60 MINS PHASE I: Performed by: SURGERY

## 2022-04-08 PROCEDURE — 83690 ASSAY OF LIPASE: CPT

## 2022-04-08 PROCEDURE — 160036 HCHG PACU - EA ADDL 30 MINS PHASE I: Performed by: SURGERY

## 2022-04-08 DEVICE — MESH VENTRALIGHT 4 X 6 INCH - (1EA/CA): Type: IMPLANTABLE DEVICE | Site: ABDOMEN | Status: FUNCTIONAL

## 2022-04-08 RX ORDER — LIDOCAINE HYDROCHLORIDE 20 MG/ML
INJECTION, SOLUTION EPIDURAL; INFILTRATION; INTRACAUDAL; PERINEURAL PRN
Status: DISCONTINUED | OUTPATIENT
Start: 2022-04-08 | End: 2022-04-08 | Stop reason: SURG

## 2022-04-08 RX ORDER — SODIUM CHLORIDE, SODIUM LACTATE, POTASSIUM CHLORIDE, CALCIUM CHLORIDE 600; 310; 30; 20 MG/100ML; MG/100ML; MG/100ML; MG/100ML
INJECTION, SOLUTION INTRAVENOUS
Status: DISCONTINUED | OUTPATIENT
Start: 2022-04-08 | End: 2022-04-08 | Stop reason: SURG

## 2022-04-08 RX ORDER — CEFAZOLIN SODIUM 1 G/3ML
INJECTION, POWDER, FOR SOLUTION INTRAMUSCULAR; INTRAVENOUS PRN
Status: DISCONTINUED | OUTPATIENT
Start: 2022-04-08 | End: 2022-04-08 | Stop reason: SURG

## 2022-04-08 RX ORDER — BUDESONIDE AND FORMOTEROL FUMARATE DIHYDRATE 160; 4.5 UG/1; UG/1
2 AEROSOL RESPIRATORY (INHALATION) 2 TIMES DAILY PRN
Status: DISCONTINUED | OUTPATIENT
Start: 2022-04-08 | End: 2022-04-08

## 2022-04-08 RX ORDER — IPRATROPIUM BROMIDE AND ALBUTEROL SULFATE 2.5; .5 MG/3ML; MG/3ML
3 SOLUTION RESPIRATORY (INHALATION)
Status: DISCONTINUED | OUTPATIENT
Start: 2022-04-08 | End: 2022-04-10 | Stop reason: HOSPADM

## 2022-04-08 RX ORDER — BUPIVACAINE HYDROCHLORIDE AND EPINEPHRINE 5; 5 MG/ML; UG/ML
INJECTION, SOLUTION EPIDURAL; INTRACAUDAL; PERINEURAL
Status: DISCONTINUED | OUTPATIENT
Start: 2022-04-08 | End: 2022-04-08 | Stop reason: HOSPADM

## 2022-04-08 RX ORDER — BUDESONIDE AND FORMOTEROL FUMARATE DIHYDRATE 160; 4.5 UG/1; UG/1
2 AEROSOL RESPIRATORY (INHALATION)
Status: DISCONTINUED | OUTPATIENT
Start: 2022-04-08 | End: 2022-04-10 | Stop reason: HOSPADM

## 2022-04-08 RX ORDER — HYDROMORPHONE HYDROCHLORIDE 1 MG/ML
0.2 INJECTION, SOLUTION INTRAMUSCULAR; INTRAVENOUS; SUBCUTANEOUS
Status: DISCONTINUED | OUTPATIENT
Start: 2022-04-08 | End: 2022-04-08 | Stop reason: HOSPADM

## 2022-04-08 RX ORDER — HALOPERIDOL 5 MG/ML
1 INJECTION INTRAMUSCULAR
Status: DISCONTINUED | OUTPATIENT
Start: 2022-04-08 | End: 2022-04-08 | Stop reason: HOSPADM

## 2022-04-08 RX ORDER — ACETAMINOPHEN 325 MG/1
650 TABLET ORAL EVERY 6 HOURS PRN
Status: DISCONTINUED | OUTPATIENT
Start: 2022-04-08 | End: 2022-04-10 | Stop reason: HOSPADM

## 2022-04-08 RX ORDER — ONDANSETRON 2 MG/ML
INJECTION INTRAMUSCULAR; INTRAVENOUS PRN
Status: DISCONTINUED | OUTPATIENT
Start: 2022-04-08 | End: 2022-04-08 | Stop reason: SURG

## 2022-04-08 RX ORDER — PROMETHAZINE HYDROCHLORIDE 25 MG/1
12.5-25 TABLET ORAL EVERY 4 HOURS PRN
Status: DISCONTINUED | OUTPATIENT
Start: 2022-04-08 | End: 2022-04-10 | Stop reason: HOSPADM

## 2022-04-08 RX ORDER — HYDROMORPHONE HYDROCHLORIDE 1 MG/ML
0.5 INJECTION, SOLUTION INTRAMUSCULAR; INTRAVENOUS; SUBCUTANEOUS
Status: DISCONTINUED | OUTPATIENT
Start: 2022-04-08 | End: 2022-04-10 | Stop reason: HOSPADM

## 2022-04-08 RX ORDER — MIDAZOLAM HYDROCHLORIDE 1 MG/ML
INJECTION INTRAMUSCULAR; INTRAVENOUS PRN
Status: DISCONTINUED | OUTPATIENT
Start: 2022-04-08 | End: 2022-04-08 | Stop reason: SURG

## 2022-04-08 RX ORDER — POLYETHYLENE GLYCOL 3350 17 G/17G
1 POWDER, FOR SOLUTION ORAL
Status: DISCONTINUED | OUTPATIENT
Start: 2022-04-08 | End: 2022-04-10 | Stop reason: HOSPADM

## 2022-04-08 RX ORDER — SODIUM CHLORIDE 9 MG/ML
INJECTION, SOLUTION INTRAVENOUS CONTINUOUS
Status: DISCONTINUED | OUTPATIENT
Start: 2022-04-08 | End: 2022-04-09

## 2022-04-08 RX ORDER — SODIUM CHLORIDE 9 MG/ML
INJECTION, SOLUTION INTRAVENOUS CONTINUOUS
Status: DISCONTINUED | OUTPATIENT
Start: 2022-04-08 | End: 2022-04-08

## 2022-04-08 RX ORDER — DIPHENHYDRAMINE HYDROCHLORIDE 50 MG/ML
12.5 INJECTION INTRAMUSCULAR; INTRAVENOUS
Status: DISCONTINUED | OUTPATIENT
Start: 2022-04-08 | End: 2022-04-08 | Stop reason: HOSPADM

## 2022-04-08 RX ORDER — AMOXICILLIN 250 MG
2 CAPSULE ORAL 2 TIMES DAILY
Status: DISCONTINUED | OUTPATIENT
Start: 2022-04-08 | End: 2022-04-10 | Stop reason: HOSPADM

## 2022-04-08 RX ORDER — HYDRALAZINE HYDROCHLORIDE 20 MG/ML
10 INJECTION INTRAMUSCULAR; INTRAVENOUS EVERY 4 HOURS PRN
Status: DISCONTINUED | OUTPATIENT
Start: 2022-04-08 | End: 2022-04-10 | Stop reason: HOSPADM

## 2022-04-08 RX ORDER — OXYCODONE HYDROCHLORIDE 10 MG/1
10 TABLET ORAL
Status: DISCONTINUED | OUTPATIENT
Start: 2022-04-08 | End: 2022-04-10 | Stop reason: HOSPADM

## 2022-04-08 RX ORDER — ONDANSETRON 2 MG/ML
4 INJECTION INTRAMUSCULAR; INTRAVENOUS ONCE
Status: COMPLETED | OUTPATIENT
Start: 2022-04-08 | End: 2022-04-08

## 2022-04-08 RX ORDER — HYDROMORPHONE HYDROCHLORIDE 1 MG/ML
0.4 INJECTION, SOLUTION INTRAMUSCULAR; INTRAVENOUS; SUBCUTANEOUS
Status: DISCONTINUED | OUTPATIENT
Start: 2022-04-08 | End: 2022-04-08 | Stop reason: HOSPADM

## 2022-04-08 RX ORDER — BISACODYL 10 MG
10 SUPPOSITORY, RECTAL RECTAL
Status: DISCONTINUED | OUTPATIENT
Start: 2022-04-08 | End: 2022-04-10 | Stop reason: HOSPADM

## 2022-04-08 RX ORDER — ONDANSETRON 4 MG/1
4 TABLET, ORALLY DISINTEGRATING ORAL EVERY 4 HOURS PRN
Status: DISCONTINUED | OUTPATIENT
Start: 2022-04-08 | End: 2022-04-10 | Stop reason: HOSPADM

## 2022-04-08 RX ORDER — OXYCODONE HCL 5 MG/5 ML
5 SOLUTION, ORAL ORAL
Status: COMPLETED | OUTPATIENT
Start: 2022-04-08 | End: 2022-04-08

## 2022-04-08 RX ORDER — HYDROMORPHONE HYDROCHLORIDE 2 MG/ML
INJECTION, SOLUTION INTRAMUSCULAR; INTRAVENOUS; SUBCUTANEOUS PRN
Status: DISCONTINUED | OUTPATIENT
Start: 2022-04-08 | End: 2022-04-08 | Stop reason: SURG

## 2022-04-08 RX ORDER — PROCHLORPERAZINE EDISYLATE 5 MG/ML
5-10 INJECTION INTRAMUSCULAR; INTRAVENOUS EVERY 4 HOURS PRN
Status: DISCONTINUED | OUTPATIENT
Start: 2022-04-08 | End: 2022-04-10 | Stop reason: HOSPADM

## 2022-04-08 RX ORDER — SERTRALINE HYDROCHLORIDE 100 MG/1
200 TABLET, FILM COATED ORAL DAILY
Status: DISCONTINUED | OUTPATIENT
Start: 2022-04-09 | End: 2022-04-10 | Stop reason: HOSPADM

## 2022-04-08 RX ORDER — OXYCODONE HCL 5 MG/5 ML
10 SOLUTION, ORAL ORAL
Status: COMPLETED | OUTPATIENT
Start: 2022-04-08 | End: 2022-04-08

## 2022-04-08 RX ORDER — LIDOCAINE HYDROCHLORIDE 40 MG/ML
SOLUTION TOPICAL PRN
Status: DISCONTINUED | OUTPATIENT
Start: 2022-04-08 | End: 2022-04-08 | Stop reason: SURG

## 2022-04-08 RX ORDER — AMOXICILLIN 500 MG/1
500 CAPSULE ORAL 3 TIMES DAILY
COMMUNITY
Start: 2022-03-25 | End: 2023-06-05

## 2022-04-08 RX ORDER — PROMETHAZINE HYDROCHLORIDE 25 MG/1
12.5-25 SUPPOSITORY RECTAL EVERY 4 HOURS PRN
Status: DISCONTINUED | OUTPATIENT
Start: 2022-04-08 | End: 2022-04-10 | Stop reason: HOSPADM

## 2022-04-08 RX ORDER — ONDANSETRON 2 MG/ML
4 INJECTION INTRAMUSCULAR; INTRAVENOUS EVERY 4 HOURS PRN
Status: DISCONTINUED | OUTPATIENT
Start: 2022-04-08 | End: 2022-04-10 | Stop reason: HOSPADM

## 2022-04-08 RX ORDER — ROCURONIUM BROMIDE 10 MG/ML
INJECTION, SOLUTION INTRAVENOUS PRN
Status: DISCONTINUED | OUTPATIENT
Start: 2022-04-08 | End: 2022-04-08 | Stop reason: SURG

## 2022-04-08 RX ORDER — DEXAMETHASONE SODIUM PHOSPHATE 4 MG/ML
INJECTION, SOLUTION INTRA-ARTICULAR; INTRALESIONAL; INTRAMUSCULAR; INTRAVENOUS; SOFT TISSUE PRN
Status: DISCONTINUED | OUTPATIENT
Start: 2022-04-08 | End: 2022-04-08 | Stop reason: SURG

## 2022-04-08 RX ORDER — OXYCODONE HYDROCHLORIDE 5 MG/1
5 TABLET ORAL
Status: DISCONTINUED | OUTPATIENT
Start: 2022-04-08 | End: 2022-04-10 | Stop reason: HOSPADM

## 2022-04-08 RX ORDER — ONDANSETRON 2 MG/ML
4 INJECTION INTRAMUSCULAR; INTRAVENOUS
Status: DISCONTINUED | OUTPATIENT
Start: 2022-04-08 | End: 2022-04-08 | Stop reason: HOSPADM

## 2022-04-08 RX ORDER — HYDROMORPHONE HYDROCHLORIDE 1 MG/ML
0.1 INJECTION, SOLUTION INTRAMUSCULAR; INTRAVENOUS; SUBCUTANEOUS
Status: DISCONTINUED | OUTPATIENT
Start: 2022-04-08 | End: 2022-04-08 | Stop reason: HOSPADM

## 2022-04-08 RX ADMIN — FENTANYL CITRATE 50 MCG: 50 INJECTION, SOLUTION INTRAMUSCULAR; INTRAVENOUS at 15:55

## 2022-04-08 RX ADMIN — LIDOCAINE HYDROCHLORIDE 4 ML: 40 SOLUTION TOPICAL at 13:58

## 2022-04-08 RX ADMIN — LIDOCAINE HYDROCHLORIDE 40 MG: 20 INJECTION, SOLUTION EPIDURAL; INFILTRATION; INTRACAUDAL at 13:55

## 2022-04-08 RX ADMIN — BUDESONIDE AND FORMOTEROL FUMARATE DIHYDRATE 2 PUFF: 160; 4.5 AEROSOL RESPIRATORY (INHALATION) at 09:57

## 2022-04-08 RX ADMIN — OXYCODONE HYDROCHLORIDE 10 MG: 10 TABLET ORAL at 20:40

## 2022-04-08 RX ADMIN — OXYCODONE HYDROCHLORIDE 10 MG: 5 SOLUTION ORAL at 15:36

## 2022-04-08 RX ADMIN — FENTANYL CITRATE 50 MCG: 50 INJECTION, SOLUTION INTRAMUSCULAR; INTRAVENOUS at 14:13

## 2022-04-08 RX ADMIN — HYDROMORPHONE HYDROCHLORIDE 0.2 MG: 1 INJECTION, SOLUTION INTRAMUSCULAR; INTRAVENOUS; SUBCUTANEOUS at 16:23

## 2022-04-08 RX ADMIN — HYDROMORPHONE HYDROCHLORIDE 0.2 MG: 1 INJECTION, SOLUTION INTRAMUSCULAR; INTRAVENOUS; SUBCUTANEOUS at 16:07

## 2022-04-08 RX ADMIN — HYDROMORPHONE HYDROCHLORIDE 0.2 MG: 1 INJECTION, SOLUTION INTRAMUSCULAR; INTRAVENOUS; SUBCUTANEOUS at 16:02

## 2022-04-08 RX ADMIN — MIDAZOLAM HYDROCHLORIDE 2 MG: 1 INJECTION, SOLUTION INTRAMUSCULAR; INTRAVENOUS at 13:55

## 2022-04-08 RX ADMIN — FENTANYL CITRATE 25 MCG: 50 INJECTION, SOLUTION INTRAMUSCULAR; INTRAVENOUS at 15:45

## 2022-04-08 RX ADMIN — DEXAMETHASONE SODIUM PHOSPHATE 4 MG: 4 INJECTION, SOLUTION INTRA-ARTICULAR; INTRALESIONAL; INTRAMUSCULAR; INTRAVENOUS; SOFT TISSUE at 14:04

## 2022-04-08 RX ADMIN — ROCURONIUM BROMIDE 10 MG: 10 INJECTION, SOLUTION INTRAVENOUS at 15:01

## 2022-04-08 RX ADMIN — BUDESONIDE AND FORMOTEROL FUMARATE DIHYDRATE 2 PUFF: 160; 4.5 AEROSOL RESPIRATORY (INHALATION) at 20:40

## 2022-04-08 RX ADMIN — HYDROMORPHONE HYDROCHLORIDE 0.4 MG: 2 INJECTION INTRAMUSCULAR; INTRAVENOUS; SUBCUTANEOUS at 14:31

## 2022-04-08 RX ADMIN — ONDANSETRON 4 MG: 2 INJECTION INTRAMUSCULAR; INTRAVENOUS at 14:05

## 2022-04-08 RX ADMIN — SENNOSIDES AND DOCUSATE SODIUM 2 TABLET: 50; 8.6 TABLET ORAL at 17:16

## 2022-04-08 RX ADMIN — HYDROMORPHONE HYDROCHLORIDE 0.2 MG: 1 INJECTION, SOLUTION INTRAMUSCULAR; INTRAVENOUS; SUBCUTANEOUS at 16:12

## 2022-04-08 RX ADMIN — PROPOFOL 150 MG: 10 INJECTION, EMULSION INTRAVENOUS at 13:55

## 2022-04-08 RX ADMIN — FENTANYL CITRATE 50 MCG: 50 INJECTION, SOLUTION INTRAMUSCULAR; INTRAVENOUS at 14:15

## 2022-04-08 RX ADMIN — HYDROMORPHONE HYDROCHLORIDE 0.4 MG: 2 INJECTION INTRAMUSCULAR; INTRAVENOUS; SUBCUTANEOUS at 15:01

## 2022-04-08 RX ADMIN — ONDANSETRON 4 MG: 2 INJECTION INTRAMUSCULAR; INTRAVENOUS at 06:32

## 2022-04-08 RX ADMIN — FENTANYL CITRATE 100 MCG: 50 INJECTION, SOLUTION INTRAMUSCULAR; INTRAVENOUS at 13:55

## 2022-04-08 RX ADMIN — CEFAZOLIN 2 G: 330 INJECTION, POWDER, FOR SOLUTION INTRAMUSCULAR; INTRAVENOUS at 13:55

## 2022-04-08 RX ADMIN — SODIUM CHLORIDE: 9 INJECTION, SOLUTION INTRAVENOUS at 09:05

## 2022-04-08 RX ADMIN — FENTANYL CITRATE 25 MCG: 50 INJECTION, SOLUTION INTRAMUSCULAR; INTRAVENOUS at 15:37

## 2022-04-08 RX ADMIN — SODIUM CHLORIDE: 9 INJECTION, SOLUTION INTRAVENOUS at 09:49

## 2022-04-08 RX ADMIN — SENNOSIDES AND DOCUSATE SODIUM 2 TABLET: 50; 8.6 TABLET ORAL at 09:57

## 2022-04-08 RX ADMIN — SUGAMMADEX 200 MG: 100 INJECTION, SOLUTION INTRAVENOUS at 15:12

## 2022-04-08 RX ADMIN — OXYCODONE HYDROCHLORIDE 10 MG: 10 TABLET ORAL at 10:05

## 2022-04-08 RX ADMIN — SODIUM CHLORIDE: 9 INJECTION, SOLUTION INTRAVENOUS at 17:15

## 2022-04-08 RX ADMIN — FENTANYL CITRATE 50 MCG: 50 INJECTION, SOLUTION INTRAMUSCULAR; INTRAVENOUS at 06:32

## 2022-04-08 RX ADMIN — HYDROMORPHONE HYDROCHLORIDE 0.2 MG: 1 INJECTION, SOLUTION INTRAMUSCULAR; INTRAVENOUS; SUBCUTANEOUS at 16:18

## 2022-04-08 RX ADMIN — ROCURONIUM BROMIDE 50 MG: 10 INJECTION, SOLUTION INTRAVENOUS at 13:55

## 2022-04-08 RX ADMIN — SODIUM CHLORIDE, POTASSIUM CHLORIDE, SODIUM LACTATE AND CALCIUM CHLORIDE: 600; 310; 30; 20 INJECTION, SOLUTION INTRAVENOUS at 13:50

## 2022-04-08 ASSESSMENT — COGNITIVE AND FUNCTIONAL STATUS - GENERAL
SUGGESTED CMS G CODE MODIFIER MOBILITY: CK
DRESSING REGULAR LOWER BODY CLOTHING: A LITTLE
MOVING FROM LYING ON BACK TO SITTING ON SIDE OF FLAT BED: A LITTLE
WALKING IN HOSPITAL ROOM: A LITTLE
DAILY ACTIVITIY SCORE: 20
TURNING FROM BACK TO SIDE WHILE IN FLAT BAD: A LITTLE
HELP NEEDED FOR BATHING: A LITTLE
TOILETING: A LITTLE
CLIMB 3 TO 5 STEPS WITH RAILING: A LITTLE
MOBILITY SCORE: 18
MOVING TO AND FROM BED TO CHAIR: A LITTLE
DRESSING REGULAR UPPER BODY CLOTHING: A LITTLE
STANDING UP FROM CHAIR USING ARMS: A LITTLE
SUGGESTED CMS G CODE MODIFIER DAILY ACTIVITY: CJ

## 2022-04-08 ASSESSMENT — ENCOUNTER SYMPTOMS
NAUSEA: 1
ABDOMINAL PAIN: 1
VOMITING: 1

## 2022-04-08 ASSESSMENT — LIFESTYLE VARIABLES
HOW MANY TIMES IN THE PAST YEAR HAVE YOU HAD 5 OR MORE DRINKS IN A DAY: 0
TOTAL SCORE: 0
AVERAGE NUMBER OF DAYS PER WEEK YOU HAVE A DRINK CONTAINING ALCOHOL: 0
DOES PATIENT WANT TO STOP DRINKING: NO
EVER HAD A DRINK FIRST THING IN THE MORNING TO STEADY YOUR NERVES TO GET RID OF A HANGOVER: NO
HAVE PEOPLE ANNOYED YOU BY CRITICIZING YOUR DRINKING: NO
HAVE YOU EVER FELT YOU SHOULD CUT DOWN ON YOUR DRINKING: NO
EVER FELT BAD OR GUILTY ABOUT YOUR DRINKING: NO
ALCOHOL_USE: NO
TOTAL SCORE: 0
TOTAL SCORE: 0
CONSUMPTION TOTAL: NEGATIVE
ON A TYPICAL DAY WHEN YOU DRINK ALCOHOL HOW MANY DRINKS DO YOU HAVE: 0

## 2022-04-08 ASSESSMENT — PAIN DESCRIPTION - PAIN TYPE
TYPE: SURGICAL PAIN
TYPE: ACUTE PAIN
TYPE: ACUTE PAIN;SURGICAL PAIN
TYPE: SURGICAL PAIN

## 2022-04-08 ASSESSMENT — FIBROSIS 4 INDEX: FIB4 SCORE: 1.04

## 2022-04-08 ASSESSMENT — PATIENT HEALTH QUESTIONNAIRE - PHQ9
1. LITTLE INTEREST OR PLEASURE IN DOING THINGS: NOT AT ALL
SUM OF ALL RESPONSES TO PHQ9 QUESTIONS 1 AND 2: 0
2. FEELING DOWN, DEPRESSED, IRRITABLE, OR HOPELESS: NOT AT ALL

## 2022-04-08 NOTE — ED NOTES
Medicated patient per MAR. Denture cup provided with patient label affixed per patient request, container at bedside.     Patient denies further needs. Call light within reach.

## 2022-04-08 NOTE — ASSESSMENT & PLAN NOTE
Labs unremarkable, no elevation in T bili or LFTs.  UA negative.  CT imaging noted internal rectus fascial hernia containing small bowel loop, no obstructive changes.   ERP consulted surgery, Dr Juarez.   S/p hernia repair on 4/8   Continue pain control   Bowel protocol

## 2022-04-08 NOTE — H&P
"Hospital Medicine History & Physical Note    Date of Service  2022    Primary Care Physician  LUIS MIGUEL Chris.    Consultants  general surgery    Specialist Names: Dr. Juarez    Code Status  Full Code    Chief Complaint  Chief Complaint   Patient presents with   • Abdominal Pain     X 2 months, patient was admitted at [facility] in February for a bowel obstruction. Patient reports bloated intermittently but has been taking metamucil. Patient has been following with GI outpatient. Patient reports \"a pocket of fluid\" in the RLQ.    • Constipation     Last bowel movement 3 days ago   • Sent by MD     Patient sent for a CT by her PCP. Patient did not schedule her CT outpatient that was ordered by GI       History of Presenting Illness  This is a 56 year old female with PMHx of COPD, depression, anxiety, chronic pain on chronic opioids, migraines, recent admission for SBO 2022, hx of breast cancer s/p mastectomy and chemotherapy, who presents to the ED with continued abdominal pain.    Patient reports she has intermittent nausea and vomiting, and poor oral intake over the past couple days.  Denies any associate fever, chills.  At bedside she is tender to palpation on the right side.    Labs unremarkable, no elevation in T bili or LFTs.  UA negative.  CT imaging noted internal rectus fascial hernia containing small bowel loop, no obstructive changes. ERP consulted surgery, Dr Juarez, who will plan for OR repair.    Of note patient had recent admission at [facility] with SBO, she has a extensive history of multiple abdominal surgeries including  x3, partial hysterectomy and subsequent total abdominal hysterectomy.  Patient noted to be hypoxic on ambulation during that admission, she was discharged home with home oxygen.    Updated patient and patient's sister on speaker phone on plan of care, all questions answered.    I discussed the plan of care with patient, family and bedside RN.    Review " of Systems  Review of Systems   Gastrointestinal: Positive for abdominal pain, nausea and vomiting.   All other systems reviewed and are negative.      Past Medical History   has a past medical history of Anesthesia, Anxiety and depression, Arthritis, Asthma, Back injury, Back pain, Bronchitis (2010), Cancer ([facility]) (1995), Chickenpox, Chronic LBP, Chronic neck pain, Cold intolerance (1/24/2012), COPD (chronic obstructive pulmonary disease) ([facility]), Cystic fibrosis ([facility]), Dental disorder, Depression, Diabetes, GERD (gastroesophageal reflux disease), H/O gastric bypass (10/11/2013), Herniated nucleus pulposus, L4-5 (3/18/2010), breast cancer (10/11/2013), Hypertension, Itching due to drug (6/2/2011), Kidney stone, Nephrolithiasis (10/11/2013), Obesity, Osteoporosis, Other specified symptom associated with female genital organs, Panic disorder, Pneumonia, PONV (postoperative nausea and vomiting), PTSD (post-traumatic stress disorder), Restless leg syndrome, Rheumatoid arthritis ([facility]), S/P laminectomy (10/22/2012), Seizure ([facility]) (09/2020), Thoracic or lumbosacral neuritis or radiculitis, unspecified (10/22/2012), and Trauma.    Surgical History   has a past surgical history that includes mastectomy; breast reconstruction; US-CYST ASPIRATION-BREAST INITIAL; fusion, spine, lumbar, plif (10/22/2012); lumbar laminectomy diskectomy (10/22/2012); ureteroscopy (10/10/2013); lasertripsy (10/10/2013); laminotomy; Sleeve,Chance Vaso Thigh; tonsillectomy; Ventilator - Continuous; primary c section; abdominal hysterectomy total; hysterectomy laparoscopy; other abdominal surgery (2006); other abdominal surgery; craniotomy; other orthopedic surgery (10/01/2012); other; bladder sling female (N/A, 11/4/2020); cystoscopy (N/A, 11/4/2020); and pr implant neurostim/ (3/11/2021).     Family History  family history includes Cancer in her father, maternal aunt, and mother; Diabetes in her father, maternal aunt, maternal grandfather,  maternal grandmother, maternal uncle, and mother; Hyperlipidemia in her father and mother; Hypertension in her father and mother; Other in her mother; Sleep Apnea in her brother; Stroke in her mother.   Family history reviewed with patient. There is no family history that is pertinent to the chief complaint.     Social History   reports that she has never smoked. She has never used smokeless tobacco. She reports current drug use. Drug: Marijuana. She reports that she does not drink alcohol.    Allergies  Allergies   Allergen Reactions   • Buprenorphine-Naloxone Hives, Shortness of Breath and Swelling   • Gabapentin      seizures   • Suboxone Hives, Shortness of Breath and Swelling   • Morphine Rash     tachycardia   • Shrimp (Diagnostic) Itching   • Contrast Media With Iodine [Iodine] Itching     Ok with benadryl       Medications  Prior to Admission Medications   Prescriptions Last Dose Informant Patient Reported? Taking?   albuterol 108 (90 Base) MCG/ACT Aero Soln inhalation aerosol 4/7/2022 at [facility] Patient No No   Sig: Inhale 2 Puffs every 6 hours as needed for Shortness of Breath.   amoxicillin (AMOXIL) 500 MG Cap FEW DAYS AGO at [facility] Patient Yes Yes   Sig: Take 500 mg by mouth 3 times a day. 7 day supply   budesonide-formoterol (SYMBICORT) 160-4.5 MCG/ACT Aerosol FEW DAYS AGO at [facility] Patient No No   Sig: Inhale 2 Puffs 2 times a day as needed (SOB).   sertraline (ZOLOFT) 100 MG Tab FEW DAYS AGO at [facility] Patient Yes No   Sig: Take 200 mg by mouth every day.      Facility-Administered Medications: None       Physical Exam  Temp:  [35.8 °C (96.5 °F)] 35.8 °C (96.5 °F)  Pulse:  [63-84] 63  Resp:  [12-16] 14  BP: (114-135)/(65-76) 126/70  SpO2:  [83 %-99 %] 99 %  Blood Pressure: 126/70   Temperature: 35.8 °C (96.5 °F)   Pulse: 63   Respiration: 14   Pulse Oximetry: 99 %       Physical Exam  Vitals and nursing note reviewed.   Constitutional:       General: She is not in acute distress.     Appearance: She is obese.    HENT:      Head: Normocephalic and atraumatic.      Mouth/Throat:      Mouth: Mucous membranes are moist.      Pharynx: No oropharyngeal exudate.   Eyes:      Extraocular Movements: Extraocular movements intact.      Pupils: Pupils are equal, round, and reactive to light.   Cardiovascular:      Rate and Rhythm: Normal rate and regular rhythm.      Pulses: Normal pulses.      Heart sounds: No murmur heard.    No friction rub. No gallop.   Pulmonary:      Effort: Pulmonary effort is normal. No respiratory distress.      Breath sounds: No wheezing, rhonchi or rales.   Abdominal:      General: Bowel sounds are normal. There is distension.      Palpations: Abdomen is soft. There is no mass.      Tenderness: There is abdominal tenderness.   Musculoskeletal:         General: No swelling or tenderness. Normal range of motion.      Cervical back: Normal range of motion. No rigidity. No muscular tenderness.      Right lower leg: No edema.      Left lower leg: No edema.   Skin:     General: Skin is warm and dry.      Capillary Refill: Capillary refill takes less than 2 seconds.      Findings: No erythema or rash.   Neurological:      General: No focal deficit present.      Mental Status: She is alert and oriented to person, place, and time.      Motor: No weakness.      Gait: Gait normal.         Laboratory:  Recent Labs     04/08/22  0421   WBC 9.3   RBC 4.90   HEMOGLOBIN 13.6   HEMATOCRIT 43.0   MCV 87.8   MCH 27.8   MCHC 31.6*   RDW 46.9   PLATELETCT 246   MPV 10.9     Recent Labs     04/08/22  0421   SODIUM 139   POTASSIUM 4.0   CHLORIDE 105   CO2 23   GLUCOSE 93   BUN 13   CREATININE 0.67   CALCIUM 9.5     Recent Labs     04/08/22  0421   ALTSGPT 20   ASTSGOT 23   ALKPHOSPHAT 144*   TBILIRUBIN 0.2   LIPASE 21   GLUCOSE 93         No results for input(s): NTPROBNP in the last 72 hours.      No results for input(s): TROPONINT in the last 72 hours.    Imaging:  CT-ABDOMEN-PELVIS W/O   Final Result         1.  Internal  rectus fascial hernia containing small bowel loop, no obstructive changes are appreciated at the time of this exam.   2.  Hepatomegaly          X-Ray:  I have personally reviewed the images and compared with prior images.  EKG:  I have personally reviewed the images and compared with prior images.    Assessment/Plan:  I anticipate this patient is appropriate for observation status at this time.    * Hernia of abdominal wall- (present on admission)  Assessment & Plan  Labs unremarkable, no elevation in T bili or LFTs.  UA negative.  CT imaging noted internal rectus fascial hernia containing small bowel loop, no obstructive changes. ERP consulted surgery, Dr Juarez, who will plan for OR repair.  Pain control    Of note patient had recent admission at [facility] with SBO, she has a extensive history of multiple abdominal surgeries including  x3, partial hysterectomy and subsequent total abdominal hysterectomy.  Patient noted to be hypoxic on ambulation during that admission, she was discharged home with home oxygen.    Obesity (BMI 30-39.9)- (present on admission)  Assessment & Plan  Encourage diet exercise and weight loss    Chronic pain syndrome- (present on admission)  Assessment & Plan  Patient reports she was formally on oxycodone  Patient has adverse reactions to Percocet, tramadol, Norco  Patient is on as needed oxycodone       VTE prophylaxis: SCDs/TEDs

## 2022-04-08 NOTE — ED PROVIDER NOTES
"ED Provider Note    CHIEF COMPLAINT  Chief Complaint   Patient presents with   • Abdominal Pain     X 2 months, patient was admitted at [facility] in February for a bowel obstruction. Patient reports bloated intermittently but has been taking metamucil. Patient has been following with GI outpatient. Patient reports \"a pocket of fluid\" in the RLQ.    • Constipation     Last bowel movement 3 days ago   • Sent by MD     Patient sent for a CT by her PCP. Patient did not schedule her CT outpatient that was ordered by GI       HPI  Patti Tello is a 56 y.o. female who presents to the emergency department through triage for abdominal pain.  Patient states she has had somewhat persistent, recurrent abdominal pain over the last couple of months.  States she was admitted for bowel obstruction to [facility] last month.  Increasing abdominal pain for the last 2 days.  Patient points to the mid right lower abdomen.  Last bowel movement 3 days ago.  Stools without blood, melena or mucus.  Nausea without vomiting.  Decreased appetite, pain is worse with any oral intake.    Followed by primary care, GI, sounds like she is been noncompliant with outpatient work-up, referred to the emergency department by primary care yesterday.    REVIEW OF SYSTEMS  See HPI for further details. All other systems are negative.     PAST MEDICAL HISTORY   has a past medical history of Anesthesia, Anxiety and depression, Arthritis, Asthma, Back injury, Back pain, Bronchitis (2010), Cancer ([facility]) (1995), Chickenpox, Chronic LBP, Chronic neck pain, Cold intolerance (1/24/2012), COPD (chronic obstructive pulmonary disease) ([facility]), Cystic fibrosis ([facility]), Dental disorder, Depression, Diabetes, GERD (gastroesophageal reflux disease), H/O gastric bypass (10/11/2013), Herniated nucleus pulposus, L4-5 (3/18/2010), breast cancer (10/11/2013), Hypertension, Itching due to drug (6/2/2011), Kidney stone, Nephrolithiasis (10/11/2013), Obesity, Osteoporosis, Other " specified symptom associated with female genital organs, Panic disorder, Pneumonia, PONV (postoperative nausea and vomiting), PTSD (post-traumatic stress disorder), Restless leg syndrome, Rheumatoid arthritis ([facility]), S/P laminectomy (10/22/2012), Seizure ([facility]) (09/2020), Thoracic or lumbosacral neuritis or radiculitis, unspecified (10/22/2012), and Trauma.    SOCIAL HISTORY  Social History     Tobacco Use   • Smoking status: Never Smoker   • Smokeless tobacco: Never Used   Vaping Use   • Vaping Use: Never used   Substance and Sexual Activity   • Alcohol use: No   • Drug use: Yes     Types: Marijuana     Comment: Gummys for pain   • Sexual activity: Not Currently       SURGICAL HISTORY   has a past surgical history that includes mastectomy; breast reconstruction; US-CYST ASPIRATION-BREAST INITIAL; fusion, spine, lumbar, plif (10/22/2012); lumbar laminectomy diskectomy (10/22/2012); ureteroscopy (10/10/2013); lasertripsy (10/10/2013); laminotomy; Sleeve,Chance Vaso Thigh; tonsillectomy; Ventilator - Continuous; primary c section; abdominal hysterectomy total; hysterectomy laparoscopy; other abdominal surgery (2006); other abdominal surgery; craniotomy; other orthopedic surgery (10/01/2012); other; bladder sling female (N/A, 11/4/2020); cystoscopy (N/A, 11/4/2020); and implant neurostim/ (3/11/2021).    CURRENT MEDICATIONS  Home Medications     Reviewed by Teresa Stoner R.N. (Registered Nurse) on 04/08/22 at 0415  Med List Status: <None>   Medication Last Dose Status   albuterol 108 (90 Base) MCG/ACT Aero Soln inhalation aerosol  Active   benzonatate (TESSALON) 100 MG Cap  Active   Blood Glucose Test Strips  Active   budesonide-formoterol (SYMBICORT) 160-4.5 MCG/ACT Aerosol  Active   cromolyn (OPTICROM) 4 % ophthalmic solution  Active   cyclobenzaprine (FLEXERIL) 10 mg Tab  Active   diphenhydrAMINE (BENADRYL) 25 MG Tab  Active   Docusate Sodium (COLACE PO)  Active   fluticasone (FLONASE) 50 MCG/ACT nasal  spray  Active   Lancets  Active   Misc. Devices Misc  Active   omeprazole (PRILOSEC) 40 MG delayed-release capsule  Active   ondansetron (ZOFRAN) 8 MG Tab  Active   oxycodone (OXY-IR) 15 MG immediate release tablet  Active   propranolol (INDERAL) 10 MG Tab  Active   sertraline (ZOLOFT) 100 MG Tab  Active                ALLERGIES  Allergies   Allergen Reactions   • Buprenorphine-Naloxone Hives, Shortness of Breath and Swelling   • Gabapentin      seizures   • Suboxone Hives, Shortness of Breath and Swelling   • Morphine Rash     tachycardia   • Shrimp (Diagnostic) Itching   • Contrast Media With Iodine [Iodine] Itching     Ok with benadryl       PHYSICAL EXAM  VITAL SIGNS: /71   Pulse 84   Temp 35.8 °C (96.5 °F) (Temporal)   Resp 14   Ht 1.524 m (5')   Wt 82.3 kg (181 lb 7 oz)   LMP 10/05/1999   SpO2 98%   BMI 35.43 kg/m²   Pulse ox interpretation: I interpret this pulse ox as normal.  Constitutional: Alert in no apparent distress.  HENT: Normocephalic, atraumatic. Bilateral external ears normal, Nose normal. Moist mucous membranes.    Eyes: Pupils are equal and reactive, Conjunctiva normal.   Neck: Normal range of motion, Supple  Lymphatic: No lymphadenopathy noted.   Cardiovascular: Regular rate and rhythm. Distal pulses intact.   Thorax & Lungs: Normal breath sounds.  No wheezing/rales/ronchi. No increased work of breathing  Abdomen: Soft, no.  Tender to palpation in the periumbilical region across the low abdomen, greatest in the right lower quadrant.  No guarding or peritonitis.  Skin: Warm, Dry, No erythema, No rash.   Musculoskeletal: Good range of motion in all major joints.  Neurologic: Alert and oriented x4 although poor historian.  Moves 4 extremities spontaneously.  Psychiatric: Odd affect.  Cooperative.    DIAGNOSTIC STUDIES / PROCEDURES  LABS  Results for orders placed or performed during the hospital encounter of 04/08/22   CBC WITH DIFFERENTIAL   Result Value Ref Range    WBC 9.3 4.8 -  10.8 K/uL    RBC 4.90 4.20 - 5.40 M/uL    Hemoglobin 13.6 12.0 - 16.0 g/dL    Hematocrit 43.0 37.0 - 47.0 %    MCV 87.8 81.4 - 97.8 fL    MCH 27.8 27.0 - 33.0 pg    MCHC 31.6 (L) 33.6 - 35.0 g/dL    RDW 46.9 35.9 - 50.0 fL    Platelet Count 246 164 - 446 K/uL    MPV 10.9 9.0 - 12.9 fL    Neutrophils-Polys 53.70 44.00 - 72.00 %    Lymphocytes 29.90 22.00 - 41.00 %    Monocytes 7.10 0.00 - 13.40 %    Eosinophils 7.80 (H) 0.00 - 6.90 %    Basophils 0.90 0.00 - 1.80 %    Immature Granulocytes 0.60 0.00 - 0.90 %    Nucleated RBC 0.00 /100 WBC    Neutrophils (Absolute) 4.97 2.00 - 7.15 K/uL    Lymphs (Absolute) 2.77 1.00 - 4.80 K/uL    Monos (Absolute) 0.66 0.00 - 0.85 K/uL    Eos (Absolute) 0.72 (H) 0.00 - 0.51 K/uL    Baso (Absolute) 0.08 0.00 - 0.12 K/uL    Immature Granulocytes (abs) 0.06 0.00 - 0.11 K/uL    NRBC (Absolute) 0.00 K/uL   COMP METABOLIC PANEL   Result Value Ref Range    Sodium 139 135 - 145 mmol/L    Potassium 4.0 3.6 - 5.5 mmol/L    Chloride 105 96 - 112 mmol/L    Co2 23 20 - 33 mmol/L    Anion Gap 11.0 7.0 - 16.0    Glucose 93 65 - 99 mg/dL    Bun 13 8 - 22 mg/dL    Creatinine 0.67 0.50 - 1.40 mg/dL    Calcium 9.5 8.5 - 10.5 mg/dL    AST(SGOT) 23 12 - 45 U/L    ALT(SGPT) 20 2 - 50 U/L    Alkaline Phosphatase 144 (H) 30 - 99 U/L    Total Bilirubin 0.2 0.1 - 1.5 mg/dL    Albumin 4.4 3.2 - 4.9 g/dL    Total Protein 7.4 6.0 - 8.2 g/dL    Globulin 3.0 1.9 - 3.5 g/dL    A-G Ratio 1.5 g/dL   LIPASE   Result Value Ref Range    Lipase 21 11 - 82 U/L   URINALYSIS    Specimen: Urine   Result Value Ref Range    Color Yellow     Character Clear     Specific Gravity 1.023 <1.035    Ph 5.5 5.0 - 8.0    Glucose Negative Negative mg/dL    Ketones Negative Negative mg/dL    Protein Negative Negative mg/dL    Bilirubin Negative Negative    Urobilinogen, Urine 0.2 Negative    Nitrite Negative Negative    Leukocyte Esterase Negative Negative    Occult Blood Negative Negative    Micro Urine Req see below    ESTIMATED GFR  "  Result Value Ref Range    GFR (CKD-EPI) 102 >60 mL/min/1.73 m 2       RADIOLOGY  CT-ABDOMEN-PELVIS W/O   Final Result         1.  Internal rectus fascial hernia containing small bowel loop, no obstructive changes are appreciated at the time of this exam.   2.  Hepatomegaly          COURSE & MEDICAL DECISION MAKING  Nursing notes and vital signs were reviewed. (See chart for details)  The patients records were reviewed, history was obtained from the patient;     Medical record review: Records reviewed from [facility] 2/2022.  Initially admitted for partial small bowel obstruction, follow-through is less suggestive of this, although patient was noted to have a fluid collection or small bowel hernia in the right lower quadrant, abdominal wall.  Discharged with outpatient follow-up.  She does have history of chronic narcotic dependence, constipation.    ED evaluation does demonstrate a \"internal rectus fascial hernia containing small bowel loop,\" but no evidence of obstructive changes.  However, patient continues to have ongoing pain in this location, abdomen is tender but without guarding or peritonitis.  Hemodynamically stable without fever, tachycardia or hypotension.  Labs are quite unrevealing.  Will discuss with surgery.    8:46 AM Dr. Juarez is aware of the patient agreeable to consultation.  We will plan the OR repair.  Request hospital admission for comorbidities. She remains NPO.  COVID added.    0910- Dr. Lowe is aware of the patient and agreeable consultation.    FINAL IMPRESSION  (R10.31) RLQ abdominal pain  (K43.9) Hernia of anterior abdominal wall      Electronically signed by: Daina Swift D.O., 4/8/2022 6:22 AM      This dictation was created using voice recognition software. The accuracy of the dictation is limited to the abilities of the software. I expect there may be some errors of grammar and possibly content. The nursing notes were reviewed and certain aspects of this information were " incorporated into this note.

## 2022-04-08 NOTE — ED NOTES
Med rec completed per pt and home pharmacy ([facility])  Allergies reviewed    Pt started a 7 day course of Amoxicillin on 3/25/2022 pt states that she has not completed this course

## 2022-04-08 NOTE — ANESTHESIA PROCEDURE NOTES
Airway    Date/Time: 4/8/2022 1:58 PM  Performed by: Evaristo Her M.D.  Authorized by: Evaristo Her M.D.     Location:  OR  Urgency:  Elective  Indications for Airway Management:  Anesthesia      Spontaneous Ventilation: absent    Sedation Level:  Deep  Preoxygenated: Yes    Patient Position:  Sniffing  Mask Difficulty Assessment:  1 - vent by mask  Final Airway Type:  Endotracheal airway  Final Endotracheal Airway:  ETT  Cuffed: Yes    Technique Used for Successful ETT Placement:  Video laryngoscopy    Insertion Site:  Oral  Blade Type:  Glide  Laryngoscope Blade/Videolaryngoscope Blade Size:  3  ETT Size (mm):  7.0  Measured from:  Teeth  ETT to Teeth (cm):  20  Placement Verified by: capnometry    Cormack-Lehane Classification:  Grade I - full view of glottis  Number of Attempts at Approach:  1

## 2022-04-08 NOTE — ED NOTES
Patient ambulatory to bathroom.    MD at bedside.     Medicated per MAR. Covid swab collected and sent to lab. Call light within reach. Patient denies further needs.

## 2022-04-08 NOTE — ED TRIAGE NOTES
"Patti Tello  56 y.o. female  Chief Complaint   Patient presents with   • Abdominal Pain     X 2 months, patient was admitted at [facility] in February for a bowel obstruction. Patient reports bloated intermittently but has been taking metamucil. Patient has been following with GI outpatient. Patient reports \"a pocket of fluid\" in the RLQ.    • Constipation     Last bowel movement 3 days ago   • Sent by MD     Patient sent for a CT by her PCP. Patient did not schedule her CT outpatient that was ordered by GI       Vitals:    04/08/22 0402   BP: 135/76   Pulse: 74   Resp: 16   Temp: 35.8 °C (96.5 °F)   SpO2: 96%       Triage process explained to patient, apologized for wait time, and returned to lobby.  Pt informed to notify staff of any change in condition.     "

## 2022-04-08 NOTE — OP REPORT
Surgeon: Yousif Juarez MD  Assistant: None  Preoperative diagnosis: Symptomatic abdominal wall hernia  Postoperative diagnosis: Symptomatic abdominal wall hernia  Procedure: Robotic repair of abdominal wall hernia with mesh, extensive lysis of adhesions  Anesthesia: General endotracheal anesthesia  Anesthesiologist: Evaristo Her MD  Indications: 56-year-old woman with a symptomatic abdominal wall hernia, repair is indicated.  Narrative: The procedure was discussed in detail with the patient including the risk of bleeding, infection, abscess, and hematoma.  I also discussed the risk of injury to intraperitoneal organs, the use of mesh, and the risk of recurrence.  She understood all the above and wished to proceed.  She was placed under anesthesia by Dr. Her.  The abdomen was prepped with chlorhexidine prep and draped sterilely.  After a timeout a left subcostal incision was made and through this incision a Veress needle was placed.  Low pressure was confirmed and CO2 insufflation was used to achieve a pneumoperitoneum of 15 mmHg.  Through the same incision an 8 mm da Alberto port was placed.  Under direct visualization and left lateral and a left lower quadrant da Alberto port was placed.  The robot was docked and instruments were placed.  There were significant and extensive adhesions throughout her abdomen.  These were carefully lysed.  A hernia was then identified.  It was in the right lower quadrant and was consistent clinically with an incisional hernia.  Due to scar tissue a pocket could not be created..  The hernia defect was approximated with an O stratafix suture.  A 4 x 6 inch elliptical ventral light mesh was then used to cover the area of repair.  The ventral light mesh was sewn in circumferentially with a 2-O strata fix suture.  Needles were removed.  The instruments were removed and the robot was undocked.  The pneumoperitoneum was released.  The skin and all incisions was approximated with 4-0 Vicryl sutures.   Dermabond was placed.  The patient tolerated the procedure well without apparent complication.  The final counts were reported as correct.  Wound class was class I, clean.

## 2022-04-08 NOTE — ED NOTES
Multiple attempts for PIV. US machine used for IV placement. Pt tolerated well. Urine also collected and sent down to lab.

## 2022-04-08 NOTE — CONSULTS
DATE OF CONSULTATION:  4/8/2022     REFERRING PHYSICIAN:   Rachelle Swift M.D.     CONSULTING PHYSICIAN:  Yousif Juarez M.D.     REASON FOR CONSULTATION:  I have been asked by Dr. Swift to see the patient in surgical consultation for evaluation of an abdominal wall hernia    HISTORY OF PRESENT ILLNESS: The patient is a 56 year-old woman who presents to the Emergency Department with a 1- month history of moderate lower midline abdominal pain. . The patient denies any recent or intercurrent illness. The patient has undergone Gastric bypass, cholecystectomy, hysterectomy, and multiple C-sections. The patient denies any previous surgery for obstructive symptoms..     PAST MEDICAL HISTORY:  has a past medical history of Anesthesia, Anxiety and depression, Arthritis, Asthma, Back injury, Back pain, Bronchitis (2010), Cancer ([facility]) (1995), Chickenpox, Chronic LBP, Chronic neck pain, Cold intolerance (1/24/2012), COPD (chronic obstructive pulmonary disease) ([facility]), Cystic fibrosis ([facility]), Dental disorder, Depression, Diabetes, GERD (gastroesophageal reflux disease), H/O gastric bypass (10/11/2013), Herniated nucleus pulposus, L4-5 (3/18/2010), breast cancer (10/11/2013), Hypertension, Itching due to drug (6/2/2011), Kidney stone, Nephrolithiasis (10/11/2013), Obesity, Osteoporosis, Other specified symptom associated with female genital organs, Panic disorder, Pneumonia, PONV (postoperative nausea and vomiting), PTSD (post-traumatic stress disorder), Restless leg syndrome, Rheumatoid arthritis ([facility]), S/P laminectomy (10/22/2012), Seizure ([facility]) (09/2020), Thoracic or lumbosacral neuritis or radiculitis, unspecified (10/22/2012), and Trauma.    PAST SURGICAL HISTORY:  has a past surgical history that includes mastectomy; breast reconstruction; US-CYST ASPIRATION-BREAST INITIAL; fusion, spine, lumbar, plif (10/22/2012); lumbar laminectomy diskectomy (10/22/2012); ureteroscopy (10/10/2013); lasertripsy (10/10/2013); laminotomy;  Sleeve,Chance Vaso Thigh; tonsillectomy; Ventilator - Continuous; primary c section; abdominal hysterectomy total; hysterectomy laparoscopy; other abdominal surgery (2006); other abdominal surgery; craniotomy; other orthopedic surgery (10/01/2012); other; bladder sling female (N/A, 11/4/2020); cystoscopy (N/A, 11/4/2020); and pr implant neurostim/ (3/11/2021).    ALLERGIES:   Allergies   Allergen Reactions   • Buprenorphine-Naloxone Hives, Shortness of Breath and Swelling   • Gabapentin      seizures   • Suboxone Hives, Shortness of Breath and Swelling   • Morphine Rash     tachycardia   • Shrimp (Diagnostic) Itching   • Contrast Media With Iodine [Iodine] Itching     Ok with benadryl       CURRENT MEDICATIONS:    Home Medications     Reviewed by Amairani Trinidad (Pharmacy Tech) on 04/08/22 at 0910  Med List Status: Complete   Medication Last Dose Status   albuterol 108 (90 Base) MCG/ACT Aero Soln inhalation aerosol 4/7/2022 Active   amoxicillin (AMOXIL) 500 MG Cap FEW DAYS AGO Active   budesonide-formoterol (SYMBICORT) 160-4.5 MCG/ACT Aerosol FEW DAYS AGO Active   sertraline (ZOLOFT) 100 MG Tab FEW DAYS AGO Active                FAMILY HISTORY: family history includes Cancer in her father, maternal aunt, and mother; Diabetes in her father, maternal aunt, maternal grandfather, maternal grandmother, maternal uncle, and mother; Hyperlipidemia in her father and mother; Hypertension in her father and mother; Other in her mother; Sleep Apnea in her brother; Stroke in her mother.    SOCIAL HISTORY:  reports that she has never smoked. She has never used smokeless tobacco. She reports current drug use. Drug: Marijuana. She reports that she does not drink alcohol.    REVIEW OF SYSTEMS: Comprehensive review of systems is negative with the exception of the aforementioned HPI, PMH, and PSH bullets in accordance with CMS guidelines.    PHYSICAL EXAMINATION:      Constitutional:     Vital Signs: /50   Pulse 76    Temp 35.8 °C (96.5 °F) (Temporal)   Resp 14   Ht 1.524 m (5')   Wt 82.3 kg (181 lb 7 oz)   SpO2 91%    General Appearance: appears stated age, is in no apparent distress.  HEENT: The pupils are equal, round, and reactive to light bilaterally. The extraocular muscles are intact bilaterally.. The sclera are none icteric. Nares and oropharynx are clear.   Neck: Supple. No adenopathy.  Respiratory:   Inspection: Unlabored respirations, no intercostal retractions, paradoxical motion, or accessory muscle use.   Auscultation: clear to auscultation.  Cardiovascular:   Inspection: The skin is warm.  Auscultation: Regular rate and rhythm.   Peripheral Pulses: Normal.   Abdomen:  Inspection: Abdominal inspection reveals no abrasions, contusions, lacerations or penetrating wounds.   Palpation: Palpation is remarkable for no significant tenderness, guarding, or peritoneal findings. I cannot palpate a hernia.  Her exam is somewhat limited by her body habitus.  Extremities:   Examination of the upper and lower extremities demonstrates no cyanosis edema or clubbing.  Neurologic:   Alert & oriented to person, time and place. Normal motor function. Normal sensory function. No focal deficits noted.    LABORATORY VALUES:   Recent Labs     04/08/22  0421   WBC 9.3   RBC 4.90   HEMOGLOBIN 13.6   HEMATOCRIT 43.0   MCV 87.8   MCH 27.8   MCHC 31.6*   RDW 46.9   PLATELETCT 246   MPV 10.9     Recent Labs     04/08/22  0421   SODIUM 139   POTASSIUM 4.0   CHLORIDE 105   CO2 23   GLUCOSE 93   BUN 13   CREATININE 0.67   CALCIUM 9.5     Recent Labs     04/08/22  0421   ASTSGOT 23   ALTSGPT 20   TBILIRUBIN 0.2   ALKPHOSPHAT 144*   GLOBULIN 3.0            IMAGING:   CT-ABDOMEN-PELVIS W/O   Final Result         1.  Internal rectus fascial hernia containing small bowel loop, no obstructive changes are appreciated at the time of this exam.   2.  Hepatomegaly          ASSESSMENT AND PLAN:   56-year-old woman with a history of lupus, breast cancer,  hypertension, diabetes, COPD, and arthritis who now presents with chronic abdominal pain.  She is noted on imaging to have a hernia through her inferior rectus fascia which does contain small bowel loops.  It is unclear at this time whether this is a source of her ongoing symptoms but given the presence of the hernia containing bowel and her presenting symptoms I do think it is reasonable to proceed with repair.  Ideally this will be performed using a minimally invasive platform and I will make arrangements for the surgical robot.  I discussed this with her in detail including the potential for converting to an open procedure, the risk of bleeding complex, abscess, hematoma.  Also discussed potential for bowel resection if the bowel is noted to be compromised.  Based on her current presentation this is unlikely and I discussed this with her as well.  She does wish to proceed and we will make arrangements.       ____________________________________     Yousif Juarez M.D.    DD: 4/8/2022  12:13 PM    ACS NSQIP Surgical Risk Calculator

## 2022-04-08 NOTE — ANESTHESIA TIME REPORT
Anesthesia Start and Stop Event Times     Date Time Event    4/8/2022 1325 Ready for Procedure     1350 Anesthesia Start     1523 Anesthesia Stop        Responsible Staff  04/08/22    Name Role Begin End    Min MALIKA Her M.D. Anesth 1350 1523        Overtime Reason:  no overtime (within assigned shift)    Comments:

## 2022-04-08 NOTE — ASSESSMENT & PLAN NOTE
Patient reports she was formally on oxycodone  Patient has adverse reactions to Percocet, tramadol, Norco  Patient is on as needed oxycodone

## 2022-04-09 LAB
ALBUMIN SERPL BCP-MCNC: 3.6 G/DL (ref 3.2–4.9)
ALBUMIN/GLOB SERPL: 1.4 G/DL
ALP SERPL-CCNC: 124 U/L (ref 30–99)
ALT SERPL-CCNC: 15 U/L (ref 2–50)
ANION GAP SERPL CALC-SCNC: 10 MMOL/L (ref 7–16)
AST SERPL-CCNC: 24 U/L (ref 12–45)
BILIRUB SERPL-MCNC: 0.2 MG/DL (ref 0.1–1.5)
BUN SERPL-MCNC: 12 MG/DL (ref 8–22)
CALCIUM SERPL-MCNC: 8.9 MG/DL (ref 8.5–10.5)
CHLORIDE SERPL-SCNC: 107 MMOL/L (ref 96–112)
CO2 SERPL-SCNC: 22 MMOL/L (ref 20–33)
CREAT SERPL-MCNC: 0.63 MG/DL (ref 0.5–1.4)
ERYTHROCYTE [DISTWIDTH] IN BLOOD BY AUTOMATED COUNT: 48.1 FL (ref 35.9–50)
GFR SERPLBLD CREATININE-BSD FMLA CKD-EPI: 103 ML/MIN/1.73 M 2
GLOBULIN SER CALC-MCNC: 2.6 G/DL (ref 1.9–3.5)
GLUCOSE SERPL-MCNC: 98 MG/DL (ref 65–99)
HCT VFR BLD AUTO: 39.8 % (ref 37–47)
HGB BLD-MCNC: 12.4 G/DL (ref 12–16)
MAGNESIUM SERPL-MCNC: 2.3 MG/DL (ref 1.5–2.5)
MCH RBC QN AUTO: 27.9 PG (ref 27–33)
MCHC RBC AUTO-ENTMCNC: 31.2 G/DL (ref 33.6–35)
MCV RBC AUTO: 89.6 FL (ref 81.4–97.8)
PHOSPHATE SERPL-MCNC: 3.7 MG/DL (ref 2.5–4.5)
PLATELET # BLD AUTO: 232 K/UL (ref 164–446)
PMV BLD AUTO: 11.1 FL (ref 9–12.9)
POTASSIUM SERPL-SCNC: 4.7 MMOL/L (ref 3.6–5.5)
PROT SERPL-MCNC: 6.2 G/DL (ref 6–8.2)
RBC # BLD AUTO: 4.44 M/UL (ref 4.2–5.4)
SODIUM SERPL-SCNC: 139 MMOL/L (ref 135–145)
WBC # BLD AUTO: 14.4 K/UL (ref 4.8–10.8)

## 2022-04-09 PROCEDURE — 36415 COLL VENOUS BLD VENIPUNCTURE: CPT

## 2022-04-09 PROCEDURE — 85027 COMPLETE CBC AUTOMATED: CPT

## 2022-04-09 PROCEDURE — 700102 HCHG RX REV CODE 250 W/ 637 OVERRIDE(OP): Performed by: NURSE PRACTITIONER

## 2022-04-09 PROCEDURE — 80053 COMPREHEN METABOLIC PANEL: CPT

## 2022-04-09 PROCEDURE — 83735 ASSAY OF MAGNESIUM: CPT

## 2022-04-09 PROCEDURE — 84100 ASSAY OF PHOSPHORUS: CPT

## 2022-04-09 PROCEDURE — A9270 NON-COVERED ITEM OR SERVICE: HCPCS | Performed by: GENERAL PRACTICE

## 2022-04-09 PROCEDURE — 700102 HCHG RX REV CODE 250 W/ 637 OVERRIDE(OP): Performed by: GENERAL PRACTICE

## 2022-04-09 PROCEDURE — 99225 PR SUBSEQUENT OBSERVATION CARE,LEVEL II: CPT | Performed by: NURSE PRACTITIONER

## 2022-04-09 PROCEDURE — A9270 NON-COVERED ITEM OR SERVICE: HCPCS | Performed by: NURSE PRACTITIONER

## 2022-04-09 PROCEDURE — G0378 HOSPITAL OBSERVATION PER HR: HCPCS

## 2022-04-09 RX ORDER — TAMSULOSIN HYDROCHLORIDE 0.4 MG/1
0.4 CAPSULE ORAL
Status: DISCONTINUED | OUTPATIENT
Start: 2022-04-09 | End: 2022-04-10 | Stop reason: HOSPADM

## 2022-04-09 RX ADMIN — POLYETHYLENE GLYCOL 3350 1 PACKET: 17 POWDER, FOR SOLUTION ORAL at 16:25

## 2022-04-09 RX ADMIN — OXYCODONE HYDROCHLORIDE 10 MG: 10 TABLET ORAL at 19:21

## 2022-04-09 RX ADMIN — OXYCODONE HYDROCHLORIDE 10 MG: 10 TABLET ORAL at 23:04

## 2022-04-09 RX ADMIN — OXYCODONE HYDROCHLORIDE 10 MG: 10 TABLET ORAL at 08:47

## 2022-04-09 RX ADMIN — BUDESONIDE AND FORMOTEROL FUMARATE DIHYDRATE 2 PUFF: 160; 4.5 AEROSOL RESPIRATORY (INHALATION) at 10:58

## 2022-04-09 RX ADMIN — TAMSULOSIN HYDROCHLORIDE 0.4 MG: 0.4 CAPSULE ORAL at 10:57

## 2022-04-09 RX ADMIN — OXYCODONE HYDROCHLORIDE 10 MG: 10 TABLET ORAL at 01:28

## 2022-04-09 RX ADMIN — SERTRALINE 200 MG: 100 TABLET, FILM COATED ORAL at 05:13

## 2022-04-09 RX ADMIN — SENNOSIDES AND DOCUSATE SODIUM 2 TABLET: 50; 8.6 TABLET ORAL at 05:13

## 2022-04-09 RX ADMIN — BUDESONIDE AND FORMOTEROL FUMARATE DIHYDRATE 2 PUFF: 160; 4.5 AEROSOL RESPIRATORY (INHALATION) at 19:21

## 2022-04-09 RX ADMIN — ACETAMINOPHEN 650 MG: 325 TABLET, FILM COATED ORAL at 12:17

## 2022-04-09 RX ADMIN — OXYCODONE HYDROCHLORIDE 10 MG: 10 TABLET ORAL at 12:17

## 2022-04-09 ASSESSMENT — ENCOUNTER SYMPTOMS
DIAPHORESIS: 0
NAUSEA: 1
WEAKNESS: 1
NERVOUS/ANXIOUS: 1
ABDOMINAL PAIN: 1
CHILLS: 0
FLANK PAIN: 0
HEADACHES: 0
VOMITING: 0
MYALGIAS: 0
HEARTBURN: 0
NECK PAIN: 0
PALPITATIONS: 0
DIZZINESS: 0
WEIGHT LOSS: 0
FEVER: 0
CONSTIPATION: 1
BACK PAIN: 0
DIARRHEA: 0
HEMOPTYSIS: 0
WHEEZING: 0
COUGH: 0

## 2022-04-09 ASSESSMENT — PAIN DESCRIPTION - PAIN TYPE
TYPE: ACUTE PAIN
TYPE: ACUTE PAIN;SURGICAL PAIN
TYPE: ACUTE PAIN
TYPE: ACUTE PAIN;SURGICAL PAIN
TYPE: ACUTE PAIN
TYPE: ACUTE PAIN
TYPE: ACUTE PAIN;SURGICAL PAIN
TYPE: ACUTE PAIN
TYPE: ACUTE PAIN;SURGICAL PAIN

## 2022-04-09 NOTE — CARE PLAN
The patient is Stable - Low risk of patient condition declining or worsening    Shift Goals  Clinical Goals: pain control, rest  Patient Goals: pain management, sleep    Progress made toward(s) clinical / shift goals:  Pain has been controlled with PRN pain medication, ice packs and rest. Pt has been sleeping comfortably during the shift.      Problem: Pain - Standard  Goal: Alleviation of pain or a reduction in pain to the patient’s comfort goal  Outcome: Progressing     Problem: Knowledge Deficit - Standard  Goal: Patient and family/care givers will demonstrate understanding of plan of care, disease process/condition, diagnostic tests and medications  Outcome: Progressing       Patient is not progressing towards the following goals:

## 2022-04-09 NOTE — PROGRESS NOTES
"Hospital Medicine Daily Progress Note    Date of Service  2022    Chief Complaint  Patti Tello is a 56 y.o. female admitted 2022 with abdominal pain    Hospital Course  This is a 56 year old female with PMHx of COPD, depression, anxiety, chronic pain on chronic opioids, migraines, recent admission for SBO 2022, hx of breast cancer s/p mastectomy and chemotherapy, who presents to the ED with continued abdominal pain.     Labs unremarkable, no elevation in T bili or LFTs.  UA negative.  CT imaging noted internal rectus fascial hernia containing small bowel loop, no obstructive changes. ERP consulted surgery, Dr Juarez, who will plan for OR repair.     Of note, patient had recent admission at [place] with SBO, she has a extensive history of multiple abdominal surgeries including  x3, partial hysterectomy and subsequent total abdominal hysterectomy.  Patient noted to be hypoxic on ambulation during that admission, she was discharged home with home oxygen.    Interval Problem Update  : POD1 hernia repair. Patient reports \"a little gas\", but no BM. Abdomen still quite distended and tender. She also is complaining of dysuria and pelvic pain and said \"I think I'm passing my kidney stone\" . UA unremarkable for infection. WBC 14.4 from 9.3. Renal function at baseline. VSS on room air.     I have personally seen and examined the patient at bedside. I discussed the plan of care with patient, bedside RN and charge RN.    Consultants/Specialty  general surgery    Code Status  Full Code    Disposition  Patient is not medically cleared for discharge.   Anticipate discharge to to home with close outpatient follow-up.  I have placed the appropriate orders for post-discharge needs.    Review of Systems  Review of Systems   Constitutional: Positive for malaise/fatigue. Negative for chills, diaphoresis, fever and weight loss.   Respiratory: Negative for cough, hemoptysis and wheezing.    Cardiovascular: " Negative for chest pain, palpitations and leg swelling.   Gastrointestinal: Positive for abdominal pain, constipation and nausea. Negative for diarrhea, heartburn and vomiting.   Genitourinary: Positive for dysuria. Negative for flank pain, frequency, hematuria and urgency.   Musculoskeletal: Negative for back pain, myalgias and neck pain.   Neurological: Positive for weakness. Negative for dizziness and headaches.   Psychiatric/Behavioral: The patient is nervous/anxious.         Physical Exam  Temp:  [36.2 °C (97.1 °F)-36.8 °C (98.3 °F)] 36.8 °C (98.3 °F)  Pulse:  [] 78  Resp:  [13-22] 18  BP: (101-175)/(50-83) 110/62  SpO2:  [91 %-100 %] 97 %    Physical Exam  Vitals and nursing note reviewed.   Constitutional:       General: She is not in acute distress.     Appearance: She is obese. She is not toxic-appearing.   HENT:      Head: Normocephalic.      Right Ear: External ear normal.      Left Ear: External ear normal.      Mouth/Throat:      Mouth: Mucous membranes are moist.      Pharynx: Oropharynx is clear.   Eyes:      General: No scleral icterus.        Right eye: No discharge.         Left eye: No discharge.   Cardiovascular:      Rate and Rhythm: Normal rate.   Pulmonary:      Effort: Pulmonary effort is normal.      Breath sounds: Normal breath sounds.   Abdominal:      General: Bowel sounds are absent. There is distension.      Tenderness: There is abdominal tenderness in the right lower quadrant and suprapubic area. There is guarding. There is no right CVA tenderness or left CVA tenderness.      Comments: laparoscopic sites c/d/i   Musculoskeletal:      Cervical back: Normal range of motion.      Right lower leg: No edema.      Left lower leg: No edema.   Skin:     General: Skin is warm and dry.   Neurological:      Mental Status: She is alert and oriented to person, place, and time.   Psychiatric:         Mood and Affect: Mood normal.         Thought Content: Thought content normal.          Judgment: Judgment normal.         Fluids    Intake/Output Summary (Last 24 hours) at 4/9/2022 0959  Last data filed at 4/9/2022 0400  Gross per 24 hour   Intake 2075 ml   Output 10 ml   Net 2065 ml       Laboratory  Recent Labs     04/08/22  0421 04/09/22  0322   WBC 9.3 14.4*   RBC 4.90 4.44   HEMOGLOBIN 13.6 12.4   HEMATOCRIT 43.0 39.8   MCV 87.8 89.6   MCH 27.8 27.9   MCHC 31.6* 31.2*   RDW 46.9 48.1   PLATELETCT 246 232   MPV 10.9 11.1     Recent Labs     04/08/22  0421 04/09/22  0322   SODIUM 139 139   POTASSIUM 4.0 4.7   CHLORIDE 105 107   CO2 23 22   GLUCOSE 93 98   BUN 13 12   CREATININE 0.67 0.63   CALCIUM 9.5 8.9                   Imaging  CT-ABDOMEN-PELVIS W/O   Final Result         1.  Internal rectus fascial hernia containing small bowel loop, no obstructive changes are appreciated at the time of this exam.   2.  Hepatomegaly           Assessment/Plan  * Hernia of abdominal wall- (present on admission)  Assessment & Plan  Labs unremarkable, no elevation in T bili or LFTs.  UA negative.  CT imaging noted internal rectus fascial hernia containing small bowel loop, no obstructive changes.   ERP consulted surgery, Dr Juarez.   S/p hernia repair on 4/8   Continue pain control   Bowel protocol     Dysuria- (present on admission)  Assessment & Plan  Per patient, she was diagnosed with nonobstructive kidney stones at [place] several weeks ago   She said she is having intermittent dysuria and pelvic pain and believes she is starting to pass her kidney stones   She denies flank pain, urgency frequency or hematuria   UA not suggestive of infection   Continue symptom management   Follow renal function     Obesity (BMI 30-39.9)- (present on admission)  Assessment & Plan  Encourage diet exercise and weight loss    Chronic pain syndrome- (present on admission)  Assessment & Plan  Patient reports she was formally on oxycodone  Patient has adverse reactions to Percocet, tramadol, Norco  Patient is on as needed  oxycodone        VTE prophylaxis: SCDs/TEDs    I have performed a physical exam and reviewed and updated ROS and Plan today (4/9/2022). In review of yesterday's note (4/8/2022), there are no changes except as documented above.

## 2022-04-09 NOTE — PROGRESS NOTES
4 Eyes Skin Assessment Completed by KENJI Carroll and KENJI Saldivar.    Head WDL  Ears WDL  Nose WDL  Mouth WDL  Neck WDL  Breast/Chest WDL  Shoulder Blades WDL  Spine WDL (scars from previous surgeries)  (R) Arm/Elbow/Hand WDL  (L) Arm/Elbow/Hand WDL  Abdomen Incision  Groin WDL  Scrotum/Coccyx/Buttocks WDL  (R) Leg Bruising  (L) Leg Bruising  (R) Heel/Foot/Toe WDL  (L) Heel/Foot/Toe WDL          Devices In Places Pulse Ox and Nasal Cannula      Interventions In Place Pressure Redistribution Mattress    Possible Skin Injury No    Pictures Uploaded Into Epic N/A  Wound Consult Placed N/A  RN Wound Prevention Protocol Ordered No

## 2022-04-09 NOTE — ANESTHESIA POSTPROCEDURE EVALUATION
Patient: Patti Tello    Procedure Summary     Date: 04/08/22 Room / Location: [place] [address] / SURGERY [facility]    Anesthesia Start: 1350 Anesthesia Stop: 1523    Procedure: ROBOTIC ASSISTED INCISIONAL HERNIA REPAIR WITH MESH, EXTENSIVE LYSIS OF ADHESIONS (N/A ) Diagnosis: (SYMPTOMATIC ABDOMINAL WALL HERNIA )    Surgeons: Yousif Juarez M.D. Responsible Provider: Evaristo Her M.D.    Anesthesia Type: general ASA Status: 3          Final Anesthesia Type: general  Last vitals  BP   Blood Pressure: 139/72    Temp   36.2 °C (97.2 °F)    Pulse   100   Resp   17    SpO2   95 %      Anesthesia Post Evaluation    Patient location during evaluation: PACU  Patient participation: complete - patient participated  Level of consciousness: awake and alert    Airway patency: patent  Anesthetic complications: no  Cardiovascular status: hemodynamically stable  Respiratory status: acceptable  Hydration status: euvolemic    PONV: none          No complications documented.     Nurse Pain Score: 10 (NPRS)

## 2022-04-09 NOTE — CARE PLAN
Problem: Pain - Standard  Goal: Alleviation of pain or a reduction in pain to the patient’s comfort goal  Outcome: Progressing     Problem: Knowledge Deficit - Standard  Goal: Patient and family/care givers will demonstrate understanding of plan of care, disease process/condition, diagnostic tests and medications  Outcome: Progressing   The patient is Stable - Low risk of patient condition declining or worsening    Shift Goals  Clinical Goals: Pain control, mobilization with staff  Patient Goals: Pain control, rest  Family Goals: No family present at this time    Progress made toward(s) clinical / shift goals:  pain control, mobilization with staff as tolerated    Patient is not progressing towards the following goals:

## 2022-04-09 NOTE — RESPIRATORY CARE
PULMONARY EDUCATION by COPD CLINICAL EDUCATOR  4/9/2022 at 3:11 PM by Fabi Morris, RRT     Patient interviewed by COPD/Lung Disease education team. Patient does not have a history or diagnosis of COPD and has never smoked. Therefore, patient does not qualify for the COPD program. She does have Asthma /TOMY and is an established patient of [facility]- Dr ROBB Beach. Last PFT in our EMR from 4/19/2018: FEV1=95,  FEV1/FVC Ratio=89

## 2022-04-09 NOTE — PROGRESS NOTES
Assumed care of patient from night shift RN.  Patient is alert and oriented times 4, states pain of 8/10, medicated per MAR.  VSS /62   Pulse 78   Temp 36.8 °C (98.3 °F) (Temporal)   Resp 18   Ht 1.524 m (5')   Wt 82.3 kg (181 lb 7 oz)   LMP 10/05/1999   SpO2 97%   BMI 35.43 kg/m²   PIV in the RFA, patent and saline locked.  On 4L oxygen with saturations in the mid 90s.  Titrate as tolerated.  Last BM PTA, urinating without difficulty.  Regular diet, tolerating well.  3 lap sites to the abdomen, well approximated with dermabond and BETHANIE.  Patient is a SBA, demonstrates steady gait, minimal assistance needed.  POC discussed for the day, bed is locked and in the lowest position, call light is within reach.  All needs are met at this time, hourly rounding is in place.

## 2022-04-09 NOTE — ASSESSMENT & PLAN NOTE
Per patient, she was diagnosed with nonobstructive kidney stones at [place] several weeks ago   She said she is having intermittent dysuria and pelvic pain and believes she is starting to pass her kidney stones   She denies flank pain, urgency frequency or hematuria   UA not suggestive of infection   Continue symptom management   Follow renal function

## 2022-04-09 NOTE — HOSPITAL COURSE
This is a 56 year old female with PMHx of COPD, depression, anxiety, chronic pain on chronic opioids, migraines, recent admission for SBO 2022, hx of breast cancer s/p mastectomy and chemotherapy, who presents to the ED with continued abdominal pain.     Labs unremarkable, no elevation in T bili or LFTs.  UA negative.  CT imaging noted internal rectus fascial hernia containing small bowel loop, no obstructive changes. ERP consulted surgery, Dr Juarez, who will plan for OR repair.     Of note, patient had recent admission at [place] with SBO, she has a extensive history of multiple abdominal surgeries including  x3, partial hysterectomy and subsequent total abdominal hysterectomy.  Patient noted to be hypoxic on ambulation during that admission, she was discharged home with home oxygen.

## 2022-04-09 NOTE — PROGRESS NOTES
Complains of pain  Tolerating p.o.  General: Comfortable in bed  HEENT: Pupils equal and reactive  Chest: Bilateral breath sounds  Cardiovascular: Regular rate and rhythm  Abdomen: Soft, dressings in place  Extremities: No clubbing or cyanosis  Neurologic: Grossly intact  Vascular: Palpable pulses  Skin: Warm and dry  Psychiatric: Interacts appropriately  Assessment:  Doing well postoperative day #1 robotic repair of symptomatic hernia  Plan:  Okay to discharge home.  Follow-up in my office in 1 week.  Surgery service signing off

## 2022-04-09 NOTE — PROGRESS NOTES
Bedside report received.  Assessment complete.  A&O x 4. Patient calls appropriately.  Patient ambulates with x1 assist.  Patient has 10/10 pain. Pain managed with prescribed medications.  Denies N&V. Tolerating regular diet.  3 lap sites with dermabond, CDI.  + void, + flatus, - BM.  Patient denies SOB.  Review plan with of care with patient. Call light and personal belongings within reach. Hourly rounding in place. All needs met at this time.

## 2022-04-09 NOTE — OR NURSING
Pt A&OX4. VSS. Pt on 3 L of oxygen via nasal cannula. Afebrile. Three lap sites to abd, dermabond closure CDI. Pt reports 10/10 pain to RLQ, restless and grimacing/moaning. IV and PO pain medication administered - pt calm with unlabored respirations. No complaints of nausea, tolerated sips of water. Report called to KENJI Carroll. Pt out of PACU via gurney with transport.

## 2022-04-10 VITALS
WEIGHT: 181.44 LBS | HEIGHT: 60 IN | TEMPERATURE: 96.8 F | OXYGEN SATURATION: 97 % | HEART RATE: 79 BPM | SYSTOLIC BLOOD PRESSURE: 97 MMHG | DIASTOLIC BLOOD PRESSURE: 52 MMHG | RESPIRATION RATE: 18 BRPM | BODY MASS INDEX: 35.62 KG/M2

## 2022-04-10 PROBLEM — R30.0 DYSURIA: Status: RESOLVED | Noted: 2018-06-07 | Resolved: 2022-04-10

## 2022-04-10 LAB
ALBUMIN SERPL BCP-MCNC: 4 G/DL (ref 3.2–4.9)
ALBUMIN/GLOB SERPL: 1.5 G/DL
ALP SERPL-CCNC: 118 U/L (ref 30–99)
ALT SERPL-CCNC: 12 U/L (ref 2–50)
ANION GAP SERPL CALC-SCNC: 9 MMOL/L (ref 7–16)
AST SERPL-CCNC: 12 U/L (ref 12–45)
BASOPHILS # BLD AUTO: 0.4 % (ref 0–1.8)
BASOPHILS # BLD: 0.04 K/UL (ref 0–0.12)
BILIRUB SERPL-MCNC: 0.2 MG/DL (ref 0.1–1.5)
BUN SERPL-MCNC: 8 MG/DL (ref 8–22)
CALCIUM SERPL-MCNC: 8.8 MG/DL (ref 8.5–10.5)
CHLORIDE SERPL-SCNC: 105 MMOL/L (ref 96–112)
CO2 SERPL-SCNC: 25 MMOL/L (ref 20–33)
CREAT SERPL-MCNC: 0.65 MG/DL (ref 0.5–1.4)
EOSINOPHIL # BLD AUTO: 0.38 K/UL (ref 0–0.51)
EOSINOPHIL NFR BLD: 3.9 % (ref 0–6.9)
ERYTHROCYTE [DISTWIDTH] IN BLOOD BY AUTOMATED COUNT: 48.9 FL (ref 35.9–50)
GFR SERPLBLD CREATININE-BSD FMLA CKD-EPI: 102 ML/MIN/1.73 M 2
GLOBULIN SER CALC-MCNC: 2.7 G/DL (ref 1.9–3.5)
GLUCOSE SERPL-MCNC: 94 MG/DL (ref 65–99)
HCT VFR BLD AUTO: 39.8 % (ref 37–47)
HGB BLD-MCNC: 12.3 G/DL (ref 12–16)
IMM GRANULOCYTES # BLD AUTO: 0.04 K/UL (ref 0–0.11)
IMM GRANULOCYTES NFR BLD AUTO: 0.4 % (ref 0–0.9)
LYMPHOCYTES # BLD AUTO: 2.21 K/UL (ref 1–4.8)
LYMPHOCYTES NFR BLD: 22.4 % (ref 22–41)
MCH RBC QN AUTO: 27.9 PG (ref 27–33)
MCHC RBC AUTO-ENTMCNC: 30.9 G/DL (ref 33.6–35)
MCV RBC AUTO: 90.2 FL (ref 81.4–97.8)
MONOCYTES # BLD AUTO: 0.76 K/UL (ref 0–0.85)
MONOCYTES NFR BLD AUTO: 7.7 % (ref 0–13.4)
NEUTROPHILS # BLD AUTO: 6.43 K/UL (ref 2–7.15)
NEUTROPHILS NFR BLD: 65.2 % (ref 44–72)
NRBC # BLD AUTO: 0 K/UL
NRBC BLD-RTO: 0 /100 WBC
PLATELET # BLD AUTO: 220 K/UL (ref 164–446)
PMV BLD AUTO: 10.8 FL (ref 9–12.9)
POTASSIUM SERPL-SCNC: 3.9 MMOL/L (ref 3.6–5.5)
PROT SERPL-MCNC: 6.7 G/DL (ref 6–8.2)
RBC # BLD AUTO: 4.41 M/UL (ref 4.2–5.4)
SODIUM SERPL-SCNC: 139 MMOL/L (ref 135–145)
WBC # BLD AUTO: 9.9 K/UL (ref 4.8–10.8)

## 2022-04-10 PROCEDURE — A9270 NON-COVERED ITEM OR SERVICE: HCPCS | Performed by: GENERAL PRACTICE

## 2022-04-10 PROCEDURE — 85025 COMPLETE CBC W/AUTO DIFF WBC: CPT

## 2022-04-10 PROCEDURE — 99217 PR OBSERVATION CARE DISCHARGE: CPT | Performed by: NURSE PRACTITIONER

## 2022-04-10 PROCEDURE — 80053 COMPREHEN METABOLIC PANEL: CPT

## 2022-04-10 PROCEDURE — 36415 COLL VENOUS BLD VENIPUNCTURE: CPT

## 2022-04-10 PROCEDURE — G0378 HOSPITAL OBSERVATION PER HR: HCPCS

## 2022-04-10 PROCEDURE — A9270 NON-COVERED ITEM OR SERVICE: HCPCS | Performed by: NURSE PRACTITIONER

## 2022-04-10 PROCEDURE — 700102 HCHG RX REV CODE 250 W/ 637 OVERRIDE(OP): Performed by: GENERAL PRACTICE

## 2022-04-10 PROCEDURE — 700102 HCHG RX REV CODE 250 W/ 637 OVERRIDE(OP): Performed by: NURSE PRACTITIONER

## 2022-04-10 RX ADMIN — BISACODYL 10 MG: 10 SUPPOSITORY RECTAL at 10:46

## 2022-04-10 RX ADMIN — TAMSULOSIN HYDROCHLORIDE 0.4 MG: 0.4 CAPSULE ORAL at 08:05

## 2022-04-10 RX ADMIN — BUDESONIDE AND FORMOTEROL FUMARATE DIHYDRATE 2 PUFF: 160; 4.5 AEROSOL RESPIRATORY (INHALATION) at 08:06

## 2022-04-10 RX ADMIN — SERTRALINE 200 MG: 100 TABLET, FILM COATED ORAL at 04:03

## 2022-04-10 RX ADMIN — OXYCODONE HYDROCHLORIDE 10 MG: 10 TABLET ORAL at 04:07

## 2022-04-10 RX ADMIN — SENNOSIDES AND DOCUSATE SODIUM 2 TABLET: 50; 8.6 TABLET ORAL at 04:03

## 2022-04-10 RX ADMIN — MAGNESIUM HYDROXIDE 30 ML: 400 SUSPENSION ORAL at 08:04

## 2022-04-10 ASSESSMENT — PAIN DESCRIPTION - PAIN TYPE
TYPE: ACUTE PAIN

## 2022-04-10 NOTE — DISCHARGE INSTRUCTIONS
Hernia, Adult         A hernia happens when tissue inside your body pushes out through a weak spot in your belly muscles (abdominal wall). This makes a round lump (bulge). The lump may be:  · In a scar from surgery that was done in your belly (incisional hernia).  · Near your belly button (umbilical hernia).  · In your groin (inguinal hernia). Your groin is the area where your leg meets your lower belly (abdomen). This kind of hernia could also be:  ? In your scrotum, if you are male.  ? In folds of skin around your vagina, if you are female.  · In your upper thigh (femoral hernia).  · Inside your belly (hiatal hernia). This happens when your stomach slides above the muscle between your belly and your chest (diaphragm).  If your hernia is small and it does not cause pain, you may not need treatment. If your hernia is large or it causes pain, you may need surgery.  Follow these instructions at home:  Activity  · Avoid stretching or overusing (straining) the muscles near your hernia. Straining can happen when you:  ? Lift something heavy.  ? Poop (have a bowel movement).  · Do not lift anything that is heavier than 10 lb (4.5 kg), or the limit that you are told, until your doctor says that it is safe.  · Use the strength of your legs when you lift something heavy. Do not use only your back muscles to lift.  General instructions  · Do these things if told by your doctor so you do not have trouble pooping (constipation):  ? Drink enough fluid to keep your pee (urine) pale yellow.  ? Eat foods that are high in fiber. These include fresh fruits and vegetables, whole grains, and beans.  ? Limit foods that are high in fat and processed sugars. These include foods that are fried or sweet.  ? Take medicine for trouble pooping.  · When you cough, try to cough gently.  · You may try to push your hernia in by very gently pressing on it when you are lying down. Do not try to force the bulge back in if it will not push in  easily.  · If you are overweight, work with your doctor to lose weight safely.  · Do not use any products that have nicotine or tobacco in them. These include cigarettes and e-cigarettes. If you need help quitting, ask your doctor.  · If you will be having surgery (hernia repair), watch your hernia for changes in shape, size, or color. Tell your doctor if you see any changes.  · Take over-the-counter and prescription medicines only as told by your doctor.  · Keep all follow-up visits as told by your doctor.  Contact a doctor if:  · You get new pain, swelling, or redness near your hernia.  · You poop fewer times in a week than normal.  · You have trouble pooping.  · You have poop (stool) that is more dry than normal.  · You have poop that is harder or larger than normal.  Get help right away if:  · You have a fever.  · You have belly pain that gets worse.  · You feel sick to your stomach (nauseous).  · You throw up (vomit).  · Your hernia cannot be pushed in by very gently pressing on it when you are lying down. Do not try to force the bulge back in if it will not push in easily.  · Your hernia:  ? Changes in shape or size.  ? Changes color.  ? Feels hard or it hurts when you touch it.  These symptoms may represent a serious problem that is an emergency. Do not wait to see if the symptoms will go away. Get medical help right away. Call your local emergency services (911 in the [place]  Summary  · A hernia happens when tissue inside your body pushes out through a weak spot in the belly muscles. This creates a bulge.  · If your hernia is small and it does not hurt, you may not need treatment. If your hernia is large or it hurts, you may need surgery.  · If you will be having surgery, watch your hernia for changes in shape, size, or color. Tell your doctor about any changes.  This information is not intended to replace advice given to you by your health care provider. Make sure you discuss any questions you have with  your health care provider.  Document Released: 06/07/2011 Document Revised: 04/09/2020 Document Reviewed: 09/19/2018  [employer] Patient Education © 2020 [employer]      Discharge Instructions    Discharged to home by car with relative. Discharged via wheelchair, hospital escort: Yes.  Special equipment needed: Not Applicable    Be sure to schedule a follow-up appointment with your primary care doctor or any specialists as instructed.     Discharge Plan:        I understand that a diet low in cholesterol, fat, and sodium is recommended for good health. Unless I have been given specific instructions below for another diet, I accept this instruction as my diet prescription.   Other diet:     Special Instructions: None    · Is patient discharged on Warfarin / Coumadin?   No     Depression / Suicide Risk    As you are discharged from this [facility] facility, it is important to learn how to keep safe from harming yourself.    Recognize the warning signs:  · Abrupt changes in personality, positive or negative- including increase in energy   · Giving away possessions  · Change in eating patterns- significant weight changes-  positive or negative  · Change in sleeping patterns- unable to sleep or sleeping all the time   · Unwillingness or inability to communicate  · Depression  · Unusual sadness, discouragement and loneliness  · Talk of wanting to die  · Neglect of personal appearance   · Rebelliousness- reckless behavior  · Withdrawal from people/activities they love  · Confusion- inability to concentrate     If you or a loved one observes any of these behaviors or has concerns about self-harm, here's what you can do:  · Talk about it- your feelings and reasons for harming yourself  · Remove any means that you might use to hurt yourself (examples: pills, rope, extension cords, firearm)  · Get professional help from the community (Mental Health, Substance Abuse, psychological counseling)  · Do not be alone:Call your  Safe Contact- someone whom you trust who will be there for you.  · Call your local CRISIS HOTLINE 345-3456 or 746-992-4868  · Call your local Children's Mobile Crisis Response Team [place] (694) 202-3928 or www.[employer]  · Call the toll free National Suicide Prevention Hotlines   · National Suicide Prevention Lifeline 062-877-YPFT (0430)  · National [employer] Line Network 800-SUICIDE (711-4242)

## 2022-04-10 NOTE — PROGRESS NOTES
Bedside report received.  Assessment complete.  A&O x 4. Patient calls appropriately.  Patient has 6/10 pain.   Denies N&V. Tolerating diet.  Patient baseline 2L at home.  Surgical incision sites to ABD with pebbles KOVACS.  + void, + flatus, Patient stated she had a BM this morning but she states she was half awake and that she has not had a BM since Tuesday. Bowel protocol in use. Ambulation encouraged.  Patient denies SOB.  Review plan with of care with patient. Call light and personal belongings with in reach. Hourly rounding in place. All needs met at this time.

## 2022-04-10 NOTE — DISCHARGE SUMMARY
"Discharge Summary    CHIEF COMPLAINT ON ADMISSION  Chief Complaint   Patient presents with   • Abdominal Pain     X 2 months, patient was admitted at [facility] in February for a bowel obstruction. Patient reports bloated intermittently but has been taking metamucil. Patient has been following with GI outpatient. Patient reports \"a pocket of fluid\" in the RLQ.    • Constipation     Last bowel movement 3 days ago   • Sent by MD     Patient sent for a CT by her PCP. Patient did not schedule her CT outpatient that was ordered by GI       Reason for Admission  Sent by MD     Admission Date  4/8/2022    CODE STATUS  Full Code    HPI & HOSPITAL COURSE  This is a 56 year old female with PMHx of COPD, depression, anxiety, chronic pain on chronic opioids, migraines, recent admission for SBO 02/28/2022, hx of breast cancer s/p mastectomy and chemotherapy, who presents to the ED with continued abdominal pain.     Labs unremarkable, no elevation in T bili or LFTs.  UA negative.  CT imaging noted internal rectus fascial hernia containing small bowel loop, no obstructive changes. ERP consulted surgery, Dr Juarez. She was taken for hernia robotic hernia repair on 4/8. She was monitored on the floor post-operatively and diet was advanced. She remained hemodynamically stable on 2L O2 which was her baseline. Leukocytosis resolved. She was tolerating regular diet and pain well controlled. She had a BM the morning of discharge and abdomen was soft and mildly tender. She was discharged with instructions to follow-up with surgery in 1 week for post-op evaluation. Of note, a review of patient's PDMP showed patient had 30-day oxycodone prescription filled on 4/5, so additional pain medications were not prescribed upon discharge. She was encouraged to used Tylenol for mild post-op pain and monitor for constipation while taking narcotics.       Therefore, she is discharged in good and stable condition to home with close outpatient " follow-up.    The patient recovered much more quickly than anticipated on admission.    Discharge Date  4/10/2022    FOLLOW UP ITEMS POST DISCHARGE  General surgery     DISCHARGE DIAGNOSES  Principal Problem:    Hernia of abdominal wall POA: Yes  Active Problems:    Chronic pain syndrome POA: Yes    Obesity (BMI 30-39.9) POA: Yes  Resolved Problems:    Dysuria POA: Yes      FOLLOW UP  No future appointments.  Yousif Juarez M.D.  [address] [facility]  [place] [address]  [phone]    Schedule an appointment as soon as possible for a visit in 1 week  post-operative evaluation      MEDICATIONS ON DISCHARGE     Medication List      CONTINUE taking these medications      Instructions   albuterol 108 (90 Base) MCG/ACT Aers inhalation aerosol   Inhale 2 Puffs every 6 hours as needed for Shortness of Breath.  Dose: 2 Puff     amoxicillin 500 MG Caps  Commonly known as: AMOXIL   Take 500 mg by mouth 3 times a day. 7 day supply  Dose: 500 mg     budesonide-formoterol 160-4.5 MCG/ACT Aero  Commonly known as: Symbicort   Inhale 2 Puffs 2 times a day as needed (SOB).  Dose: 2 Puff     sertraline 100 MG Tabs  Commonly known as: Zoloft   Take 200 mg by mouth every day.  Dose: 200 mg            Allergies  Allergies   Allergen Reactions   • Buprenorphine-Naloxone Hives, Shortness of Breath and Swelling   • Gabapentin      seizures   • Suboxone Hives, Shortness of Breath and Swelling   • Morphine Rash     tachycardia   • Shrimp (Diagnostic) Itching   • Contrast Media With Iodine [Iodine] Itching     Ok with benadryl       DIET  Orders Placed This Encounter   Procedures   • Diet Order Diet: Regular     Standing Status:   Standing     Number of Occurrences:   1     Order Specific Question:   Diet:     Answer:   Regular [1]       ACTIVITY  As tolerated.  Weight bearing as tolerated    CONSULTATIONS  General surgery, Dr. Juarez     PROCEDURES  4/8:robotic repair of symptomatic hernia    LABORATORY  Lab Results   Component  Value Date    SODIUM 139 04/10/2022    POTASSIUM 3.9 04/10/2022    CHLORIDE 105 04/10/2022    CO2 25 04/10/2022    GLUCOSE 94 04/10/2022    BUN 8 04/10/2022    CREATININE 0.65 04/10/2022    CREATININE 0.9 10/25/2005        Lab Results   Component Value Date    WBC 9.9 04/10/2022    HEMOGLOBIN 12.3 04/10/2022    HEMATOCRIT 39.8 04/10/2022    PLATELETCT 220 04/10/2022

## 2022-04-10 NOTE — CARE PLAN
The patient is Stable - Low risk of patient condition declining or worsening    Shift Goals  Clinical Goals: BM  Patient Goals: pain control, rest  Family Goals: No family present at this time      Progress made toward(s) clinical / shift goals:      BM per pt  Problem: Pain - Standard  Goal: Alleviation of pain or a reduction in pain to the patient’s comfort goal  Outcome: Progressing     Problem: Knowledge Deficit - Standard  Goal: Patient and family/care givers will demonstrate understanding of plan of care, disease process/condition, diagnostic tests and medications  Outcome: Progressing       Patient is not progressing towards the following goals:

## 2022-04-10 NOTE — PROGRESS NOTES
Discharge instructions reviewed with patient, PIV removed. Patient wheeled down with belongings to car where relative will be driving her home.

## 2022-04-10 NOTE — CARE PLAN
The patient is Stable - Low risk of patient condition declining or worsening    Shift Goals  Clinical Goals: pain control  Patient Goals: pain control, rest    Progress made toward(s) clinical / shift goals:  Pain being controlled with PRN oxy and rest. Pt resting comfortably during the shift.      Problem: Pain - Standard  Goal: Alleviation of pain or a reduction in pain to the patient’s comfort goal  Outcome: Progressing     Problem: Knowledge Deficit - Standard  Goal: Patient and family/care givers will demonstrate understanding of plan of care, disease process/condition, diagnostic tests and medications  Outcome: Progressing       Patient is not progressing towards the following goals:

## 2022-04-10 NOTE — PROGRESS NOTES
Bedside report received.  Assessment complete.  A&O x 4. Patient calls appropriately.  Patient is up self.  Patient has 9/10 pain. Pain managed with prescribed medications.  Denies N&V. Tolerating regular diet.  3 lap sites with dermabond, CDI.  + void, + flatus, - BM.  Patient denies SOB.  Review plan with of care with patient. Call light and personal belongings within reach. Hourly rounding in place. All needs met at this time.

## 2022-04-11 ENCOUNTER — APPOINTMENT (OUTPATIENT)
Dept: RADIOLOGY | Facility: MEDICAL CENTER | Age: 57
End: 2022-04-11
Attending: EMERGENCY MEDICINE
Payer: MEDICARE

## 2022-04-11 ENCOUNTER — HOSPITAL ENCOUNTER (EMERGENCY)
Facility: MEDICAL CENTER | Age: 57
End: 2022-04-11
Attending: EMERGENCY MEDICINE
Payer: MEDICARE

## 2022-04-11 VITALS
SYSTOLIC BLOOD PRESSURE: 100 MMHG | BODY MASS INDEX: 35.93 KG/M2 | DIASTOLIC BLOOD PRESSURE: 67 MMHG | TEMPERATURE: 96.9 F | OXYGEN SATURATION: 93 % | HEART RATE: 82 BPM | WEIGHT: 183 LBS | HEIGHT: 60 IN | RESPIRATION RATE: 18 BRPM

## 2022-04-11 DIAGNOSIS — S30.1XXA ABDOMINAL WALL HEMATOMA, INITIAL ENCOUNTER: ICD-10-CM

## 2022-04-11 DIAGNOSIS — K59.03 DRUG-INDUCED CONSTIPATION: ICD-10-CM

## 2022-04-11 DIAGNOSIS — G89.18 POST-OPERATIVE PAIN: ICD-10-CM

## 2022-04-11 LAB
ALBUMIN SERPL BCP-MCNC: 4.5 G/DL (ref 3.2–4.9)
ALBUMIN/GLOB SERPL: 1.4 G/DL
ALP SERPL-CCNC: 132 U/L (ref 30–99)
ALT SERPL-CCNC: 14 U/L (ref 2–50)
ANION GAP SERPL CALC-SCNC: 12 MMOL/L (ref 7–16)
APPEARANCE UR: ABNORMAL
AST SERPL-CCNC: 18 U/L (ref 12–45)
BACTERIA #/AREA URNS HPF: NEGATIVE /HPF
BASOPHILS # BLD AUTO: 0.7 % (ref 0–1.8)
BASOPHILS # BLD: 0.07 K/UL (ref 0–0.12)
BILIRUB SERPL-MCNC: 0.4 MG/DL (ref 0.1–1.5)
BILIRUB UR QL STRIP.AUTO: NEGATIVE
BUN SERPL-MCNC: 7 MG/DL (ref 8–22)
CALCIUM SERPL-MCNC: 9.8 MG/DL (ref 8.5–10.5)
CHLORIDE SERPL-SCNC: 104 MMOL/L (ref 96–112)
CO2 SERPL-SCNC: 23 MMOL/L (ref 20–33)
COLOR UR: YELLOW
CREAT SERPL-MCNC: 0.61 MG/DL (ref 0.5–1.4)
EOSINOPHIL # BLD AUTO: 0.53 K/UL (ref 0–0.51)
EOSINOPHIL NFR BLD: 5.3 % (ref 0–6.9)
EPI CELLS #/AREA URNS HPF: NEGATIVE /HPF
ERYTHROCYTE [DISTWIDTH] IN BLOOD BY AUTOMATED COUNT: 46.7 FL (ref 35.9–50)
GFR SERPLBLD CREATININE-BSD FMLA CKD-EPI: 104 ML/MIN/1.73 M 2
GLOBULIN SER CALC-MCNC: 3.3 G/DL (ref 1.9–3.5)
GLUCOSE SERPL-MCNC: 122 MG/DL (ref 65–99)
GLUCOSE UR STRIP.AUTO-MCNC: NEGATIVE MG/DL
HCG SERPL QL: NEGATIVE
HCT VFR BLD AUTO: 44.1 % (ref 37–47)
HGB BLD-MCNC: 13.7 G/DL (ref 12–16)
HYALINE CASTS #/AREA URNS LPF: NORMAL /LPF
IMM GRANULOCYTES # BLD AUTO: 0.03 K/UL (ref 0–0.11)
IMM GRANULOCYTES NFR BLD AUTO: 0.3 % (ref 0–0.9)
KETONES UR STRIP.AUTO-MCNC: NEGATIVE MG/DL
LEUKOCYTE ESTERASE UR QL STRIP.AUTO: NEGATIVE
LIPASE SERPL-CCNC: 14 U/L (ref 11–82)
LYMPHOCYTES # BLD AUTO: 1.74 K/UL (ref 1–4.8)
LYMPHOCYTES NFR BLD: 17.3 % (ref 22–41)
MCH RBC QN AUTO: 27.6 PG (ref 27–33)
MCHC RBC AUTO-ENTMCNC: 31.1 G/DL (ref 33.6–35)
MCV RBC AUTO: 88.9 FL (ref 81.4–97.8)
MICRO URNS: ABNORMAL
MONOCYTES # BLD AUTO: 0.58 K/UL (ref 0–0.85)
MONOCYTES NFR BLD AUTO: 5.8 % (ref 0–13.4)
NEUTROPHILS # BLD AUTO: 7.08 K/UL (ref 2–7.15)
NEUTROPHILS NFR BLD: 70.6 % (ref 44–72)
NITRITE UR QL STRIP.AUTO: NEGATIVE
NRBC # BLD AUTO: 0 K/UL
NRBC BLD-RTO: 0 /100 WBC
PH UR STRIP.AUTO: 8.5 [PH] (ref 5–8)
PLATELET # BLD AUTO: 257 K/UL (ref 164–446)
PMV BLD AUTO: 11.1 FL (ref 9–12.9)
POTASSIUM SERPL-SCNC: 4.3 MMOL/L (ref 3.6–5.5)
PROT SERPL-MCNC: 7.8 G/DL (ref 6–8.2)
PROT UR QL STRIP: NEGATIVE MG/DL
RBC # BLD AUTO: 4.96 M/UL (ref 4.2–5.4)
RBC # URNS HPF: NORMAL /HPF
RBC UR QL AUTO: NEGATIVE
SODIUM SERPL-SCNC: 139 MMOL/L (ref 135–145)
SP GR UR STRIP.AUTO: 1.01
UROBILINOGEN UR STRIP.AUTO-MCNC: 0.2 MG/DL
WBC # BLD AUTO: 10 K/UL (ref 4.8–10.8)
WBC #/AREA URNS HPF: NORMAL /HPF

## 2022-04-11 PROCEDURE — 700117 HCHG RX CONTRAST REV CODE 255: Performed by: EMERGENCY MEDICINE

## 2022-04-11 PROCEDURE — 96375 TX/PRO/DX INJ NEW DRUG ADDON: CPT

## 2022-04-11 PROCEDURE — 84703 CHORIONIC GONADOTROPIN ASSAY: CPT

## 2022-04-11 PROCEDURE — 81001 URINALYSIS AUTO W/SCOPE: CPT

## 2022-04-11 PROCEDURE — 99285 EMERGENCY DEPT VISIT HI MDM: CPT

## 2022-04-11 PROCEDURE — 74177 CT ABD & PELVIS W/CONTRAST: CPT | Mod: MG

## 2022-04-11 PROCEDURE — 700111 HCHG RX REV CODE 636 W/ 250 OVERRIDE (IP): Performed by: EMERGENCY MEDICINE

## 2022-04-11 PROCEDURE — 36415 COLL VENOUS BLD VENIPUNCTURE: CPT

## 2022-04-11 PROCEDURE — 96374 THER/PROPH/DIAG INJ IV PUSH: CPT | Mod: XU

## 2022-04-11 PROCEDURE — 80053 COMPREHEN METABOLIC PANEL: CPT

## 2022-04-11 PROCEDURE — 83690 ASSAY OF LIPASE: CPT

## 2022-04-11 PROCEDURE — 85025 COMPLETE CBC W/AUTO DIFF WBC: CPT

## 2022-04-11 RX ORDER — DIPHENHYDRAMINE HYDROCHLORIDE 50 MG/ML
25 INJECTION INTRAMUSCULAR; INTRAVENOUS ONCE
Status: COMPLETED | OUTPATIENT
Start: 2022-04-11 | End: 2022-04-11

## 2022-04-11 RX ORDER — KETOROLAC TROMETHAMINE 30 MG/ML
15 INJECTION, SOLUTION INTRAMUSCULAR; INTRAVENOUS ONCE
Status: COMPLETED | OUTPATIENT
Start: 2022-04-11 | End: 2022-04-11

## 2022-04-11 RX ADMIN — KETOROLAC TROMETHAMINE 15 MG: 30 INJECTION, SOLUTION INTRAMUSCULAR at 19:27

## 2022-04-11 RX ADMIN — IOHEXOL 100 ML: 350 INJECTION, SOLUTION INTRAVENOUS at 21:10

## 2022-04-11 RX ADMIN — DIPHENHYDRAMINE HYDROCHLORIDE 25 MG: 50 INJECTION INTRAMUSCULAR; INTRAVENOUS at 19:57

## 2022-04-11 ASSESSMENT — FIBROSIS 4 INDEX: FIB4 SCORE: 0.9

## 2022-04-11 NOTE — ED TRIAGE NOTES
Chief Complaint   Patient presents with   • Abdominal Pain   • Constipation     Pt with recent Abd surgery for hernia repair bowel obstruction. Pt states sent home yesterday and not better, pt still has not been able to pass stool since surgery.      Explained to pt triage process, made pt aware to tell this RN/staff of any changes/concerns, pt verbalized understanding of process and instructions given. Pt to ER lobby.

## 2022-04-12 NOTE — DISCHARGE INSTRUCTIONS
Drink plenty of fluids at home start with the milk of magnesia first and if that is not successful then try the magnesium citrate.  Return to the emergency department with fevers or vomiting or uncontrolled pain.

## 2022-04-12 NOTE — ED NOTES
Blood spec to lab, PIV placed in triage, pt made verbal contract to stay in ER lobby with PIV in place.    14

## 2022-04-12 NOTE — ED NOTES
Report received from prior RN. Pt Aox4. Resp normal/unlabored. Bed side rails up/in low position. Pt updated to POC and all questions answered.     Pt complaining of ABD pain and asking for pain meds, informed would assess MAR for current Rx

## 2022-04-12 NOTE — ED NOTES
Pt discharged, all appropriate hospital equipment removed (IV, monitor, pulse ox, etc.). Pt left unit via walking to vehicle for home. Personal belongings with pt when leaving unit. Pt given discharge instructions prior to leaving unit including where to  prescriptions and when to follow-up; verbalizes understanding. Pt informed to return to ED if symptoms worsen/return or altered status develop. Copy of discharge instructions signed and turned into DC basket and copy sent with pt. Rx for constipation and f/u with APRN and Surgeon

## 2022-04-20 DIAGNOSIS — J30.9 ALLERGIC RHINITIS, UNSPECIFIED SEASONALITY, UNSPECIFIED TRIGGER: ICD-10-CM

## 2022-04-20 NOTE — TELEPHONE ENCOUNTER
Received request via: Pharmacy    Was the patient seen in the last year in this department? Yes    Does the patient have an active prescription (recently filled or refills available) for medication(s) requested? No     Last office Visit:11/17/2021  Last Labs:04/11/2022

## 2022-04-21 RX ORDER — ALBUTEROL SULFATE 90 UG/1
2 AEROSOL, METERED RESPIRATORY (INHALATION) EVERY 6 HOURS PRN
Qty: 8.5 G | Refills: 0 | Status: SHIPPED | OUTPATIENT
Start: 2022-04-21 | End: 2023-06-05

## 2022-04-22 ENCOUNTER — HOSPITAL ENCOUNTER (EMERGENCY)
Facility: MEDICAL CENTER | Age: 57
End: 2022-04-22
Attending: EMERGENCY MEDICINE
Payer: MEDICARE

## 2022-04-22 VITALS
HEIGHT: 64 IN | WEIGHT: 178.79 LBS | DIASTOLIC BLOOD PRESSURE: 52 MMHG | HEART RATE: 60 BPM | RESPIRATION RATE: 16 BRPM | TEMPERATURE: 97 F | SYSTOLIC BLOOD PRESSURE: 99 MMHG | OXYGEN SATURATION: 92 % | BODY MASS INDEX: 30.52 KG/M2

## 2022-04-22 DIAGNOSIS — M54.50 ACUTE RIGHT-SIDED LOW BACK PAIN WITHOUT SCIATICA: ICD-10-CM

## 2022-04-22 LAB
ALBUMIN SERPL BCP-MCNC: 4.3 G/DL (ref 3.2–4.9)
ALBUMIN/GLOB SERPL: 1.5 G/DL
ALP SERPL-CCNC: 126 U/L (ref 30–99)
ALT SERPL-CCNC: 16 U/L (ref 2–50)
ANION GAP SERPL CALC-SCNC: 11 MMOL/L (ref 7–16)
APPEARANCE UR: CLEAR
AST SERPL-CCNC: 20 U/L (ref 12–45)
BASOPHILS # BLD AUTO: 0.7 % (ref 0–1.8)
BASOPHILS # BLD: 0.07 K/UL (ref 0–0.12)
BILIRUB SERPL-MCNC: 0.2 MG/DL (ref 0.1–1.5)
BILIRUB UR QL STRIP.AUTO: NEGATIVE
BLOOD CULTURE HOLD CXBCH: NORMAL
BUN SERPL-MCNC: 12 MG/DL (ref 8–22)
CALCIUM SERPL-MCNC: 9.4 MG/DL (ref 8.5–10.5)
CHLORIDE SERPL-SCNC: 107 MMOL/L (ref 96–112)
CO2 SERPL-SCNC: 23 MMOL/L (ref 20–33)
COLOR UR: YELLOW
CREAT SERPL-MCNC: 0.84 MG/DL (ref 0.5–1.4)
EOSINOPHIL # BLD AUTO: 0.67 K/UL (ref 0–0.51)
EOSINOPHIL NFR BLD: 6.6 % (ref 0–6.9)
ERYTHROCYTE [DISTWIDTH] IN BLOOD BY AUTOMATED COUNT: 46 FL (ref 35.9–50)
GFR SERPLBLD CREATININE-BSD FMLA CKD-EPI: 81 ML/MIN/1.73 M 2
GLOBULIN SER CALC-MCNC: 2.9 G/DL (ref 1.9–3.5)
GLUCOSE SERPL-MCNC: 121 MG/DL (ref 65–99)
GLUCOSE UR STRIP.AUTO-MCNC: NEGATIVE MG/DL
HCT VFR BLD AUTO: 42.1 % (ref 37–47)
HGB BLD-MCNC: 13.1 G/DL (ref 12–16)
IMM GRANULOCYTES # BLD AUTO: 0.05 K/UL (ref 0–0.11)
IMM GRANULOCYTES NFR BLD AUTO: 0.5 % (ref 0–0.9)
KETONES UR STRIP.AUTO-MCNC: NEGATIVE MG/DL
LEUKOCYTE ESTERASE UR QL STRIP.AUTO: NEGATIVE
LIPASE SERPL-CCNC: 21 U/L (ref 11–82)
LYMPHOCYTES # BLD AUTO: 2.84 K/UL (ref 1–4.8)
LYMPHOCYTES NFR BLD: 28 % (ref 22–41)
MCH RBC QN AUTO: 27.2 PG (ref 27–33)
MCHC RBC AUTO-ENTMCNC: 31.1 G/DL (ref 33.6–35)
MCV RBC AUTO: 87.5 FL (ref 81.4–97.8)
MICRO URNS: NORMAL
MONOCYTES # BLD AUTO: 0.61 K/UL (ref 0–0.85)
MONOCYTES NFR BLD AUTO: 6 % (ref 0–13.4)
NEUTROPHILS # BLD AUTO: 5.9 K/UL (ref 2–7.15)
NEUTROPHILS NFR BLD: 58.2 % (ref 44–72)
NITRITE UR QL STRIP.AUTO: NEGATIVE
NRBC # BLD AUTO: 0 K/UL
NRBC BLD-RTO: 0 /100 WBC
PH UR STRIP.AUTO: 5.5 [PH] (ref 5–8)
PLATELET # BLD AUTO: 294 K/UL (ref 164–446)
PMV BLD AUTO: 10.9 FL (ref 9–12.9)
POTASSIUM SERPL-SCNC: 3.5 MMOL/L (ref 3.6–5.5)
PROT SERPL-MCNC: 7.2 G/DL (ref 6–8.2)
PROT UR QL STRIP: NEGATIVE MG/DL
RBC # BLD AUTO: 4.81 M/UL (ref 4.2–5.4)
RBC UR QL AUTO: NEGATIVE
SODIUM SERPL-SCNC: 141 MMOL/L (ref 135–145)
SP GR UR STRIP.AUTO: 1.01
UROBILINOGEN UR STRIP.AUTO-MCNC: 0.2 MG/DL
WBC # BLD AUTO: 10.1 K/UL (ref 4.8–10.8)

## 2022-04-22 PROCEDURE — 700111 HCHG RX REV CODE 636 W/ 250 OVERRIDE (IP): Performed by: EMERGENCY MEDICINE

## 2022-04-22 PROCEDURE — 80053 COMPREHEN METABOLIC PANEL: CPT

## 2022-04-22 PROCEDURE — 99284 EMERGENCY DEPT VISIT MOD MDM: CPT

## 2022-04-22 PROCEDURE — 36415 COLL VENOUS BLD VENIPUNCTURE: CPT

## 2022-04-22 PROCEDURE — 85025 COMPLETE CBC W/AUTO DIFF WBC: CPT

## 2022-04-22 PROCEDURE — 96374 THER/PROPH/DIAG INJ IV PUSH: CPT

## 2022-04-22 PROCEDURE — 81003 URINALYSIS AUTO W/O SCOPE: CPT

## 2022-04-22 PROCEDURE — 87086 URINE CULTURE/COLONY COUNT: CPT

## 2022-04-22 PROCEDURE — 83690 ASSAY OF LIPASE: CPT

## 2022-04-22 RX ORDER — KETOROLAC TROMETHAMINE 30 MG/ML
15 INJECTION, SOLUTION INTRAMUSCULAR; INTRAVENOUS ONCE
Status: COMPLETED | OUTPATIENT
Start: 2022-04-22 | End: 2022-04-22

## 2022-04-22 RX ADMIN — KETOROLAC TROMETHAMINE 15 MG: 30 INJECTION, SOLUTION INTRAMUSCULAR at 06:31

## 2022-04-22 ASSESSMENT — FIBROSIS 4 INDEX: FIB4 SCORE: 1.07

## 2022-04-22 NOTE — ED NOTES
Patient educated on discharge instructions, follow up appointments, prescriptions, and home care. Patient verbalized understanding. Patient ambulated well to [facility].

## 2022-04-22 NOTE — ED NOTES
Pt presents to the ED with complaint of right, flank pain.  Pt states that she has been having chills and a fever (not checked with a thermometer).

## 2022-04-22 NOTE — ED TRIAGE NOTES
"Chief Complaint   Patient presents with   • Flank Pain     Pt reports R flank pain. States \"I'm passing kidney stones\". Hx of kidney stones       Pt to triage with steady gait for above complaint.     Pt presents in triage with severe pain to R flank, pt has hx of kidney stones. Pt has images of kidney stones from 8 weeks ago, pt states she is now passing them.    Pt back to lobby, educated on triage process and encourage to alert staff of any changes.     /66   Pulse 82   Temp 36.7 °C (98 °F) (Temporal)   Resp 18   Ht 1.6 m (5' 3\") Comment: Simultaneous filing. User may not have seen previous data.  Wt 81.1 kg (178 lb 12.7 oz)   LMP 10/05/1999   SpO2 98%   BMI 31.67 kg/m²     "

## 2022-04-24 LAB
BACTERIA UR CULT: NORMAL
SIGNIFICANT IND 70042: NORMAL
SITE SITE: NORMAL
SOURCE SOURCE: NORMAL

## 2022-04-24 NOTE — PATIENT INSTRUCTIONS
Gastroesophageal Reflux Disease, Adult  Normally, food travels down the esophagus and stays in the stomach to be digested. However, when a person has gastroesophageal reflux disease (GERD), food and stomach acid move back up into the esophagus. When this happens, the esophagus becomes sore and inflamed. Over time, GERD can create small holes (ulcers) in the lining of the esophagus.  What are the causes?  This condition is caused by a problem with the muscle between the esophagus and the stomach (lower esophageal sphincter, or LES). Normally, the LES muscle closes after food passes through the esophagus to the stomach. When the LES is weakened or abnormal, it does not close properly, and that allows food and stomach acid to go back up into the esophagus. The LES can be weakened by certain dietary substances, medicines, and medical conditions, including:  · Tobacco use.  · Pregnancy.  · Having a hiatal hernia.  · Heavy alcohol use.  · Certain foods and beverages, such as coffee, chocolate, onions, and peppermint.  What increases the risk?  This condition is more likely to develop in:  · People who have an increased body weight.  · People who have connective tissue disorders.  · People who use NSAID medicines.  What are the signs or symptoms?  Symptoms of this condition include:  · Heartburn.  · Difficult or painful swallowing.  · The feeling of having a lump in the throat.  · A bitter taste in the mouth.  · Bad breath.  · Having a large amount of saliva.  · Having an upset or bloated stomach.  · Belching.  · Chest pain.  · Shortness of breath or wheezing.  · Ongoing (chronic) cough or a night-time cough.  · Wearing away of tooth enamel.  · Weight loss.  Different conditions can cause chest pain. Make sure to see your health care provider if you experience chest pain.  How is this diagnosed?  Your health care provider will take a medical history and perform a physical exam. To determine if you have mild or severe GERD,  your health care provider may also monitor how you respond to treatment. You may also have other tests, including:  · An endoscopy to examine your stomach and esophagus with a small camera.  · A test that measures the acidity level in your esophagus.  · A test that measures how much pressure is on your esophagus.  · A barium swallow or modified barium swallow to show the shape, size, and functioning of your esophagus.  How is this treated?  The goal of treatment is to help relieve your symptoms and to prevent complications. Treatment for this condition may vary depending on how severe your symptoms are. Your health care provider may recommend:  · Changes to your diet.  · Medicine.  · Surgery.  Follow these instructions at home:  Diet  · Follow a diet as recommended by your health care provider. This may involve avoiding foods and drinks such as:  ¨ Coffee and tea (with or without caffeine).  ¨ Drinks that contain alcohol.  ¨ Energy drinks and sports drinks.  ¨ Carbonated drinks or sodas.  ¨ Chocolate and cocoa.  ¨ Peppermint and mint flavorings.  ¨ Garlic and onions.  ¨ Horseradish.  ¨ Spicy and acidic foods, including peppers, chili powder, rubalcava powder, vinegar, hot sauces, and barbecue sauce.  ¨ Citrus fruit juices and citrus fruits, such as oranges, mathew, and limes.  ¨ Tomato-based foods, such as red sauce, chili, salsa, and pizza with red sauce.  ¨ Fried and fatty foods, such as donuts, french fries, potato chips, and high-fat dressings.  ¨ High-fat meats, such as hot dogs and fatty cuts of red and white meats, such as rib eye steak, sausage, ham, and tan.  ¨ High-fat dairy items, such as whole milk, butter, and cream cheese.  · Eat small, frequent meals instead of large meals.  · Avoid drinking large amounts of liquid with your meals.  · Avoid eating meals during the 2-3 hours before bedtime.  · Avoid lying down right after you eat.  · Do not exercise right after you eat.  General instructions  · Pay  attention to any changes in your symptoms.  · Take over-the-counter and prescription medicines only as told by your health care provider. Do not take aspirin, ibuprofen, or other NSAIDs unless your health care provider told you to do so.  · Do not use any tobacco products, including cigarettes, chewing tobacco, and e-cigarettes. If you need help quitting, ask your health care provider.  · Wear loose-fitting clothing. Do not wear anything tight around your waist that causes pressure on your abdomen.  · Raise (elevate) the head of your bed 6 inches (15cm).  · Try to reduce your stress, such as with yoga or meditation. If you need help reducing stress, ask your health care provider.  · If you are overweight, reduce your weight to an amount that is healthy for you. Ask your health care provider for guidance about a safe weight loss goal.  · Keep all follow-up visits as told by your health care provider. This is important.  Contact a health care provider if:  · You have new symptoms.  · You have unexplained weight loss.  · You have difficulty swallowing, or it hurts to swallow.  · You have wheezing or a persistent cough.  · Your symptoms do not improve with treatment.  · You have a hoarse voice.  Get help right away if:  · You have pain in your arms, neck, jaw, teeth, or back.  · You feel sweaty, dizzy, or light-headed.  · You have chest pain or shortness of breath.  · You vomit and your vomit looks like blood or coffee grounds.  · You faint.  · Your stool is bloody or black.  · You cannot swallow, drink, or eat.  This information is not intended to replace advice given to you by your health care provider. Make sure you discuss any questions you have with your health care provider.  Document Released: 09/27/2006 Document Revised: 05/17/2017 Document Reviewed: 04/13/2016  [employer] Interactive Patient Education © 2017 [employer]    Note:  We will obtain your medical record from [facility] to see if further work up is  needed for your coughing. Will contact you once we get the record. Will have to undergo pulmonary function test for now. For your acid reflux, please start taking omeprazole to see if that could help both on your reflux and coughing. Please stay on the inhaler of spiriva, symbicort every day and use albuterol as needed for sob or wheezing.     Other

## 2022-06-02 ENCOUNTER — HOSPITAL ENCOUNTER (EMERGENCY)
Facility: MEDICAL CENTER | Age: 57
End: 2022-06-02
Payer: MEDICARE

## 2022-06-02 VITALS
HEART RATE: 78 BPM | BODY MASS INDEX: 29.06 KG/M2 | RESPIRATION RATE: 16 BRPM | TEMPERATURE: 96.6 F | SYSTOLIC BLOOD PRESSURE: 117 MMHG | DIASTOLIC BLOOD PRESSURE: 68 MMHG | OXYGEN SATURATION: 98 % | WEIGHT: 169.31 LBS

## 2022-06-02 PROCEDURE — 302449 STATCHG TRIAGE ONLY (STATISTIC)

## 2022-06-02 ASSESSMENT — FIBROSIS 4 INDEX: FIB4 SCORE: 0.97

## 2022-06-03 NOTE — ED TRIAGE NOTES
Chief Complaint   Patient presents with   • Sent by MD     Pt tested covid + today. Reports that she was sent by her MD for dehydration and Hx asthma     NAD. VSS  /68   Pulse 78   Temp 35.9 °C (96.6 °F) (Temporal)   Resp 16   Wt 76.8 kg (169 lb 5 oz)   SpO2 98%   Pt informed of wait times. Educated on triage process.  Asked to return to triage RN for any new or worsening of symptoms. Thanked for patience.

## 2022-06-09 ENCOUNTER — TELEPHONE (OUTPATIENT)
Dept: HEALTH INFORMATION MANAGEMENT | Facility: OTHER | Age: 57
End: 2022-06-09

## 2022-06-24 ENCOUNTER — HOSPITAL ENCOUNTER (EMERGENCY)
Facility: MEDICAL CENTER | Age: 57
End: 2022-06-24
Payer: MEDICARE

## 2022-06-24 ENCOUNTER — TELEPHONE (OUTPATIENT)
Dept: SCHEDULING | Facility: IMAGING CENTER | Age: 57
End: 2022-06-24

## 2022-06-24 VITALS
OXYGEN SATURATION: 100 % | HEART RATE: 107 BPM | WEIGHT: 169 LBS | SYSTOLIC BLOOD PRESSURE: 133 MMHG | RESPIRATION RATE: 18 BRPM | BODY MASS INDEX: 33.18 KG/M2 | HEIGHT: 60 IN | DIASTOLIC BLOOD PRESSURE: 87 MMHG | TEMPERATURE: 98.3 F

## 2022-06-24 PROCEDURE — 302449 STATCHG TRIAGE ONLY (STATISTIC)

## 2022-06-24 ASSESSMENT — FIBROSIS 4 INDEX: FIB4 SCORE: 0.97
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
## 2022-06-25 NOTE — ED NOTES
"Pt came up to triage desk and stated, \"my  is coming to pick me up and take me to another hospital.\" Pt signed AMA form. Apologized for long wait time and advised pt to return if there are any changes.    "

## 2022-06-27 ENCOUNTER — APPOINTMENT (OUTPATIENT)
Dept: MEDICAL GROUP | Facility: PHYSICIAN GROUP | Age: 57
End: 2022-06-27
Payer: MEDICARE

## 2022-09-22 ENCOUNTER — HOSPITAL ENCOUNTER (EMERGENCY)
Facility: MEDICAL CENTER | Age: 57
End: 2022-09-22
Payer: MEDICARE

## 2022-09-22 VITALS
HEART RATE: 75 BPM | OXYGEN SATURATION: 100 % | TEMPERATURE: 96.5 F | SYSTOLIC BLOOD PRESSURE: 119 MMHG | DIASTOLIC BLOOD PRESSURE: 77 MMHG | RESPIRATION RATE: 16 BRPM

## 2022-09-22 PROCEDURE — 302449 STATCHG TRIAGE ONLY (STATISTIC)

## 2022-10-11 ENCOUNTER — TELEPHONE (OUTPATIENT)
Dept: HEMATOLOGY ONCOLOGY | Facility: MEDICAL CENTER | Age: 57
End: 2022-10-11
Payer: MEDICARE

## 2022-10-11 NOTE — TELEPHONE ENCOUNTER
Patient has not been seen since 7/2021.    She is feeling very tired, dizzy, etc.  She thinks her blood levels are down, as she is not a meat eater.      She is asking Regina to write lab orders, please.    Contact patient to let her know of lab orders are written.

## 2022-10-12 NOTE — TELEPHONE ENCOUNTER
I contacted the patient and informed her that I spoke with [facility], Dr. More's office, who told me that she was referred to their office back in February 2022 and that she was seen February 10, 2022 by Dr. More.  Unfortunately [facility] does not accept the patient's insurance plan which is why she was referred.  Patient understood and stated that she has   Dr. More's phone number and will call his office to schedule.

## 2022-11-03 ENCOUNTER — PATIENT MESSAGE (OUTPATIENT)
Dept: HEALTH INFORMATION MANAGEMENT | Facility: OTHER | Age: 57
End: 2022-11-03

## 2022-11-21 NOTE — ED NOTES
Called for pt in lobby and sr lounge no answer.   Oral Minoxidil Counseling- I discussed with the patient the risks of oral minoxidil including but not limited to shortness of breath, swelling of the feet or ankles, dizziness, lightheadedness, unwanted hair growth and allergic reaction.  The patient verbalized understanding of the proper use and possible adverse effects of oral minoxidil.  All of the patient's questions and concerns were addressed.

## 2023-01-01 ENCOUNTER — HOSPITAL ENCOUNTER (EMERGENCY)
Facility: MEDICAL CENTER | Age: 58
End: 2023-01-01
Attending: EMERGENCY MEDICINE
Payer: OTHER MISCELLANEOUS

## 2023-01-01 ENCOUNTER — APPOINTMENT (OUTPATIENT)
Dept: RADIOLOGY | Facility: MEDICAL CENTER | Age: 58
End: 2023-01-01
Attending: EMERGENCY MEDICINE
Payer: OTHER MISCELLANEOUS

## 2023-01-01 VITALS
HEIGHT: 60 IN | RESPIRATION RATE: 16 BRPM | DIASTOLIC BLOOD PRESSURE: 71 MMHG | HEART RATE: 82 BPM | WEIGHT: 155 LBS | OXYGEN SATURATION: 94 % | SYSTOLIC BLOOD PRESSURE: 117 MMHG | TEMPERATURE: 98.8 F | BODY MASS INDEX: 30.43 KG/M2
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
## 2023-01-01 DIAGNOSIS — S16.1XXA STRAIN OF NECK MUSCLE, INITIAL ENCOUNTER: ICD-10-CM

## 2023-01-01 PROCEDURE — 72125 CT NECK SPINE W/O DYE: CPT

## 2023-01-01 PROCEDURE — A9270 NON-COVERED ITEM OR SERVICE: HCPCS | Performed by: EMERGENCY MEDICINE

## 2023-01-01 PROCEDURE — 700102 HCHG RX REV CODE 250 W/ 637 OVERRIDE(OP): Performed by: EMERGENCY MEDICINE

## 2023-01-01 PROCEDURE — 99284 EMERGENCY DEPT VISIT MOD MDM: CPT

## 2023-01-01 PROCEDURE — 70450 CT HEAD/BRAIN W/O DYE: CPT

## 2023-01-01 RX ORDER — ALPRAZOLAM 1 MG/1
2 TABLET ORAL ONCE
Status: COMPLETED | OUTPATIENT
Start: 2023-01-01 | End: 2023-01-01

## 2023-01-01 RX ORDER — OXYCODONE HYDROCHLORIDE 10 MG/1
10 TABLET ORAL ONCE
Status: COMPLETED | OUTPATIENT
Start: 2023-01-01 | End: 2023-01-01

## 2023-01-01 RX ADMIN — ALPRAZOLAM 2 MG: 1 TABLET ORAL at 21:40

## 2023-01-01 RX ADMIN — OXYCODONE HYDROCHLORIDE 10 MG: 10 TABLET ORAL at 21:40

## 2023-01-01 ASSESSMENT — FIBROSIS 4 INDEX: FIB4 SCORE: 0.99
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
## 2023-01-02 NOTE — ED NOTES
Patient noted to be hypoxic at 82% on room air after medication administration.  Placed patient on 2L/NC and patient SpO2 increased to 93%, resp even and unlabored, NAD.

## 2023-01-02 NOTE — ED NOTES
Discharge education provided. Discharge paperwork signed by pt. All questions answered. All belongings with pt. Pt assisted to lobby in WC accompanied by spouse for transport home.
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
## 2023-06-05 ENCOUNTER — APPOINTMENT (OUTPATIENT)
Dept: RADIOLOGY | Facility: MEDICAL CENTER | Age: 58
DRG: 101 | End: 2023-06-05
Attending: EMERGENCY MEDICINE
Payer: MEDICARE

## 2023-06-05 ENCOUNTER — HOSPITAL ENCOUNTER (INPATIENT)
Facility: MEDICAL CENTER | Age: 58
LOS: 3 days | DRG: 101 | End: 2023-06-08
Attending: EMERGENCY MEDICINE | Admitting: INTERNAL MEDICINE
Payer: MEDICARE

## 2023-06-05 DIAGNOSIS — S00.83XA CONTUSION OF FOREHEAD, INITIAL ENCOUNTER: ICD-10-CM

## 2023-06-05 DIAGNOSIS — F11.29 OPIOID DEPENDENCE WITH OPIOID-INDUCED DISORDER (HCC): ICD-10-CM

## 2023-06-05 DIAGNOSIS — R56.9 SEIZURE (HCC): ICD-10-CM

## 2023-06-05 DIAGNOSIS — R41.82 ALTERED MENTAL STATUS, UNSPECIFIED ALTERED MENTAL STATUS TYPE: ICD-10-CM

## 2023-06-05 DIAGNOSIS — G93.40 ENCEPHALOPATHY ACUTE: ICD-10-CM

## 2023-06-05 PROBLEM — F11.20 NARCOTIC DEPENDENCE (HCC): Status: ACTIVE | Noted: 2023-06-05

## 2023-06-05 PROBLEM — E66.9 OBESITY (BMI 30-39.9): Status: RESOLVED | Noted: 2018-01-18 | Resolved: 2023-06-05

## 2023-06-05 PROBLEM — E87.20 LACTIC ACIDOSIS: Status: ACTIVE | Noted: 2023-06-05

## 2023-06-05 LAB
ALBUMIN SERPL BCP-MCNC: 4.4 G/DL (ref 3.2–4.9)
ALBUMIN/GLOB SERPL: 1.5 G/DL
ALP SERPL-CCNC: 160 U/L (ref 30–99)
ALT SERPL-CCNC: 20 U/L (ref 2–50)
AMMONIA PLAS-SCNC: 24 UMOL/L (ref 11–45)
AMPHET UR QL SCN: NEGATIVE
ANION GAP SERPL CALC-SCNC: 16 MMOL/L (ref 7–16)
APPEARANCE UR: CLEAR
AST SERPL-CCNC: 25 U/L (ref 12–45)
BARBITURATES UR QL SCN: NEGATIVE
BASOPHILS # BLD AUTO: 0.5 % (ref 0–1.8)
BASOPHILS # BLD: 0.06 K/UL (ref 0–0.12)
BENZODIAZ UR QL SCN: POSITIVE
BILIRUB SERPL-MCNC: 0.2 MG/DL (ref 0.1–1.5)
BILIRUB UR QL STRIP.AUTO: NEGATIVE
BUN SERPL-MCNC: 10 MG/DL (ref 8–22)
BURR CELLS/RBC NFR CSF MANUAL: 0 %
BZE UR QL SCN: NEGATIVE
C GATTII+NEOFOR DNA CSF QL NAA+NON-PROBE: NOT DETECTED
CALCIUM ALBUM COR SERPL-MCNC: 9.3 MG/DL (ref 8.5–10.5)
CALCIUM SERPL-MCNC: 9.6 MG/DL (ref 8.5–10.5)
CANNABINOIDS UR QL SCN: NEGATIVE
CHLORIDE SERPL-SCNC: 106 MMOL/L (ref 96–112)
CK SERPL-CCNC: 197 U/L (ref 0–154)
CLARITY CSF: CLEAR
CMV DNA CSF QL NAA+NON-PROBE: NOT DETECTED
CO2 SERPL-SCNC: 22 MMOL/L (ref 20–33)
COLOR CSF: COLORLESS
COLOR SPUN CSF: COLORLESS
COLOR UR: YELLOW
CREAT SERPL-MCNC: 0.69 MG/DL (ref 0.5–1.4)
CSF COMMENTS 1658: NORMAL
E COLI K1 DNA CSF QL NAA+NON-PROBE: NOT DETECTED
EKG IMPRESSION: NORMAL
EOSINOPHIL # BLD AUTO: 0.15 K/UL (ref 0–0.51)
EOSINOPHIL NFR BLD: 1.2 % (ref 0–6.9)
ERYTHROCYTE [DISTWIDTH] IN BLOOD BY AUTOMATED COUNT: 46.6 FL (ref 35.9–50)
ETHANOL BLD-MCNC: <10.1 MG/DL
EV RNA CSF QL NAA+NON-PROBE: NOT DETECTED
FENTANYL UR QL: NEGATIVE
GFR SERPLBLD CREATININE-BSD FMLA CKD-EPI: 100 ML/MIN/1.73 M 2
GLOBULIN SER CALC-MCNC: 2.9 G/DL (ref 1.9–3.5)
GLUCOSE BLD STRIP.AUTO-MCNC: 101 MG/DL (ref 65–99)
GLUCOSE CSF-MCNC: 68 MG/DL (ref 40–80)
GLUCOSE SERPL-MCNC: 105 MG/DL (ref 65–99)
GLUCOSE UR STRIP.AUTO-MCNC: NEGATIVE MG/DL
GP B STREP DNA CSF QL NAA+NON-PROBE: NOT DETECTED
GRAM STN SPEC: NORMAL
HAEM INFLU DNA CSF QL NAA+NON-PROBE: NOT DETECTED
HCG SERPL QL: NEGATIVE
HCT VFR BLD AUTO: 45.4 % (ref 37–47)
HGB BLD-MCNC: 14.1 G/DL (ref 12–16)
HHV6 DNA CSF QL NAA+NON-PROBE: NOT DETECTED
HSV1 DNA CSF QL NAA+NON-PROBE: NOT DETECTED
HSV2 DNA CSF QL NAA+NON-PROBE: NOT DETECTED
IMM GRANULOCYTES # BLD AUTO: 0.05 K/UL (ref 0–0.11)
IMM GRANULOCYTES NFR BLD AUTO: 0.4 % (ref 0–0.9)
INR PPP: 1.08 (ref 0.87–1.13)
KETONES UR STRIP.AUTO-MCNC: NEGATIVE MG/DL
L MONOCYTOG DNA CSF QL NAA+NON-PROBE: NOT DETECTED
LACTATE SERPL-SCNC: 2.6 MMOL/L (ref 0.5–2)
LACTATE SERPL-SCNC: 2.7 MMOL/L (ref 0.5–2)
LACTATE SERPL-SCNC: 3.9 MMOL/L (ref 0.5–2)
LEUKOCYTE ESTERASE UR QL STRIP.AUTO: NEGATIVE
LYMPHOCYTES # BLD AUTO: 1.92 K/UL (ref 1–4.8)
LYMPHOCYTES NFR BLD: 14.8 % (ref 22–41)
MCH RBC QN AUTO: 27.2 PG (ref 27–33)
MCHC RBC AUTO-ENTMCNC: 31.1 G/DL (ref 32.2–35.5)
MCV RBC AUTO: 87.6 FL (ref 81.4–97.8)
METHADONE UR QL SCN: NEGATIVE
MICRO URNS: NORMAL
MONOCYTES # BLD AUTO: 0.67 K/UL (ref 0–0.85)
MONOCYTES NFR BLD AUTO: 5.2 % (ref 0–13.4)
N MEN DNA CSF QL NAA+NON-PROBE: NOT DETECTED
NEUTROPHILS # BLD AUTO: 10.15 K/UL (ref 1.82–7.42)
NEUTROPHILS NFR BLD: 77.9 % (ref 44–72)
NITRITE UR QL STRIP.AUTO: NEGATIVE
NRBC # BLD AUTO: 0 K/UL
NRBC BLD-RTO: 0 /100 WBC (ref 0–0.2)
NUC CELL # CSF: <1 CELLS/UL (ref 0–10)
OPIATES UR QL SCN: NEGATIVE
OXYCODONE UR QL SCN: POSITIVE
PARECHOVIRUS A RNA CSF QL NAA+NON-PROBE: NOT DETECTED
PCP UR QL SCN: NEGATIVE
PH UR STRIP.AUTO: 7 [PH] (ref 5–8)
PLATELET # BLD AUTO: 300 K/UL (ref 164–446)
PMV BLD AUTO: 10.8 FL (ref 9–12.9)
POTASSIUM SERPL-SCNC: 3.7 MMOL/L (ref 3.6–5.5)
PROPOXYPH UR QL SCN: NEGATIVE
PROT CSF-MCNC: 30 MG/DL (ref 15–45)
PROT SERPL-MCNC: 7.3 G/DL (ref 6–8.2)
PROT UR QL STRIP: NEGATIVE MG/DL
PROTHROMBIN TIME: 13.9 SEC (ref 12–14.6)
RBC # BLD AUTO: 5.18 M/UL (ref 4.2–5.4)
RBC # CSF: 2 CELLS/UL
RBC UR QL AUTO: NEGATIVE
S PNEUM DNA CSF QL NAA+NON-PROBE: NOT DETECTED
SIGNIFICANT IND 70042: NORMAL
SITE SITE: NORMAL
SODIUM SERPL-SCNC: 144 MMOL/L (ref 135–145)
SOURCE SOURCE: NORMAL
SP GR UR STRIP.AUTO: 1
SPECIMEN VOL CSF: 10 ML
TUBE # CSF: 4
TUBE # CSF: NORMAL
UROBILINOGEN UR STRIP.AUTO-MCNC: 0.2 MG/DL
VZV DNA CSF QL NAA+NON-PROBE: NOT DETECTED
WBC # BLD AUTO: 13 K/UL (ref 4.8–10.8)

## 2023-06-05 PROCEDURE — 87483 CNS DNA AMP PROBE TYPE 12-25: CPT

## 2023-06-05 PROCEDURE — 62270 DX LMBR SPI PNXR: CPT

## 2023-06-05 PROCEDURE — 87086 URINE CULTURE/COLONY COUNT: CPT

## 2023-06-05 PROCEDURE — 81003 URINALYSIS AUTO W/O SCOPE: CPT

## 2023-06-05 PROCEDURE — 96372 THER/PROPH/DIAG INJ SC/IM: CPT

## 2023-06-05 PROCEDURE — 84703 CHORIONIC GONADOTROPIN ASSAY: CPT

## 2023-06-05 PROCEDURE — 85610 PROTHROMBIN TIME: CPT

## 2023-06-05 PROCEDURE — 83605 ASSAY OF LACTIC ACID: CPT

## 2023-06-05 PROCEDURE — 99223 1ST HOSP IP/OBS HIGH 75: CPT | Mod: 25,AI | Performed by: HOSPITALIST

## 2023-06-05 PROCEDURE — 96365 THER/PROPH/DIAG IV INF INIT: CPT

## 2023-06-05 PROCEDURE — 85025 COMPLETE CBC W/AUTO DIFF WBC: CPT

## 2023-06-05 PROCEDURE — 700111 HCHG RX REV CODE 636 W/ 250 OVERRIDE (IP): Performed by: EMERGENCY MEDICINE

## 2023-06-05 PROCEDURE — 84157 ASSAY OF PROTEIN OTHER: CPT

## 2023-06-05 PROCEDURE — 009U3ZX DRAINAGE OF SPINAL CANAL, PERCUTANEOUS APPROACH, DIAGNOSTIC: ICD-10-PCS | Performed by: HOSPITALIST

## 2023-06-05 PROCEDURE — 87070 CULTURE OTHR SPECIMN AEROBIC: CPT

## 2023-06-05 PROCEDURE — 82550 ASSAY OF CK (CPK): CPT

## 2023-06-05 PROCEDURE — 96375 TX/PRO/DX INJ NEW DRUG ADDON: CPT

## 2023-06-05 PROCEDURE — 89051 BODY FLUID CELL COUNT: CPT

## 2023-06-05 PROCEDURE — 80053 COMPREHEN METABOLIC PANEL: CPT

## 2023-06-05 PROCEDURE — 82945 GLUCOSE OTHER FLUID: CPT

## 2023-06-05 PROCEDURE — 99232 SBSQ HOSP IP/OBS MODERATE 35: CPT | Performed by: INTERNAL MEDICINE

## 2023-06-05 PROCEDURE — 71045 X-RAY EXAM CHEST 1 VIEW: CPT

## 2023-06-05 PROCEDURE — 82962 GLUCOSE BLOOD TEST: CPT

## 2023-06-05 PROCEDURE — 770000 HCHG ROOM/CARE - INTERMEDIATE ICU *

## 2023-06-05 PROCEDURE — 87205 SMEAR GRAM STAIN: CPT

## 2023-06-05 PROCEDURE — 700105 HCHG RX REV CODE 258: Performed by: EMERGENCY MEDICINE

## 2023-06-05 PROCEDURE — 62270 DX LMBR SPI PNXR: CPT | Performed by: HOSPITALIST

## 2023-06-05 PROCEDURE — 99285 EMERGENCY DEPT VISIT HI MDM: CPT

## 2023-06-05 PROCEDURE — 36415 COLL VENOUS BLD VENIPUNCTURE: CPT

## 2023-06-05 PROCEDURE — 82077 ASSAY SPEC XCP UR&BREATH IA: CPT

## 2023-06-05 PROCEDURE — 93005 ELECTROCARDIOGRAM TRACING: CPT | Performed by: EMERGENCY MEDICINE

## 2023-06-05 PROCEDURE — 93010 ELECTROCARDIOGRAM REPORT: CPT | Performed by: INTERNAL MEDICINE

## 2023-06-05 PROCEDURE — 700101 HCHG RX REV CODE 250: Performed by: HOSPITALIST

## 2023-06-05 PROCEDURE — 87040 BLOOD CULTURE FOR BACTERIA: CPT

## 2023-06-05 PROCEDURE — 82140 ASSAY OF AMMONIA: CPT

## 2023-06-05 PROCEDURE — 70450 CT HEAD/BRAIN W/O DYE: CPT

## 2023-06-05 PROCEDURE — 700105 HCHG RX REV CODE 258: Performed by: HOSPITALIST

## 2023-06-05 PROCEDURE — 80307 DRUG TEST PRSMV CHEM ANLYZR: CPT

## 2023-06-05 PROCEDURE — 700111 HCHG RX REV CODE 636 W/ 250 OVERRIDE (IP)

## 2023-06-05 PROCEDURE — 700111 HCHG RX REV CODE 636 W/ 250 OVERRIDE (IP): Performed by: HOSPITALIST

## 2023-06-05 RX ORDER — DICYCLOMINE HYDROCHLORIDE 10 MG/1
20 CAPSULE ORAL
COMMUNITY

## 2023-06-05 RX ORDER — LORAZEPAM 2 MG/ML
1-2 INJECTION INTRAMUSCULAR
Status: DISCONTINUED | OUTPATIENT
Start: 2023-06-05 | End: 2023-06-08 | Stop reason: HOSPADM

## 2023-06-05 RX ORDER — ONDANSETRON 4 MG/1
4 TABLET, ORALLY DISINTEGRATING ORAL EVERY 12 HOURS PRN
COMMUNITY

## 2023-06-05 RX ORDER — PROMETHAZINE HYDROCHLORIDE 25 MG/1
12.5-25 TABLET ORAL EVERY 4 HOURS PRN
Status: DISCONTINUED | OUTPATIENT
Start: 2023-06-05 | End: 2023-06-08 | Stop reason: HOSPADM

## 2023-06-05 RX ORDER — SODIUM CHLORIDE, SODIUM LACTATE, POTASSIUM CHLORIDE, CALCIUM CHLORIDE 600; 310; 30; 20 MG/100ML; MG/100ML; MG/100ML; MG/100ML
1000 INJECTION, SOLUTION INTRAVENOUS ONCE
Status: COMPLETED | OUTPATIENT
Start: 2023-06-05 | End: 2023-06-05

## 2023-06-05 RX ORDER — MIDAZOLAM HYDROCHLORIDE 1 MG/ML
5 INJECTION INTRAMUSCULAR; INTRAVENOUS ONCE
Status: COMPLETED | OUTPATIENT
Start: 2023-06-05 | End: 2023-06-05

## 2023-06-05 RX ORDER — AMOXICILLIN 250 MG
2 CAPSULE ORAL 2 TIMES DAILY
Status: DISCONTINUED | OUTPATIENT
Start: 2023-06-05 | End: 2023-06-08 | Stop reason: HOSPADM

## 2023-06-05 RX ORDER — ONDANSETRON 2 MG/ML
4 INJECTION INTRAMUSCULAR; INTRAVENOUS EVERY 4 HOURS PRN
Status: DISCONTINUED | OUTPATIENT
Start: 2023-06-05 | End: 2023-06-08 | Stop reason: HOSPADM

## 2023-06-05 RX ORDER — HALOPERIDOL 5 MG/ML
5 INJECTION INTRAMUSCULAR ONCE
Status: COMPLETED | OUTPATIENT
Start: 2023-06-05 | End: 2023-06-05

## 2023-06-05 RX ORDER — BISACODYL 10 MG
10 SUPPOSITORY, RECTAL RECTAL
Status: DISCONTINUED | OUTPATIENT
Start: 2023-06-05 | End: 2023-06-08 | Stop reason: HOSPADM

## 2023-06-05 RX ORDER — DEXMEDETOMIDINE HYDROCHLORIDE 4 UG/ML
.1-1.5 INJECTION, SOLUTION INTRAVENOUS CONTINUOUS
Status: DISCONTINUED | OUTPATIENT
Start: 2023-06-05 | End: 2023-06-06

## 2023-06-05 RX ORDER — SODIUM CHLORIDE, SODIUM LACTATE, POTASSIUM CHLORIDE, CALCIUM CHLORIDE 600; 310; 30; 20 MG/100ML; MG/100ML; MG/100ML; MG/100ML
INJECTION, SOLUTION INTRAVENOUS CONTINUOUS
Status: DISCONTINUED | OUTPATIENT
Start: 2023-06-05 | End: 2023-06-06

## 2023-06-05 RX ORDER — ALPRAZOLAM 2 MG/1
1 TABLET ORAL 3 TIMES DAILY PRN
COMMUNITY

## 2023-06-05 RX ORDER — IBUPROFEN 600 MG/1
600 TABLET ORAL EVERY 6 HOURS PRN
COMMUNITY

## 2023-06-05 RX ORDER — SUCRALFATE 1 G/1
1 TABLET ORAL
COMMUNITY

## 2023-06-05 RX ORDER — ZIPRASIDONE MESYLATE 20 MG/ML
10 INJECTION, POWDER, LYOPHILIZED, FOR SOLUTION INTRAMUSCULAR ONCE
Status: COMPLETED | OUTPATIENT
Start: 2023-06-05 | End: 2023-06-05

## 2023-06-05 RX ORDER — NALOXEGOL OXALATE 12.5 MG/1
12.5 TABLET, FILM COATED ORAL
COMMUNITY

## 2023-06-05 RX ORDER — LORAZEPAM 2 MG/ML
2 INJECTION INTRAMUSCULAR ONCE
Status: COMPLETED | OUTPATIENT
Start: 2023-06-05 | End: 2023-06-05

## 2023-06-05 RX ORDER — LORAZEPAM 2 MG/ML
INJECTION INTRAMUSCULAR
Status: COMPLETED
Start: 2023-06-05 | End: 2023-06-05

## 2023-06-05 RX ORDER — HALOPERIDOL 5 MG/ML
5 INJECTION INTRAMUSCULAR EVERY 4 HOURS PRN
Status: DISCONTINUED | OUTPATIENT
Start: 2023-06-05 | End: 2023-06-08 | Stop reason: HOSPADM

## 2023-06-05 RX ORDER — ONDANSETRON 4 MG/1
4 TABLET, ORALLY DISINTEGRATING ORAL EVERY 4 HOURS PRN
Status: DISCONTINUED | OUTPATIENT
Start: 2023-06-05 | End: 2023-06-08 | Stop reason: HOSPADM

## 2023-06-05 RX ORDER — POLYETHYLENE GLYCOL 3350 17 G/17G
1 POWDER, FOR SOLUTION ORAL
Status: DISCONTINUED | OUTPATIENT
Start: 2023-06-05 | End: 2023-06-08 | Stop reason: HOSPADM

## 2023-06-05 RX ORDER — PROMETHAZINE HYDROCHLORIDE 25 MG/1
12.5-25 SUPPOSITORY RECTAL EVERY 4 HOURS PRN
Status: DISCONTINUED | OUTPATIENT
Start: 2023-06-05 | End: 2023-06-08 | Stop reason: HOSPADM

## 2023-06-05 RX ORDER — PROCHLORPERAZINE EDISYLATE 5 MG/ML
5-10 INJECTION INTRAMUSCULAR; INTRAVENOUS EVERY 4 HOURS PRN
Status: DISCONTINUED | OUTPATIENT
Start: 2023-06-05 | End: 2023-06-08 | Stop reason: HOSPADM

## 2023-06-05 RX ORDER — SODIUM CHLORIDE 9 MG/ML
1000 INJECTION, SOLUTION INTRAVENOUS ONCE
Status: COMPLETED | OUTPATIENT
Start: 2023-06-05 | End: 2023-06-05

## 2023-06-05 RX ADMIN — MIDAZOLAM HYDROCHLORIDE 5 MG: 1 INJECTION, SOLUTION INTRAMUSCULAR; INTRAVENOUS at 06:20

## 2023-06-05 RX ADMIN — ZIPRASIDONE MESYLATE 10 MG: 20 INJECTION, POWDER, LYOPHILIZED, FOR SOLUTION INTRAMUSCULAR at 09:17

## 2023-06-05 RX ADMIN — DEXMEDETOMIDINE 0.2 MCG/KG/HR: 100 INJECTION, SOLUTION INTRAVENOUS at 10:22

## 2023-06-05 RX ADMIN — LORAZEPAM 2 MG: 2 INJECTION INTRAMUSCULAR; INTRAVENOUS at 14:26

## 2023-06-05 RX ADMIN — SODIUM CHLORIDE, POTASSIUM CHLORIDE, SODIUM LACTATE AND CALCIUM CHLORIDE: 600; 310; 30; 20 INJECTION, SOLUTION INTRAVENOUS at 12:25

## 2023-06-05 RX ADMIN — LORAZEPAM 2 MG: 2 INJECTION INTRAMUSCULAR; INTRAVENOUS at 06:04

## 2023-06-05 RX ADMIN — LORAZEPAM 2 MG: 2 INJECTION INTRAMUSCULAR at 06:04

## 2023-06-05 RX ADMIN — FENTANYL CITRATE 100 MCG: 50 INJECTION, SOLUTION INTRAMUSCULAR; INTRAVENOUS at 08:18

## 2023-06-05 RX ADMIN — HALOPERIDOL LACTATE 5 MG: 5 INJECTION, SOLUTION INTRAMUSCULAR at 05:55

## 2023-06-05 RX ADMIN — SODIUM CHLORIDE 1000 ML: 9 INJECTION, SOLUTION INTRAVENOUS at 05:58

## 2023-06-05 RX ADMIN — SODIUM CHLORIDE, POTASSIUM CHLORIDE, SODIUM LACTATE AND CALCIUM CHLORIDE: 600; 310; 30; 20 INJECTION, SOLUTION INTRAVENOUS at 19:33

## 2023-06-05 RX ADMIN — CEFTRIAXONE SODIUM 1000 MG: 10 INJECTION, POWDER, FOR SOLUTION INTRAVENOUS at 12:12

## 2023-06-05 RX ADMIN — SODIUM CHLORIDE, POTASSIUM CHLORIDE, SODIUM LACTATE AND CALCIUM CHLORIDE 1000 ML: 600; 310; 30; 20 INJECTION, SOLUTION INTRAVENOUS at 07:51

## 2023-06-05 ASSESSMENT — FIBROSIS 4 INDEX
FIB4 SCORE: 1.08
FIB4 SCORE: 1.04
FIB4 SCORE: 1.08

## 2023-06-05 NOTE — CARE PLAN
The patient is Watcher - Medium risk of patient condition declining or worsening    Shift Goals  Clinical Goals: Safety, lumbar punc  Patient Goals: NATALI  Family Goals: Updates    Progress made toward(s) clinical / shift goals:    Problem: Safety - Medical Restraint  Goal: Remains free of injury from restraints (Restraint for Interference with Medical Device)  Outcome: Progressing, pt shows no signs of injury from restraints.      Problem: Pain - Standard  Goal: Alleviation of pain or a reduction in pain to the patient’s comfort goal  Outcome: Progressing, pt not grimacing.        Patient is not progressing towards the following goals:

## 2023-06-05 NOTE — ASSESSMENT & PLAN NOTE
She is on oxycodone as an outpatient  She is not safe to take oral meds  IV fentanyl (morphine allergy) as indicated  Continue monitor closely in IMCU.  Urine drug screen is positive with benzodiazepine and oxycodone.  I resume her outpatient medication for pain and anxiety.
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
## 2023-06-05 NOTE — ED NOTES
SO at bedside attempting to untie patient's restraints.  RN retried restraints and informed SO that he is not able to touch any of the equipment in the room.  Educated him that it is for her safety and the safety of others.

## 2023-06-05 NOTE — ED NOTES
Remains altered and agitated.  MD aware.  Verbal consent over the phone for LP from the  was provided to primary RN and Dr. Vu.

## 2023-06-05 NOTE — ED NOTES
Patient extremely agitated and restless pulling IV and monitor , 4 limbs place on non-violent restraints as per order. Will continue to revaluate for discontinuation criteria.

## 2023-06-05 NOTE — ED NOTES
Bedside report from KENJI Eastman.  Patient on NIBP, pulse ox, and cardiac monitor.  Patient very agitated in 4 pt soft restraints.   at bedside.

## 2023-06-05 NOTE — PROCEDURES
Procedure Lumbar Puncture    Date/Time: 6/5/2023 2:49 PM    Performed by: Damon Vu M.D.  Authorized by: Damon Vu M.D.    Consent:     Consent obtained:  Written    Consent given by:  Spouse    Risks discussed:  Bleeding, infection, pain, repeat procedure, nerve damage and headache    Alternatives discussed:  No treatment  Universal protocol:     Procedure explained and questions answered to patient or proxy's satisfaction: yes      Relevant documents present and verified: yes      Test results available and properly labeled: yes      Imaging studies available: yes      Required blood products, implants, devices, and special equipment available: yes      Immediately prior to procedure a time out was called: yes      Site/side marked: yes      Patient identity confirmed:  Hospital-assigned identification number  Pre-procedure details:     Procedure purpose:  Diagnostic  Sedation:     Sedation type: she is on a Precedex drip.  Anesthesia:     Anesthesia method:  Local infiltration    Local anesthetic:  Lidocaine 1% w/o epi  Procedure details:     Lumbar space:  L3-L4 interspace    Patient position:  Sitting    Needle gauge:  20    Needle type:  Spinal needle - Quincke tip    Needle length (in):  3.5    Ultrasound guidance: no      Number of attempts:  2    Fluid appearance:  Clear    Tubes of fluid:  4    Total volume (ml): 11.  Post-procedure:     Puncture site:  Adhesive bandage applied  Comments:      Sent for cells, culture, meningitis/encephalitis panel.

## 2023-06-05 NOTE — PROGRESS NOTES
4 Eyes Skin Assessment Completed by KENJI Rosales and KENJI Ivan.    Head Bruising and Swelling, bumps  Ears WDL  Nose WDL  Mouth dried blood, dryness and cracked lips  Neck WDL  Breast/Chest Bruising and Scar  Shoulder Blades WDL  Spine Incision and Bruising, previous spinal sx site  (R) Arm/Elbow/Hand Bruising  (L) Arm/Elbow/Hand Bruising  Abdomen Bruising  Groin Excoriation and Bruising  Scrotum/Coccyx/Buttocks bruising  (R) Leg Bruising  (L) Leg Bruising  (R) Heel/Foot/Toe Bruising  (L) Heel/Foot/Toe Bruising          Devices In Places Tele Box, Blood Pressure Cuff, Pulse Ox, Gray, and Nasal Cannula      Interventions In Place Gray Ear Foams and Pillows    Possible Skin Injury No    Pictures Uploaded Into Epic No, needs to be completed  Wound Consult Placed N/A  RN Wound Prevention Protocol Ordered No

## 2023-06-05 NOTE — ASSESSMENT & PLAN NOTE
Lactic acid is quite elevated at 3.9  Lactic acid still elevated but trending down.  It is not clear if this is due to infection or SIRS  IV fluids and trend.  CPK slightly elevated.  Continue IV fluid administration.  Ordered repeat lactic acid.

## 2023-06-05 NOTE — ED NOTES
LR hung. Patient voided x 1.  Patient cleaned, sheets changed, and patient repositioned for comfort.

## 2023-06-05 NOTE — ED NOTES
Med rec completed per pt's home pharmacy ([facility])   Unknown last doses of medications taken   Called  and he was unable to verify pt's medications   Pt unable to participate in an interview at this time

## 2023-06-05 NOTE — ED NOTES
Patient bedside report given to RN Steffi . Pt Agitated on 4 limbs on non violent restraints , respirations even and unlabored, on room air, maintaining spo2 98% . High risk for fall, Fall risk interventions in place.     Lower denture placed at bedside.

## 2023-06-05 NOTE — H&P
Hospital Medicine History & Physical Note    Date of Service  6/5/2023    Primary Care Physician  Alfred Hameed P.A.-C.    Consultants  critical care    Specialist Names: Lorri    Code Status  Full Code    Chief Complaint  Chief Complaint   Patient presents with    ALOC       History of Presenting Illness  Patti Tello is a 57 y.o. female who presented 6/5/2023 with altered mental status.  Mrs. Tello has a past medical history of chronic pain syndrome on chronic codon as well as anxiety on Xanax and breast cancer that apparently has been in her usual state of health and was brought in this morning by paramedics because of agitation and altered mental status.  Apparently the  had conveyed to them that she had been somewhat depressed because of a family member's death though did not endorse suicidal ideation.  In the emergency room she has had extensive work-up including CT of the head, chest x-ray, labs and these were revealing for white count 13,000 with a lactic acidosis though no clear source of infection.  I called her , Nic at [phone] tried my best to obtain a history but unfortunately because of slurred speech I am unable to really understand anything he says.  Apparently the ER nurse and ER doctor had the same problem in person.  Effectively she is extremely agitated encephalopathic despite Haldol, Versed, Ativan, Geodon, and fentanyl.  She is in four-point restraints.  She will be admitted in guarded condition to the [facility] on a Precedex drip we will consider a lumbar puncture once we get her stabilized.  I called her , Nic and did my best to   I discussed the plan of care with her bedside RN and Dr. Waddell.    Review of Systems  Review of Systems   Unable to perform ROS: Mental acuity       Past Medical History   has a past medical history of Anesthesia, Anxiety and depression, Arthritis, Asthma, Back injury, Back pain, Bronchitis (2010), Cancer ([facility]) (1995), Chickenpox,  Chronic LBP, Chronic neck pain, Cold intolerance (1/24/2012), COPD (chronic obstructive pulmonary disease) ([facility]), Cystic fibrosis ([facility]), Dental disorder, Depression, Diabetes, GERD (gastroesophageal reflux disease), H/O gastric bypass (10/11/2013), Herniated nucleus pulposus, L4-5 (3/18/2010), breast cancer (10/11/2013), Hypertension, Itching due to drug (6/2/2011), Kidney stone, Nephrolithiasis (10/11/2013), Obesity, Osteoporosis, Other specified symptom associated with female genital organs, Panic disorder, Pneumonia, PONV (postoperative nausea and vomiting), PTSD (post-traumatic stress disorder), Restless leg syndrome, Rheumatoid arthritis ([facility]), S/P laminectomy (10/22/2012), Seizure ([facility]) (09/2020), Thoracic or lumbosacral neuritis or radiculitis, unspecified (10/22/2012), and Trauma.    Surgical History   has a past surgical history that includes mastectomy; breast reconstruction; US-CYST ASPIRATION-BREAST INITIAL; fusion, spine, lumbar, plif (10/22/2012); lumbar laminectomy diskectomy (10/22/2012); ureteroscopy (10/10/2013); lasertripsy (10/10/2013); laminotomy; Sleeve,Chance Vaso Thigh; tonsillectomy; Ventilator - Continuous; primary c section; abdominal hysterectomy total; hysterectomy laparoscopy; other abdominal surgery (2006); other abdominal surgery; craniotomy; other orthopedic surgery (10/01/2012); other; bladder sling female (N/A, 11/4/2020); cystoscopy (N/A, 11/4/2020); pr implant neurostim/ (3/11/2021); and ventral hernia repair robotic xi (N/A, 4/8/2022).     Family History  family history includes Cancer in her father, maternal aunt, and mother; Diabetes in her father, maternal aunt, maternal grandfather, maternal grandmother, maternal uncle, and mother; Hyperlipidemia in her father and mother; Hypertension in her father and mother; Other in her mother; Sleep Apnea in her brother; Stroke in her mother.   Family history reviewed with patient. There is no family history that is pertinent to  the chief complaint.     Social History   reports that she has never smoked. She has never used smokeless tobacco. She reports that she does not currently use drugs. She reports that she does not drink alcohol.    Allergies  Allergies   Allergen Reactions    Buprenorphine-Naloxone Hives, Shortness of Breath and Swelling    Gabapentin      seizures    Suboxone Hives, Shortness of Breath and Swelling    Morphine Rash     tachycardia    Shrimp (Diagnostic) Itching    Contrast Media With Iodine [Iodine] Itching     Ok with benadryl       Medications  Prior to Admission Medications   Prescriptions Last Dose Informant Patient Reported? Taking?   Naloxegol Oxalate (MOVANTIK) 12.5 MG Tab UNK at [facility] Patient's Home Pharmacy Yes Yes   Sig: Take 12.5 mg by mouth 1 time a day as needed. Indications: Opioid-Induced Constipation   alprazolam (XANAX) 2 MG tablet UNK at [facility] Patient's Home Pharmacy Yes Yes   Sig: Take 1 mg by mouth 3 times a day as needed for Anxiety.   dicyclomine (BENTYL) 10 MG Cap UNK at [facility] Patient's Home Pharmacy Yes Yes   Sig: Take 20 mg by mouth 4 Times a Day,Before Meals and at Bedtime.   ibuprofen (MOTRIN) 600 MG Tab UNK at [facility] Patient's Home Pharmacy Yes Yes   Sig: Take 600 mg by mouth every 6 hours as needed. Indications: Pain   ondansetron (ZOFRAN ODT) 4 MG TABLET DISPERSIBLE UNK at [facility] Patient's Home Pharmacy Yes Yes   Sig: Take 4 mg by mouth every 12 hours as needed for Nausea/Vomiting.   oxyCODONE immediate release (ROXICODONE) 10 MG immediate release tablet UNK at [facility] Patient's Home Pharmacy Yes No   Sig: Take 10 mg by mouth 5 Times a Day.   sertraline (ZOLOFT) 100 MG Tab UNK at [facility] Patient's Home Pharmacy Yes No   Sig: Take 100 mg by mouth every day.   sucralfate (CARAFATE) 1 GM Tab UNK at [facility] Patient's Home Pharmacy Yes Yes   Sig: Take 1 g by mouth 4 Times a Day,Before Meals and at Bedtime.      Facility-Administered Medications: None       Physical Exam  Temp:  [35.9 °C (96.6 °F)] 35.9 °C (96.6  °F)  Pulse:  [] 108  Resp:  [21-23] 23  BP: (109-148)/(71-94) 109/87  SpO2:  [93 %-98 %] 95 %  Blood Pressure: 109/87   Temperature: 35.9 °C (96.6 °F)   Pulse: (!) 108   Respiration: (!) 23   Pulse Oximetry: 95 %       Physical Exam  Vitals reviewed.   Constitutional:       General: She is in acute distress.      Appearance: She is ill-appearing and toxic-appearing.   HENT:      Mouth/Throat:      Comments: Very dry mouth  Cardiovascular:      Rate and Rhythm: Regular rhythm. Tachycardia present.   Pulmonary:      Effort: Pulmonary effort is normal.      Breath sounds: Normal breath sounds.   Abdominal:      General: There is no distension.      Palpations: Abdomen is soft.      Tenderness: There is no abdominal tenderness.   Genitourinary:     Comments: dwyer  Musculoskeletal:      Cervical back: Neck supple.      Right lower leg: No edema.      Left lower leg: No edema.      Comments: Bruises head and legs   Skin:     General: Skin is warm and dry.      Findings: No rash.   Neurological:      Comments: She is able to answer some questions such as her name and she is yelling out  She pulls at all 4 restraints equally.         Laboratory:  Recent Labs     06/05/23  0610   WBC 13.0*   RBC 5.18   HEMOGLOBIN 14.1   HEMATOCRIT 45.4   MCV 87.6   MCH 27.2   MCHC 31.1*   RDW 46.6   PLATELETCT 300   MPV 10.8     Recent Labs     06/05/23  0600   SODIUM 144   POTASSIUM 3.7   CHLORIDE 106   CO2 22   GLUCOSE 105*   BUN 10   CREATININE 0.69   CALCIUM 9.6     Recent Labs     06/05/23  0600   ALTSGPT 20   ASTSGOT 25   ALKPHOSPHAT 160*   TBILIRUBIN 0.2   GLUCOSE 105*         No results for input(s): NTPROBNP in the last 72 hours.      No results for input(s): TROPONINT in the last 72 hours.    Imaging:  DX-CHEST-PORTABLE (1 VIEW)   Final Result      No acute cardiac or pulmonary abnormalities are identified.      CT-HEAD W/O   Final Result         1.  No acute intracranial abnormality.   2.  Left frontal scalp hematoma              EKG pending    Assessment/Plan:  Justification for Admission Status  I anticipate this patient will require at least two midnights for appropriate medical management, necessitating inpatient admission because work up of severe encephalopathy    Patient will need a Telemetry bed on MEDICAL service .  The need is secondary to as above.    * Encephalopathy acute- (present on admission)  Assessment & Plan  Severe  She had been given haldol, ativan, versed, fentanyl, and geodon and remains very agitated.  Clinically this is most consistent with drug overdose or drug withdrawal though meningitis is in the differential diagnosis.  Her  was not able to convey a history except that she has been depressed.  Consider lumbar puncture if she does not improve by this afternoon which will need to be done with conscious sedation.  Neuro checks q4 hours  She is requiring 4 point restraints.   PRN IV ativan and haldol ordered.  IV fentanyl to mitigate withdrawal from oxycodone.  Ammonia and CT head negative.  Empiric IV Rocephin  No reported seizure activity though seizure precautions.  EKG ordered to eval QTc  IMCU for an IV precedex drip.    Lactic acidosis- (present on admission)  Assessment & Plan  Lactic acid is quite elevated at 3.9  It is not clear if this is due to infection or SIRS  IV fluids and trend.  Check CPK as she is at risk of rhabdomyolysis.    Narcotic dependence (HCC)- (present on admission)  Assessment & Plan  She is on oxycodone as an outpatient  She is not safe to take oral meds  IV fentanyl (morphine allergy) as indicated    Bipolar affective disorder, currently depressed, moderate (HCC)- (present on admission)  Assessment & Plan  Consider psychiatry consult once she is more lucid.        VTE prophylaxis: SCDs/TEDs

## 2023-06-05 NOTE — ASSESSMENT & PLAN NOTE
Severe  She had been given haldol, ativan, versed, fentanyl, and geodon and remains very agitated.  Clinically this is most consistent with drug overdose or drug withdrawal though meningitis is in the differential diagnosis.  Her  was not able to convey a history except that she has been depressed.  Consider lumbar puncture if she does not improve by this afternoon which will need to be done with conscious sedation.  Neuro checks q4 hours  Ammonia and CT head negative.  Started on vimpat for hx seizures  resolved

## 2023-06-05 NOTE — ED TRIAGE NOTES
"Chief Complaint   Patient presents with    ALOC     BIB EMS to [facility] with alter mental status, as per EMS, they were called by  because patient was found in rest room with ALOC, patient has contusion at left side of forehead, possible fall/ drug overdose.  endorses to EMS they had recent death in the family that's why patient was depressed. Her further stated that patient is on prescribed oxycodone and xanax.     Patient is restless, incontinent of stool at this time, maintaining 98% on room air. GCS 10/15, Best Eye Response: To pain  Best Verbal Response: Inappropriate words  Best Motor Response: Localizes pain  [place] Coma Scale Score: 10     ED RN ALOC protocol initiated. pt on monitor and in gown, labs drawn and sent.     Medications given en route: none    /81   Pulse 96   Resp 18   Ht 1.646 m (5' 4.8\")   Wt 68 kg (149 lb 14.6 oz)   LMP 10/05/1999   SpO2 98%   BMI 25.10 kg/m²     "

## 2023-06-06 PROBLEM — E87.0 HYPERNATREMIA: Status: ACTIVE | Noted: 2023-06-06

## 2023-06-06 LAB
ALBUMIN SERPL BCP-MCNC: 3.9 G/DL (ref 3.2–4.9)
ALBUMIN/GLOB SERPL: 1.4 G/DL
ALP SERPL-CCNC: 149 U/L (ref 30–99)
ALT SERPL-CCNC: 17 U/L (ref 2–50)
ANION GAP SERPL CALC-SCNC: 12 MMOL/L (ref 7–16)
AST SERPL-CCNC: 24 U/L (ref 12–45)
BASOPHILS # BLD AUTO: 0.5 % (ref 0–1.8)
BASOPHILS # BLD: 0.06 K/UL (ref 0–0.12)
BILIRUB SERPL-MCNC: 0.5 MG/DL (ref 0.1–1.5)
BUN SERPL-MCNC: 12 MG/DL (ref 8–22)
CALCIUM ALBUM COR SERPL-MCNC: 9.9 MG/DL (ref 8.5–10.5)
CALCIUM SERPL-MCNC: 9.8 MG/DL (ref 8.5–10.5)
CHLORIDE SERPL-SCNC: 113 MMOL/L (ref 96–112)
CO2 SERPL-SCNC: 23 MMOL/L (ref 20–33)
CREAT SERPL-MCNC: 0.57 MG/DL (ref 0.5–1.4)
EOSINOPHIL # BLD AUTO: 0.1 K/UL (ref 0–0.51)
EOSINOPHIL NFR BLD: 0.8 % (ref 0–6.9)
ERYTHROCYTE [DISTWIDTH] IN BLOOD BY AUTOMATED COUNT: 46.2 FL (ref 35.9–50)
GFR SERPLBLD CREATININE-BSD FMLA CKD-EPI: 105 ML/MIN/1.73 M 2
GLOBULIN SER CALC-MCNC: 2.8 G/DL (ref 1.9–3.5)
GLUCOSE SERPL-MCNC: 106 MG/DL (ref 65–99)
HCT VFR BLD AUTO: 39.1 % (ref 37–47)
HGB BLD-MCNC: 12.6 G/DL (ref 12–16)
IMM GRANULOCYTES # BLD AUTO: 0.04 K/UL (ref 0–0.11)
IMM GRANULOCYTES NFR BLD AUTO: 0.3 % (ref 0–0.9)
LYMPHOCYTES # BLD AUTO: 1.55 K/UL (ref 1–4.8)
LYMPHOCYTES NFR BLD: 13.1 % (ref 22–41)
MCH RBC QN AUTO: 27.8 PG (ref 27–33)
MCHC RBC AUTO-ENTMCNC: 32.2 G/DL (ref 32.2–35.5)
MCV RBC AUTO: 86.1 FL (ref 81.4–97.8)
MONOCYTES # BLD AUTO: 0.74 K/UL (ref 0–0.85)
MONOCYTES NFR BLD AUTO: 6.3 % (ref 0–13.4)
NEUTROPHILS # BLD AUTO: 9.32 K/UL (ref 1.82–7.42)
NEUTROPHILS NFR BLD: 79 % (ref 44–72)
NRBC # BLD AUTO: 0 K/UL
NRBC BLD-RTO: 0 /100 WBC (ref 0–0.2)
PLATELET # BLD AUTO: 275 K/UL (ref 164–446)
PMV BLD AUTO: 11.1 FL (ref 9–12.9)
POTASSIUM SERPL-SCNC: 3.8 MMOL/L (ref 3.6–5.5)
PROT SERPL-MCNC: 6.7 G/DL (ref 6–8.2)
RBC # BLD AUTO: 4.54 M/UL (ref 4.2–5.4)
SODIUM SERPL-SCNC: 142 MMOL/L (ref 135–145)
SODIUM SERPL-SCNC: 148 MMOL/L (ref 135–145)
WBC # BLD AUTO: 11.8 K/UL (ref 4.8–10.8)

## 2023-06-06 PROCEDURE — 99222 1ST HOSP IP/OBS MODERATE 55: CPT | Mod: GC | Performed by: PSYCHIATRY & NEUROLOGY

## 2023-06-06 PROCEDURE — 700102 HCHG RX REV CODE 250 W/ 637 OVERRIDE(OP): Performed by: INTERNAL MEDICINE

## 2023-06-06 PROCEDURE — 700105 HCHG RX REV CODE 258: Performed by: HOSPITALIST

## 2023-06-06 PROCEDURE — A9270 NON-COVERED ITEM OR SERVICE: HCPCS | Performed by: INTERNAL MEDICINE

## 2023-06-06 PROCEDURE — 700105 HCHG RX REV CODE 258: Performed by: INTERNAL MEDICINE

## 2023-06-06 PROCEDURE — 84295 ASSAY OF SERUM SODIUM: CPT

## 2023-06-06 PROCEDURE — 700111 HCHG RX REV CODE 636 W/ 250 OVERRIDE (IP): Performed by: HOSPITALIST

## 2023-06-06 PROCEDURE — 700102 HCHG RX REV CODE 250 W/ 637 OVERRIDE(OP): Performed by: HOSPITALIST

## 2023-06-06 PROCEDURE — 302131 K PAD MOTOR: Performed by: INTERNAL MEDICINE

## 2023-06-06 PROCEDURE — 700101 HCHG RX REV CODE 250: Performed by: HOSPITALIST

## 2023-06-06 PROCEDURE — A9270 NON-COVERED ITEM OR SERVICE: HCPCS | Performed by: HOSPITALIST

## 2023-06-06 PROCEDURE — 85025 COMPLETE CBC W/AUTO DIFF WBC: CPT

## 2023-06-06 PROCEDURE — 770006 HCHG ROOM/CARE - MED/SURG/GYN SEMI*

## 2023-06-06 PROCEDURE — 80053 COMPREHEN METABOLIC PANEL: CPT

## 2023-06-06 PROCEDURE — 302151 K-PAD 14X20: Performed by: INTERNAL MEDICINE

## 2023-06-06 PROCEDURE — 99233 SBSQ HOSP IP/OBS HIGH 50: CPT | Performed by: INTERNAL MEDICINE

## 2023-06-06 RX ORDER — ENOXAPARIN SODIUM 100 MG/ML
40 INJECTION SUBCUTANEOUS DAILY
Status: DISCONTINUED | OUTPATIENT
Start: 2023-06-07 | End: 2023-06-08 | Stop reason: HOSPADM

## 2023-06-06 RX ORDER — DICYCLOMINE HYDROCHLORIDE 10 MG/1
20 CAPSULE ORAL
Status: DISCONTINUED | OUTPATIENT
Start: 2023-06-06 | End: 2023-06-08 | Stop reason: HOSPADM

## 2023-06-06 RX ORDER — OXYCODONE HYDROCHLORIDE 10 MG/1
10 TABLET ORAL
Status: DISCONTINUED | OUTPATIENT
Start: 2023-06-06 | End: 2023-06-08 | Stop reason: HOSPADM

## 2023-06-06 RX ORDER — SERTRALINE HYDROCHLORIDE 100 MG/1
100 TABLET, FILM COATED ORAL DAILY
Status: DISCONTINUED | OUTPATIENT
Start: 2023-06-06 | End: 2023-06-08 | Stop reason: HOSPADM

## 2023-06-06 RX ORDER — DEXTROSE MONOHYDRATE 50 MG/ML
INJECTION, SOLUTION INTRAVENOUS CONTINUOUS
Status: DISCONTINUED | OUTPATIENT
Start: 2023-06-06 | End: 2023-06-06

## 2023-06-06 RX ORDER — ALPRAZOLAM 1 MG/1
1 TABLET ORAL 3 TIMES DAILY PRN
Status: DISCONTINUED | OUTPATIENT
Start: 2023-06-06 | End: 2023-06-08 | Stop reason: HOSPADM

## 2023-06-06 RX ORDER — LAMOTRIGINE 25 MG/1
25 TABLET ORAL DAILY
Status: ON HOLD | COMMUNITY
End: 2023-06-08

## 2023-06-06 RX ORDER — SUCRALFATE 1 G/1
1 TABLET ORAL
Status: DISCONTINUED | OUTPATIENT
Start: 2023-06-06 | End: 2023-06-08 | Stop reason: HOSPADM

## 2023-06-06 RX ADMIN — DEXMEDETOMIDINE 0.6 MCG/KG/HR: 100 INJECTION, SOLUTION INTRAVENOUS at 03:52

## 2023-06-06 RX ADMIN — ONDANSETRON 4 MG: 2 INJECTION INTRAMUSCULAR; INTRAVENOUS at 11:21

## 2023-06-06 RX ADMIN — CEFTRIAXONE SODIUM 1000 MG: 10 INJECTION, POWDER, FOR SOLUTION INTRAVENOUS at 05:44

## 2023-06-06 RX ADMIN — SUCRALFATE 1 G: 1 TABLET ORAL at 17:03

## 2023-06-06 RX ADMIN — OXYCODONE HYDROCHLORIDE 10 MG: 10 TABLET ORAL at 17:03

## 2023-06-06 RX ADMIN — OXYCODONE HYDROCHLORIDE 10 MG: 10 TABLET ORAL at 12:53

## 2023-06-06 RX ADMIN — DEXTROSE MONOHYDRATE: 50 INJECTION, SOLUTION INTRAVENOUS at 10:22

## 2023-06-06 RX ADMIN — SODIUM CHLORIDE, POTASSIUM CHLORIDE, SODIUM LACTATE AND CALCIUM CHLORIDE: 600; 310; 30; 20 INJECTION, SOLUTION INTRAVENOUS at 03:51

## 2023-06-06 RX ADMIN — DICYCLOMINE HYDROCHLORIDE 20 MG: 10 CAPSULE ORAL at 21:33

## 2023-06-06 RX ADMIN — SERTRALINE 100 MG: 100 TABLET, FILM COATED ORAL at 12:53

## 2023-06-06 RX ADMIN — OXYCODONE HYDROCHLORIDE 10 MG: 10 TABLET ORAL at 21:33

## 2023-06-06 RX ADMIN — ALPRAZOLAM 1 MG: 1 TABLET ORAL at 22:26

## 2023-06-06 RX ADMIN — DOCUSATE SODIUM 50 MG AND SENNOSIDES 8.6 MG 2 TABLET: 8.6; 5 TABLET, FILM COATED ORAL at 17:03

## 2023-06-06 RX ADMIN — DICYCLOMINE HYDROCHLORIDE 20 MG: 10 CAPSULE ORAL at 17:03

## 2023-06-06 ASSESSMENT — PAIN DESCRIPTION - PAIN TYPE
TYPE: CHRONIC PAIN
TYPE: CHRONIC PAIN

## 2023-06-06 ASSESSMENT — ENCOUNTER SYMPTOMS
SPUTUM PRODUCTION: 0
MYALGIAS: 1
PHOTOPHOBIA: 0
SEIZURES: 0
TREMORS: 0
ORTHOPNEA: 0
BLURRED VISION: 0
SPEECH CHANGE: 0
VOMITING: 0
FOCAL WEAKNESS: 0
NAUSEA: 0
PALPITATIONS: 0
FEVER: 0
SENSORY CHANGE: 0
CONSTIPATION: 0
HALLUCINATIONS: 0
NECK PAIN: 0
CHILLS: 0
LOSS OF CONSCIOUSNESS: 0
SHORTNESS OF BREATH: 0
SEIZURES: 1
TINGLING: 0
EYE PAIN: 0
DIZZINESS: 0
HEADACHES: 0
COUGH: 0
BACK PAIN: 0
DIARRHEA: 0
DOUBLE VISION: 0
WEIGHT LOSS: 0
ABDOMINAL PAIN: 0

## 2023-06-06 ASSESSMENT — LIFESTYLE VARIABLES: SUBSTANCE_ABUSE: 0

## 2023-06-06 NOTE — CARE PLAN
The patient is Watcher - Medium risk of patient condition declining or worsening    Shift Goals  Clinical Goals: improve mentation  Patient Goals: NATALI  Family Goals: NATALI    Progress made toward(s) clinical / shift goals:    Problem: Pain - Standard  Goal: Alleviation of pain or a reduction in pain to the patient’s comfort goal  Outcome: Progressing  Note: Reposition for pain     Problem: Safety - Medical Restraint  Goal: Remains free of injury from restraints (Restraint for Interference with Medical Device)  Outcome: Met  Flowsheets (Taken 6/6/2023 0408)  Addressed this shift: Remains free of injury from restraints (restraint for interference with medical device):   Determine that other, less restrictive measures have been tried or would not be effective before applying the restraint   Evaluate the patient's condition at the time of restraint application   Inform patient/family regarding the reason for restraint   Every 2 hours: Monitor safety, psychosocial status, comfort, nutrition and hydration  Goal: Free from restraint(s) (Restraint for Interference with Medical Device)  Outcome: Met  Flowsheets (Taken 6/6/2023 0408)  Addressed this shift: Free from restraint(s) (restraint for interference with medical device):   ONCE/SHIFT or MINIMUM Every 12 hours: Assess and document the continuing need for restraints   Every 24 hours: Continued use of restraint requires Licensed Independent Practitioner to perform face to face examination and written order   Identify and implement measures to help patient regain control       Patient is not progressing towards the following goals:
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
## 2023-06-06 NOTE — PROGRESS NOTES
Updated Dr. Jimenez that pt verbalized that pt said she has history of seizures, ear pain from the left ear, right wrist pain, GI issues, all of which patient has been being followed by necessary MD.

## 2023-06-06 NOTE — CARE PLAN
Problem: Knowledge Deficit - Standard  Goal: Patient and family/care givers will demonstrate understanding of plan of care, disease process/condition, diagnostic tests and medications  Outcome: Progressing     Problem: Pain - Standard  Goal: Alleviation of pain or a reduction in pain to the patient’s comfort goal  Outcome: Progressing   The patient is Stable - Low risk of patient condition declining or worsening    Shift Goals  Clinical Goals: improve mentation  Patient Goals: NATALI  Family Goals: NATALI    Progress made toward(s) clinical / shift goals:  pt verbalizes pain regimen is effective

## 2023-06-07 LAB
BACTERIA UR CULT: NORMAL
LACTATE SERPL-SCNC: 0.8 MMOL/L (ref 0.5–2)
SIGNIFICANT IND 70042: NORMAL
SITE SITE: NORMAL
SOURCE SOURCE: NORMAL

## 2023-06-07 PROCEDURE — 700111 HCHG RX REV CODE 636 W/ 250 OVERRIDE (IP): Performed by: HOSPITALIST

## 2023-06-07 PROCEDURE — A9270 NON-COVERED ITEM OR SERVICE: HCPCS | Performed by: STUDENT IN AN ORGANIZED HEALTH CARE EDUCATION/TRAINING PROGRAM

## 2023-06-07 PROCEDURE — 770006 HCHG ROOM/CARE - MED/SURG/GYN SEMI*

## 2023-06-07 PROCEDURE — 83605 ASSAY OF LACTIC ACID: CPT

## 2023-06-07 PROCEDURE — A9270 NON-COVERED ITEM OR SERVICE: HCPCS | Performed by: HOSPITALIST

## 2023-06-07 PROCEDURE — 700111 HCHG RX REV CODE 636 W/ 250 OVERRIDE (IP): Performed by: INTERNAL MEDICINE

## 2023-06-07 PROCEDURE — 700102 HCHG RX REV CODE 250 W/ 637 OVERRIDE(OP): Performed by: HOSPITALIST

## 2023-06-07 PROCEDURE — A9270 NON-COVERED ITEM OR SERVICE: HCPCS | Performed by: INTERNAL MEDICINE

## 2023-06-07 PROCEDURE — 700102 HCHG RX REV CODE 250 W/ 637 OVERRIDE(OP): Performed by: INTERNAL MEDICINE

## 2023-06-07 PROCEDURE — 700102 HCHG RX REV CODE 250 W/ 637 OVERRIDE(OP): Performed by: STUDENT IN AN ORGANIZED HEALTH CARE EDUCATION/TRAINING PROGRAM

## 2023-06-07 PROCEDURE — 99232 SBSQ HOSP IP/OBS MODERATE 35: CPT | Performed by: STUDENT IN AN ORGANIZED HEALTH CARE EDUCATION/TRAINING PROGRAM

## 2023-06-07 RX ORDER — LACOSAMIDE 50 MG/1
50 TABLET ORAL 2 TIMES DAILY
Status: DISCONTINUED | OUTPATIENT
Start: 2023-06-07 | End: 2023-06-08 | Stop reason: HOSPADM

## 2023-06-07 RX ORDER — ACETAMINOPHEN 325 MG/1
650 TABLET ORAL EVERY 4 HOURS PRN
Status: DISCONTINUED | OUTPATIENT
Start: 2023-06-07 | End: 2023-06-08 | Stop reason: HOSPADM

## 2023-06-07 RX ADMIN — SUCRALFATE 1 G: 1 TABLET ORAL at 11:27

## 2023-06-07 RX ADMIN — FENTANYL CITRATE 50 MCG: 50 INJECTION, SOLUTION INTRAMUSCULAR; INTRAVENOUS at 01:31

## 2023-06-07 RX ADMIN — SUCRALFATE 1 G: 1 TABLET ORAL at 20:06

## 2023-06-07 RX ADMIN — OXYCODONE HYDROCHLORIDE 10 MG: 10 TABLET ORAL at 20:06

## 2023-06-07 RX ADMIN — OXYCODONE HYDROCHLORIDE 10 MG: 10 TABLET ORAL at 17:11

## 2023-06-07 RX ADMIN — SUCRALFATE 1 G: 1 TABLET ORAL at 17:11

## 2023-06-07 RX ADMIN — CEFTRIAXONE SODIUM 1000 MG: 10 INJECTION, POWDER, FOR SOLUTION INTRAVENOUS at 04:42

## 2023-06-07 RX ADMIN — LACOSAMIDE 50 MG: 50 TABLET, FILM COATED ORAL at 17:31

## 2023-06-07 RX ADMIN — ACETAMINOPHEN 650 MG: 325 TABLET, FILM COATED ORAL at 09:29

## 2023-06-07 RX ADMIN — OXYCODONE HYDROCHLORIDE 10 MG: 10 TABLET ORAL at 08:19

## 2023-06-07 RX ADMIN — DICYCLOMINE HYDROCHLORIDE 20 MG: 10 CAPSULE ORAL at 17:11

## 2023-06-07 RX ADMIN — DICYCLOMINE HYDROCHLORIDE 20 MG: 10 CAPSULE ORAL at 05:33

## 2023-06-07 RX ADMIN — DOCUSATE SODIUM 50 MG AND SENNOSIDES 8.6 MG 2 TABLET: 8.6; 5 TABLET, FILM COATED ORAL at 17:11

## 2023-06-07 RX ADMIN — SUCRALFATE 1 G: 1 TABLET ORAL at 05:34

## 2023-06-07 RX ADMIN — OXYCODONE HYDROCHLORIDE 10 MG: 10 TABLET ORAL at 14:29

## 2023-06-07 RX ADMIN — SERTRALINE 100 MG: 100 TABLET, FILM COATED ORAL at 04:42

## 2023-06-07 RX ADMIN — DICYCLOMINE HYDROCHLORIDE 20 MG: 10 CAPSULE ORAL at 11:27

## 2023-06-07 RX ADMIN — OXYCODONE HYDROCHLORIDE 10 MG: 10 TABLET ORAL at 04:41

## 2023-06-07 RX ADMIN — ENOXAPARIN SODIUM 40 MG: 100 INJECTION SUBCUTANEOUS at 17:11

## 2023-06-07 RX ADMIN — LACOSAMIDE 50 MG: 50 TABLET, FILM COATED ORAL at 08:18

## 2023-06-07 RX ADMIN — DICYCLOMINE HYDROCHLORIDE 20 MG: 10 CAPSULE ORAL at 20:06

## 2023-06-07 ASSESSMENT — ENCOUNTER SYMPTOMS
DIARRHEA: 0
PALPITATIONS: 0
SPEECH CHANGE: 0
ORTHOPNEA: 0
TINGLING: 0
BLURRED VISION: 0
DOUBLE VISION: 0
WEIGHT LOSS: 0
SPUTUM PRODUCTION: 0
COUGH: 0
CONSTIPATION: 0
PHOTOPHOBIA: 0
ABDOMINAL PAIN: 0
EYE PAIN: 0
SHORTNESS OF BREATH: 0
CHILLS: 0
VOMITING: 0
TREMORS: 0
MYALGIAS: 1
HALLUCINATIONS: 0
FEVER: 0
DIZZINESS: 0
SEIZURES: 1
HEADACHES: 0
FOCAL WEAKNESS: 0
NAUSEA: 0
BACK PAIN: 0
NECK PAIN: 0
SENSORY CHANGE: 0

## 2023-06-07 ASSESSMENT — PAIN DESCRIPTION - PAIN TYPE
TYPE: CHRONIC PAIN
TYPE: CHRONIC PAIN

## 2023-06-07 ASSESSMENT — LIFESTYLE VARIABLES: SUBSTANCE_ABUSE: 0

## 2023-06-07 NOTE — PROGRESS NOTES
Complaints of headache despite PRN pain med. Ambulated on the hallway which helped. No nausea, no vomiting. Denies vision problems. Vitals stable.

## 2023-06-07 NOTE — CARE PLAN
Problem: Knowledge Deficit - Standard  Goal: Patient and family/care givers will demonstrate understanding of plan of care, disease process/condition, diagnostic tests and medications  Outcome: Progressing     Problem: Hemodynamics  Goal: Patient's hemodynamics, fluid balance and neurologic status will be stable or improve  Outcome: Progressing     Problem: Fluid Volume  Goal: Fluid volume balance will be maintained  Outcome: Progressing     Problem: Urinary - Renal Perfusion  Goal: Ability to achieve and maintain adequate renal perfusion and functioning will improve  Outcome: Progressing     Problem: Respiratory  Goal: Patient will achieve/maintain optimum respiratory ventilation and gas exchange  Outcome: Progressing     Problem: Mechanical Ventilation  Goal: Safe management of artificial airway and ventilation  Outcome: Progressing  Goal: Successful weaning off mechanical ventilator, spontaneously maintains adequate gas exchange  Outcome: Progressing  Goal: Patient will be able to express needs and understand communication  Outcome: Progressing     Problem: Physical Regulation  Goal: Diagnostic test results will improve  Outcome: Progressing  Goal: Signs and symptoms of infection will decrease  Outcome: Progressing     Problem: Pain - Standard  Goal: Alleviation of pain or a reduction in pain to the patient’s comfort goal  Outcome: Progressing     Problem: Skin Integrity  Goal: Skin integrity is maintained or improved  Outcome: Progressing     Problem: Fall Risk  Goal: Patient will remain free from falls  Outcome: Progressing   The patient is Stable - Low risk of patient condition declining or worsening    Shift Goals  Clinical Goals: safety, work with therapies  Patient Goals: rest  Family Goals: chery    Progress made toward(s) clinical / shift goals:  fall precautions in place. Pending PT/OT    Patient is not progressing towards the following goals:

## 2023-06-07 NOTE — CARE PLAN
The patient is Watcher - Medium risk of patient condition declining or worsening    Shift Goals  Clinical Goals: no seizures, fall prevention, safety  Patient Goals: rest  Family Goals: NATALI    Progress made toward(s) clinical / shift goals:    No seizure noted. Precautions in place.  A&o x4. Follows commands. Pleasant.  Voiding without difficulty.  Fair appetite.    Patient is not progressing towards the following goals:      Problem: Pain - Standard  Goal: Alleviation of pain or a reduction in pain to the patient’s comfort goal  6/7/2023 0158 by Noemí Davis V, R.N.  Outcome: Progressing  Note: Complaints of chronic back and neck pain. Scheduled oxycodone given. Heat pack applied. PRN fentanyl given x1 thus far. Reports some relief. Resting.       Problem: Fall Risk  Goal: Patient will remain free from falls  6/7/2023 0158 by Noemí Davis V, R.N.  Outcome: Progressing  Note: Generalized weakness. At risk for fall. Precautions in place.

## 2023-06-07 NOTE — PROGRESS NOTES
Gave report to Neuroscience GUANACO Delarosa discussed. Informed this said RN about pt needing help with care coordination.
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
## 2023-06-07 NOTE — DISCHARGE PLANNING
Received Choice Form @: 1257  Agency/ Facility Name: Home Health  Referral Sent per Choice Form @: Not sent as there are no orders or F2F      Received Choice Form @: 1257  Agency/ Facility Name[facility]  Referral Sent per Choice Form @: 1441

## 2023-06-07 NOTE — PROGRESS NOTES
4 Eyes Skin Assessment Completed by KENJI Dowd and KENJI Barry.    Head WDL  Ears WDL  Nose WDL  Mouth WDL  Neck WDL  Breast/Chest WDL  Shoulder Blades WDL  Spine WDL, old healed scar  (R) Arm/Elbow/Hand Bruising  (L) Arm/Elbow/Hand Bruising  Abdomen WDL  Groin WDL  Scrotum/Coccyx/Buttocks Redness and Blanching Bruising  (R) Leg Bruising, discoloration   (L) Leg Bruising, discoloration  (R) Heel/Foot/Toe WDL  (L) Heel/Foot/Toe Bruising          Devices In Places SCD's      Interventions In Place Pillows and Pressure Redistribution Mattress    Possible Skin Injury No    Pictures Uploaded Into Epic N/A  Wound Consult Placed N/A  RN Wound Prevention Protocol Ordered No

## 2023-06-07 NOTE — FACE TO FACE
Face to Face Supporting Documentation - Home Health    The encounter with this patient was in whole or in part the primary reason for home health admission.    Date of encounter:   Patient:                    MRN:                       YOB: 2024  Patti Tello  [id number]  1965     Home health to see patient for:  Skilled Nursing care for assessment, interventions & education, Physical Therapy evaluation and treatment, and Occupational therapy evaluation and treatment    Skilled need for:  New Onset Medical Diagnosis seizures    Skilled nursing interventions to include:  Comment: medication management, physical and occupational therapy    Homebound status evidenced by:  Needs the assistance of another person in order to leave the home. Leaving home requires a considerable and taxing effort. There is a normal inability to leave the home.    Community Physician to provide follow up care: Alfred Hameed P.A.-C.     Optional Interventions? No      I certify the face to face encounter for this home health care referral meets the CMS requirements and the encounter/clinical assessment with the patient was, in whole, or in part, for the medical condition(s) listed above, which is the primary reason for home health care. Based on my clinical findings: the service(s) are medically necessary, support the need for home health care, and the homebound criteria are met.  I certify that this patient has had a face to face encounter by myself.  Johan Marley M.D. - NPI: [id number]

## 2023-06-07 NOTE — PROGRESS NOTES
Monitor Summary: SR 71-87, TX 0.14, QRS 0.06, QT 0.39, with PVCs per strip from monitor room.

## 2023-06-07 NOTE — RESPIRATORY CARE
, BOBBY COPD EDUCATION by COPD CLINICAL EDUCATOR  6/7/2023 at 9:55 AM by Fabi Morris RRT     Patient reviewed by COPD education team. Patient does not have a history or diagnosis of COPD and is a non-smoker (never)  Therefore, patient does not qualify for the COPD program. She has a 4/19/2018: PFT FEV1-91, FEV1/FVC Ratio-89-normal. Has Asthma and see's [facility]

## 2023-06-08 ENCOUNTER — PHARMACY VISIT (OUTPATIENT)
Dept: PHARMACY | Facility: MEDICAL CENTER | Age: 58
End: 2023-06-08
Payer: COMMERCIAL

## 2023-06-08 VITALS
OXYGEN SATURATION: 97 % | HEART RATE: 68 BPM | DIASTOLIC BLOOD PRESSURE: 68 MMHG | HEIGHT: 65 IN | SYSTOLIC BLOOD PRESSURE: 118 MMHG | RESPIRATION RATE: 18 BRPM | BODY MASS INDEX: 24.76 KG/M2 | WEIGHT: 148.59 LBS | TEMPERATURE: 98.1 F

## 2023-06-08 LAB
BACTERIA CSF CULT: NORMAL
GRAM STN SPEC: NORMAL
SIGNIFICANT IND 70042: NORMAL
SITE SITE: NORMAL
SOURCE SOURCE: NORMAL

## 2023-06-08 PROCEDURE — RXMED WILLOW AMBULATORY MEDICATION CHARGE: Performed by: STUDENT IN AN ORGANIZED HEALTH CARE EDUCATION/TRAINING PROGRAM

## 2023-06-08 PROCEDURE — A9270 NON-COVERED ITEM OR SERVICE: HCPCS | Performed by: STUDENT IN AN ORGANIZED HEALTH CARE EDUCATION/TRAINING PROGRAM

## 2023-06-08 PROCEDURE — A9270 NON-COVERED ITEM OR SERVICE: HCPCS | Performed by: HOSPITALIST

## 2023-06-08 PROCEDURE — 99239 HOSP IP/OBS DSCHRG MGMT >30: CPT | Performed by: STUDENT IN AN ORGANIZED HEALTH CARE EDUCATION/TRAINING PROGRAM

## 2023-06-08 PROCEDURE — 700111 HCHG RX REV CODE 636 W/ 250 OVERRIDE (IP): Performed by: HOSPITALIST

## 2023-06-08 PROCEDURE — 700102 HCHG RX REV CODE 250 W/ 637 OVERRIDE(OP): Performed by: INTERNAL MEDICINE

## 2023-06-08 PROCEDURE — A9270 NON-COVERED ITEM OR SERVICE: HCPCS | Performed by: INTERNAL MEDICINE

## 2023-06-08 PROCEDURE — 700102 HCHG RX REV CODE 250 W/ 637 OVERRIDE(OP): Performed by: HOSPITALIST

## 2023-06-08 PROCEDURE — 700102 HCHG RX REV CODE 250 W/ 637 OVERRIDE(OP): Performed by: STUDENT IN AN ORGANIZED HEALTH CARE EDUCATION/TRAINING PROGRAM

## 2023-06-08 RX ORDER — LACOSAMIDE 50 MG/1
50 TABLET ORAL 2 TIMES DAILY
Qty: 60 TABLET | Refills: 0 | Status: SHIPPED | OUTPATIENT
Start: 2023-06-08 | End: 2023-07-08

## 2023-06-08 RX ADMIN — OXYCODONE HYDROCHLORIDE 10 MG: 10 TABLET ORAL at 09:52

## 2023-06-08 RX ADMIN — DICYCLOMINE HYDROCHLORIDE 20 MG: 10 CAPSULE ORAL at 05:16

## 2023-06-08 RX ADMIN — SERTRALINE 100 MG: 100 TABLET, FILM COATED ORAL at 05:16

## 2023-06-08 RX ADMIN — DICYCLOMINE HYDROCHLORIDE 20 MG: 10 CAPSULE ORAL at 10:29

## 2023-06-08 RX ADMIN — LACOSAMIDE 50 MG: 50 TABLET, FILM COATED ORAL at 05:16

## 2023-06-08 RX ADMIN — SUCRALFATE 1 G: 1 TABLET ORAL at 05:16

## 2023-06-08 RX ADMIN — FENTANYL CITRATE 100 MCG: 50 INJECTION, SOLUTION INTRAMUSCULAR; INTRAVENOUS at 00:31

## 2023-06-08 RX ADMIN — SUCRALFATE 1 G: 1 TABLET ORAL at 10:29

## 2023-06-08 RX ADMIN — OXYCODONE HYDROCHLORIDE 10 MG: 10 TABLET ORAL at 05:16

## 2023-06-08 RX ADMIN — DOCUSATE SODIUM 50 MG AND SENNOSIDES 8.6 MG 2 TABLET: 8.6; 5 TABLET, FILM COATED ORAL at 05:16

## 2023-06-08 ASSESSMENT — PAIN DESCRIPTION - PAIN TYPE
TYPE: CHRONIC PAIN

## 2023-06-08 NOTE — PROGRESS NOTES
"Hospital Medicine Daily Progress Note    Date of Service  6/7/2023    Chief Complaint  Patti Tello is a 57 y.o. female admitted 6/5/2023 with altered level of consciousness    Hospital Course    As per HPI    \"Patti Tello is a 57 y.o. female who presented 6/5/2023 with altered mental status.  Mrs. Tello has a past medical history of chronic pain syndrome on chronic codon as well as anxiety on Xanax and breast cancer that apparently has been in her usual state of health and was brought in this morning by paramedics because of agitation and altered mental status.  Apparently the  had conveyed to them that she had been somewhat depressed because of a family member's death though did not endorse suicidal ideation.  In the emergency room she has had extensive work-up including CT of the head, chest x-ray, labs and these were revealing for white count 13,000 with a lactic acidosis though no clear source of infection.  I called her , Nic at [phone] tried my best to obtain a history but unfortunately because of slurred speech I am unable to really understand anything he says.  Apparently the ER nurse and ER doctor had the same problem in person.  Effectively she is extremely agitated encephalopathic despite Haldol, Versed, Ativan, Geodon, and fentanyl.  She is in four-point restraints.  She will be admitted in guarded condition to the [facility] on a Precedex drip we will consider a lumbar puncture once we get her stabilized.\"    Patient admitted lumbar puncture that did not show any acute abnormalities.  She found to have hyponatremia and I started her on D5W and ordered every 4 hours sodium checks.  Lactic acid is trending down.      Interval Problem Update  No acute events overnight, no noted seizure activity.  Start vimpat today for hx seizures per neurology.  Patient's encephalopathy has resolved, she appears back to baseline mentation.  Discussed following up with her neurologist for dosage " tapering as well as following up with her PCP to manage her bipolar disorder. Will continue sertraline for now although discussed this is not ideal as it may precipitate manic episodes. She denies any recent waldemar.  CSF labs reviewed, no signs of infection.  DMV form filled out, discussed no driving for patient.  Home health ordered.  Anticipate discharge home tomorrow if continuing to improve clinically without noted seizure activity.      I have discussed this patient's plan of care and discharge plan at IDT rounds today with Case Management, Nursing, Nursing leadership, and other members of the IDT team.    Consultants/Specialty  neurology    Code Status  Full Code    Disposition  The patient is not medically cleared for discharge to home or a post-acute facility.  Anticipate discharge to: home with organized home healthcare and close outpatient follow-up    I have placed the appropriate orders for post-discharge needs.    Review of Systems  Review of Systems   Constitutional:  Positive for malaise/fatigue. Negative for chills, fever and weight loss.   HENT:  Negative for hearing loss and tinnitus.    Eyes:  Negative for blurred vision, double vision, photophobia and pain.   Respiratory:  Negative for cough, sputum production and shortness of breath.    Cardiovascular:  Negative for chest pain, palpitations, orthopnea and leg swelling.   Gastrointestinal:  Negative for abdominal pain, constipation, diarrhea, nausea and vomiting.   Genitourinary:  Negative for dysuria, frequency and urgency.   Musculoskeletal:  Positive for myalgias. Negative for back pain, joint pain and neck pain.   Skin:  Negative for rash.   Neurological:  Positive for seizures. Negative for dizziness, tingling, tremors, sensory change, speech change, focal weakness and headaches.   Psychiatric/Behavioral:  Negative for hallucinations and substance abuse.    All other systems reviewed and are negative.       Physical Exam  Temp:  [36.3 °C  (97.3 °F)-36.7 °C (98.1 °F)] 36.3 °C (97.3 °F)  Pulse:  [61-67] 67  Resp:  [18-19] 18  BP: (108-138)/(57-88) 136/88  SpO2:  [94 %-99 %] 99 %    Physical Exam  Vitals reviewed.   Constitutional:       General: She is not in acute distress.     Appearance: She is ill-appearing.   HENT:      Head: Normocephalic and atraumatic.      Nose: No congestion.   Eyes:      General:         Right eye: No discharge.         Left eye: No discharge.      Pupils: Pupils are equal, round, and reactive to light.   Cardiovascular:      Rate and Rhythm: Normal rate and regular rhythm.      Pulses: Normal pulses.      Heart sounds: Normal heart sounds. No murmur heard.  Pulmonary:      Effort: Pulmonary effort is normal. No respiratory distress.      Breath sounds: Normal breath sounds. No stridor.   Abdominal:      General: Bowel sounds are normal. There is no distension.      Palpations: Abdomen is soft.      Tenderness: There is no abdominal tenderness.   Musculoskeletal:         General: No swelling or tenderness. Normal range of motion.      Cervical back: Normal range of motion. No rigidity.   Skin:     General: Skin is warm.      Capillary Refill: Capillary refill takes less than 2 seconds.      Coloration: Skin is not jaundiced or pale.      Findings: No bruising.   Neurological:      General: No focal deficit present.      Mental Status: She is alert and oriented to person, place, and time.      Cranial Nerves: No cranial nerve deficit.      Comments: She is moving all her extremities.   Psychiatric:         Mood and Affect: Mood normal.         Behavior: Behavior normal.         Fluids    Intake/Output Summary (Last 24 hours) at 6/7/2023 1803  Last data filed at 6/7/2023 1429  Gross per 24 hour   Intake 680 ml   Output --   Net 680 ml       Laboratory  Recent Labs     06/05/23  0610 06/06/23  0320   WBC 13.0* 11.8*   RBC 5.18 4.54   HEMOGLOBIN 14.1 12.6   HEMATOCRIT 45.4 39.1   MCV 87.6 86.1   MCH 27.2 27.8   MCHC 31.1* 32.2    RDW 46.6 46.2   PLATELETCT 300 275   MPV 10.8 11.1     Recent Labs     06/05/23  0600 06/06/23  0320 06/06/23  1400   SODIUM 144 148* 142   POTASSIUM 3.7 3.8  --    CHLORIDE 106 113*  --    CO2 22 23  --    GLUCOSE 105* 106*  --    BUN 10 12  --    CREATININE 0.69 0.57  --    CALCIUM 9.6 9.8  --      Recent Labs     06/05/23  1200   INR 1.08               Imaging  DX-CHEST-PORTABLE (1 VIEW)   Final Result      No acute cardiac or pulmonary abnormalities are identified.      CT-HEAD W/O   Final Result         1.  No acute intracranial abnormality.   2.  Left frontal scalp hematoma              Assessment/Plan  * Encephalopathy acute- (present on admission)  Assessment & Plan  Severe  She had been given haldol, ativan, versed, fentanyl, and geodon and remains very agitated.  Clinically this is most consistent with drug overdose or drug withdrawal though meningitis is in the differential diagnosis.  Her  was not able to convey a history except that she has been depressed.  Consider lumbar puncture if she does not improve by this afternoon which will need to be done with conscious sedation.  Neuro checks q4 hours  Ammonia and CT head negative.  Started on vimpat for hx seizures  resolved    Hypernatremia  Assessment & Plan  She found to have hypernatremia.  Resolved  Eating normally, can stop IV fluids      Narcotic dependence (HCC)- (present on admission)  Assessment & Plan  She is on oxycodone as an outpatient  She is not safe to take oral meds  IV fentanyl (morphine allergy) as indicated  Continue monitor closely in IMCU.  Urine drug screen is positive with benzodiazepine and oxycodone.  I resume her outpatient medication for pain and anxiety.    Lactic acidosis- (present on admission)  Assessment & Plan  Lactic acid is quite elevated at 3.9  Lactic acid still elevated but trending down.  It is not clear if this is due to infection or SIRS  IV fluids and trend.  CPK slightly elevated.  Continue IV fluid  administration.  Ordered repeat lactic acid.    Bipolar affective disorder, currently depressed, moderate (HCC)- (present on admission)  Assessment & Plan  Consider psychiatry consult once she is more lucid.    Chronic pain syndrome- (present on admission)  Assessment & Plan  Continue outpatient pain medications           VTE prophylaxis: enoxaparin ppx    I have performed a physical exam and reviewed and updated ROS and Plan today (6/7/2023). In review of yesterday's note (6/6/2023), there are no changes except as documented above.

## 2023-06-08 NOTE — DOCUMENTATION QUERY
"                                                                         Formerly [facility], later [facility]                                                                       Query Response Note      PATIENT:               SHELL BRICEÑO  ACCT #:                  [id number]  MRN:                     [id number]  :                      1965  ADMIT DATE:       2023 5:41 AM  DISCH DATE:        2023 11:26 AM  RESPONDING  PROVIDER #:        [id number]           QUERY TEXT:    \"Encephalopathy, clinically this is most consistent with drug overdose or drug withdrawal\" is documented in the H&P and Progress Notes.     Can this documentation be further specified?    The patient's Clinical Indicators include:    H&P: Encephalopathy, lactic acidosis, narcotic dependence, bipolar  - agitated encephalopathic despite Haldol, Versed, Ativan, Geodon, & Fentanyl  - clinically consistent w/ drug overdose or drug withdrawal    CC PN: Metabolic encephalopathy unclear. Withdrawal opioids vs ingestion other drugs.    CT-Head: No acute intracranial abnormality. Left frontal scalp hematoma  Ammonia: 24; CPK: 197; Lactic Acid: 3.9 -> 2.6 -> 2.7; WBC: 13.0  UDS: POS - Benzodiazepines; Oxycodone  Blood Culture's x 2: NEG; UC: NEG; CSF: NEG       Sodium: 148 -> 142; Chloride: 113; Alkaline Phos: 149; WBC: 11.8    HM PN: Hypernatremia can contribute for encephalopathy. Encephalopathy now resolved.  Neurology Consult: Seizure management, begin slow up-titrate Lamotrigine again.    Treatment:   IV Fluid bolus/infusion; Neurology consult; LP; 4 point restraints; lab/imaging   IV Ativan/Haldol/Fentanyl/Precedex/Versed/Rocephin; IM Geodon; PO Vimpat    Risk Factors: Seizure hx; oxycodone dependence; acidosis; hypernatremia; polypharmacy     Thank You,  Abida Huerta RN  Clinical    Connect via Youtuo  Options provided:   -- Encephalopathy due to drug overdose, (Please specify drug)   -- Encephalopathy due to drug " withdrawal, (Please specify drug)   -- Other type of encephalopathy, (Please specify type)   -- Other explanation, (please specify other explanation)   -- Unable to determine      Query created by: Abida Huerta on 6/8/2023 8:42 AM    RESPONSE TEXT:    Other explanation - encephalopathy due to seizure          Electronically signed by:  KAREN BARTLETT MD 6/8/2023 2:44 PM

## 2023-06-08 NOTE — DISCHARGE INSTRUCTIONS
Altered Mental Status  Altered mental status most often refers to an abnormal change in your responsiveness and awareness. It can affect your speech, thought, mobility, memory, attention span, or alertness. It can range from slight confusion to complete unresponsiveness (coma). Altered mental status can be a sign of a serious underlying medical condition. Rapid evaluation and medical treatment is necessary for patients having an altered mental status.  CAUSES   Low blood sugar (hypoglycemia) or diabetes.  Severe loss of body fluids (dehydration) or a body salt (electrolyte) imbalance.  A stroke or other neurologic problem, such as dementia or delirium.  A head injury or tumor.  A drug or alcohol overdose.  Exposure to toxins or poisons.  Depression, anxiety, and stress.  A low oxygen level (hypoxia).  An infection.  Blood loss.  Twitching or shaking (seizure).  Heart problems, such as heart attack or heart rhythm problems (arrhythmias).  A body temperature that is too low or too high (hypothermia or hyperthermia).  DIAGNOSIS   A diagnosis is based on your history, symptoms, physical and neurologic examinations, and diagnostic tests. Diagnostic tests may include:  Measurement of your blood pressure, pulse, breathing, and oxygen levels (vital signs).  Blood tests.  Urine tests.  X-ray exams.  A computerized magnetic scan (magnetic resonance imaging, MRI).  A computerized X-ray scan (computed tomography, CT scan).  TREATMENT   Treatment will depend on the cause. Treatment may include:  Management of an underlying medical or mental health condition.  Critical care or support in the hospital.  HOME CARE INSTRUCTIONS   Only take over-the-counter or prescription medicines for pain, discomfort, or fever as directed by your caregiver.  Manage underlying conditions as directed by your caregiver.  Eat a healthy, well-balanced diet to maintain strength.  Join a support group or prevention program to cope with the condition or  trauma that caused the altered mental status. Ask your caregiver to help choose a program that works for you.  Follow up with your caregiver for further examination, therapy, or testing as directed.  SEEK MEDICAL CARE IF:   You feel unwell or have chills.  You or your family notice a change in your behavior or your alertness.  You have trouble following your caregiver's treatment plan.  You have questions or concerns.  SEEK IMMEDIATE MEDICAL CARE IF:   You have a rapid heartbeat or have chest pain.  You have difficulty breathing.  You have a fever.  You have a headache with a stiff neck.  You cough up blood.  You have blood in your urine or stool.  You have severe agitation or confusion.  MAKE SURE YOU:   Understand these instructions.  Will watch your condition.  Will get help right away if you are not doing well or get worse.     This information is not intended to replace advice given to you by your health care provider. Make sure you discuss any questions you have with your health care provider.     Document Released: 06/07/2011 Document Revised: 03/11/2013 Document Reviewed: 02/11/2016  [employer] Interactive Patient Education ©2016 [employer]  Fall Prevention in the Home, Adult  Falls can cause injuries. They can happen to people of all ages. There are many things you can do to make your home safe and to help prevent falls. Ask for help when making these changes, if needed.  What actions can I take to prevent falls?  General Instructions  Use good lighting in all rooms. Replace any light bulbs that burn out.  Turn on the lights when you go into a dark area. Use night-lights.  Keep items that you use often in easy-to-reach places. Lower the shelves around your home if necessary.  Set up your furniture so you have a clear path. Avoid moving your furniture around.  Do not have throw rugs and other things on the floor that can make you trip.  Avoid walking on wet floors.  If any of your floors are uneven, fix  them.  Add color or contrast paint or tape to clearly alfred and help you see:  Any grab bars or handrails.  First and last steps of stairways.  Where the edge of each step is.  If you use a stepladder:  Make sure that it is fully opened. Do not climb a closed stepladder.  Make sure that both sides of the stepladder are locked into place.  Ask someone to hold the stepladder for you while you use it.  If there are any pets around you, be aware of where they are.  What can I do in the bathroom?         Keep the floor dry. Clean up any water that spills onto the floor as soon as it happens.  Remove soap buildup in the tub or shower regularly.  Use non-skid mats or decals on the floor of the tub or shower.  Attach bath mats securely with double-sided, non-slip rug tape.  If you need to sit down in the shower, use a plastic, non-slip stool.  Install grab bars by the toilet and in the tub and shower. Do not use towel bars as grab bars.  What can I do in the bedroom?  Make sure that you have a light by your bed that is easy to reach.  Do not use any sheets or blankets that are too big for your bed. They should not hang down onto the floor.  Have a firm chair that has side arms. You can use this for support while you get dressed.  What can I do in the kitchen?  Clean up any spills right away.  If you need to reach something above you, use a strong step stool that has a grab bar.  Keep electrical cords out of the way.  Do not use floor polish or wax that makes floors slippery. If you must use wax, use non-skid floor wax.  What can I do with my stairs?  Do not leave any items on the stairs.  Make sure that you have a light switch at the top of the stairs and the bottom of the stairs. If you do not have them, ask someone to add them for you.  Make sure that there are handrails on both sides of the stairs, and use them. Fix handrails that are broken or loose. Make sure that handrails are as long as the stairways.  Install  non-slip stair treads on all stairs in your home.  Avoid having throw rugs at the top or bottom of the stairs. If you do have throw rugs, attach them to the floor with carpet tape.  Choose a carpet that does not hide the edge of the steps on the stairway.  Check any carpeting to make sure that it is firmly attached to the stairs. Fix any carpet that is loose or worn.  What can I do on the outside of my home?  Use bright outdoor lighting.  Regularly fix the edges of walkways and driveways and fix any cracks.  Remove anything that might make you trip as you walk through a door, such as a raised step or threshold.  Trim any bushes or trees on the path to your home.  Regularly check to see if handrails are loose or broken. Make sure that both sides of any steps have handrails.  Install guardrails along the edges of any raised decks and porches.  Clear walking paths of anything that might make someone trip, such as tools or rocks.  Have any leaves, snow, or ice cleared regularly.  Use sand or salt on walking paths during winter.  Clean up any spills in your garage right away. This includes grease or oil spills.  What other actions can I take?  Wear shoes that:  Have a low heel. Do not wear high heels.  Have rubber bottoms.  Are comfortable and fit you well.  Are closed at the toe. Do not wear open-toe sandals.  Use tools that help you move around (mobility aids) if they are needed. These include:  Canes.  Walkers.  Scooters.  Crutches.  Review your medicines with your doctor. Some medicines can make you feel dizzy. This can increase your chance of falling.  Ask your doctor what other things you can do to help prevent falls.  Where to find more information  Centers for Disease Control and Prevention, STEADI: https://cdc.gov  National [employer] Aging: https://mh7xibf.shanika.nih.gov  Contact a doctor if:  You are afraid of falling at home.  You feel weak, drowsy, or dizzy at home.  You fall at home.  Summary  There are many  simple things that you can do to make your home safe and to help prevent falls.  Ways to make your home safe include removing tripping hazards and installing grab bars in the bathroom.  Ask for help when making these changes in your home.  This information is not intended to replace advice given to you by your health care provider. Make sure you discuss any questions you have with your health care provider.  Document Released: 10/14/2010 Document Revised: 04/09/2020 Document Reviewed: 08/02/2018  Elsevier Patient Education © 2020 [employer]

## 2023-06-08 NOTE — PROGRESS NOTES
Pt with transport to the discharge lounge.   Pt has all belongings.   Med's to beds are being prepared by the pharmacy.

## 2023-06-08 NOTE — DISCHARGE PLANNING
Agency/Facility Name: [facility]  Spoke To: Carlee  Outcome: DPA received call from agency. Per Carlee, agency does not accept pts type of Medicare.  DPA sent to next on the choice form

## 2023-06-08 NOTE — PROGRESS NOTES
DC instructions reviewed w/ pt, verbalized understanding. PIV dc'd, armband removed. Meds to bed delivered.

## 2023-06-08 NOTE — CARE PLAN
Problem: Knowledge Deficit - Standard  Goal: Patient and family/care givers will demonstrate understanding of plan of care, disease process/condition, diagnostic tests and medications  Outcome: Progressing     Problem: Physical Regulation  Goal: Diagnostic test results will improve  Outcome: Progressing     Problem: Pain - Standard  Goal: Alleviation of pain or a reduction in pain to the patient’s comfort goal  Outcome: Progressing     Problem: Skin Integrity  Goal: Skin integrity is maintained or improved  Outcome: Progressing     Problem: Fall Risk  Goal: Patient will remain free from falls  Outcome: Progressing     Problem: Discharge Barriers/Planning  Goal: Patient's continuum of care needs are met  Outcome: Progressing  Flowsheets (Taken 6/7/2023 2040)  Continuum of Care Needs:   Assessed for discharge barriers   Communicated discharge barriers to interdisciplinary tream   Involved patient/family/support system in discharge process   Collaborated with Case Management/   Provided and explained discharge instructions   The patient is Stable - Low risk of patient condition declining or worsening    Shift Goals  Clinical Goals: Home with home xavier  Patient Goals: D/C  Family Goals: chery    Progress made toward(s) clinical / shift goals:  improving    Patient is not progressing towards the following goals:

## 2023-06-08 NOTE — DISCHARGE PLANNING
Actual Discharge Information     Discharge Disposition: D/T to home under [facility]HA care in anticipation of covered skilled care (06)     Patient accepted with [facility]     Pt  will transport pt home.     IMM explained, pt signed, copy provided and original faxed to [facility] for scanning.     LSW gave pt RTC paper work for transport to appointments.     No other CM needs at this time.

## 2023-06-08 NOTE — PROGRESS NOTES
Monitor summary: SR 71-85, MT 0.15, QRS 0.07, QT 0.38, with rare-occasional PVCs, trigem and PACs with PSVT up to 190s per strip from monitor room.

## 2023-06-08 NOTE — DISCHARGE SUMMARY
Discharge Summary    CHIEF COMPLAINT ON ADMISSION  Chief Complaint   Patient presents with    ALOC       Reason for Admission  EMS     Admission Date  6/5/2023    CODE STATUS  Full Code    HPI & HOSPITAL COURSE  Patti Tello is a 57 y.o. female who presented 6/5/2023 with altered mental status.  Mrs. Tello has a past medical history of chronic pain syndrome on chronic opiates as well as anxiety on Xanax and breast cancer that apparently has been in her usual state of health and was brought in this morning by paramedics because of agitation and altered mental status.  Apparently the  had conveyed to them that she had been somewhat depressed because of a family member's death though did not endorse suicidal ideation.  In the emergency room she has had extensive work-up including CT of the head, chest x-ray, labs and these were revealing for white count 13,000 with a lactic acidosis though no clear source of infection. Patient was admitted for agitation and altered mental status. She required Haldol, Versed, Ativan, Geodon, and fentanyl; and ultimately admitted to the IMCU for precedex drip. She underwent lumbar puncture showing normal results. Patient with history of seizures, not on any seizure medication currently. She is started on vimpat per neurology recommendations. Patient's encephalopathy resolved. She is back to baseline mentation and doing well. At this time suspect patient's presentation due to drug overdose/withdrawal vs seizure. She is to follow up with PCP and neurology. She is prescribed 30 days of medication on discharge. Home health ordered for patient as well.      Therefore, she is discharged in fair and stable condition to home with organized home healthcare and close outpatient follow-up.    The patient met 2-midnight criteria for an inpatient stay at the time of discharge.    Discharge Date  6/8/2023    FOLLOW UP ITEMS POST DISCHARGE  Take medications as prescribed.  Follow up with  neurology and PCP.    DISCHARGE DIAGNOSES  Principal Problem:    Encephalopathy acute (POA: Yes)  Active Problems:    Chronic pain syndrome (POA: Yes)    History of gastric bypass (POA: Yes)    Bipolar affective disorder, currently depressed, moderate (HCC) (POA: Yes)    History of diabetes mellitus (POA: Yes)    Lactic acidosis (POA: Yes)    Narcotic dependence (HCC) (POA: Yes)    Hypernatremia (POA: Unknown)  Resolved Problems:    * No resolved hospital problems. *      FOLLOW UP  No future appointments.  [address]  [place] [address]  [phone]          MEDICATIONS ON DISCHARGE     Medication List        START taking these medications        Instructions   lacosamide 50 MG Tabs tablet  Commonly known as: VIMPAT   Take 1 Tablet by mouth 2 times a day for 30 days.  Dose: 50 mg            CONTINUE taking these medications        Instructions   alprazolam 2 MG tablet  Commonly known as: XANAX   Take 1 mg by mouth 3 times a day as needed for Anxiety.  Dose: 1 mg     dicyclomine 10 MG Caps  Commonly known as: BENTYL   Take 20 mg by mouth 4 Times a Day,Before Meals and at Bedtime.  Dose: 20 mg     ibuprofen 600 MG Tabs  Commonly known as: MOTRIN   Take 600 mg by mouth every 6 hours as needed. Indications: Pain  Dose: 600 mg     Movantik 12.5 MG Tabs  Generic drug: Naloxegol Oxalate   Take 12.5 mg by mouth 1 time a day as needed. Indications: Opioid-Induced Constipation  Dose: 12.5 mg     ondansetron 4 MG Tbdp  Commonly known as: ZOFRAN ODT   Take 4 mg by mouth every 12 hours as needed for Nausea/Vomiting.  Dose: 4 mg     oxyCODONE immediate release 10 MG immediate release tablet  Commonly known as: ROXICODONE   Take 10 mg by mouth 5 Times a Day.  Dose: 10 mg     sertraline 100 MG Tabs  Commonly known as: Zoloft   Take 100 mg by mouth every day.  Dose: 100 mg     sucralfate 1 GM Tabs  Commonly known as: CARAFATE   Take 1 g by mouth 4 Times a Day,Before Meals and at Bedtime.  Dose: 1  g            STOP taking these medications      lamoTRIgine 25 MG Tabs  Commonly known as: LAMICTAL              Allergies  Allergies   Allergen Reactions    Buprenorphine-Naloxone Hives, Shortness of Breath and Swelling    Gabapentin      seizures    Suboxone Hives, Shortness of Breath and Swelling    Morphine Rash     tachycardia    Nsaids Itching    Shrimp (Diagnostic) Itching    Contrast Media With Iodine [Iodine] Itching     Ok with benadryl       DIET  Orders Placed This Encounter   Procedures    Diet Order Diet: Cardiac     Standing Status:   Standing     Number of Occurrences:   1     Order Specific Question:   Diet:     Answer:   Cardiac [6]       ACTIVITY  As tolerated.  Weight bearing as tolerated    CONSULTATIONS  neurology    PROCEDURES  Lumbar puncture    LABORATORY  Lab Results   Component Value Date    SODIUM 142 06/06/2023    POTASSIUM 3.8 06/06/2023    CHLORIDE 113 (H) 06/06/2023    CO2 23 06/06/2023    GLUCOSE 106 (H) 06/06/2023    BUN 12 06/06/2023    CREATININE 0.57 06/06/2023    CREATININE 0.9 10/25/2005        Lab Results   Component Value Date    WBC 11.8 (H) 06/06/2023    HEMOGLOBIN 12.6 06/06/2023    HEMATOCRIT 39.1 06/06/2023    PLATELETCT 275 06/06/2023        Total time of the discharge process exceeds 36 minutes.

## 2023-06-08 NOTE — DISCHARGE PLANNING
Case Management Discharge Planning    Admission Date: 6/5/2023  GMLOS: 3.4  ALOS: 3    6-Clicks ADL Score:    6-Clicks Mobility Score:        Anticipated Discharge Dispo: Discharge Disposition: D/T to home under [facility] care in anticipation of covered skilled care (06)    DME Needed: No        Action(s) Taken: LSW met with pt at bedside to discuss DC plan. Pt has been accepted with [facility]. LSW received pt physical address ([address] [place], [place] [address] [id number]). Pt stated her  is able to pick her up at DC.     Escalations Completed: None    Medically Clear: Yes    Next Steps: This LSW will continue to assist Pt with discharge as needed.       Barriers to Discharge: None

## 2023-06-10 LAB
BACTERIA BLD CULT: NORMAL
BACTERIA BLD CULT: NORMAL
SIGNIFICANT IND 70042: NORMAL
SIGNIFICANT IND 70042: NORMAL
SITE SITE: NORMAL
SITE SITE: NORMAL
SOURCE SOURCE: NORMAL
SOURCE SOURCE: NORMAL

## 2023-06-21 ENCOUNTER — HOSPITAL ENCOUNTER (OUTPATIENT)
Facility: MEDICAL CENTER | Age: 58
End: 2023-06-21
Attending: INTERNAL MEDICINE
Payer: MEDICARE

## 2023-06-21 LAB
FORWARD REASON: SPWHY: NORMAL
FORWARDED TO LAB: SPWHR: NORMAL
SPECIMEN SENT (2ND): SPWT2: NORMAL
SPECIMEN SENT: SPWT1: NORMAL

## 2023-08-19 NOTE — ANESTHESIA PREPROCEDURE EVALUATION
Case: [id number] Date/Time: 04/08/22 1352    Procedure: REPAIR, HERNIA, VENTRAL, ROBOT-ASSISTED, USING DA FRANK XI    Location: [facility] OR  / SURGERY [facility]    Surgeons: Yousif Juarez M.D.      55yo F w/ hx of chronic neck and back pain on Oxycodone, COPD/Asthma (recently discharged from hospital w/ home O2 2L 24/7), chronic abdominal pain, lupus, GERD, marijuana use. NPO. Not on AC or BB. Hx of gastric bypass and spinal cord stimulator placement.    Relevant Problems   ANESTHESIA   (positive) TOMY (obstructive sleep apnea)      PULMONARY   (positive) Chronic obstructive pulmonary disease (HCC)   (positive) Mild intermittent asthma without complication   (positive) Moderate persistent asthma without complication      NEURO   (positive) History of diabetes mellitus      Other   (positive) Arthritis       Physical Exam    Airway   Mallampati: III  TM distance: >3 FB  Neck ROM: full       Cardiovascular - normal exam  Rhythm: regular  Rate: normal  (-) murmur     Dental   (+) upper dentures, lower dentures           Pulmonary - normal exam  Breath sounds clear to auscultation     Abdominal    Neurological - normal exam                 Anesthesia Plan    ASA 3   ASA physical status 3 criteria: COPD    Plan - general       Airway plan will be ETT          Induction: intravenous    Postoperative Plan: Postoperative administration of opioids is intended.    Pertinent diagnostic labs and testing reviewed    Informed Consent:    Anesthetic plan and risks discussed with patient.    Use of blood products discussed with: patient whom consented to blood products.         
Clear

## 2023-09-28 ENCOUNTER — OFFICE VISIT (OUTPATIENT)
Dept: URGENT CARE | Facility: CLINIC | Age: 58
End: 2023-09-28
Payer: MEDICARE

## 2023-09-28 VITALS
OXYGEN SATURATION: 97 % | BODY MASS INDEX: 30.08 KG/M2 | TEMPERATURE: 96.8 F | DIASTOLIC BLOOD PRESSURE: 62 MMHG | SYSTOLIC BLOOD PRESSURE: 106 MMHG | RESPIRATION RATE: 18 BRPM | WEIGHT: 159.3 LBS | HEART RATE: 69 BPM | HEIGHT: 61 IN

## 2023-09-28 DIAGNOSIS — J06.9 VIRAL URI WITH COUGH: ICD-10-CM

## 2023-09-28 DIAGNOSIS — R09.81 NASAL CONGESTION: ICD-10-CM

## 2023-09-28 LAB
FLUAV RNA SPEC QL NAA+PROBE: NEGATIVE
FLUBV RNA SPEC QL NAA+PROBE: NEGATIVE
RSV RNA SPEC QL NAA+PROBE: NEGATIVE
SARS-COV-2 RNA RESP QL NAA+PROBE: NEGATIVE

## 2023-09-28 PROCEDURE — 99213 OFFICE O/P EST LOW 20 MIN: CPT | Performed by: PHYSICIAN ASSISTANT

## 2023-09-28 PROCEDURE — 3074F SYST BP LT 130 MM HG: CPT | Performed by: PHYSICIAN ASSISTANT

## 2023-09-28 PROCEDURE — 3078F DIAST BP <80 MM HG: CPT | Performed by: PHYSICIAN ASSISTANT

## 2023-09-28 PROCEDURE — 0241U POCT CEPHEID COV-2, FLU A/B, RSV - PCR: CPT | Performed by: PHYSICIAN ASSISTANT

## 2023-09-28 RX ORDER — TOLTERODINE 4 MG/1
4 CAPSULE, EXTENDED RELEASE ORAL
COMMUNITY
Start: 2023-08-20

## 2023-09-28 ASSESSMENT — ENCOUNTER SYMPTOMS
FEVER: 0
SHORTNESS OF BREATH: 0
NAUSEA: 0
SORE THROAT: 1
CHILLS: 1
MYALGIAS: 1
WHEEZING: 0
VOMITING: 0
COUGH: 1
ABDOMINAL PAIN: 0
DIARRHEA: 0
SPUTUM PRODUCTION: 0

## 2023-09-28 ASSESSMENT — FIBROSIS 4 INDEX: FIB4 SCORE: 1.28

## 2023-09-28 NOTE — PROGRESS NOTES
Subjective:   Patti Tello  is a 58 y.o. female who presents for Coronavirus Screening (Pt has a sore throat, runny nose, chest congestion, cough, body aches x yesterday )      Other  This is a new problem. The current episode started yesterday. Associated symptoms include chills, congestion, coughing (mild), myalgias and a sore throat (mild). Pertinent negatives include no abdominal pain, fever, nausea, rash or vomiting.   Patient presents urgent care describing 24 to 48 hours of symptoms of sore throat, runny nose and congestion.  She describes congestion that is dropped to her chest as well as in her sinuses.  She notes history of left-sided sinus pressure and that has remained the same.  She complains of left-sided pressure in the ear which is also worsened recently.  She has a history of fairly chronic issues with her left ear and is pending surgery.  She has tubes in left TM that are pushing out and has been recommended for left-sided sinus surgery.  Patient denies fevers but has had some chills and body aches.  She notes mild cough and mild irritation to throat.  She denies nausea vomiting abdominal pain diarrhea or rash.  Notes history of asthma and COPD has been taking inhalers without an increase in need.  She notes past medical history with COVID x2.  The first time with COVID she got significantly ill, the second time she was treated with an antiviral which helped.  Her son had COVID 2 weeks ago and she had been in proximity.    Review of Systems   Constitutional:  Positive for chills. Negative for fever.   HENT:  Positive for congestion, ear pain and sore throat (mild).    Respiratory:  Positive for cough (mild). Negative for sputum production, shortness of breath and wheezing.    Gastrointestinal:  Negative for abdominal pain, diarrhea, nausea and vomiting.   Musculoskeletal:  Positive for myalgias.   Skin:  Negative for rash.       Allergies   Allergen Reactions    Buprenorphine-Naloxone Hives,  "Shortness of Breath and Swelling    Gabapentin      seizures    Suboxone Hives, Shortness of Breath and Swelling    Morphine Rash     tachycardia    Nsaids Itching    Shrimp (Diagnostic) Itching    Contrast Media With Iodine [Iodine] Itching     Ok with benadryl        Objective:   /62 (BP Location: Right arm, Patient Position: Sitting, BP Cuff Size: Adult)   Pulse 69   Temp 36 °C (96.8 °F) (Temporal)   Resp 18   Ht 1.549 m (5' 1\")   Wt 72.3 kg (159 lb 4.8 oz)   LMP 10/05/1999   SpO2 97%   BMI 30.10 kg/m²     Physical Exam  Vitals and nursing note reviewed.   Constitutional:       General: She is not in acute distress.     Appearance: She is well-developed. She is not diaphoretic.   HENT:      Head: Normocephalic and atraumatic.      Right Ear: Tympanic membrane, ear canal and external ear normal. Tympanic membrane is not erythematous.      Left Ear: Tympanic membrane, ear canal and external ear normal. No drainage. A PE tube (in cerumen debris of EAC) is present. Tympanic membrane is not erythematous.      Nose: Nose normal.      Mouth/Throat:      Mouth: Mucous membranes are moist.      Pharynx: Uvula midline. Posterior oropharyngeal erythema ( mild PND) present. No oropharyngeal exudate.      Tonsils: No tonsillar abscesses.   Eyes:      General: Lids are normal. No scleral icterus.        Right eye: No discharge.         Left eye: No discharge.      Conjunctiva/sclera: Conjunctivae normal.   Pulmonary:      Effort: Pulmonary effort is normal. No respiratory distress.      Breath sounds: Normal breath sounds. No stridor. No decreased breath sounds, wheezing, rhonchi or rales.   Musculoskeletal:         General: Normal range of motion.      Cervical back: Neck supple.   Skin:     General: Skin is warm and dry.      Coloration: Skin is not pale.      Findings: No erythema.   Neurological:      Mental Status: She is alert and oriented to person, place, and time. She is not disoriented.   Psychiatric:   "       Speech: Speech normal.         Behavior: Behavior normal.     Point-of-care test for COVID, RSV and influenza is NEGATIVE    Assessment/Plan:   1. Viral URI with cough  - POCT CoV-2, Flu A/B, RSV by PCR    2. Nasal congestion  - POCT CoV-2, Flu A/B, RSV by PCR    Other orders  - tolterodine ER (DETROL-LA) 4 MG CAPSULE SR 24 HR; Take 4 mg by mouth every day.  Supportive care is reviewed with patient/caregiver - recommend to push PO fluids and electrolytes, Nsaids/tylenol, netti pot/saline irrig, humidifier in home, recommend continued use of antihistamines for congestion pushing from sinuses to ears.  No evidence of ear infection, follow-up with ENT per their recommendations  Return to clinic with lack of resolution or progression of symptoms.e  ER precautions with any worsening symptoms are reviewed with patient/caregiver and they do express understanding      I have worn an N95 mask, gloves and eye protection for the entire encounter with this patient.     Differential diagnosis, natural history, supportive care, and indications for immediate follow-up discussed.

## 2023-11-10 ENCOUNTER — OFFICE VISIT (OUTPATIENT)
Dept: URGENT CARE | Facility: CLINIC | Age: 58
End: 2023-11-10
Payer: MEDICARE

## 2023-11-10 ENCOUNTER — HOSPITAL ENCOUNTER (OUTPATIENT)
Facility: MEDICAL CENTER | Age: 58
End: 2023-11-10
Attending: PHYSICIAN ASSISTANT
Payer: MEDICARE

## 2023-11-10 VITALS
SYSTOLIC BLOOD PRESSURE: 112 MMHG | WEIGHT: 160.3 LBS | HEIGHT: 60 IN | TEMPERATURE: 98.8 F | BODY MASS INDEX: 31.47 KG/M2 | DIASTOLIC BLOOD PRESSURE: 70 MMHG | OXYGEN SATURATION: 98 % | RESPIRATION RATE: 20 BRPM | HEART RATE: 66 BPM

## 2023-11-10 DIAGNOSIS — N39.0 URINARY TRACT INFECTION WITHOUT HEMATURIA, SITE UNSPECIFIED: ICD-10-CM

## 2023-11-10 LAB
APPEARANCE UR: CLEAR
BILIRUB UR STRIP-MCNC: NORMAL MG/DL
COLOR UR AUTO: YELLOW
FORWARD REASON: SPWHY: NORMAL
FORWARDED TO LAB: SPWHR: NORMAL
GLUCOSE UR STRIP.AUTO-MCNC: NORMAL MG/DL
KETONES UR STRIP.AUTO-MCNC: NORMAL MG/DL
LEUKOCYTE ESTERASE UR QL STRIP.AUTO: NORMAL
NITRITE UR QL STRIP.AUTO: POSITIVE
PH UR STRIP.AUTO: 5.5 [PH] (ref 5–8)
PROT UR QL STRIP: NORMAL MG/DL
RBC UR QL AUTO: NORMAL
SP GR UR STRIP.AUTO: 1.03
SPECIMEN SENT: SPWT1: NORMAL
UROBILINOGEN UR STRIP-MCNC: 0.2 MG/DL

## 2023-11-10 PROCEDURE — 81002 URINALYSIS NONAUTO W/O SCOPE: CPT | Performed by: PHYSICIAN ASSISTANT

## 2023-11-10 PROCEDURE — 3078F DIAST BP <80 MM HG: CPT | Performed by: PHYSICIAN ASSISTANT

## 2023-11-10 PROCEDURE — 99213 OFFICE O/P EST LOW 20 MIN: CPT | Performed by: PHYSICIAN ASSISTANT

## 2023-11-10 PROCEDURE — 3074F SYST BP LT 130 MM HG: CPT | Performed by: PHYSICIAN ASSISTANT

## 2023-11-10 RX ORDER — CEFDINIR 300 MG/1
300 CAPSULE ORAL 2 TIMES DAILY
Qty: 10 CAPSULE | Refills: 0 | Status: SHIPPED | OUTPATIENT
Start: 2023-11-10 | End: 2023-11-15

## 2023-11-10 ASSESSMENT — ENCOUNTER SYMPTOMS
NAUSEA: 0
MYALGIAS: 0
ABDOMINAL PAIN: 0
DIARRHEA: 0
FEVER: 1
CHILLS: 1
HEADACHES: 0
WEAKNESS: 0
DIAPHORESIS: 0
FLANK PAIN: 0
BACK PAIN: 1
VOMITING: 0
DIZZINESS: 0

## 2023-11-10 ASSESSMENT — FIBROSIS 4 INDEX: FIB4 SCORE: 1.28

## 2023-11-10 NOTE — PROGRESS NOTES
Subjective:     CHIEF COMPLAINT  Chief Complaint   Patient presents with    Back Pain     X2DAYS FLANK PAIN/CHILLS/FEVERS/     HPI:  This is a very pleasant 58-year-old female presenting to the clinic with UTI-like symptoms x2 days.  Patient has been experiencing dysuria, urinary frequency and urgency.  Also experiencing intermittent low back pain without radiation.  Believes she was running a fever last night.  Also feeling slightly chilled last night.  No nausea or vomiting.  Believes her symptoms feel similar to previous UTIs.  Has been maintaining adequate oral intake.      REVIEW OF SYSTEMS  Review of Systems   Constitutional:  Positive for chills and fever (subjective). Negative for diaphoresis and malaise/fatigue.   Gastrointestinal:  Negative for abdominal pain, diarrhea, nausea and vomiting.   Genitourinary:  Positive for dysuria, frequency and urgency. Negative for flank pain and hematuria.   Musculoskeletal:  Positive for back pain. Negative for myalgias.   Skin:  Negative for rash.   Neurological:  Negative for dizziness, weakness and headaches.       PAST MEDICAL HISTORY  Patient Active Problem List    Diagnosis Date Noted    Hypernatremia 06/06/2023    Lactic acidosis 06/05/2023    Encephalopathy acute 06/05/2023    Narcotic dependence (HCC) 06/05/2023    Hernia of abdominal wall 04/08/2022    Memory change 06/02/2021    RLQ abdominal pain 06/02/2021    Musculoskeletal strain 06/02/2021    Acute suppurative otitis media of left ear without spontaneous rupture of tympanic membrane 05/17/2021    Thrush 09/24/2020    Slow transit constipation 08/04/2020    Dysphasia 08/04/2020    Poor balance 05/26/2020    Nausea 05/26/2020    Non-smoker 04/25/2020    Mild intermittent asthma without complication 04/25/2020    Cough 03/03/2020    Allergic rhinitis 03/03/2020    Moderate persistent asthma without complication 01/16/2020    Other dysphagia 12/09/2019    Esophageal stricture 12/09/2019    Burn 07/03/2019     Malodorous urine 07/03/2019    Chronic obstructive pulmonary disease (HCC) 07/02/2019    Abscess 06/04/2019    Allergic conjunctivitis of both eyes 05/15/2019    Hyperparathyroidism (HCC) 04/11/2019    Benign paroxysmal positional vertigo due to bilateral vestibular disorder 04/11/2019    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    Elevated PTHrP level 10/09/2018    Vitiligo 09/27/2018    Vitamin B12 deficiency 09/27/2018    Abnormal laboratory test 09/25/2018    TOMY (obstructive sleep apnea) 09/17/2018    History of diabetes mellitus 09/04/2018    Positive NAKUL (antinuclear antibody) 07/24/2018    Arthritis, multiple joint involvement 07/24/2018    Family history of systemic lupus erythematosus (SLE) in mother 07/24/2018    Iron deficiency anemia 07/24/2018    Skin lesions 07/24/2018    Chronic fatigue 06/20/2018    Heartburn 05/08/2018    Muscle spasm of both lower legs 03/26/2018    Calcaneal spur of both feet 03/15/2018    Pain management contract broken 03/15/2018    Seasonal allergies 10/05/2017    Bipolar affective disorder, currently depressed, moderate ([facility]) 01/24/2017    History of gastric bypass 10/11/2013    Chronic pain syndrome 09/16/2013    Anxiety 12/06/2012    Lumbar stenosis 10/22/2012    Multilevel degenerative disc disease 10/01/2012    Cervical radiculopathy, chronic 05/04/2011    Vitamin D deficiency 11/16/2010    Arthritis     Panic attacks     PTSD (post-traumatic stress disorder)     Insomnia due to medical condition 11/10/2010       SURGICAL HISTORY   has a past surgical history that includes mastectomy; breast reconstruction; US-CYST ASPIRATION-BREAST INITIAL; fusion, spine, lumbar, plif (10/22/2012); lumbar laminectomy diskectomy (10/22/2012); ureteroscopy (10/10/2013); lasertripsy (10/10/2013); laminotomy; Sleeve,Chance Vaso Thigh; tonsillectomy; Ventilator - Continuous; primary c section; abdominal hysterectomy total; hysterectomy laparoscopy; other abdominal surgery (2006); other abdominal  surgery; craniotomy; other orthopedic surgery (10/01/2012); other; bladder sling female (N/A, 11/4/2020); cystoscopy (N/A, 11/4/2020); implant neurostim/ (3/11/2021); and ventral hernia repair robotic xi (N/A, 4/8/2022).    ALLERGIES  Allergies   Allergen Reactions    Buprenorphine-Naloxone Hives, Shortness of Breath and Swelling    Gabapentin      seizures    Suboxone Hives, Shortness of Breath and Swelling    Morphine Rash     tachycardia    Nsaids Itching    Shrimp (Diagnostic) Itching    Contrast Media With Iodine [Iodine] Itching     Ok with benadryl       CURRENT MEDICATIONS  Home Medications       Reviewed by Juan Petersen P.A.-C. (Physician Assistant) on 11/10/23 at 1441  Med List Status: <None>     Medication Last Dose Status   alprazolam (XANAX) 2 MG tablet Taking Active   dicyclomine (BENTYL) 10 MG Cap Not Taking Active   ibuprofen (MOTRIN) 600 MG Tab Not Taking Active   Naloxegol Oxalate (MOVANTIK) 12.5 MG Tab Not Taking Active   ondansetron (ZOFRAN ODT) 4 MG TABLET DISPERSIBLE Taking Active   oxyCODONE immediate release (ROXICODONE) 10 MG immediate release tablet Taking Active   sertraline (ZOLOFT) 100 MG Tab Taking Active   sucralfate (CARAFATE) 1 GM Tab Not Taking Active   tolterodine ER (DETROL-LA) 4 MG CAPSULE SR 24 HR Taking Active                    SOCIAL HISTORY  Social History     Tobacco Use    Smoking status: Never    Smokeless tobacco: Never   Vaping Use    Vaping Use: Never used   Substance and Sexual Activity    Alcohol use: No    Drug use: Not Currently     Comment: Gummys for pain    Sexual activity: Not Currently       FAMILY HISTORY  Family History   Problem Relation Age of Onset    Diabetes Father     Cancer Father     Hypertension Father     Hyperlipidemia Father     Cancer Mother     Other Mother         SLE    Diabetes Mother     Hypertension Mother     Hyperlipidemia Mother     Stroke Mother     Sleep Apnea Brother     Cancer Maternal Aunt     Diabetes Maternal Aunt      Diabetes Maternal Uncle     Diabetes Maternal Grandmother     Diabetes Maternal Grandfather           Objective:     VITAL SIGNS: /70 (BP Location: Right arm, Patient Position: Sitting)   Pulse 66   Temp 37.1 °C (98.8 °F) (Temporal)   Resp 20   Ht 1.524 m (5')   Wt 72.7 kg (160 lb 4.8 oz)   LMP 10/05/1999   SpO2 98%   BMI 31.31 kg/m²     PHYSICAL EXAM  Physical Exam  Constitutional:       Appearance: Normal appearance.   HENT:      Head: Normocephalic and atraumatic.   Eyes:      Conjunctiva/sclera: Conjunctivae normal.   Pulmonary:      Effort: Pulmonary effort is normal.   Abdominal:      General: Bowel sounds are normal.      Palpations: Abdomen is soft.      Tenderness: There is no abdominal tenderness. There is no right CVA tenderness, left CVA tenderness, guarding or rebound.   Musculoskeletal:      Cervical back: Normal range of motion.   Neurological:      General: No focal deficit present.      Mental Status: She is alert and oriented to person, place, and time. Mental status is at baseline.       Lab Results/POC Test Results   Results for orders placed or performed in visit on 11/10/23   POCT Urinalysis   Result Value Ref Range    POC Color yellow Negative    POC Appearance clear Negative    POC Glucose neg Negative mg/dL    POC Bilirubin neg Negative mg/dL    POC Ketones neg Negative mg/dL    POC Specific Gravity 1.030 <1.005 - >1.030    POC Blood trace Negative    POC Urine PH 5.5 5.0 - 8.0    POC Protein neg Negative mg/dL    POC Urobiligen 0.2 Negative (0.2) mg/dL    POC Nitrites positive Negative    POC Leukocyte Esterase trace Negative           Assessment/Plan:     1. Urinary tract infection without hematuria, site unspecified  - POCT Urinalysis  - URINE CULTURE(NEW); Future  - cefdinir (OMNICEF) 300 MG Cap; Take 1 Capsule by mouth 2 times a day for 5 days.  Dispense: 10 Capsule; Refill: 0      MDM/Comments:    - Pt educated on preventative measures for avoiding future UTIs  - Advised  to increase fluid intake  - OTC Pyridium (Azo) for symptomatic relief, advised that it will turn urine orange in color  - Pending urine culture  - ER precautions given regarding pyelonephritis including fevers greater than 101 and, vomiting and dehydration, increased back pain.      Differential diagnosis, natural history, supportive care, and indications for immediate follow-up discussed. All questions answered. Patient agrees with the plan of care.    Follow-up as needed if symptoms worsen or fail to improve to PCP, Urgent care or Emergency Room.    I have personally reviewed prior external notes and test results pertinent to today's visit.  I have independently reviewed and interpreted all diagnostics ordered during this urgent care acute visit.   Discussed management options (risks,benefits, and alternatives to treatment). Pt expresses understanding and the treatment plan was agreed upon. Questions were encouraged and answered to pt's satisfaction.    Please note that this dictation was created using voice recognition software. I have made a reasonable attempt to correct obvious errors, but I expect that there are errors of grammar and possibly content that I did not discover before finalizing the note.

## 2023-11-14 LAB
BACTERIA UR CULT: ABNORMAL
BACTERIA UR CULT: ABNORMAL
OTHER ANTIBIOTIC SUSC ISLT: ABNORMAL

## 2023-11-15 DIAGNOSIS — N39.0 URINARY TRACT INFECTION WITHOUT HEMATURIA, SITE UNSPECIFIED: ICD-10-CM

## 2023-11-15 RX ORDER — SULFAMETHOXAZOLE AND TRIMETHOPRIM 800; 160 MG/1; MG/1
1 TABLET ORAL 2 TIMES DAILY
Qty: 10 TABLET | Refills: 0 | Status: SHIPPED | OUTPATIENT
Start: 2023-11-15 | End: 2023-11-20

## 2024-09-11 NOTE — ED NOTES
Medication: amlodipine passed protocol.   Last office visit date: 4/18/24  Next appointment scheduled?: Yes   Number of refills given: one year   Report to Aneta PHILLIP. NS continue to floor.

## 2024-10-10 PROBLEM — E61.1 IRON DEFICIENCY: Status: ACTIVE | Noted: 2024-10-10

## 2024-10-15 ENCOUNTER — HOSPITAL ENCOUNTER (EMERGENCY)
Facility: MEDICAL CENTER | Age: 59
End: 2024-10-15
Attending: EMERGENCY MEDICINE
Payer: MEDICARE

## 2024-10-15 ENCOUNTER — APPOINTMENT (OUTPATIENT)
Dept: RADIOLOGY | Facility: MEDICAL CENTER | Age: 59
End: 2024-10-15
Attending: EMERGENCY MEDICINE
Payer: MEDICARE

## 2024-10-15 VITALS
TEMPERATURE: 97.3 F | SYSTOLIC BLOOD PRESSURE: 108 MMHG | DIASTOLIC BLOOD PRESSURE: 56 MMHG | BODY MASS INDEX: 29.44 KG/M2 | HEIGHT: 62 IN | WEIGHT: 160 LBS | RESPIRATION RATE: 14 BRPM | HEART RATE: 76 BPM | OXYGEN SATURATION: 93 %

## 2024-10-15 DIAGNOSIS — S16.1XXA STRAIN OF NECK MUSCLE, INITIAL ENCOUNTER: ICD-10-CM
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
## 2024-10-15 DIAGNOSIS — S09.90XA CLOSED HEAD INJURY, INITIAL ENCOUNTER: ICD-10-CM

## 2024-10-15 LAB
ALBUMIN SERPL BCP-MCNC: 4.3 G/DL (ref 3.2–4.9)
ALBUMIN/GLOB SERPL: 1.2 G/DL
ALP SERPL-CCNC: 141 U/L (ref 30–99)
ALT SERPL-CCNC: 12 U/L (ref 2–50)
ANION GAP SERPL CALC-SCNC: 10 MMOL/L (ref 7–16)
AST SERPL-CCNC: 19 U/L (ref 12–45)
BASOPHILS # BLD AUTO: 0.7 % (ref 0–1.8)
BASOPHILS # BLD: 0.06 K/UL (ref 0–0.12)
BILIRUB SERPL-MCNC: 0.3 MG/DL (ref 0.1–1.5)
BUN SERPL-MCNC: 17 MG/DL (ref 8–22)
CALCIUM ALBUM COR SERPL-MCNC: 9.9 MG/DL (ref 8.5–10.5)
CALCIUM SERPL-MCNC: 10.1 MG/DL (ref 8.5–10.5)
CHLORIDE SERPL-SCNC: 108 MMOL/L (ref 96–112)
CO2 SERPL-SCNC: 23 MMOL/L (ref 20–33)
CREAT SERPL-MCNC: 0.84 MG/DL (ref 0.5–1.4)
EKG IMPRESSION: NORMAL
EOSINOPHIL # BLD AUTO: 0.16 K/UL (ref 0–0.51)
EOSINOPHIL NFR BLD: 1.9 % (ref 0–6.9)
ERYTHROCYTE [DISTWIDTH] IN BLOOD BY AUTOMATED COUNT: 46.5 FL (ref 35.9–50)
GFR SERPLBLD CREATININE-BSD FMLA CKD-EPI: 79 ML/MIN/1.73 M 2
GLOBULIN SER CALC-MCNC: 3.6 G/DL (ref 1.9–3.5)
GLUCOSE SERPL-MCNC: 106 MG/DL (ref 65–99)
HCT VFR BLD AUTO: 44.7 % (ref 37–47)
HGB BLD-MCNC: 14.1 G/DL (ref 12–16)
HOLDING TUBE BB 8507: NORMAL
IMM GRANULOCYTES # BLD AUTO: 0.02 K/UL (ref 0–0.11)
IMM GRANULOCYTES NFR BLD AUTO: 0.2 % (ref 0–0.9)
LYMPHOCYTES # BLD AUTO: 1.1 K/UL (ref 1–4.8)
LYMPHOCYTES NFR BLD: 13.3 % (ref 22–41)
MCH RBC QN AUTO: 26.5 PG (ref 27–33)
MCHC RBC AUTO-ENTMCNC: 31.5 G/DL (ref 32.2–35.5)
MCV RBC AUTO: 83.9 FL (ref 81.4–97.8)
MONOCYTES # BLD AUTO: 0.44 K/UL (ref 0–0.85)
MONOCYTES NFR BLD AUTO: 5.3 % (ref 0–13.4)
NEUTROPHILS # BLD AUTO: 6.51 K/UL (ref 1.82–7.42)
NEUTROPHILS NFR BLD: 78.6 % (ref 44–72)
NRBC # BLD AUTO: 0 K/UL
NRBC BLD-RTO: 0 /100 WBC (ref 0–0.2)
NT-PROBNP SERPL IA-MCNC: 85 PG/ML (ref 0–125)
PLATELET # BLD AUTO: 283 K/UL (ref 164–446)
PMV BLD AUTO: 10.9 FL (ref 9–12.9)
POTASSIUM SERPL-SCNC: 4.4 MMOL/L (ref 3.6–5.5)
PROT SERPL-MCNC: 7.9 G/DL (ref 6–8.2)
RBC # BLD AUTO: 5.33 M/UL (ref 4.2–5.4)
SODIUM SERPL-SCNC: 141 MMOL/L (ref 135–145)
TROPONIN T SERPL-MCNC: <6 NG/L (ref 6–19)
WBC # BLD AUTO: 8.3 K/UL (ref 4.8–10.8)

## 2024-10-15 PROCEDURE — 84484 ASSAY OF TROPONIN QUANT: CPT

## 2024-10-15 PROCEDURE — 70450 CT HEAD/BRAIN W/O DYE: CPT

## 2024-10-15 PROCEDURE — 36415 COLL VENOUS BLD VENIPUNCTURE: CPT

## 2024-10-15 PROCEDURE — 99285 EMERGENCY DEPT VISIT HI MDM: CPT

## 2024-10-15 PROCEDURE — A9270 NON-COVERED ITEM OR SERVICE: HCPCS | Performed by: EMERGENCY MEDICINE

## 2024-10-15 PROCEDURE — 96375 TX/PRO/DX INJ NEW DRUG ADDON: CPT

## 2024-10-15 PROCEDURE — 93005 ELECTROCARDIOGRAM TRACING: CPT | Performed by: EMERGENCY MEDICINE

## 2024-10-15 PROCEDURE — 71045 X-RAY EXAM CHEST 1 VIEW: CPT

## 2024-10-15 PROCEDURE — 93005 ELECTROCARDIOGRAM TRACING: CPT

## 2024-10-15 PROCEDURE — 80053 COMPREHEN METABOLIC PANEL: CPT

## 2024-10-15 PROCEDURE — 85025 COMPLETE CBC W/AUTO DIFF WBC: CPT

## 2024-10-15 PROCEDURE — 72125 CT NECK SPINE W/O DYE: CPT

## 2024-10-15 PROCEDURE — 96374 THER/PROPH/DIAG INJ IV PUSH: CPT

## 2024-10-15 PROCEDURE — 700102 HCHG RX REV CODE 250 W/ 637 OVERRIDE(OP): Performed by: EMERGENCY MEDICINE

## 2024-10-15 PROCEDURE — 700111 HCHG RX REV CODE 636 W/ 250 OVERRIDE (IP): Mod: JZ | Performed by: EMERGENCY MEDICINE

## 2024-10-15 PROCEDURE — 83880 ASSAY OF NATRIURETIC PEPTIDE: CPT

## 2024-10-15 RX ORDER — ALBUTEROL SULFATE 90 UG/1
2 INHALANT RESPIRATORY (INHALATION) EVERY 4 HOURS PRN
COMMUNITY

## 2024-10-15 RX ORDER — HYDROXYCHLOROQUINE SULFATE 200 MG/1
1 TABLET, FILM COATED ORAL EVERY EVENING
COMMUNITY

## 2024-10-15 RX ORDER — VIBEGRON 75 MG/1
75 TABLET, FILM COATED ORAL EVERY EVENING
COMMUNITY

## 2024-10-15 RX ORDER — ACETAMINOPHEN 325 MG/1
975 TABLET ORAL ONCE
Status: COMPLETED | OUTPATIENT
Start: 2024-10-15 | End: 2024-10-15

## 2024-10-15 RX ORDER — ONDANSETRON 2 MG/ML
4 INJECTION INTRAMUSCULAR; INTRAVENOUS ONCE
Status: COMPLETED | OUTPATIENT
Start: 2024-10-15 | End: 2024-10-15

## 2024-10-15 RX ORDER — RISPERIDONE 2 MG/1
1 TABLET ORAL
COMMUNITY

## 2024-10-15 RX ORDER — LAMOTRIGINE 100 MG/1
100 TABLET ORAL
COMMUNITY

## 2024-10-15 RX ORDER — HYDROMORPHONE HYDROCHLORIDE 1 MG/ML
0.5 INJECTION, SOLUTION INTRAMUSCULAR; INTRAVENOUS; SUBCUTANEOUS ONCE
Status: COMPLETED | OUTPATIENT
Start: 2024-10-15 | End: 2024-10-15

## 2024-10-15 RX ORDER — PROPRANOLOL HYDROCHLORIDE 40 MG/1
40 TABLET ORAL 2 TIMES DAILY
COMMUNITY

## 2024-10-15 RX ADMIN — HYDROMORPHONE HYDROCHLORIDE 0.5 MG: 1 INJECTION, SOLUTION INTRAMUSCULAR; INTRAVENOUS; SUBCUTANEOUS at 10:17

## 2024-10-15 RX ADMIN — ACETAMINOPHEN 975 MG: 325 TABLET ORAL at 13:00

## 2024-10-15 RX ADMIN — ONDANSETRON 4 MG: 2 INJECTION INTRAMUSCULAR; INTRAVENOUS at 10:07

## 2024-10-15 ASSESSMENT — FIBROSIS 4 INDEX: FIB4 SCORE: 1.4

## 2024-10-15 ASSESSMENT — PAIN DESCRIPTION - PAIN TYPE: TYPE: ACUTE PAIN;CHRONIC PAIN

## 2024-10-15 ASSESSMENT — LIFESTYLE VARIABLES: DO YOU DRINK ALCOHOL: NO

## 2024-10-24 ENCOUNTER — OUTPATIENT INFUSION SERVICES (OUTPATIENT)
Dept: ONCOLOGY | Facility: MEDICAL CENTER | Age: 59
End: 2024-10-24
Attending: INTERNAL MEDICINE
Payer: MEDICARE

## 2024-10-24 VITALS
HEIGHT: 61 IN | SYSTOLIC BLOOD PRESSURE: 109 MMHG | HEART RATE: 71 BPM | DIASTOLIC BLOOD PRESSURE: 65 MMHG | OXYGEN SATURATION: 96 % | RESPIRATION RATE: 18 BRPM | TEMPERATURE: 96.9 F | BODY MASS INDEX: 33.63 KG/M2 | WEIGHT: 178.13 LBS

## 2024-10-24 DIAGNOSIS — E61.1 IRON DEFICIENCY: ICD-10-CM

## 2024-10-24 LAB
FERRITIN SERPL-MCNC: 28.2 NG/ML (ref 10–291)
IRON SATN MFR SERPL: 13 % (ref 15–55)
IRON SERPL-MCNC: 47 UG/DL (ref 40–170)
TIBC SERPL-MCNC: 367 UG/DL (ref 250–450)
UIBC SERPL-MCNC: 320 UG/DL (ref 110–370)

## 2024-10-24 PROCEDURE — 700111 HCHG RX REV CODE 636 W/ 250 OVERRIDE (IP): Mod: JZ | Performed by: INTERNAL MEDICINE

## 2024-10-24 PROCEDURE — 83550 IRON BINDING TEST: CPT

## 2024-10-24 PROCEDURE — 82728 ASSAY OF FERRITIN: CPT

## 2024-10-24 PROCEDURE — 83540 ASSAY OF IRON: CPT

## 2024-10-24 PROCEDURE — 96374 THER/PROPH/DIAG INJ IV PUSH: CPT

## 2024-10-24 PROCEDURE — 96375 TX/PRO/DX INJ NEW DRUG ADDON: CPT

## 2024-10-24 PROCEDURE — 700105 HCHG RX REV CODE 258: Performed by: INTERNAL MEDICINE

## 2024-10-24 RX ORDER — METHYLPREDNISOLONE SODIUM SUCCINATE 125 MG/2ML
125 INJECTION, POWDER, LYOPHILIZED, FOR SOLUTION INTRAMUSCULAR; INTRAVENOUS PRN
Status: CANCELLED | OUTPATIENT
Start: 2024-10-24

## 2024-10-24 RX ORDER — METHYLPREDNISOLONE SODIUM SUCCINATE 125 MG/2ML
125 INJECTION, POWDER, LYOPHILIZED, FOR SOLUTION INTRAMUSCULAR; INTRAVENOUS PRN
Status: DISCONTINUED | OUTPATIENT
Start: 2024-10-24 | End: 2024-10-24

## 2024-10-24 RX ORDER — DIPHENHYDRAMINE HYDROCHLORIDE 50 MG/ML
50 INJECTION INTRAMUSCULAR; INTRAVENOUS PRN
Status: COMPLETED | OUTPATIENT
Start: 2024-10-24 | End: 2024-10-24

## 2024-10-24 RX ORDER — SODIUM CHLORIDE 9 MG/ML
INJECTION, SOLUTION INTRAVENOUS CONTINUOUS
Status: CANCELLED | OUTPATIENT
Start: 2024-10-24

## 2024-10-24 RX ORDER — EPINEPHRINE 1 MG/ML(1)
0.5 AMPUL (ML) INJECTION PRN
Status: CANCELLED | OUTPATIENT
Start: 2024-10-24

## 2024-10-24 RX ORDER — DIPHENHYDRAMINE HYDROCHLORIDE 50 MG/ML
50 INJECTION INTRAMUSCULAR; INTRAVENOUS PRN
Status: CANCELLED | OUTPATIENT
Start: 2024-10-24

## 2024-10-24 RX ADMIN — DIPHENHYDRAMINE HYDROCHLORIDE 50 MG: 50 INJECTION, SOLUTION INTRAMUSCULAR; INTRAVENOUS at 09:07

## 2024-10-24 RX ADMIN — SODIUM CHLORIDE 1000 MG: 9 INJECTION, SOLUTION INTRAVENOUS at 08:48

## 2024-10-24 RX ADMIN — METHYLPREDNISOLONE SODIUM SUCCINATE 125 MG: 125 INJECTION, POWDER, FOR SOLUTION INTRAMUSCULAR; INTRAVENOUS at 08:44

## 2024-10-24 ASSESSMENT — FIBROSIS 4 INDEX: FIB4 SCORE: 1.16

## 2024-10-25 RX ORDER — METHYLPREDNISOLONE SODIUM SUCCINATE 125 MG/2ML
125 INJECTION, POWDER, LYOPHILIZED, FOR SOLUTION INTRAMUSCULAR; INTRAVENOUS PRN
OUTPATIENT
Start: 2024-11-01

## 2024-10-25 RX ORDER — 0.9 % SODIUM CHLORIDE 0.9 %
VIAL (ML) INJECTION PRN
OUTPATIENT
Start: 2024-11-01

## 2024-10-25 RX ORDER — EPINEPHRINE 1 MG/ML(1)
0.5 AMPUL (ML) INJECTION PRN
OUTPATIENT
Start: 2024-11-01

## 2024-10-25 RX ORDER — DIPHENHYDRAMINE HYDROCHLORIDE 50 MG/ML
50 INJECTION INTRAMUSCULAR; INTRAVENOUS PRN
OUTPATIENT
Start: 2024-11-01

## 2024-10-25 RX ORDER — SODIUM CHLORIDE 9 MG/ML
INJECTION, SOLUTION INTRAVENOUS CONTINUOUS
OUTPATIENT
Start: 2024-11-01

## 2024-10-25 RX ORDER — 0.9 % SODIUM CHLORIDE 0.9 %
10 VIAL (ML) INJECTION PRN
OUTPATIENT
Start: 2024-11-01

## 2024-10-25 RX ORDER — 0.9 % SODIUM CHLORIDE 0.9 %
3 VIAL (ML) INJECTION PRN
OUTPATIENT
Start: 2024-11-01

## (undated) DEVICE — BLADE SURGICAL #11 - (50/BX)

## (undated) DEVICE — DRESSING TRANSPARENT FILM TEGADERM 4 X 4.75" (50EA/BX)"

## (undated) DEVICE — SLEEVE, VASO, THIGH, MED

## (undated) DEVICE — GOWN WARMING STANDARD FLEX - (30/CA)

## (undated) DEVICE — GLOVE BIOGEL SZ 7.5 SURGICAL PF LTX - (50PR/BX 4BX/CA)

## (undated) DEVICE — ROBOTIC SURGERY SERVICES

## (undated) DEVICE — BLADE SURGICAL #10 - (50/BX)

## (undated) DEVICE — SUTURE 2-0 VICRYL PLUS CT-1 - 8 X 18 INCH(12/BX)

## (undated) DEVICE — SUCTION INSTRUMENT YANKAUER BULBOUS TIP W/O VENT (50EA/CA)

## (undated) DEVICE — MASK ANESTHESIA ADULT  - (100/CA)

## (undated) DEVICE — WASH BABY HEAD-TO-TOE 1.7 OZ. - (144EA/CA)

## (undated) DEVICE — STYLETTE 6FR INFANT STERILE SINGEL ALUMINUM SUNSLIP SEALED IN PVC SHEATH (20EA/BX)

## (undated) DEVICE — DRAPE STRLE REG TOWEL 18X24 - (10/BX 4BX/CA)"

## (undated) DEVICE — OBTURATOR BLADELESS STANDARD 8MM (6EA/BX)

## (undated) DEVICE — SYSTEM CLEARIFY VISUALIZATION (10EA/PK)

## (undated) DEVICE — PAD SANITARY 11IN MAXI IND WRAPPED  (12EA/PK 24PK/CA)

## (undated) DEVICE — FORCEPS FENESTRATED BIPOLAR DA VINCI 10X'S REUSABLE

## (undated) DEVICE — PACK BREAST SM OR - (1EA/CA)

## (undated) DEVICE — SET LEADWIRE 5 LEAD BEDSIDE DISPOSABLE ECG (1SET OF 5/EA)

## (undated) DEVICE — PATIENT PROGRAMMER

## (undated) DEVICE — CANISTER SUCTION RIGID RED 1500CC (40EA/CA)

## (undated) DEVICE — SHEARS MONOPOLAR CURVED  DA VINCI 10X'S REUSABLE

## (undated) DEVICE — HUMID-VENT HEAT AND MOISTURE EXCHANGE- (50/BX)

## (undated) DEVICE — KIT SURGIFLO W/OUT THROMBIN - (6EA/CA)

## (undated) DEVICE — SET EXTENSION WITH 2 PORTS (48EA/CA) ***PART #2C8610 IS A SUBSTITUTE*****

## (undated) DEVICE — NEPTUNE 4 PORT MANIFOLD - (20/PK)

## (undated) DEVICE — HEAD HOLDER JUNIOR/ADULT

## (undated) DEVICE — SUTURE GENERAL

## (undated) DEVICE — IPG TEMPLATE

## (undated) DEVICE — CHLORAPREP 26 ML APPLICATOR - ORANGE TINT(25/CA)

## (undated) DEVICE — GLOVE BIOGEL PI INDICATOR SZ 7.5 SURGICAL PF LF -(50/BX 4BX/CA)

## (undated) DEVICE — SET TUBING PNEUMOCLEAR HIGH FLOW SMOKE EVACUATION (10EA/BX)

## (undated) DEVICE — PROTECTOR ULNA NERVE - (36PR/CA)

## (undated) DEVICE — SUTURE 2-0 8-18IN VCRL + ANTI - (12/BX)

## (undated) DEVICE — SUTURE 3-0 VICRYL PLUS CT-1 - 36 INCH (36/BX)

## (undated) DEVICE — DRAPE LARGE 3 QUARTER - (20/CA)

## (undated) DEVICE — DRESSING NON ADHERENT 3 X 4 - STERILE (100/BX 12BX/CA)

## (undated) DEVICE — TUBING CLEARLINK DUO-VENT - C-FLO (48EA/CA)

## (undated) DEVICE — SEAL 5MM-8MM UNIVERSAL  BOX OF 10

## (undated) DEVICE — SUTURE 2-0 30CM STRATAFIX SPIRAL PDO ***WAS PART #SXPD1B401 *****

## (undated) DEVICE — SYRINGE 10 ML CONTROL LL (25EA/BX 4BX/CA)

## (undated) DEVICE — SENSOR SPO2 ADULT LNCS ADTX (20/BX) ORDER ITEM #19593

## (undated) DEVICE — PACK MINOR BASIN - (2EA/CA)

## (undated) DEVICE — DRAPE C-ARM LARGE 41IN X 74 IN - (10/BX 2BX/CA)

## (undated) DEVICE — SUTURE 0 ETHIBOND CT-1 - (12/BX) 18 INCH

## (undated) DEVICE — ELECTRODE DUAL RETURN W/ CORD - (50/PK)

## (undated) DEVICE — NEEDLE DRIVER MEGA SUTURECUT DA VINCI 15X'S REUSABLE

## (undated) DEVICE — WATER IRRIGATION STERILE 1000ML (12EA/CA)

## (undated) DEVICE — CANISTER SUCTION 3000ML MECHANICAL FILTER AUTO SHUTOFF MEDI-VAC NONSTERILE LF DISP  (40EA/CA)

## (undated) DEVICE — WATER IRRIG. STER 3000 ML - (4/CA)

## (undated) DEVICE — TOWEL STOP TIMEOUT SAFETY FLAG (40EA/CA)

## (undated) DEVICE — LACTATED RINGERS INJ 1000 ML - (14EA/CA 60CA/PF)

## (undated) DEVICE — SUTURE 3-0 STRATAFIX SPIRAL PDS PLUS SH 15CM (12EA/BX)

## (undated) DEVICE — KIT ANESTHESIA W/CIRCUIT & 3/LT BAG W/FILTER (20EA/CA)

## (undated) DEVICE — Device

## (undated) DEVICE — SUTURE 0 VICRYL PLUS CT-2 - 27 INCH (36/BX)

## (undated) DEVICE — GLOVE BIOGEL PI INDICATOR SZ 7.0 SURGICAL PF LF - (50/BX 4BX/CA)

## (undated) DEVICE — DRAPE VAGINAL BIB W/ POUCH (10EA/CA)

## (undated) DEVICE — DRAPE IOBAN II INCISE 23X17 - (10EA/BX 4BX/CA)

## (undated) DEVICE — TUBE CONNECT SUCTION CLEAR 120 X 1/4" (50EA/CA)"

## (undated) DEVICE — NEEDLE INSFL 120MM 14GA VRRS - (20/BX)

## (undated) DEVICE — BRIEF STRETCH MATERNITY M/L - FITS 20-60IN (5EA/BG 20BG/CA)

## (undated) DEVICE — SUTURE 4-0 MONOCRYL PLUS PS-2 - 27 INCH (36/BX)

## (undated) DEVICE — TUNNELING TOOL

## (undated) DEVICE — COVER TIP ENDOWRIST HOT SHEAR - (10EA/BX) DA VINCI

## (undated) DEVICE — GLOVE BIOGEL SZ 8 SURGICAL PF LTX - (50PR/BX 4BX/CA)

## (undated) DEVICE — STOCKING KNEE HIGH MED. REG (12PR/BX)

## (undated) DEVICE — DERMABOND ADVANCED - (12EA/BX)

## (undated) DEVICE — DRAPE LAPAROTOMY T SHEET - (12EA/CA)

## (undated) DEVICE — BANDAGE ROLL STERILE BULKEE 4.5 IN X 4 YD (100EA/CA)

## (undated) DEVICE — SENSOR SPO2 NEO LNCS ADHESIVE (20/BX) SEE USER NOTES

## (undated) DEVICE — NEEDLE NON SAFETY HYPO 22 GA X 1 1/2 IN (100/BX)

## (undated) DEVICE — GUIDE TRACHE TUBE INTUBATING STYLET 5.0-10.0MM 14FR (20EA/PK)

## (undated) DEVICE — SET IRRIGATION CYSTOSCOPY TUBE L80 IN (20EA/CA)

## (undated) DEVICE — SODIUM CHL IRRIGATION 0.9% 1000ML (12EA/CA)

## (undated) DEVICE — ELECTRODE 850 FOAM ADHESIVE - HYDROGEL RADIOTRNSPRNT (50/PK)

## (undated) DEVICE — GLOVE BIOGEL PI INDICATOR SZ 6.5 SURGICAL PF LF - (50/BX 4BX/CA)

## (undated) DEVICE — CONTAINER, SPECIMEN, STERILE

## (undated) DEVICE — SUTURE 2-0 VICRYL PLUS CT-1 36 (36PK/BX)"

## (undated) DEVICE — CANNULA W/ SUPPLY TUBING O2 - (50/CA)

## (undated) DEVICE — BLADE SURGICAL #15 - (50/BX 3BX/CA)

## (undated) DEVICE — DRAPE ARM  BOX OF 20

## (undated) DEVICE — INTRAOP NEURO IN OR 1:1 PER 15 MIN

## (undated) DEVICE — SYRINGE LOSS OF RESIST. 50/BX - (50/CA)

## (undated) DEVICE — NEEDLE SPINAL NON-SAFETY 25GA X 3 (25EA/BX)"

## (undated) DEVICE — SYRINGE ASEPTO - (50EA/CA

## (undated) DEVICE — DRAPE COLUMN  BOX OF 20